# Patient Record
Sex: MALE | Race: WHITE | NOT HISPANIC OR LATINO | Employment: OTHER | URBAN - METROPOLITAN AREA
[De-identification: names, ages, dates, MRNs, and addresses within clinical notes are randomized per-mention and may not be internally consistent; named-entity substitution may affect disease eponyms.]

---

## 2017-01-01 ENCOUNTER — PATIENT OUTREACH (OUTPATIENT)
Dept: ADMINISTRATIVE | Facility: CLINIC | Age: 82
End: 2017-01-01
Payer: MEDICARE

## 2017-01-01 ENCOUNTER — TELEPHONE (OUTPATIENT)
Dept: INTERNAL MEDICINE | Facility: CLINIC | Age: 82
End: 2017-01-01

## 2017-01-01 ENCOUNTER — OFFICE VISIT (OUTPATIENT)
Dept: PULMONOLOGY | Facility: CLINIC | Age: 82
End: 2017-01-01
Payer: MEDICARE

## 2017-01-01 ENCOUNTER — LAB VISIT (OUTPATIENT)
Dept: LAB | Facility: HOSPITAL | Age: 82
End: 2017-01-01
Attending: PSYCHIATRY & NEUROLOGY
Payer: MEDICARE

## 2017-01-01 ENCOUNTER — PROCEDURE VISIT (OUTPATIENT)
Dept: PULMONOLOGY | Facility: CLINIC | Age: 82
End: 2017-01-01
Payer: MEDICARE

## 2017-01-01 ENCOUNTER — OFFICE VISIT (OUTPATIENT)
Dept: NEUROLOGY | Facility: CLINIC | Age: 82
End: 2017-01-01
Payer: MEDICARE

## 2017-01-01 ENCOUNTER — LAB VISIT (OUTPATIENT)
Dept: LAB | Facility: HOSPITAL | Age: 82
End: 2017-01-01
Attending: NUCLEAR MEDICINE
Payer: MEDICARE

## 2017-01-01 ENCOUNTER — TELEPHONE (OUTPATIENT)
Dept: CARDIOLOGY | Facility: CLINIC | Age: 82
End: 2017-01-01

## 2017-01-01 ENCOUNTER — TELEPHONE (OUTPATIENT)
Dept: PHARMACY | Facility: CLINIC | Age: 82
End: 2017-01-01

## 2017-01-01 ENCOUNTER — LAB VISIT (OUTPATIENT)
Dept: LAB | Facility: HOSPITAL | Age: 82
End: 2017-01-01
Attending: INTERNAL MEDICINE
Payer: MEDICARE

## 2017-01-01 ENCOUNTER — HOSPITAL ENCOUNTER (INPATIENT)
Facility: HOSPITAL | Age: 82
LOS: 4 days | Discharge: HOME | DRG: 189 | End: 2017-05-20
Attending: EMERGENCY MEDICINE | Admitting: INTERNAL MEDICINE
Payer: MEDICARE

## 2017-01-01 ENCOUNTER — TELEPHONE (OUTPATIENT)
Dept: PULMONOLOGY | Facility: CLINIC | Age: 82
End: 2017-01-01

## 2017-01-01 ENCOUNTER — OFFICE VISIT (OUTPATIENT)
Dept: CARDIOLOGY | Facility: CLINIC | Age: 82
End: 2017-01-01
Payer: MEDICARE

## 2017-01-01 ENCOUNTER — PATIENT OUTREACH (OUTPATIENT)
Dept: ADMINISTRATIVE | Facility: CLINIC | Age: 82
End: 2017-01-01

## 2017-01-01 ENCOUNTER — HOSPITAL ENCOUNTER (OUTPATIENT)
Dept: RADIOLOGY | Facility: HOSPITAL | Age: 82
Discharge: HOME | End: 2017-01-25
Attending: INTERNAL MEDICINE
Payer: MEDICARE

## 2017-01-01 ENCOUNTER — OFFICE VISIT (OUTPATIENT)
Dept: CARDIOLOGY | Facility: CLINIC | Age: 82
DRG: 291 | End: 2017-01-01
Payer: MEDICARE

## 2017-01-01 ENCOUNTER — IMMUNIZATION (OUTPATIENT)
Dept: INTERNAL MEDICINE | Facility: CLINIC | Age: 82
End: 2017-01-01
Payer: MEDICARE

## 2017-01-01 ENCOUNTER — TELEPHONE (OUTPATIENT)
Dept: HEMATOLOGY/ONCOLOGY | Facility: CLINIC | Age: 82
End: 2017-01-01

## 2017-01-01 ENCOUNTER — HOSPITAL ENCOUNTER (OUTPATIENT)
Dept: RADIOLOGY | Facility: HOSPITAL | Age: 82
Discharge: HOME | End: 2017-04-25
Attending: INTERNAL MEDICINE
Payer: MEDICARE

## 2017-01-01 ENCOUNTER — OFFICE VISIT (OUTPATIENT)
Dept: PODIATRY | Facility: CLINIC | Age: 82
End: 2017-01-01
Payer: MEDICARE

## 2017-01-01 ENCOUNTER — HOSPITAL ENCOUNTER (INPATIENT)
Facility: HOSPITAL | Age: 82
LOS: 5 days | Discharge: HOME | DRG: 871 | End: 2017-09-23
Attending: EMERGENCY MEDICINE | Admitting: FAMILY MEDICINE
Payer: MEDICARE

## 2017-01-01 ENCOUNTER — HOSPITAL ENCOUNTER (INPATIENT)
Facility: HOSPITAL | Age: 82
LOS: 2 days | Discharge: HOME | DRG: 871 | End: 2017-01-05
Attending: EMERGENCY MEDICINE | Admitting: INTERNAL MEDICINE
Payer: MEDICARE

## 2017-01-01 ENCOUNTER — HOSPITAL ENCOUNTER (INPATIENT)
Facility: HOSPITAL | Age: 82
LOS: 3 days | Discharge: HOME | DRG: 291 | End: 2017-07-06
Attending: EMERGENCY MEDICINE | Admitting: EMERGENCY MEDICINE
Payer: MEDICARE

## 2017-01-01 ENCOUNTER — HOSPITAL ENCOUNTER (INPATIENT)
Facility: HOSPITAL | Age: 82
LOS: 3 days | DRG: 291 | End: 2017-10-23
Attending: EMERGENCY MEDICINE | Admitting: INTERNAL MEDICINE
Payer: MEDICARE

## 2017-01-01 VITALS
RESPIRATION RATE: 18 BRPM | DIASTOLIC BLOOD PRESSURE: 64 MMHG | WEIGHT: 202 LBS | HEART RATE: 60 BPM | BODY MASS INDEX: 28.28 KG/M2 | SYSTOLIC BLOOD PRESSURE: 120 MMHG | HEIGHT: 71 IN | OXYGEN SATURATION: 94 %

## 2017-01-01 VITALS
SYSTOLIC BLOOD PRESSURE: 128 MMHG | OXYGEN SATURATION: 94 % | RESPIRATION RATE: 20 BRPM | WEIGHT: 190.5 LBS | TEMPERATURE: 98 F | HEIGHT: 71 IN | DIASTOLIC BLOOD PRESSURE: 67 MMHG | BODY MASS INDEX: 26.67 KG/M2 | HEART RATE: 90 BPM

## 2017-01-01 VITALS
DIASTOLIC BLOOD PRESSURE: 24 MMHG | OXYGEN SATURATION: 88 % | SYSTOLIC BLOOD PRESSURE: 57 MMHG | HEIGHT: 71 IN | HEART RATE: 76 BPM | BODY MASS INDEX: 28.58 KG/M2 | RESPIRATION RATE: 18 BRPM | TEMPERATURE: 101 F | WEIGHT: 204.13 LBS

## 2017-01-01 VITALS
OXYGEN SATURATION: 98 % | SYSTOLIC BLOOD PRESSURE: 115 MMHG | WEIGHT: 186.69 LBS | HEIGHT: 71 IN | RESPIRATION RATE: 20 BRPM | DIASTOLIC BLOOD PRESSURE: 62 MMHG | TEMPERATURE: 98 F | BODY MASS INDEX: 26.14 KG/M2 | HEART RATE: 75 BPM

## 2017-01-01 VITALS
RESPIRATION RATE: 18 BRPM | HEIGHT: 71 IN | SYSTOLIC BLOOD PRESSURE: 104 MMHG | HEART RATE: 73 BPM | WEIGHT: 191 LBS | TEMPERATURE: 98 F | OXYGEN SATURATION: 94 % | DIASTOLIC BLOOD PRESSURE: 53 MMHG | BODY MASS INDEX: 26.74 KG/M2

## 2017-01-01 VITALS
BODY MASS INDEX: 28.55 KG/M2 | HEART RATE: 72 BPM | SYSTOLIC BLOOD PRESSURE: 120 MMHG | HEIGHT: 71 IN | WEIGHT: 203.94 LBS | DIASTOLIC BLOOD PRESSURE: 60 MMHG

## 2017-01-01 VITALS
SYSTOLIC BLOOD PRESSURE: 80 MMHG | WEIGHT: 181.44 LBS | HEIGHT: 71 IN | DIASTOLIC BLOOD PRESSURE: 36 MMHG | BODY MASS INDEX: 25.4 KG/M2 | HEART RATE: 72 BPM

## 2017-01-01 VITALS — HEART RATE: 68 BPM | DIASTOLIC BLOOD PRESSURE: 56 MMHG | HEIGHT: 71 IN | SYSTOLIC BLOOD PRESSURE: 92 MMHG

## 2017-01-01 VITALS
HEART RATE: 67 BPM | SYSTOLIC BLOOD PRESSURE: 90 MMHG | WEIGHT: 181.44 LBS | BODY MASS INDEX: 25.4 KG/M2 | HEIGHT: 71 IN | DIASTOLIC BLOOD PRESSURE: 46 MMHG

## 2017-01-01 VITALS
RESPIRATION RATE: 20 BRPM | HEART RATE: 72 BPM | DIASTOLIC BLOOD PRESSURE: 68 MMHG | SYSTOLIC BLOOD PRESSURE: 90 MMHG | OXYGEN SATURATION: 83 % | BODY MASS INDEX: 26.91 KG/M2 | WEIGHT: 192.25 LBS | HEIGHT: 71 IN

## 2017-01-01 VITALS
HEART RATE: 77 BPM | TEMPERATURE: 98 F | DIASTOLIC BLOOD PRESSURE: 53 MMHG | WEIGHT: 172.81 LBS | RESPIRATION RATE: 20 BRPM | BODY MASS INDEX: 24.19 KG/M2 | HEIGHT: 71 IN | SYSTOLIC BLOOD PRESSURE: 96 MMHG | OXYGEN SATURATION: 94 %

## 2017-01-01 VITALS
SYSTOLIC BLOOD PRESSURE: 104 MMHG | HEART RATE: 96 BPM | WEIGHT: 188.06 LBS | BODY MASS INDEX: 26.33 KG/M2 | HEIGHT: 71 IN | DIASTOLIC BLOOD PRESSURE: 50 MMHG

## 2017-01-01 VITALS
OXYGEN SATURATION: 90 % | HEART RATE: 62 BPM | BODY MASS INDEX: 28.45 KG/M2 | HEIGHT: 71 IN | SYSTOLIC BLOOD PRESSURE: 120 MMHG | DIASTOLIC BLOOD PRESSURE: 60 MMHG | WEIGHT: 203.25 LBS

## 2017-01-01 DIAGNOSIS — G47.36 NOCTURNAL HYPOXEMIA DUE TO EMPHYSEMA: Chronic | ICD-10-CM

## 2017-01-01 DIAGNOSIS — I50.9 CONGESTIVE HEART FAILURE, UNSPECIFIED CONGESTIVE HEART FAILURE CHRONICITY, UNSPECIFIED CONGESTIVE HEART FAILURE TYPE: Primary | ICD-10-CM

## 2017-01-01 DIAGNOSIS — R06.02 SOB (SHORTNESS OF BREATH): ICD-10-CM

## 2017-01-01 DIAGNOSIS — I50.41 ACUTE COMBINED SYSTOLIC AND DIASTOLIC CONGESTIVE HEART FAILURE: Primary | ICD-10-CM

## 2017-01-01 DIAGNOSIS — J44.9 MODERATE COPD (CHRONIC OBSTRUCTIVE PULMONARY DISEASE): ICD-10-CM

## 2017-01-01 DIAGNOSIS — R60.0 LOCALIZED EDEMA: ICD-10-CM

## 2017-01-01 DIAGNOSIS — Z99.81 SUPPLEMENTAL OXYGEN DEPENDENT: ICD-10-CM

## 2017-01-01 DIAGNOSIS — K72.00 ACUTE LIVER FAILURE WITHOUT HEPATIC COMA: ICD-10-CM

## 2017-01-01 DIAGNOSIS — I50.43 ACUTE ON CHRONIC COMBINED SYSTOLIC AND DIASTOLIC CONGESTIVE HEART FAILURE: Primary | ICD-10-CM

## 2017-01-01 DIAGNOSIS — D69.6 THROMBOCYTOPENIA: Primary | Chronic | ICD-10-CM

## 2017-01-01 DIAGNOSIS — R73.9 HYPERGLYCEMIA, UNSPECIFIED: ICD-10-CM

## 2017-01-01 DIAGNOSIS — I50.41 ACUTE COMBINED SYSTOLIC AND DIASTOLIC HEART FAILURE: Primary | ICD-10-CM

## 2017-01-01 DIAGNOSIS — I50.23 ACUTE ON CHRONIC SYSTOLIC CHF (CONGESTIVE HEART FAILURE), NYHA CLASS 4: ICD-10-CM

## 2017-01-01 DIAGNOSIS — I27.20 PULMONARY HYPERTENSION: Chronic | ICD-10-CM

## 2017-01-01 DIAGNOSIS — J96.21 ACUTE ON CHRONIC RESPIRATORY FAILURE WITH HYPOXEMIA: Primary | ICD-10-CM

## 2017-01-01 DIAGNOSIS — R79.89 ELEVATED LACTIC ACID LEVEL: ICD-10-CM

## 2017-01-01 DIAGNOSIS — I25.10 CORONARY ARTERY DISEASE INVOLVING NATIVE CORONARY ARTERY OF NATIVE HEART WITHOUT ANGINA PECTORIS: Chronic | ICD-10-CM

## 2017-01-01 DIAGNOSIS — I50.41 ACUTE COMBINED SYSTOLIC AND DIASTOLIC HEART FAILURE: ICD-10-CM

## 2017-01-01 DIAGNOSIS — L90.9 FAT PAD ATROPHY OF FOOT: ICD-10-CM

## 2017-01-01 DIAGNOSIS — J96.11 CHRONIC RESPIRATORY FAILURE WITH HYPOXIA: Primary | Chronic | ICD-10-CM

## 2017-01-01 DIAGNOSIS — R57.0 CARDIOGENIC SHOCK: ICD-10-CM

## 2017-01-01 DIAGNOSIS — I50.22 CHRONIC SYSTOLIC CONGESTIVE HEART FAILURE: Chronic | ICD-10-CM

## 2017-01-01 DIAGNOSIS — N17.9 ACUTE RENAL FAILURE, UNSPECIFIED ACUTE RENAL FAILURE TYPE: ICD-10-CM

## 2017-01-01 DIAGNOSIS — I50.22 CHRONIC SYSTOLIC CONGESTIVE HEART FAILURE: Primary | Chronic | ICD-10-CM

## 2017-01-01 DIAGNOSIS — J96.21 ACUTE ON CHRONIC RESPIRATORY FAILURE WITH HYPOXIA: ICD-10-CM

## 2017-01-01 DIAGNOSIS — J43.9 NOCTURNAL HYPOXEMIA DUE TO EMPHYSEMA: Chronic | ICD-10-CM

## 2017-01-01 DIAGNOSIS — R60.9 EDEMA, UNSPECIFIED TYPE: ICD-10-CM

## 2017-01-01 DIAGNOSIS — J81.0 ACUTE PULMONARY EDEMA: ICD-10-CM

## 2017-01-01 DIAGNOSIS — J44.9 COPD (CHRONIC OBSTRUCTIVE PULMONARY DISEASE): ICD-10-CM

## 2017-01-01 DIAGNOSIS — J44.9 MODERATE COPD (CHRONIC OBSTRUCTIVE PULMONARY DISEASE): Primary | ICD-10-CM

## 2017-01-01 DIAGNOSIS — J44.1 CHRONIC OBSTRUCTIVE PULMONARY DISEASE WITH ACUTE EXACERBATION: ICD-10-CM

## 2017-01-01 DIAGNOSIS — J96.01 ACUTE RESPIRATORY FAILURE WITH HYPOXIA: ICD-10-CM

## 2017-01-01 DIAGNOSIS — R56.9 SEIZURES: Chronic | ICD-10-CM

## 2017-01-01 DIAGNOSIS — I50.41 ACUTE COMBINED SYSTOLIC AND DIASTOLIC CONGESTIVE HEART FAILURE: ICD-10-CM

## 2017-01-01 DIAGNOSIS — L85.3 XEROSIS CUTIS: ICD-10-CM

## 2017-01-01 DIAGNOSIS — I50.9 CONGESTIVE HEART FAILURE, UNSPECIFIED CONGESTIVE HEART FAILURE CHRONICITY, UNSPECIFIED CONGESTIVE HEART FAILURE TYPE: ICD-10-CM

## 2017-01-01 DIAGNOSIS — D69.6 THROMBOCYTOPENIA: Chronic | ICD-10-CM

## 2017-01-01 DIAGNOSIS — J44.0 CHRONIC OBSTRUCTIVE PULMONARY DISEASE WITH ACUTE LOWER RESPIRATORY INFECTION: ICD-10-CM

## 2017-01-01 DIAGNOSIS — R74.8 ELEVATION OF CARDIAC ENZYMES: ICD-10-CM

## 2017-01-01 DIAGNOSIS — I25.5 CARDIOMYOPATHY, ISCHEMIC: Chronic | ICD-10-CM

## 2017-01-01 DIAGNOSIS — I50.9 ACUTE CONGESTIVE HEART FAILURE, UNSPECIFIED CONGESTIVE HEART FAILURE TYPE: ICD-10-CM

## 2017-01-01 DIAGNOSIS — L84 CORN OR CALLUS: ICD-10-CM

## 2017-01-01 DIAGNOSIS — M62.469 GASTROCNEMIUS EQUINUS, UNSPECIFIED LATERALITY: ICD-10-CM

## 2017-01-01 DIAGNOSIS — I50.43 ACUTE ON CHRONIC COMBINED SYSTOLIC AND DIASTOLIC CONGESTIVE HEART FAILURE: ICD-10-CM

## 2017-01-01 DIAGNOSIS — B96.20 E. COLI URINARY TRACT INFECTION: ICD-10-CM

## 2017-01-01 DIAGNOSIS — I27.9 PULMONARY HEART DISEASE: Primary | ICD-10-CM

## 2017-01-01 DIAGNOSIS — J96.11 CHRONIC RESPIRATORY FAILURE WITH HYPOXIA: Chronic | ICD-10-CM

## 2017-01-01 DIAGNOSIS — R51.9 ACUTE NONINTRACTABLE HEADACHE, UNSPECIFIED HEADACHE TYPE: ICD-10-CM

## 2017-01-01 DIAGNOSIS — J44.9 CHRONIC OBSTRUCTIVE PULMONARY DISEASE, UNSPECIFIED COPD TYPE: ICD-10-CM

## 2017-01-01 DIAGNOSIS — I73.9 PERIPHERAL VASCULAR DISEASE: Primary | ICD-10-CM

## 2017-01-01 DIAGNOSIS — A41.9 SEVERE SEPSIS: ICD-10-CM

## 2017-01-01 DIAGNOSIS — R57.9 SHOCK: ICD-10-CM

## 2017-01-01 DIAGNOSIS — N39.0 E. COLI URINARY TRACT INFECTION: ICD-10-CM

## 2017-01-01 DIAGNOSIS — I35.0 NONRHEUMATIC AORTIC VALVE STENOSIS: ICD-10-CM

## 2017-01-01 DIAGNOSIS — I48.91 A-FIB: ICD-10-CM

## 2017-01-01 DIAGNOSIS — G40.909 SEIZURE DISORDER: Primary | ICD-10-CM

## 2017-01-01 DIAGNOSIS — R56.9 CONVULSIONS, UNSPECIFIED CONVULSION TYPE: Chronic | ICD-10-CM

## 2017-01-01 DIAGNOSIS — R06.02 SHORTNESS OF BREATH: ICD-10-CM

## 2017-01-01 DIAGNOSIS — I50.9 ACUTE CONGESTIVE HEART FAILURE, UNSPECIFIED CONGESTIVE HEART FAILURE TYPE: Primary | ICD-10-CM

## 2017-01-01 DIAGNOSIS — R65.20 SEVERE SEPSIS: ICD-10-CM

## 2017-01-01 DIAGNOSIS — J96.21 ACUTE ON CHRONIC RESPIRATORY FAILURE WITH HYPOXIA: Primary | ICD-10-CM

## 2017-01-01 DIAGNOSIS — A41.9 SEPSIS, DUE TO UNSPECIFIED ORGANISM: ICD-10-CM

## 2017-01-01 DIAGNOSIS — I27.9 PULMONARY HEART DISEASE: ICD-10-CM

## 2017-01-01 DIAGNOSIS — J43.9 PULMONARY EMPHYSEMA, UNSPECIFIED EMPHYSEMA TYPE: Primary | ICD-10-CM

## 2017-01-01 DIAGNOSIS — I27.21 PAH (PULMONARY ARTERY HYPERTENSION): ICD-10-CM

## 2017-01-01 DIAGNOSIS — R51.9 PERSISTENT HEADACHES: ICD-10-CM

## 2017-01-01 DIAGNOSIS — R09.02 HYPOXIA: Primary | ICD-10-CM

## 2017-01-01 DIAGNOSIS — M10.9 GOUT SYNOVITIS: ICD-10-CM

## 2017-01-01 DIAGNOSIS — I48.0 PAF (PAROXYSMAL ATRIAL FIBRILLATION): ICD-10-CM

## 2017-01-01 DIAGNOSIS — J44.1 ACUTE EXACERBATION OF CHRONIC OBSTRUCTIVE PULMONARY DISEASE (COPD): ICD-10-CM

## 2017-01-01 DIAGNOSIS — I50.9 CHF (CONGESTIVE HEART FAILURE): ICD-10-CM

## 2017-01-01 DIAGNOSIS — M20.12 HALLUX ABDUCTO VALGUS, LEFT: ICD-10-CM

## 2017-01-01 DIAGNOSIS — N17.9 AKI (ACUTE KIDNEY INJURY): ICD-10-CM

## 2017-01-01 DIAGNOSIS — E87.20 METABOLIC ACIDOSIS: ICD-10-CM

## 2017-01-01 DIAGNOSIS — Z95.2 H/O AORTIC VALVE REPLACEMENT: Chronic | ICD-10-CM

## 2017-01-01 DIAGNOSIS — E87.6 CHRONIC HYPOKALEMIA: ICD-10-CM

## 2017-01-01 DIAGNOSIS — R51.9 ACUTE NONINTRACTABLE HEADACHE, UNSPECIFIED HEADACHE TYPE: Primary | ICD-10-CM

## 2017-01-01 DIAGNOSIS — J18.9 PNEUMONIA OF LEFT LOWER LOBE DUE TO INFECTIOUS ORGANISM: ICD-10-CM

## 2017-01-01 DIAGNOSIS — J18.9 LLL PNEUMONIA: ICD-10-CM

## 2017-01-01 DIAGNOSIS — R06.00 DYSPNEA: ICD-10-CM

## 2017-01-01 DIAGNOSIS — N39.0 URINARY TRACT INFECTION WITHOUT HEMATURIA, SITE UNSPECIFIED: ICD-10-CM

## 2017-01-01 DIAGNOSIS — J96.01 ACUTE RESPIRATORY FAILURE WITH HYPOXIA: Primary | ICD-10-CM

## 2017-01-01 DIAGNOSIS — N39.0 ACUTE URINARY TRACT INFECTION: ICD-10-CM

## 2017-01-01 DIAGNOSIS — J43.2 CENTRILOBULAR EMPHYSEMA: Chronic | ICD-10-CM

## 2017-01-01 LAB
ACANTHOCYTES BLD QL SMEAR: PRESENT
ALBUMIN SERPL BCP-MCNC: 2.4 G/DL
ALBUMIN SERPL BCP-MCNC: 2.8 G/DL
ALBUMIN SERPL BCP-MCNC: 2.9 G/DL
ALBUMIN SERPL BCP-MCNC: 3 G/DL
ALBUMIN SERPL BCP-MCNC: 3.2 G/DL
ALBUMIN SERPL BCP-MCNC: 3.3 G/DL
ALBUMIN SERPL BCP-MCNC: 3.4 G/DL
ALBUMIN SERPL BCP-MCNC: 3.4 G/DL
ALBUMIN SERPL BCP-MCNC: 3.7 G/DL
ALBUMIN SERPL BCP-MCNC: 3.9 G/DL
ALLENS TEST: ABNORMAL
ALP SERPL-CCNC: 107 U/L
ALP SERPL-CCNC: 108 U/L
ALP SERPL-CCNC: 108 U/L
ALP SERPL-CCNC: 137 U/L
ALP SERPL-CCNC: 57 U/L
ALP SERPL-CCNC: 62 U/L
ALP SERPL-CCNC: 65 U/L
ALP SERPL-CCNC: 71 U/L
ALP SERPL-CCNC: 72 U/L
ALP SERPL-CCNC: 73 U/L
ALP SERPL-CCNC: 80 U/L
ALP SERPL-CCNC: 80 U/L
ALP SERPL-CCNC: 82 U/L
ALP SERPL-CCNC: 89 U/L
ALP SERPL-CCNC: 91 U/L
ALP SERPL-CCNC: 96 U/L
ALP SERPL-CCNC: 99 U/L
ALT SERPL W/O P-5'-P-CCNC: 13 U/L
ALT SERPL W/O P-5'-P-CCNC: 14 U/L
ALT SERPL W/O P-5'-P-CCNC: 14 U/L
ALT SERPL W/O P-5'-P-CCNC: 15 U/L
ALT SERPL W/O P-5'-P-CCNC: 17 U/L
ALT SERPL W/O P-5'-P-CCNC: 17 U/L
ALT SERPL W/O P-5'-P-CCNC: 18 U/L
ALT SERPL W/O P-5'-P-CCNC: 18 U/L
ALT SERPL W/O P-5'-P-CCNC: 19 U/L
ALT SERPL W/O P-5'-P-CCNC: 21 U/L
ALT SERPL W/O P-5'-P-CCNC: 22 U/L
ALT SERPL W/O P-5'-P-CCNC: 31 U/L
ALT SERPL W/O P-5'-P-CCNC: 3401 U/L
AMORPH CRY URNS QL MICRO: ABNORMAL
ANION GAP SERPL CALC-SCNC: 10 MMOL/L
ANION GAP SERPL CALC-SCNC: 11 MMOL/L
ANION GAP SERPL CALC-SCNC: 12 MMOL/L
ANION GAP SERPL CALC-SCNC: 13 MMOL/L
ANION GAP SERPL CALC-SCNC: 14 MMOL/L
ANION GAP SERPL CALC-SCNC: 14 MMOL/L
ANION GAP SERPL CALC-SCNC: 15 MMOL/L
ANION GAP SERPL CALC-SCNC: 17 MMOL/L
ANION GAP SERPL CALC-SCNC: 18 MMOL/L
ANION GAP SERPL CALC-SCNC: 18 MMOL/L
ANION GAP SERPL CALC-SCNC: 7 MMOL/L
ANION GAP SERPL CALC-SCNC: 8 MMOL/L
ANION GAP SERPL CALC-SCNC: 8 MMOL/L
ANION GAP SERPL CALC-SCNC: 9 MMOL/L
ANISOCYTOSIS BLD QL SMEAR: SLIGHT
AORTIC VALVE REGURGITATION: ABNORMAL
AORTIC VALVE REGURGITATION: ABNORMAL
APTT BLDCRRT: 26.8 SEC
APTT BLDCRRT: 27.3 SEC
APTT BLDCRRT: 29.2 SEC
APTT BLDCRRT: 50.6 SEC
AST SERPL-CCNC: 13 U/L
AST SERPL-CCNC: 16 U/L
AST SERPL-CCNC: 1603 U/L
AST SERPL-CCNC: 17 U/L
AST SERPL-CCNC: 18 U/L
AST SERPL-CCNC: 19 U/L
AST SERPL-CCNC: 20 U/L
AST SERPL-CCNC: 21 U/L
AST SERPL-CCNC: 22 U/L
AST SERPL-CCNC: 22 U/L
AST SERPL-CCNC: 26 U/L
AST SERPL-CCNC: 27 U/L
AST SERPL-CCNC: 27 U/L
AST SERPL-CCNC: 40 U/L
BACTERIA #/AREA URNS HPF: ABNORMAL /HPF
BACTERIA #/AREA URNS HPF: ABNORMAL /HPF
BACTERIA BLD CULT: NORMAL
BACTERIA SPEC AEROBE CULT: NORMAL
BACTERIA UR CULT: NO GROWTH
BACTERIA UR CULT: NORMAL
BACTERIA UR CULT: NORMAL
BASOPHILS # BLD AUTO: 0 K/UL
BASOPHILS # BLD AUTO: 0.01 K/UL
BASOPHILS # BLD AUTO: 0.03 K/UL
BASOPHILS # BLD AUTO: 0.03 K/UL
BASOPHILS # BLD AUTO: 0.04 K/UL
BASOPHILS # BLD AUTO: 0.04 K/UL
BASOPHILS # BLD AUTO: 0.05 K/UL
BASOPHILS # BLD AUTO: 0.05 K/UL
BASOPHILS # BLD AUTO: 0.06 K/UL
BASOPHILS # BLD AUTO: 0.06 K/UL
BASOPHILS NFR BLD: 0 %
BASOPHILS NFR BLD: 0.1 %
BASOPHILS NFR BLD: 0.1 %
BASOPHILS NFR BLD: 0.2 %
BASOPHILS NFR BLD: 0.3 %
BASOPHILS NFR BLD: 0.4 %
BASOPHILS NFR BLD: 0.6 %
BASOPHILS NFR BLD: 0.8 %
BASOPHILS NFR BLD: 0.9 %
BASOPHILS NFR BLD: 1 %
BASOPHILS NFR BLD: 1 %
BASOPHILS NFR BLD: 1.2 %
BILIRUB SERPL-MCNC: 1.5 MG/DL
BILIRUB SERPL-MCNC: 1.6 MG/DL
BILIRUB SERPL-MCNC: 1.7 MG/DL
BILIRUB SERPL-MCNC: 1.7 MG/DL
BILIRUB SERPL-MCNC: 1.8 MG/DL
BILIRUB SERPL-MCNC: 2 MG/DL
BILIRUB SERPL-MCNC: 2.1 MG/DL
BILIRUB SERPL-MCNC: 2.1 MG/DL
BILIRUB SERPL-MCNC: 2.5 MG/DL
BILIRUB SERPL-MCNC: 2.7 MG/DL
BILIRUB SERPL-MCNC: 2.8 MG/DL
BILIRUB SERPL-MCNC: 5.8 MG/DL
BILIRUB UR QL STRIP: ABNORMAL
BILIRUB UR QL STRIP: NEGATIVE
BNP SERPL-MCNC: 1524 PG/ML
BNP SERPL-MCNC: 1556 PG/ML
BNP SERPL-MCNC: 1829 PG/ML
BNP SERPL-MCNC: 1977 PG/ML
BNP SERPL-MCNC: 2317 PG/ML
BNP SERPL-MCNC: 2560 PG/ML
BNP SERPL-MCNC: 2597 PG/ML
BUN SERPL-MCNC: 29 MG/DL
BUN SERPL-MCNC: 31 MG/DL
BUN SERPL-MCNC: 33 MG/DL
BUN SERPL-MCNC: 35 MG/DL
BUN SERPL-MCNC: 39 MG/DL
BUN SERPL-MCNC: 40 MG/DL
BUN SERPL-MCNC: 46 MG/DL
BUN SERPL-MCNC: 46 MG/DL
BUN SERPL-MCNC: 49 MG/DL
BUN SERPL-MCNC: 52 MG/DL
BUN SERPL-MCNC: 56 MG/DL
BUN SERPL-MCNC: 56 MG/DL
BUN SERPL-MCNC: 57 MG/DL
BUN SERPL-MCNC: 57 MG/DL
BUN SERPL-MCNC: 60 MG/DL
BUN SERPL-MCNC: 60 MG/DL
BUN SERPL-MCNC: 62 MG/DL
BUN SERPL-MCNC: 62 MG/DL
BUN SERPL-MCNC: 63 MG/DL
BUN SERPL-MCNC: 63 MG/DL
BUN SERPL-MCNC: 64 MG/DL
BUN SERPL-MCNC: 65 MG/DL
BUN SERPL-MCNC: 66 MG/DL
BUN SERPL-MCNC: 66 MG/DL
BUN SERPL-MCNC: 67 MG/DL
BUN SERPL-MCNC: 68 MG/DL
BUN SERPL-MCNC: 69 MG/DL
BUN SERPL-MCNC: 73 MG/DL
BUN SERPL-MCNC: 84 MG/DL
BURR CELLS BLD QL SMEAR: ABNORMAL
CALCIUM SERPL-MCNC: 6.3 MG/DL
CALCIUM SERPL-MCNC: 7.1 MG/DL
CALCIUM SERPL-MCNC: 7.3 MG/DL
CALCIUM SERPL-MCNC: 7.9 MG/DL
CALCIUM SERPL-MCNC: 8 MG/DL
CALCIUM SERPL-MCNC: 8.1 MG/DL
CALCIUM SERPL-MCNC: 8.7 MG/DL
CALCIUM SERPL-MCNC: 8.7 MG/DL
CALCIUM SERPL-MCNC: 8.8 MG/DL
CALCIUM SERPL-MCNC: 8.8 MG/DL
CALCIUM SERPL-MCNC: 9 MG/DL
CALCIUM SERPL-MCNC: 9.1 MG/DL
CALCIUM SERPL-MCNC: 9.2 MG/DL
CALCIUM SERPL-MCNC: 9.3 MG/DL
CALCIUM SERPL-MCNC: 9.3 MG/DL
CALCIUM SERPL-MCNC: 9.4 MG/DL
CALCIUM SERPL-MCNC: 9.5 MG/DL
CALCIUM SERPL-MCNC: 9.5 MG/DL
CALCIUM SERPL-MCNC: 9.9 MG/DL
CHLORIDE SERPL-SCNC: 101 MMOL/L
CHLORIDE SERPL-SCNC: 104 MMOL/L
CHLORIDE SERPL-SCNC: 105 MMOL/L
CHLORIDE SERPL-SCNC: 105 MMOL/L
CHLORIDE SERPL-SCNC: 106 MMOL/L
CHLORIDE SERPL-SCNC: 107 MMOL/L
CHLORIDE SERPL-SCNC: 108 MMOL/L
CHLORIDE SERPL-SCNC: 108 MMOL/L
CHLORIDE SERPL-SCNC: 109 MMOL/L
CHLORIDE SERPL-SCNC: 110 MMOL/L
CHLORIDE SERPL-SCNC: 110 MMOL/L
CHLORIDE SERPL-SCNC: 111 MMOL/L
CHLORIDE SERPL-SCNC: 112 MMOL/L
CHLORIDE SERPL-SCNC: 114 MMOL/L
CHLORIDE SERPL-SCNC: 116 MMOL/L
CHLORIDE SERPL-SCNC: 120 MMOL/L
CHLORIDE SERPL-SCNC: 96 MMOL/L
CHLORIDE SERPL-SCNC: 99 MMOL/L
CK MB SERPL-MCNC: 2.7 NG/ML
CK MB SERPL-MCNC: 6.2 NG/ML
CK MB SERPL-RTO: 2.9 %
CK MB SERPL-RTO: 5.5 %
CK SERPL-CCNC: 112 U/L
CK SERPL-CCNC: 112 U/L
CK SERPL-CCNC: 92 U/L
CK SERPL-CCNC: 92 U/L
CLARITY UR: ABNORMAL
CLARITY UR: CLEAR
CO2 SERPL-SCNC: 11 MMOL/L
CO2 SERPL-SCNC: 13 MMOL/L
CO2 SERPL-SCNC: 14 MMOL/L
CO2 SERPL-SCNC: 15 MMOL/L
CO2 SERPL-SCNC: 16 MMOL/L
CO2 SERPL-SCNC: 16 MMOL/L
CO2 SERPL-SCNC: 17 MMOL/L
CO2 SERPL-SCNC: 18 MMOL/L
CO2 SERPL-SCNC: 19 MMOL/L
CO2 SERPL-SCNC: 20 MMOL/L
CO2 SERPL-SCNC: 21 MMOL/L
CO2 SERPL-SCNC: 22 MMOL/L
CO2 SERPL-SCNC: 22 MMOL/L
CO2 SERPL-SCNC: 23 MMOL/L
CO2 SERPL-SCNC: 23 MMOL/L
CO2 SERPL-SCNC: 24 MMOL/L
CO2 SERPL-SCNC: 26 MMOL/L
CO2 SERPL-SCNC: 27 MMOL/L
CO2 SERPL-SCNC: 8 MMOL/L
CO2 SERPL-SCNC: 8 MMOL/L
COLOR UR: YELLOW
CREAT SERPL-MCNC: 1 MG/DL
CREAT SERPL-MCNC: 1.2 MG/DL
CREAT SERPL-MCNC: 1.2 MG/DL
CREAT SERPL-MCNC: 1.3 MG/DL
CREAT SERPL-MCNC: 1.4 MG/DL
CREAT SERPL-MCNC: 1.4 MG/DL
CREAT SERPL-MCNC: 1.5 MG/DL
CREAT SERPL-MCNC: 1.6 MG/DL
CREAT SERPL-MCNC: 1.7 MG/DL
CREAT SERPL-MCNC: 1.8 MG/DL
CREAT SERPL-MCNC: 1.9 MG/DL
CREAT SERPL-MCNC: 2 MG/DL
CREAT SERPL-MCNC: 2.3 MG/DL
CREAT SERPL-MCNC: 2.6 MG/DL
D DIMER PPP IA.FEU-MCNC: 2.77 MG/L FEU
DACRYOCYTES BLD QL SMEAR: ABNORMAL
DACRYOCYTES BLD QL SMEAR: ABNORMAL
DELSYS: ABNORMAL
DIASTOLIC DYSFUNCTION: YES
DIFFERENTIAL METHOD: ABNORMAL
EOSINOPHIL # BLD AUTO: 0 K/UL
EOSINOPHIL # BLD AUTO: 0.1 K/UL
EOSINOPHIL NFR BLD: 0 %
EOSINOPHIL NFR BLD: 0.1 %
EOSINOPHIL NFR BLD: 0.3 %
EOSINOPHIL NFR BLD: 0.5 %
EOSINOPHIL NFR BLD: 0.6 %
EOSINOPHIL NFR BLD: 0.7 %
EOSINOPHIL NFR BLD: 0.9 %
EOSINOPHIL NFR BLD: 1.3 %
EOSINOPHIL NFR BLD: 1.4 %
EOSINOPHIL NFR BLD: 2 %
EP: 6
ERYTHROCYTE [DISTWIDTH] IN BLOOD BY AUTOMATED COUNT: 19.6 %
ERYTHROCYTE [DISTWIDTH] IN BLOOD BY AUTOMATED COUNT: 19.7 %
ERYTHROCYTE [DISTWIDTH] IN BLOOD BY AUTOMATED COUNT: 19.7 %
ERYTHROCYTE [DISTWIDTH] IN BLOOD BY AUTOMATED COUNT: 19.8 %
ERYTHROCYTE [DISTWIDTH] IN BLOOD BY AUTOMATED COUNT: 19.8 %
ERYTHROCYTE [DISTWIDTH] IN BLOOD BY AUTOMATED COUNT: 20 %
ERYTHROCYTE [DISTWIDTH] IN BLOOD BY AUTOMATED COUNT: 20.2 %
ERYTHROCYTE [DISTWIDTH] IN BLOOD BY AUTOMATED COUNT: 20.5 %
ERYTHROCYTE [DISTWIDTH] IN BLOOD BY AUTOMATED COUNT: 20.6 %
ERYTHROCYTE [DISTWIDTH] IN BLOOD BY AUTOMATED COUNT: 20.7 %
ERYTHROCYTE [DISTWIDTH] IN BLOOD BY AUTOMATED COUNT: 20.8 %
ERYTHROCYTE [DISTWIDTH] IN BLOOD BY AUTOMATED COUNT: 20.9 %
ERYTHROCYTE [DISTWIDTH] IN BLOOD BY AUTOMATED COUNT: 21 %
ERYTHROCYTE [DISTWIDTH] IN BLOOD BY AUTOMATED COUNT: 21 %
ERYTHROCYTE [DISTWIDTH] IN BLOOD BY AUTOMATED COUNT: 21.2 %
ERYTHROCYTE [DISTWIDTH] IN BLOOD BY AUTOMATED COUNT: 21.2 %
ERYTHROCYTE [DISTWIDTH] IN BLOOD BY AUTOMATED COUNT: 21.3 %
ERYTHROCYTE [DISTWIDTH] IN BLOOD BY AUTOMATED COUNT: 22 %
ERYTHROCYTE [SEDIMENTATION RATE] IN BLOOD BY WESTERGREN METHOD: 1 MM/HR
ERYTHROCYTE [SEDIMENTATION RATE] IN BLOOD BY WESTERGREN METHOD: 10 MM/H
ERYTHROCYTE [SEDIMENTATION RATE] IN BLOOD BY WESTERGREN METHOD: 16 MM/H
ERYTHROCYTE [SEDIMENTATION RATE] IN BLOOD BY WESTERGREN METHOD: 16 MM/H
ERYTHROCYTE [SEDIMENTATION RATE] IN BLOOD BY WESTERGREN METHOD: 20 MM/H
ERYTHROCYTE [SEDIMENTATION RATE] IN BLOOD BY WESTERGREN METHOD: 22 MM/H
ERYTHROCYTE [SEDIMENTATION RATE] IN BLOOD BY WESTERGREN METHOD: 22 MM/H
EST. GFR  (AFRICAN AMERICAN): 26 ML/MIN/1.73 M^2
EST. GFR  (AFRICAN AMERICAN): 30 ML/MIN/1.73 M^2
EST. GFR  (AFRICAN AMERICAN): 35.6 ML/MIN/1.73 M^2
EST. GFR  (AFRICAN AMERICAN): 38 ML/MIN/1.73 M^2
EST. GFR  (AFRICAN AMERICAN): 40 ML/MIN/1.73 M^2
EST. GFR  (AFRICAN AMERICAN): 43 ML/MIN/1.73 M^2
EST. GFR  (AFRICAN AMERICAN): 46.7 ML/MIN/1.73 M^2
EST. GFR  (AFRICAN AMERICAN): 46.7 ML/MIN/1.73 M^2
EST. GFR  (AFRICAN AMERICAN): 47 ML/MIN/1.73 M^2
EST. GFR  (AFRICAN AMERICAN): 47 ML/MIN/1.73 M^2
EST. GFR  (AFRICAN AMERICAN): 50 ML/MIN/1.73 M^2
EST. GFR  (AFRICAN AMERICAN): 54.9 ML/MIN/1.73 M^2
EST. GFR  (AFRICAN AMERICAN): 55 ML/MIN/1.73 M^2
EST. GFR  (AFRICAN AMERICAN): 60 ML/MIN/1.73 M^2
EST. GFR  (AFRICAN AMERICAN): >60 ML/MIN/1.73 M^2
EST. GFR  (NON AFRICAN AMERICAN): 22 ML/MIN/1.73 M^2
EST. GFR  (NON AFRICAN AMERICAN): 26 ML/MIN/1.73 M^2
EST. GFR  (NON AFRICAN AMERICAN): 30.8 ML/MIN/1.73 M^2
EST. GFR  (NON AFRICAN AMERICAN): 33 ML/MIN/1.73 M^2
EST. GFR  (NON AFRICAN AMERICAN): 35 ML/MIN/1.73 M^2
EST. GFR  (NON AFRICAN AMERICAN): 38 ML/MIN/1.73 M^2
EST. GFR  (NON AFRICAN AMERICAN): 40 ML/MIN/1.73 M^2
EST. GFR  (NON AFRICAN AMERICAN): 40 ML/MIN/1.73 M^2
EST. GFR  (NON AFRICAN AMERICAN): 40.4 ML/MIN/1.73 M^2
EST. GFR  (NON AFRICAN AMERICAN): 40.4 ML/MIN/1.73 M^2
EST. GFR  (NON AFRICAN AMERICAN): 44 ML/MIN/1.73 M^2
EST. GFR  (NON AFRICAN AMERICAN): 47 ML/MIN/1.73 M^2
EST. GFR  (NON AFRICAN AMERICAN): 47.4 ML/MIN/1.73 M^2
EST. GFR  (NON AFRICAN AMERICAN): 52 ML/MIN/1.73 M^2
EST. GFR  (NON AFRICAN AMERICAN): 57 ML/MIN/1.73 M^2
EST. GFR  (NON AFRICAN AMERICAN): 57 ML/MIN/1.73 M^2
EST. GFR  (NON AFRICAN AMERICAN): >60 ML/MIN/1.73 M^2
ESTIMATED PA SYSTOLIC PRESSURE: 33.64
ESTIMATED PA SYSTOLIC PRESSURE: 50.28
ESTIMATED PA SYSTOLIC PRESSURE: 69.15
FIBRINOGEN PPP-MCNC: 151 MG/DL
FIO2: 100
FIO2: 40
FIO2: 45
FIO2: 50
FIO2: 50
FIO2: 55
FLOW: 14
FLOW: 5
FLUAV AG SPEC QL IA: NEGATIVE
FLUBV AG SPEC QL IA: NEGATIVE
GIANT PLATELETS BLD QL SMEAR: PRESENT
GIANT PLATELETS BLD QL SMEAR: PRESENT
GLUCOSE SERPL-MCNC: 108 MG/DL
GLUCOSE SERPL-MCNC: 110 MG/DL
GLUCOSE SERPL-MCNC: 112 MG/DL
GLUCOSE SERPL-MCNC: 119 MG/DL
GLUCOSE SERPL-MCNC: 123 MG/DL
GLUCOSE SERPL-MCNC: 123 MG/DL
GLUCOSE SERPL-MCNC: 127 MG/DL
GLUCOSE SERPL-MCNC: 136 MG/DL
GLUCOSE SERPL-MCNC: 137 MG/DL
GLUCOSE SERPL-MCNC: 143 MG/DL
GLUCOSE SERPL-MCNC: 144 MG/DL
GLUCOSE SERPL-MCNC: 147 MG/DL
GLUCOSE SERPL-MCNC: 160 MG/DL
GLUCOSE SERPL-MCNC: 164 MG/DL
GLUCOSE SERPL-MCNC: 164 MG/DL
GLUCOSE SERPL-MCNC: 165 MG/DL
GLUCOSE SERPL-MCNC: 172 MG/DL
GLUCOSE SERPL-MCNC: 176 MG/DL
GLUCOSE SERPL-MCNC: 180 MG/DL
GLUCOSE SERPL-MCNC: 207 MG/DL
GLUCOSE SERPL-MCNC: 218 MG/DL
GLUCOSE SERPL-MCNC: 316 MG/DL
GLUCOSE SERPL-MCNC: 353 MG/DL
GLUCOSE SERPL-MCNC: 85 MG/DL
GLUCOSE SERPL-MCNC: 86 MG/DL
GLUCOSE SERPL-MCNC: 89 MG/DL
GLUCOSE SERPL-MCNC: 90 MG/DL
GLUCOSE SERPL-MCNC: 92 MG/DL
GLUCOSE SERPL-MCNC: 96 MG/DL
GLUCOSE SERPL-MCNC: 96 MG/DL
GLUCOSE SERPL-MCNC: 99 MG/DL
GLUCOSE UR QL STRIP: NEGATIVE
GRAM STN SPEC: NORMAL
HCO3 UR-SCNC: 10.4 MMOL/L (ref 24–28)
HCO3 UR-SCNC: 10.5 MMOL/L (ref 24–28)
HCO3 UR-SCNC: 11 MMOL/L (ref 24–28)
HCO3 UR-SCNC: 13.7 MMOL/L (ref 24–28)
HCO3 UR-SCNC: 16.2 MMOL/L (ref 24–28)
HCO3 UR-SCNC: 16.3 MMOL/L (ref 24–28)
HCO3 UR-SCNC: 16.4 MMOL/L (ref 24–28)
HCO3 UR-SCNC: 16.7 MMOL/L (ref 24–28)
HCO3 UR-SCNC: 17.6 MMOL/L (ref 24–28)
HCO3 UR-SCNC: 21.7 MMOL/L (ref 24–28)
HCT VFR BLD AUTO: 34.3 %
HCT VFR BLD AUTO: 34.9 %
HCT VFR BLD AUTO: 35.3 %
HCT VFR BLD AUTO: 35.5 %
HCT VFR BLD AUTO: 36.2 %
HCT VFR BLD AUTO: 36.4 %
HCT VFR BLD AUTO: 36.7 %
HCT VFR BLD AUTO: 37.4 %
HCT VFR BLD AUTO: 37.6 %
HCT VFR BLD AUTO: 37.8 %
HCT VFR BLD AUTO: 37.8 %
HCT VFR BLD AUTO: 37.9 %
HCT VFR BLD AUTO: 38.5 %
HCT VFR BLD AUTO: 39.9 %
HCT VFR BLD AUTO: 40 %
HCT VFR BLD AUTO: 40.3 %
HCT VFR BLD AUTO: 41.1 %
HCT VFR BLD AUTO: 41.1 %
HCT VFR BLD AUTO: 42 %
HCT VFR BLD AUTO: 43 %
HCT VFR BLD AUTO: 43.1 %
HCT VFR BLD AUTO: 44.3 %
HGB BLD-MCNC: 10.3 G/DL
HGB BLD-MCNC: 10.8 G/DL
HGB BLD-MCNC: 10.9 G/DL
HGB BLD-MCNC: 11 G/DL
HGB BLD-MCNC: 11.3 G/DL
HGB BLD-MCNC: 11.5 G/DL
HGB BLD-MCNC: 11.6 G/DL
HGB BLD-MCNC: 11.6 G/DL
HGB BLD-MCNC: 11.9 G/DL
HGB BLD-MCNC: 12.3 G/DL
HGB BLD-MCNC: 12.3 G/DL
HGB BLD-MCNC: 12.5 G/DL
HGB BLD-MCNC: 12.6 G/DL
HGB BLD-MCNC: 12.6 G/DL
HGB BLD-MCNC: 12.8 G/DL
HGB BLD-MCNC: 13 G/DL
HGB BLD-MCNC: 13 G/DL
HGB BLD-MCNC: 13.4 G/DL
HGB BLD-MCNC: 13.8 G/DL
HGB BLD-MCNC: 14.3 G/DL
HGB UR QL STRIP: ABNORMAL
HGB UR QL STRIP: NEGATIVE
HYALINE CASTS #/AREA URNS LPF: 1 /LPF
HYALINE CASTS #/AREA URNS LPF: 3 /LPF
INR PPP: 1.2
INR PPP: 1.4
INR PPP: 1.5
INR PPP: 2.5
IP: 10
IP: 10
IP: 12
IP: 20
IT: 0.68
IT: 0.68
KETONES UR QL STRIP: ABNORMAL
KETONES UR QL STRIP: NEGATIVE
LACTATE SERPL-SCNC: 1.2 MMOL/L
LACTATE SERPL-SCNC: 1.3 MMOL/L
LACTATE SERPL-SCNC: 1.4 MMOL/L
LACTATE SERPL-SCNC: 1.8 MMOL/L
LACTATE SERPL-SCNC: 2.1 MMOL/L
LACTATE SERPL-SCNC: 2.4 MMOL/L
LACTATE SERPL-SCNC: 3.4 MMOL/L
LACTATE SERPL-SCNC: 3.9 MMOL/L
LACTATE SERPL-SCNC: 4.8 MMOL/L
LACTATE SERPL-SCNC: 5.1 MMOL/L
LACTATE SERPL-SCNC: 6.1 MMOL/L
LACTATE SERPL-SCNC: 6.2 MMOL/L
LACTATE SERPL-SCNC: 6.2 MMOL/L
LEUKOCYTE ESTERASE UR QL STRIP: ABNORMAL
LEUKOCYTE ESTERASE UR QL STRIP: NEGATIVE
LIPASE SERPL-CCNC: 11 U/L
LYMPHOCYTES # BLD AUTO: 0.1 K/UL
LYMPHOCYTES # BLD AUTO: 0.3 K/UL
LYMPHOCYTES # BLD AUTO: 0.5 K/UL
LYMPHOCYTES # BLD AUTO: 0.6 K/UL
LYMPHOCYTES # BLD AUTO: 0.7 K/UL
LYMPHOCYTES # BLD AUTO: 0.8 K/UL
LYMPHOCYTES # BLD AUTO: 0.8 K/UL
LYMPHOCYTES # BLD AUTO: 0.9 K/UL
LYMPHOCYTES # BLD AUTO: 1 K/UL
LYMPHOCYTES # BLD AUTO: 1.1 K/UL
LYMPHOCYTES # BLD AUTO: 1.1 K/UL
LYMPHOCYTES # BLD AUTO: 1.2 K/UL
LYMPHOCYTES # BLD AUTO: 1.2 K/UL
LYMPHOCYTES # BLD AUTO: 1.5 K/UL
LYMPHOCYTES NFR BLD: 1.3 %
LYMPHOCYTES NFR BLD: 10.4 %
LYMPHOCYTES NFR BLD: 11 %
LYMPHOCYTES NFR BLD: 11.3 %
LYMPHOCYTES NFR BLD: 11.7 %
LYMPHOCYTES NFR BLD: 11.7 %
LYMPHOCYTES NFR BLD: 12.5 %
LYMPHOCYTES NFR BLD: 12.8 %
LYMPHOCYTES NFR BLD: 12.8 %
LYMPHOCYTES NFR BLD: 13 %
LYMPHOCYTES NFR BLD: 13.9 %
LYMPHOCYTES NFR BLD: 14 %
LYMPHOCYTES NFR BLD: 15.2 %
LYMPHOCYTES NFR BLD: 18.1 %
LYMPHOCYTES NFR BLD: 19.2 %
LYMPHOCYTES NFR BLD: 21 %
LYMPHOCYTES NFR BLD: 22 %
LYMPHOCYTES NFR BLD: 4 %
LYMPHOCYTES NFR BLD: 7.5 %
LYMPHOCYTES NFR BLD: 8.9 %
LYMPHOCYTES NFR BLD: 9.1 %
LYMPHOCYTES NFR BLD: 9.4 %
MAGNESIUM SERPL-MCNC: 1.9 MG/DL
MAGNESIUM SERPL-MCNC: 2 MG/DL
MAGNESIUM SERPL-MCNC: 2 MG/DL
MAGNESIUM SERPL-MCNC: 2.1 MG/DL
MAGNESIUM SERPL-MCNC: 2.2 MG/DL
MAGNESIUM SERPL-MCNC: 2.3 MG/DL
MAGNESIUM SERPL-MCNC: 2.3 MG/DL
MAGNESIUM SERPL-MCNC: 2.5 MG/DL
MAGNESIUM SERPL-MCNC: 2.5 MG/DL
MAGNESIUM SERPL-MCNC: 2.6 MG/DL
MAGNESIUM SERPL-MCNC: 2.6 MG/DL
MCH RBC QN AUTO: 25.7 PG
MCH RBC QN AUTO: 25.8 PG
MCH RBC QN AUTO: 25.8 PG
MCH RBC QN AUTO: 26 PG
MCH RBC QN AUTO: 26.7 PG
MCH RBC QN AUTO: 26.7 PG
MCH RBC QN AUTO: 26.9 PG
MCH RBC QN AUTO: 26.9 PG
MCH RBC QN AUTO: 27 PG
MCH RBC QN AUTO: 27.1 PG
MCH RBC QN AUTO: 27.3 PG
MCH RBC QN AUTO: 29.3 PG
MCH RBC QN AUTO: 29.4 PG
MCH RBC QN AUTO: 29.4 PG
MCH RBC QN AUTO: 29.5 PG
MCH RBC QN AUTO: 29.5 PG
MCH RBC QN AUTO: 29.6 PG
MCH RBC QN AUTO: 29.6 PG
MCH RBC QN AUTO: 29.9 PG
MCH RBC QN AUTO: 30.1 PG
MCH RBC QN AUTO: 30.3 PG
MCH RBC QN AUTO: 30.5 PG
MCHC RBC AUTO-ENTMCNC: 29.5 G/DL
MCHC RBC AUTO-ENTMCNC: 30.3 G/DL
MCHC RBC AUTO-ENTMCNC: 30.4 G/DL
MCHC RBC AUTO-ENTMCNC: 30.7 %
MCHC RBC AUTO-ENTMCNC: 30.7 G/DL
MCHC RBC AUTO-ENTMCNC: 30.8 %
MCHC RBC AUTO-ENTMCNC: 30.9 G/DL
MCHC RBC AUTO-ENTMCNC: 31 G/DL
MCHC RBC AUTO-ENTMCNC: 31.1 %
MCHC RBC AUTO-ENTMCNC: 31.2 %
MCHC RBC AUTO-ENTMCNC: 31.2 G/DL
MCHC RBC AUTO-ENTMCNC: 31.3 G/DL
MCHC RBC AUTO-ENTMCNC: 31.6 %
MCHC RBC AUTO-ENTMCNC: 31.6 G/DL
MCHC RBC AUTO-ENTMCNC: 32 %
MCHC RBC AUTO-ENTMCNC: 32.1 %
MCHC RBC AUTO-ENTMCNC: 32.3 %
MCHC RBC AUTO-ENTMCNC: 32.5 %
MCHC RBC AUTO-ENTMCNC: 32.5 %
MCHC RBC AUTO-ENTMCNC: 32.7 %
MCV RBC AUTO: 100 FL
MCV RBC AUTO: 82 FL
MCV RBC AUTO: 83 FL
MCV RBC AUTO: 84 FL
MCV RBC AUTO: 85 FL
MCV RBC AUTO: 85 FL
MCV RBC AUTO: 94 FL
MCV RBC AUTO: 95 FL
MCV RBC AUTO: 95 FL
MCV RBC AUTO: 96 FL
MCV RBC AUTO: 97 FL
MCV RBC AUTO: 97 FL
MCV RBC AUTO: 98 FL
MCV RBC AUTO: 98 FL
METAMYELOCYTES NFR BLD MANUAL: 1 %
MICROSCOPIC COMMENT: ABNORMAL
MICROSCOPIC COMMENT: ABNORMAL
MICROSCOPIC COMMENT: NORMAL
MIN VOL: 32.5
MITRAL VALVE REGURGITATION: ABNORMAL
MITRAL VALVE STENOSIS: ABNORMAL
MODE: ABNORMAL
MONOCYTES # BLD AUTO: 0.2 K/UL
MONOCYTES # BLD AUTO: 0.3 K/UL
MONOCYTES # BLD AUTO: 0.4 K/UL
MONOCYTES # BLD AUTO: 0.4 K/UL
MONOCYTES # BLD AUTO: 0.5 K/UL
MONOCYTES # BLD AUTO: 0.8 K/UL
MONOCYTES # BLD AUTO: 0.8 K/UL
MONOCYTES # BLD AUTO: 0.9 K/UL
MONOCYTES # BLD AUTO: 1 K/UL
MONOCYTES # BLD AUTO: 1 K/UL
MONOCYTES # BLD AUTO: 1.1 K/UL
MONOCYTES # BLD AUTO: 1.4 K/UL
MONOCYTES # BLD AUTO: 1.7 K/UL
MONOCYTES # BLD AUTO: 1.8 K/UL
MONOCYTES # BLD AUTO: 2.2 K/UL
MONOCYTES NFR BLD: 1.7 %
MONOCYTES NFR BLD: 12.4 %
MONOCYTES NFR BLD: 13 %
MONOCYTES NFR BLD: 13.5 %
MONOCYTES NFR BLD: 13.8 %
MONOCYTES NFR BLD: 14.9 %
MONOCYTES NFR BLD: 14.9 %
MONOCYTES NFR BLD: 15.6 %
MONOCYTES NFR BLD: 15.7 %
MONOCYTES NFR BLD: 16 %
MONOCYTES NFR BLD: 16 %
MONOCYTES NFR BLD: 16.3 %
MONOCYTES NFR BLD: 17.6 %
MONOCYTES NFR BLD: 18.3 %
MONOCYTES NFR BLD: 19.6 %
MONOCYTES NFR BLD: 21.6 %
MONOCYTES NFR BLD: 22.6 %
MONOCYTES NFR BLD: 6 %
MONOCYTES NFR BLD: 6.8 %
MONOCYTES NFR BLD: 7.4 %
MONOCYTES NFR BLD: 8 %
MONOCYTES NFR BLD: 9.3 %
MYELOCYTES NFR BLD MANUAL: 1 %
MYELOCYTES NFR BLD MANUAL: 1 %
NEUTROPHILS # BLD AUTO: 1.7 K/UL
NEUTROPHILS # BLD AUTO: 1.9 K/UL
NEUTROPHILS # BLD AUTO: 2.4 K/UL
NEUTROPHILS # BLD AUTO: 2.6 K/UL
NEUTROPHILS # BLD AUTO: 2.8 K/UL
NEUTROPHILS # BLD AUTO: 3.1 K/UL
NEUTROPHILS # BLD AUTO: 3.3 K/UL
NEUTROPHILS # BLD AUTO: 3.3 K/UL
NEUTROPHILS # BLD AUTO: 3.4 K/UL
NEUTROPHILS # BLD AUTO: 3.6 K/UL
NEUTROPHILS # BLD AUTO: 3.6 K/UL
NEUTROPHILS # BLD AUTO: 3.8 K/UL
NEUTROPHILS # BLD AUTO: 4.2 K/UL
NEUTROPHILS # BLD AUTO: 4.6 K/UL
NEUTROPHILS # BLD AUTO: 6.3 K/UL
NEUTROPHILS # BLD AUTO: 6.8 K/UL
NEUTROPHILS # BLD AUTO: 7.7 K/UL
NEUTROPHILS # BLD AUTO: 8 K/UL
NEUTROPHILS # BLD AUTO: 9.6 K/UL
NEUTROPHILS NFR BLD: 57.9 %
NEUTROPHILS NFR BLD: 58.2 %
NEUTROPHILS NFR BLD: 58.4 %
NEUTROPHILS NFR BLD: 65 %
NEUTROPHILS NFR BLD: 67 %
NEUTROPHILS NFR BLD: 67.4 %
NEUTROPHILS NFR BLD: 69 %
NEUTROPHILS NFR BLD: 70.6 %
NEUTROPHILS NFR BLD: 70.7 %
NEUTROPHILS NFR BLD: 71.3 %
NEUTROPHILS NFR BLD: 71.4 %
NEUTROPHILS NFR BLD: 73.2 %
NEUTROPHILS NFR BLD: 74.3 %
NEUTROPHILS NFR BLD: 74.9 %
NEUTROPHILS NFR BLD: 78.5 %
NEUTROPHILS NFR BLD: 79 %
NEUTROPHILS NFR BLD: 80.9 %
NEUTROPHILS NFR BLD: 81 %
NEUTROPHILS NFR BLD: 84.5 %
NEUTROPHILS NFR BLD: 84.8 %
NEUTROPHILS NFR BLD: 88.2 %
NEUTROPHILS NFR BLD: 89.1 %
NEUTS BAND NFR BLD MANUAL: 2 %
NITRITE UR QL STRIP: NEGATIVE
NRBC BLD-RTO: 4 /100 WBC
OVALOCYTES BLD QL SMEAR: ABNORMAL
PCO2 BLDA: 20.9 MMHG (ref 35–45)
PCO2 BLDA: 21.2 MMHG (ref 35–45)
PCO2 BLDA: 23.5 MMHG (ref 35–45)
PCO2 BLDA: 23.5 MMHG (ref 35–45)
PCO2 BLDA: 24.1 MMHG (ref 35–45)
PCO2 BLDA: 24.9 MMHG (ref 35–45)
PCO2 BLDA: 27.4 MMHG (ref 35–45)
PCO2 BLDA: 27.6 MMHG (ref 35–45)
PCO2 BLDA: 29.6 MMHG (ref 35–45)
PCO2 BLDA: 31.6 MMHG (ref 35–45)
PEEP: 8
PEEP: 8
PH SMN: 7.23 [PH] (ref 7.35–7.45)
PH SMN: 7.26 [PH] (ref 7.35–7.45)
PH SMN: 7.27 [PH] (ref 7.35–7.45)
PH SMN: 7.32 [PH] (ref 7.35–7.45)
PH SMN: 7.39 [PH] (ref 7.35–7.45)
PH SMN: 7.42 [PH] (ref 7.35–7.45)
PH SMN: 7.43 [PH] (ref 7.35–7.45)
PH SMN: 7.45 [PH] (ref 7.35–7.45)
PH SMN: 7.47 [PH] (ref 7.35–7.45)
PH SMN: 7.49 [PH] (ref 7.35–7.45)
PH UR STRIP: 5 [PH] (ref 5–8)
PH UR STRIP: 5 [PH] (ref 5–8)
PH UR STRIP: 6 [PH] (ref 5–8)
PH UR STRIP: 6 [PH] (ref 5–8)
PHOSPHATE SERPL-MCNC: 3.4 MG/DL
PHOSPHATE SERPL-MCNC: 4.3 MG/DL
PHOSPHATE SERPL-MCNC: 4.5 MG/DL
PLATELET # BLD AUTO: 100 K/UL
PLATELET # BLD AUTO: 109 K/UL
PLATELET # BLD AUTO: 120 K/UL
PLATELET # BLD AUTO: 123 K/UL
PLATELET # BLD AUTO: 129 K/UL
PLATELET # BLD AUTO: 138 K/UL
PLATELET # BLD AUTO: 140 K/UL
PLATELET # BLD AUTO: 17 K/UL
PLATELET # BLD AUTO: 28 K/UL
PLATELET # BLD AUTO: 33 K/UL
PLATELET # BLD AUTO: 36 K/UL
PLATELET # BLD AUTO: 37 K/UL
PLATELET # BLD AUTO: 40 K/UL
PLATELET # BLD AUTO: 40 K/UL
PLATELET # BLD AUTO: 42 K/UL
PLATELET # BLD AUTO: 45 K/UL
PLATELET # BLD AUTO: 47 K/UL
PLATELET # BLD AUTO: 56 K/UL
PLATELET # BLD AUTO: 75 K/UL
PLATELET # BLD AUTO: 76 K/UL
PLATELET # BLD AUTO: 77 K/UL
PLATELET # BLD AUTO: 77 K/UL
PLATELET BLD QL SMEAR: ABNORMAL
PMV BLD AUTO: ABNORMAL FL
PO2 BLDA: 105 MMHG (ref 80–100)
PO2 BLDA: 111 MMHG (ref 80–100)
PO2 BLDA: 112 MMHG (ref 80–100)
PO2 BLDA: 162 MMHG (ref 80–100)
PO2 BLDA: 175 MMHG (ref 80–100)
PO2 BLDA: 232 MMHG (ref 80–100)
PO2 BLDA: 49 MMHG (ref 80–100)
PO2 BLDA: 57 MMHG (ref 80–100)
PO2 BLDA: 85 MMHG (ref 80–100)
PO2 BLDA: 88 MMHG (ref 80–100)
POC BE: -10 MMOL/L
POC BE: -11 MMOL/L
POC BE: -16 MMOL/L
POC BE: -17 MMOL/L
POC BE: -17 MMOL/L
POC BE: -2 MMOL/L
POC BE: -7 MMOL/L
POC BE: -7 MMOL/L
POC BE: -8 MMOL/L
POC BE: -8 MMOL/L
POC SATURATED O2: 100 % (ref 95–100)
POC SATURATED O2: 85 % (ref 95–100)
POC SATURATED O2: 91 % (ref 95–100)
POC SATURATED O2: 95 % (ref 95–100)
POC SATURATED O2: 97 % (ref 95–100)
POC SATURATED O2: 98 % (ref 95–100)
POCT GLUCOSE: 104 MG/DL (ref 70–110)
POCT GLUCOSE: 128 MG/DL (ref 70–110)
POCT GLUCOSE: 133 MG/DL (ref 70–110)
POCT GLUCOSE: 135 MG/DL (ref 70–110)
POCT GLUCOSE: 136 MG/DL (ref 70–110)
POCT GLUCOSE: 137 MG/DL (ref 70–110)
POCT GLUCOSE: 140 MG/DL (ref 70–110)
POCT GLUCOSE: 145 MG/DL (ref 70–110)
POCT GLUCOSE: 149 MG/DL (ref 70–110)
POCT GLUCOSE: 156 MG/DL (ref 70–110)
POCT GLUCOSE: 210 MG/DL (ref 70–110)
POCT GLUCOSE: 213 MG/DL (ref 70–110)
POCT GLUCOSE: 252 MG/DL (ref 70–110)
POCT GLUCOSE: 368 MG/DL (ref 70–110)
POCT GLUCOSE: 94 MG/DL (ref 70–110)
POCT GLUCOSE: 99 MG/DL (ref 70–110)
POIKILOCYTOSIS BLD QL SMEAR: SLIGHT
POLYCHROMASIA BLD QL SMEAR: ABNORMAL
POTASSIUM SERPL-SCNC: 3.5 MMOL/L
POTASSIUM SERPL-SCNC: 3.6 MMOL/L
POTASSIUM SERPL-SCNC: 3.8 MMOL/L
POTASSIUM SERPL-SCNC: 3.9 MMOL/L
POTASSIUM SERPL-SCNC: 3.9 MMOL/L
POTASSIUM SERPL-SCNC: 4 MMOL/L
POTASSIUM SERPL-SCNC: 4.2 MMOL/L
POTASSIUM SERPL-SCNC: 4.2 MMOL/L
POTASSIUM SERPL-SCNC: 4.3 MMOL/L
POTASSIUM SERPL-SCNC: 4.4 MMOL/L
POTASSIUM SERPL-SCNC: 4.5 MMOL/L
POTASSIUM SERPL-SCNC: 4.5 MMOL/L
POTASSIUM SERPL-SCNC: 4.6 MMOL/L
POTASSIUM SERPL-SCNC: 4.7 MMOL/L
POTASSIUM SERPL-SCNC: 4.8 MMOL/L
POTASSIUM SERPL-SCNC: 4.8 MMOL/L
POTASSIUM SERPL-SCNC: 5.1 MMOL/L
POTASSIUM SERPL-SCNC: 5.1 MMOL/L
POTASSIUM SERPL-SCNC: 5.4 MMOL/L
POTASSIUM SERPL-SCNC: 5.6 MMOL/L
PRE FEF 25 75: 1.08 L/S (ref 1.29–2.92)
PRE FET 100: 8.69 S
PRE FEV1 FVC: 67 %
PRE FEV1: 2.16 L (ref 2.61–3.42)
PRE FIF 50: 0.79 L/S
PRE FVC: 3.24 L (ref 3.74–4.7)
PRE PEF: 4.01 L/S (ref 6.37–8.74)
PREDICTED FEV1 FVC: 71.74 % (ref 66.91–76.58)
PREDICTED FEV1: 3.02 L (ref 2.61–3.42)
PREDICTED FVC: 4.22 L (ref 3.74–4.7)
PROCALCITONIN SERPL IA-MCNC: 0.27 NG/ML
PROT SERPL-MCNC: 5.3 G/DL
PROT SERPL-MCNC: 6.2 G/DL
PROT SERPL-MCNC: 6.6 G/DL
PROT SERPL-MCNC: 6.6 G/DL
PROT SERPL-MCNC: 6.8 G/DL
PROT SERPL-MCNC: 6.9 G/DL
PROT SERPL-MCNC: 6.9 G/DL
PROT SERPL-MCNC: 7 G/DL
PROT SERPL-MCNC: 7 G/DL
PROT SERPL-MCNC: 7.3 G/DL
PROT SERPL-MCNC: 7.3 G/DL
PROT SERPL-MCNC: 7.4 G/DL
PROT SERPL-MCNC: 7.6 G/DL
PROT SERPL-MCNC: 7.6 G/DL
PROT SERPL-MCNC: 7.7 G/DL
PROT SERPL-MCNC: 7.8 G/DL
PROT SERPL-MCNC: 8.2 G/DL
PROT UR QL STRIP: ABNORMAL
PROT UR QL STRIP: NEGATIVE
PROTHROMBIN TIME: 12.8 SEC
PROTHROMBIN TIME: 13.9 SEC
PROTHROMBIN TIME: 14 SEC
PROTHROMBIN TIME: 14.7 SEC
PROTHROMBIN TIME: 15.4 SEC
PROTHROMBIN TIME: 24.6 SEC
PROVOCATION PROTOCOL: ABNORMAL
RBC # BLD AUTO: 3.48 M/UL
RBC # BLD AUTO: 3.6 M/UL
RBC # BLD AUTO: 3.61 M/UL
RBC # BLD AUTO: 3.77 M/UL
RBC # BLD AUTO: 3.85 M/UL
RBC # BLD AUTO: 3.92 M/UL
RBC # BLD AUTO: 3.93 M/UL
RBC # BLD AUTO: 3.94 M/UL
RBC # BLD AUTO: 3.95 M/UL
RBC # BLD AUTO: 4.12 M/UL
RBC # BLD AUTO: 4.22 M/UL
RBC # BLD AUTO: 4.3 M/UL
RBC # BLD AUTO: 4.4 M/UL
RBC # BLD AUTO: 4.58 M/UL
RBC # BLD AUTO: 4.69 M/UL
RBC # BLD AUTO: 4.78 M/UL
RBC # BLD AUTO: 4.79 M/UL
RBC # BLD AUTO: 4.88 M/UL
RBC # BLD AUTO: 4.96 M/UL
RBC # BLD AUTO: 5.12 M/UL
RBC # BLD AUTO: 5.16 M/UL
RBC # BLD AUTO: 5.24 M/UL
RBC #/AREA URNS HPF: 0 /HPF (ref 0–4)
RETIRED EF AND QEF - SEE NOTES: 30 (ref 55–65)
RETIRED EF AND QEF - SEE NOTES: 45 (ref 55–65)
RETIRED EF AND QEF - SEE NOTES: 45 (ref 55–65)
SAMPLE: ABNORMAL
SCHISTOCYTES BLD QL SMEAR: PRESENT
SITE: ABNORMAL
SODIUM SERPL-SCNC: 135 MMOL/L
SODIUM SERPL-SCNC: 135 MMOL/L
SODIUM SERPL-SCNC: 136 MMOL/L
SODIUM SERPL-SCNC: 137 MMOL/L
SODIUM SERPL-SCNC: 138 MMOL/L
SODIUM SERPL-SCNC: 139 MMOL/L
SODIUM SERPL-SCNC: 139 MMOL/L
SODIUM SERPL-SCNC: 140 MMOL/L
SODIUM SERPL-SCNC: 141 MMOL/L
SODIUM SERPL-SCNC: 142 MMOL/L
SODIUM SERPL-SCNC: 143 MMOL/L
SP GR UR STRIP: 1.01 (ref 1–1.03)
SP GR UR STRIP: 1.02 (ref 1–1.03)
SP GR UR STRIP: 1.02 (ref 1–1.03)
SP GR UR STRIP: >=1.03 (ref 1–1.03)
SP02: 100
SP02: 100
SPECIMEN SOURCE: NORMAL
SPHEROCYTES BLD QL SMEAR: ABNORMAL
SPONT RATE: 10
SPONT RATE: 28
SPONT RATE: 31
SQUAMOUS #/AREA URNS HPF: 3 /HPF
STOMATOCYTES BLD QL SMEAR: PRESENT
STOMATOCYTES BLD QL SMEAR: PRESENT
T4 FREE SERPL-MCNC: 0.79 NG/DL
T4 FREE SERPL-MCNC: 0.81 NG/DL
TARGETS BLD QL SMEAR: ABNORMAL
TRICUSPID VALVE REGURGITATION: ABNORMAL
TRICUSPID VALVE REGURGITATION: ABNORMAL
TROPONIN I SERPL DL<=0.01 NG/ML-MCNC: 0.02 NG/ML
TROPONIN I SERPL DL<=0.01 NG/ML-MCNC: 0.03 NG/ML
TROPONIN I SERPL DL<=0.01 NG/ML-MCNC: 0.04 NG/ML
TROPONIN I SERPL DL<=0.01 NG/ML-MCNC: 0.05 NG/ML
TROPONIN I SERPL DL<=0.01 NG/ML-MCNC: 0.05 NG/ML
TROPONIN I SERPL DL<=0.01 NG/ML-MCNC: 0.12 NG/ML
TROPONIN I SERPL DL<=0.01 NG/ML-MCNC: 0.14 NG/ML
TROPONIN I SERPL DL<=0.01 NG/ML-MCNC: 2.79 NG/ML
TSH SERPL DL<=0.005 MIU/L-ACNC: 8.54 UIU/ML
TSH SERPL DL<=0.005 MIU/L-ACNC: 9.92 UIU/ML
URATE SERPL-MCNC: 8 MG/DL
URN SPEC COLLECT METH UR: ABNORMAL
UROBILINOGEN UR STRIP-ACNC: ABNORMAL EU/DL
UROBILINOGEN UR STRIP-ACNC: NEGATIVE EU/DL
VANCOMYCIN SERPL-MCNC: 12.1 UG/ML
VANCOMYCIN SERPL-MCNC: 17.1 UG/ML
VANCOMYCIN SERPL-MCNC: 6.5 UG/ML
VANCOMYCIN SERPL-MCNC: 7.7 UG/ML
VANCOMYCIN SERPL-MCNC: 8 UG/ML
VANCOMYCIN SERPL-MCNC: 8.5 UG/ML
WBC # BLD AUTO: 10.97 K/UL
WBC # BLD AUTO: 12.99 K/UL
WBC # BLD AUTO: 13.18 K/UL
WBC # BLD AUTO: 2.31 K/UL
WBC # BLD AUTO: 2.61 K/UL
WBC # BLD AUTO: 3.02 K/UL
WBC # BLD AUTO: 3.07 K/UL
WBC # BLD AUTO: 3.36 K/UL
WBC # BLD AUTO: 3.86 K/UL
WBC # BLD AUTO: 4 K/UL
WBC # BLD AUTO: 4.87 K/UL
WBC # BLD AUTO: 4.91 K/UL
WBC # BLD AUTO: 5.23 K/UL
WBC # BLD AUTO: 5.51 K/UL
WBC # BLD AUTO: 5.63 K/UL
WBC # BLD AUTO: 5.77 K/UL
WBC # BLD AUTO: 6.25 K/UL
WBC # BLD AUTO: 6.54 K/UL
WBC # BLD AUTO: 7.9 K/UL
WBC # BLD AUTO: 8.63 K/UL
WBC # BLD AUTO: 8.85 K/UL
WBC # BLD AUTO: 9.5 K/UL
WBC #/AREA URNS HPF: 0 /HPF (ref 0–5)
WBC #/AREA URNS HPF: 25 /HPF (ref 0–5)

## 2017-01-01 PROCEDURE — 25000003 PHARM REV CODE 250: Performed by: NURSE PRACTITIONER

## 2017-01-01 PROCEDURE — 99233 SBSQ HOSP IP/OBS HIGH 50: CPT | Mod: ,,, | Performed by: INTERNAL MEDICINE

## 2017-01-01 PROCEDURE — 99291 CRITICAL CARE FIRST HOUR: CPT | Mod: ,,, | Performed by: INTERNAL MEDICINE

## 2017-01-01 PROCEDURE — 36600 WITHDRAWAL OF ARTERIAL BLOOD: CPT

## 2017-01-01 PROCEDURE — 83735 ASSAY OF MAGNESIUM: CPT

## 2017-01-01 PROCEDURE — 87040 BLOOD CULTURE FOR BACTERIA: CPT | Mod: 59

## 2017-01-01 PROCEDURE — 94761 N-INVAS EAR/PLS OXIMETRY MLT: CPT

## 2017-01-01 PROCEDURE — 63600175 PHARM REV CODE 636 W HCPCS: Performed by: EMERGENCY MEDICINE

## 2017-01-01 PROCEDURE — 21400001 HC TELEMETRY ROOM

## 2017-01-01 PROCEDURE — 99214 OFFICE O/P EST MOD 30 MIN: CPT | Mod: 25,S$GLB,, | Performed by: INTERNAL MEDICINE

## 2017-01-01 PROCEDURE — 25000003 PHARM REV CODE 250: Performed by: INTERNAL MEDICINE

## 2017-01-01 PROCEDURE — 25000003 PHARM REV CODE 250: Performed by: HOSPITALIST

## 2017-01-01 PROCEDURE — 87147 CULTURE TYPE IMMUNOLOGIC: CPT

## 2017-01-01 PROCEDURE — 93010 ELECTROCARDIOGRAM REPORT: CPT | Mod: ,,, | Performed by: INTERNAL MEDICINE

## 2017-01-01 PROCEDURE — 85610 PROTHROMBIN TIME: CPT

## 2017-01-01 PROCEDURE — 82962 GLUCOSE BLOOD TEST: CPT

## 2017-01-01 PROCEDURE — 36415 COLL VENOUS BLD VENIPUNCTURE: CPT

## 2017-01-01 PROCEDURE — 27000221 HC OXYGEN, UP TO 24 HOURS

## 2017-01-01 PROCEDURE — 82803 BLOOD GASES ANY COMBINATION: CPT

## 2017-01-01 PROCEDURE — 36415 COLL VENOUS BLD VENIPUNCTURE: CPT | Mod: PO

## 2017-01-01 PROCEDURE — 25000242 PHARM REV CODE 250 ALT 637 W/ HCPCS: Performed by: NURSE PRACTITIONER

## 2017-01-01 PROCEDURE — 63600175 PHARM REV CODE 636 W HCPCS: Performed by: INTERNAL MEDICINE

## 2017-01-01 PROCEDURE — 94640 AIRWAY INHALATION TREATMENT: CPT

## 2017-01-01 PROCEDURE — 85025 COMPLETE CBC W/AUTO DIFF WBC: CPT

## 2017-01-01 PROCEDURE — 84484 ASSAY OF TROPONIN QUANT: CPT | Mod: 91

## 2017-01-01 PROCEDURE — 1160F RVW MEDS BY RX/DR IN RCRD: CPT | Mod: S$GLB,,, | Performed by: NUCLEAR MEDICINE

## 2017-01-01 PROCEDURE — 85730 THROMBOPLASTIN TIME PARTIAL: CPT

## 2017-01-01 PROCEDURE — 63600175 PHARM REV CODE 636 W HCPCS: Performed by: HOSPITALIST

## 2017-01-01 PROCEDURE — 25000003 PHARM REV CODE 250

## 2017-01-01 PROCEDURE — 97165 OT EVAL LOW COMPLEX 30 MIN: CPT

## 2017-01-01 PROCEDURE — 99999 PR PBB SHADOW E&M-EST. PATIENT-LVL III: CPT | Mod: PBBFAC,,, | Performed by: NUCLEAR MEDICINE

## 2017-01-01 PROCEDURE — 63600175 PHARM REV CODE 636 W HCPCS: Performed by: NURSE PRACTITIONER

## 2017-01-01 PROCEDURE — 83605 ASSAY OF LACTIC ACID: CPT

## 2017-01-01 PROCEDURE — 99231 SBSQ HOSP IP/OBS SF/LOW 25: CPT | Mod: ,,, | Performed by: INTERNAL MEDICINE

## 2017-01-01 PROCEDURE — 99223 1ST HOSP IP/OBS HIGH 75: CPT | Mod: ,,, | Performed by: INTERNAL MEDICINE

## 2017-01-01 PROCEDURE — 84484 ASSAY OF TROPONIN QUANT: CPT

## 2017-01-01 PROCEDURE — 97530 THERAPEUTIC ACTIVITIES: CPT

## 2017-01-01 PROCEDURE — 94003 VENT MGMT INPAT SUBQ DAY: CPT

## 2017-01-01 PROCEDURE — 80048 BASIC METABOLIC PNL TOTAL CA: CPT

## 2017-01-01 PROCEDURE — 63600175 PHARM REV CODE 636 W HCPCS: Performed by: FAMILY MEDICINE

## 2017-01-01 PROCEDURE — 1126F AMNT PAIN NOTED NONE PRSNT: CPT | Mod: S$GLB,,, | Performed by: INTERNAL MEDICINE

## 2017-01-01 PROCEDURE — 83690 ASSAY OF LIPASE: CPT

## 2017-01-01 PROCEDURE — 96365 THER/PROPH/DIAG IV INF INIT: CPT

## 2017-01-01 PROCEDURE — 80053 COMPREHEN METABOLIC PANEL: CPT

## 2017-01-01 PROCEDURE — 84439 ASSAY OF FREE THYROXINE: CPT

## 2017-01-01 PROCEDURE — 99292 CRITICAL CARE ADDL 30 MIN: CPT | Mod: 25,,, | Performed by: INTERNAL MEDICINE

## 2017-01-01 PROCEDURE — 85027 COMPLETE CBC AUTOMATED: CPT

## 2017-01-01 PROCEDURE — 1159F MED LIST DOCD IN RCRD: CPT | Mod: S$GLB,,, | Performed by: INTERNAL MEDICINE

## 2017-01-01 PROCEDURE — 25000003 PHARM REV CODE 250: Performed by: EMERGENCY MEDICINE

## 2017-01-01 PROCEDURE — 20000000 HC ICU ROOM

## 2017-01-01 PROCEDURE — 93306 TTE W/DOPPLER COMPLETE: CPT | Mod: 26,,, | Performed by: INTERNAL MEDICINE

## 2017-01-01 PROCEDURE — 63600175 PHARM REV CODE 636 W HCPCS

## 2017-01-01 PROCEDURE — 87070 CULTURE OTHR SPECIMN AEROBIC: CPT

## 2017-01-01 PROCEDURE — G8979 MOBILITY GOAL STATUS: HCPCS | Mod: CH

## 2017-01-01 PROCEDURE — 99291 CRITICAL CARE FIRST HOUR: CPT | Mod: ,,, | Performed by: NURSE PRACTITIONER

## 2017-01-01 PROCEDURE — 94660 CPAP INITIATION&MGMT: CPT

## 2017-01-01 PROCEDURE — 87088 URINE BACTERIA CULTURE: CPT

## 2017-01-01 PROCEDURE — 83605 ASSAY OF LACTIC ACID: CPT | Mod: 91

## 2017-01-01 PROCEDURE — 99215 OFFICE O/P EST HI 40 MIN: CPT | Mod: S$GLB,,, | Performed by: NUCLEAR MEDICINE

## 2017-01-01 PROCEDURE — 25000242 PHARM REV CODE 250 ALT 637 W/ HCPCS: Performed by: INTERNAL MEDICINE

## 2017-01-01 PROCEDURE — 97161 PT EVAL LOW COMPLEX 20 MIN: CPT

## 2017-01-01 PROCEDURE — 87077 CULTURE AEROBIC IDENTIFY: CPT

## 2017-01-01 PROCEDURE — 99900035 HC TECH TIME PER 15 MIN (STAT)

## 2017-01-01 PROCEDURE — 84100 ASSAY OF PHOSPHORUS: CPT

## 2017-01-01 PROCEDURE — 25000003 PHARM REV CODE 250: Performed by: STUDENT IN AN ORGANIZED HEALTH CARE EDUCATION/TRAINING PROGRAM

## 2017-01-01 PROCEDURE — 87186 SC STD MICRODIL/AGAR DIL: CPT

## 2017-01-01 PROCEDURE — 80202 ASSAY OF VANCOMYCIN: CPT

## 2017-01-01 PROCEDURE — 02HV33Z INSERTION OF INFUSION DEVICE INTO SUPERIOR VENA CAVA, PERCUTANEOUS APPROACH: ICD-10-PCS | Performed by: INTERNAL MEDICINE

## 2017-01-01 PROCEDURE — G8987 SELF CARE CURRENT STATUS: HCPCS | Mod: CI

## 2017-01-01 PROCEDURE — 90662 IIV NO PRSV INCREASED AG IM: CPT | Mod: S$GLB,,, | Performed by: FAMILY MEDICINE

## 2017-01-01 PROCEDURE — 87086 URINE CULTURE/COLONY COUNT: CPT

## 2017-01-01 PROCEDURE — 83880 ASSAY OF NATRIURETIC PEPTIDE: CPT

## 2017-01-01 PROCEDURE — 97162 PT EVAL MOD COMPLEX 30 MIN: CPT

## 2017-01-01 PROCEDURE — 37799 UNLISTED PX VASCULAR SURGERY: CPT

## 2017-01-01 PROCEDURE — 25000003 PHARM REV CODE 250: Performed by: PHYSICIAN ASSISTANT

## 2017-01-01 PROCEDURE — 85007 BL SMEAR W/DIFF WBC COUNT: CPT

## 2017-01-01 PROCEDURE — 87040 BLOOD CULTURE FOR BACTERIA: CPT

## 2017-01-01 PROCEDURE — 99214 OFFICE O/P EST MOD 30 MIN: CPT | Mod: S$GLB,,, | Performed by: NUCLEAR MEDICINE

## 2017-01-01 PROCEDURE — 25000242 PHARM REV CODE 250 ALT 637 W/ HCPCS: Performed by: EMERGENCY MEDICINE

## 2017-01-01 PROCEDURE — 81000 URINALYSIS NONAUTO W/SCOPE: CPT

## 2017-01-01 PROCEDURE — 99222 1ST HOSP IP/OBS MODERATE 55: CPT | Mod: ,,, | Performed by: INTERNAL MEDICINE

## 2017-01-01 PROCEDURE — 96375 TX/PRO/DX INJ NEW DRUG ADDON: CPT

## 2017-01-01 PROCEDURE — G8979 MOBILITY GOAL STATUS: HCPCS | Mod: CI

## 2017-01-01 PROCEDURE — G8988 SELF CARE GOAL STATUS: HCPCS | Mod: CI

## 2017-01-01 PROCEDURE — 93005 ELECTROCARDIOGRAM TRACING: CPT

## 2017-01-01 PROCEDURE — 51702 INSERT TEMP BLADDER CATH: CPT

## 2017-01-01 PROCEDURE — 36620 INSERTION CATHETER ARTERY: CPT

## 2017-01-01 PROCEDURE — G8980 MOBILITY D/C STATUS: HCPCS | Mod: CI

## 2017-01-01 PROCEDURE — 36620 INSERTION CATHETER ARTERY: CPT | Mod: 59,,, | Performed by: INTERNAL MEDICINE

## 2017-01-01 PROCEDURE — 94010 BREATHING CAPACITY TEST: CPT | Mod: S$GLB,,, | Performed by: INTERNAL MEDICINE

## 2017-01-01 PROCEDURE — 84145 PROCALCITONIN (PCT): CPT

## 2017-01-01 PROCEDURE — 85384 FIBRINOGEN ACTIVITY: CPT

## 2017-01-01 PROCEDURE — 1160F RVW MEDS BY RX/DR IN RCRD: CPT | Mod: S$GLB,,, | Performed by: INTERNAL MEDICINE

## 2017-01-01 PROCEDURE — 27000190 HC CPAP FULL FACE MASK W/VALVE

## 2017-01-01 PROCEDURE — 99499 UNLISTED E&M SERVICE: CPT | Mod: S$GLB,,, | Performed by: INTERNAL MEDICINE

## 2017-01-01 PROCEDURE — 82553 CREATINE MB FRACTION: CPT

## 2017-01-01 PROCEDURE — 99285 EMERGENCY DEPT VISIT HI MDM: CPT | Mod: 25

## 2017-01-01 PROCEDURE — 99212 OFFICE O/P EST SF 10 MIN: CPT | Mod: S$GLB,,, | Performed by: NUCLEAR MEDICINE

## 2017-01-01 PROCEDURE — 83735 ASSAY OF MAGNESIUM: CPT | Mod: 91

## 2017-01-01 PROCEDURE — 96372 THER/PROPH/DIAG INJ SC/IM: CPT

## 2017-01-01 PROCEDURE — 99232 SBSQ HOSP IP/OBS MODERATE 35: CPT | Mod: ,,, | Performed by: INTERNAL MEDICINE

## 2017-01-01 PROCEDURE — C9113 INJ PANTOPRAZOLE SODIUM, VIA: HCPCS | Performed by: INTERNAL MEDICINE

## 2017-01-01 PROCEDURE — 3008F BODY MASS INDEX DOCD: CPT | Mod: S$GLB,,, | Performed by: NUCLEAR MEDICINE

## 2017-01-01 PROCEDURE — 97116 GAIT TRAINING THERAPY: CPT

## 2017-01-01 PROCEDURE — 81003 URINALYSIS AUTO W/O SCOPE: CPT

## 2017-01-01 PROCEDURE — 1157F ADVNC CARE PLAN IN RCRD: CPT | Mod: S$GLB,,, | Performed by: INTERNAL MEDICINE

## 2017-01-01 PROCEDURE — 99499 UNLISTED E&M SERVICE: CPT | Mod: S$GLB,,, | Performed by: NUCLEAR MEDICINE

## 2017-01-01 PROCEDURE — 99499 UNLISTED E&M SERVICE: CPT | Mod: S$GLB,,, | Performed by: PSYCHIATRY & NEUROLOGY

## 2017-01-01 PROCEDURE — 1125F AMNT PAIN NOTED PAIN PRSNT: CPT | Mod: S$GLB,,, | Performed by: PODIATRIST

## 2017-01-01 PROCEDURE — 11000001 HC ACUTE MED/SURG PRIVATE ROOM

## 2017-01-01 PROCEDURE — 1126F AMNT PAIN NOTED NONE PRSNT: CPT | Mod: S$GLB,,, | Performed by: NUCLEAR MEDICINE

## 2017-01-01 PROCEDURE — 80048 BASIC METABOLIC PNL TOTAL CA: CPT | Mod: 91

## 2017-01-01 PROCEDURE — 1159F MED LIST DOCD IN RCRD: CPT | Mod: S$GLB,,, | Performed by: PODIATRIST

## 2017-01-01 PROCEDURE — 99999 PR PBB SHADOW E&M-EST. PATIENT-LVL III: CPT | Mod: PBBFAC,,, | Performed by: INTERNAL MEDICINE

## 2017-01-01 PROCEDURE — 1159F MED LIST DOCD IN RCRD: CPT | Mod: S$GLB,,, | Performed by: NUCLEAR MEDICINE

## 2017-01-01 PROCEDURE — 99233 SBSQ HOSP IP/OBS HIGH 50: CPT | Mod: ,,, | Performed by: NURSE PRACTITIONER

## 2017-01-01 PROCEDURE — 85651 RBC SED RATE NONAUTOMATED: CPT

## 2017-01-01 PROCEDURE — 99291 CRITICAL CARE FIRST HOUR: CPT | Mod: 25

## 2017-01-01 PROCEDURE — G0008 ADMIN INFLUENZA VIRUS VAC: HCPCS | Mod: S$GLB,,, | Performed by: FAMILY MEDICINE

## 2017-01-01 PROCEDURE — 96361 HYDRATE IV INFUSION ADD-ON: CPT

## 2017-01-01 PROCEDURE — 27200966 HC CLOSED SUCTION SYSTEM

## 2017-01-01 PROCEDURE — 25500020 PHARM REV CODE 255: Performed by: INTERNAL MEDICINE

## 2017-01-01 PROCEDURE — 85379 FIBRIN DEGRADATION QUANT: CPT

## 2017-01-01 PROCEDURE — 93010 ELECTROCARDIOGRAM REPORT: CPT | Mod: S$GLB,,, | Performed by: INTERNAL MEDICINE

## 2017-01-01 PROCEDURE — G8978 MOBILITY CURRENT STATUS: HCPCS | Mod: CI

## 2017-01-01 PROCEDURE — 87400 INFLUENZA A/B EACH AG IA: CPT | Mod: 59

## 2017-01-01 PROCEDURE — 96374 THER/PROPH/DIAG INJ IV PUSH: CPT

## 2017-01-01 PROCEDURE — 71020 XR CHEST PA AND LATERAL: CPT | Mod: 26,,, | Performed by: RADIOLOGY

## 2017-01-01 PROCEDURE — 25000003 PHARM REV CODE 250: Performed by: FAMILY MEDICINE

## 2017-01-01 PROCEDURE — 99999 PR PBB SHADOW E&M-EST. PATIENT-LVL III: CPT | Mod: PBBFAC,,, | Performed by: PODIATRIST

## 2017-01-01 PROCEDURE — 63600175 PHARM REV CODE 636 W HCPCS: Performed by: STUDENT IN AN ORGANIZED HEALTH CARE EDUCATION/TRAINING PROGRAM

## 2017-01-01 PROCEDURE — 84443 ASSAY THYROID STIM HORMONE: CPT

## 2017-01-01 PROCEDURE — 1125F AMNT PAIN NOTED PAIN PRSNT: CPT | Mod: S$GLB,,, | Performed by: PSYCHIATRY & NEUROLOGY

## 2017-01-01 PROCEDURE — G8978 MOBILITY CURRENT STATUS: HCPCS | Mod: CJ

## 2017-01-01 PROCEDURE — 94002 VENT MGMT INPAT INIT DAY: CPT

## 2017-01-01 PROCEDURE — G8989 SELF CARE D/C STATUS: HCPCS | Mod: CI

## 2017-01-01 PROCEDURE — 1159F MED LIST DOCD IN RCRD: CPT | Mod: S$GLB,,, | Performed by: PSYCHIATRY & NEUROLOGY

## 2017-01-01 PROCEDURE — 99214 OFFICE O/P EST MOD 30 MIN: CPT | Mod: S$GLB,,, | Performed by: PSYCHIATRY & NEUROLOGY

## 2017-01-01 PROCEDURE — 0BH17EZ INSERTION OF ENDOTRACHEAL AIRWAY INTO TRACHEA, VIA NATURAL OR ARTIFICIAL OPENING: ICD-10-PCS | Performed by: EMERGENCY MEDICINE

## 2017-01-01 PROCEDURE — 36556 INSERT NON-TUNNEL CV CATH: CPT

## 2017-01-01 PROCEDURE — 1160F RVW MEDS BY RX/DR IN RCRD: CPT | Mod: S$GLB,,, | Performed by: PSYCHIATRY & NEUROLOGY

## 2017-01-01 PROCEDURE — 1157F ADVNC CARE PLAN IN RCRD: CPT | Mod: S$GLB,,, | Performed by: NUCLEAR MEDICINE

## 2017-01-01 PROCEDURE — 87205 SMEAR GRAM STAIN: CPT

## 2017-01-01 PROCEDURE — 93306 TTE W/DOPPLER COMPLETE: CPT

## 2017-01-01 PROCEDURE — 36556 INSERT NON-TUNNEL CV CATH: CPT | Mod: ,,, | Performed by: INTERNAL MEDICINE

## 2017-01-01 PROCEDURE — 99291 CRITICAL CARE FIRST HOUR: CPT | Mod: 25,,, | Performed by: INTERNAL MEDICINE

## 2017-01-01 PROCEDURE — 96367 TX/PROPH/DG ADDL SEQ IV INF: CPT

## 2017-01-01 PROCEDURE — 96376 TX/PRO/DX INJ SAME DRUG ADON: CPT

## 2017-01-01 PROCEDURE — 99999 PR PBB SHADOW E&M-EST. PATIENT-LVL III: CPT | Mod: PBBFAC,,, | Performed by: PSYCHIATRY & NEUROLOGY

## 2017-01-01 PROCEDURE — 5A1945Z RESPIRATORY VENTILATION, 24-96 CONSECUTIVE HOURS: ICD-10-PCS | Performed by: EMERGENCY MEDICINE

## 2017-01-01 PROCEDURE — 99499 UNLISTED E&M SERVICE: CPT | Mod: S$GLB,,, | Performed by: PODIATRIST

## 2017-01-01 PROCEDURE — 99203 OFFICE O/P NEW LOW 30 MIN: CPT | Mod: 25,S$GLB,, | Performed by: PODIATRIST

## 2017-01-01 PROCEDURE — 84550 ASSAY OF BLOOD/URIC ACID: CPT

## 2017-01-01 RX ORDER — IBUPROFEN 200 MG
24 TABLET ORAL
Status: DISCONTINUED | OUTPATIENT
Start: 2017-01-01 | End: 2017-01-01

## 2017-01-01 RX ORDER — FORMOTEROL FUMARATE DIHYDRATE 20 UG/2ML
20 SOLUTION RESPIRATORY (INHALATION) EVERY 12 HOURS
Status: DISCONTINUED | OUTPATIENT
Start: 2017-01-01 | End: 2017-01-01

## 2017-01-01 RX ORDER — PANTOPRAZOLE SODIUM 40 MG/10ML
40 INJECTION, POWDER, LYOPHILIZED, FOR SOLUTION INTRAVENOUS DAILY
Status: DISCONTINUED | OUTPATIENT
Start: 2017-01-01 | End: 2017-01-01

## 2017-01-01 RX ORDER — PANTOPRAZOLE SODIUM 40 MG/1
40 TABLET, DELAYED RELEASE ORAL DAILY
Status: DISCONTINUED | OUTPATIENT
Start: 2017-01-01 | End: 2017-01-01 | Stop reason: HOSPADM

## 2017-01-01 RX ORDER — HEPARIN SODIUM 5000 [USP'U]/ML
5000 INJECTION, SOLUTION INTRAVENOUS; SUBCUTANEOUS EVERY 8 HOURS
Status: DISCONTINUED | OUTPATIENT
Start: 2017-01-01 | End: 2017-01-01

## 2017-01-01 RX ORDER — METHYLPREDNISOLONE SOD SUCC 125 MG
125 VIAL (EA) INJECTION
Status: COMPLETED | OUTPATIENT
Start: 2017-01-01 | End: 2017-01-01

## 2017-01-01 RX ORDER — ARFORMOTEROL TARTRATE 15 UG/2ML
15 SOLUTION RESPIRATORY (INHALATION) 2 TIMES DAILY
Status: DISCONTINUED | OUTPATIENT
Start: 2017-01-01 | End: 2017-01-01 | Stop reason: HOSPADM

## 2017-01-01 RX ORDER — TORSEMIDE 10 MG/1
20 TABLET ORAL DAILY
Status: DISCONTINUED | OUTPATIENT
Start: 2017-01-01 | End: 2017-01-01 | Stop reason: HOSPADM

## 2017-01-01 RX ORDER — FUROSEMIDE 10 MG/ML
40 INJECTION INTRAMUSCULAR; INTRAVENOUS DAILY
Status: DISCONTINUED | OUTPATIENT
Start: 2017-01-01 | End: 2017-01-01

## 2017-01-01 RX ORDER — IPRATROPIUM BROMIDE AND ALBUTEROL SULFATE 2.5; .5 MG/3ML; MG/3ML
3 SOLUTION RESPIRATORY (INHALATION)
Status: DISCONTINUED | OUTPATIENT
Start: 2017-01-01 | End: 2017-01-01

## 2017-01-01 RX ORDER — SIMVASTATIN 20 MG/1
40 TABLET, FILM COATED ORAL NIGHTLY
Status: DISCONTINUED | OUTPATIENT
Start: 2017-01-01 | End: 2017-01-01

## 2017-01-01 RX ORDER — PREDNISONE 20 MG/1
20 TABLET ORAL DAILY
Qty: 10 TABLET | Refills: 0 | Status: SHIPPED | OUTPATIENT
Start: 2017-01-01 | End: 2017-01-01

## 2017-01-01 RX ORDER — CIPROFLOXACIN 500 MG/1
500 TABLET ORAL EVERY 12 HOURS
Status: DISCONTINUED | OUTPATIENT
Start: 2017-01-01 | End: 2017-01-01

## 2017-01-01 RX ORDER — SIMVASTATIN 20 MG/1
40 TABLET, FILM COATED ORAL NIGHTLY
Status: DISCONTINUED | OUTPATIENT
Start: 2017-01-01 | End: 2017-01-01 | Stop reason: HOSPADM

## 2017-01-01 RX ORDER — IBUPROFEN 200 MG
16 TABLET ORAL
Status: DISCONTINUED | OUTPATIENT
Start: 2017-01-01 | End: 2017-01-01 | Stop reason: HOSPADM

## 2017-01-01 RX ORDER — FAMOTIDINE 20 MG/1
20 TABLET, FILM COATED ORAL 2 TIMES DAILY
Status: DISCONTINUED | OUTPATIENT
Start: 2017-01-01 | End: 2017-01-01

## 2017-01-01 RX ORDER — BUDESONIDE 0.5 MG/2ML
0.5 INHALANT ORAL 2 TIMES DAILY
Status: DISCONTINUED | OUTPATIENT
Start: 2017-01-01 | End: 2017-01-01 | Stop reason: HOSPADM

## 2017-01-01 RX ORDER — IPRATROPIUM BROMIDE AND ALBUTEROL SULFATE 2.5; .5 MG/3ML; MG/3ML
3 SOLUTION RESPIRATORY (INHALATION)
Status: COMPLETED | OUTPATIENT
Start: 2017-01-01 | End: 2017-01-01

## 2017-01-01 RX ORDER — FUROSEMIDE 10 MG/ML
60 INJECTION INTRAMUSCULAR; INTRAVENOUS
Status: COMPLETED | OUTPATIENT
Start: 2017-01-01 | End: 2017-01-01

## 2017-01-01 RX ORDER — CHLORHEXIDINE GLUCONATE ORAL RINSE 1.2 MG/ML
15 SOLUTION DENTAL 2 TIMES DAILY
Status: DISCONTINUED | OUTPATIENT
Start: 2017-01-01 | End: 2017-01-01

## 2017-01-01 RX ORDER — METOPROLOL TARTRATE 25 MG/1
12.5 TABLET ORAL 2 TIMES DAILY
Status: DISCONTINUED | OUTPATIENT
Start: 2017-01-01 | End: 2017-01-01

## 2017-01-01 RX ORDER — ACETAMINOPHEN 325 MG/1
650 TABLET ORAL
Status: COMPLETED | OUTPATIENT
Start: 2017-01-01 | End: 2017-01-01

## 2017-01-01 RX ORDER — LEVALBUTEROL INHALATION SOLUTION 0.63 MG/3ML
0.63 SOLUTION RESPIRATORY (INHALATION) EVERY 8 HOURS
Status: DISCONTINUED | OUTPATIENT
Start: 2017-01-01 | End: 2017-01-01

## 2017-01-01 RX ORDER — ONDANSETRON 2 MG/ML
4 INJECTION INTRAMUSCULAR; INTRAVENOUS EVERY 12 HOURS PRN
Status: DISCONTINUED | OUTPATIENT
Start: 2017-01-01 | End: 2017-01-01 | Stop reason: HOSPADM

## 2017-01-01 RX ORDER — MEROPENEM AND SODIUM CHLORIDE 500 MG/50ML
500 INJECTION, SOLUTION INTRAVENOUS
Status: DISCONTINUED | OUTPATIENT
Start: 2017-01-01 | End: 2017-01-01

## 2017-01-01 RX ORDER — IBUPROFEN 200 MG
16 TABLET ORAL
Status: DISCONTINUED | OUTPATIENT
Start: 2017-01-01 | End: 2017-01-01

## 2017-01-01 RX ORDER — DIPHENHYDRAMINE HYDROCHLORIDE 50 MG/ML
6.25 INJECTION INTRAMUSCULAR; INTRAVENOUS EVERY 4 HOURS PRN
Status: DISCONTINUED | OUTPATIENT
Start: 2017-01-01 | End: 2017-01-01 | Stop reason: HOSPADM

## 2017-01-01 RX ORDER — FUROSEMIDE 10 MG/ML
40 INJECTION INTRAMUSCULAR; INTRAVENOUS 2 TIMES DAILY
Status: DISCONTINUED | OUTPATIENT
Start: 2017-01-01 | End: 2017-01-01

## 2017-01-01 RX ORDER — MOXIFLOXACIN HYDROCHLORIDE 400 MG/1
400 TABLET ORAL DAILY
Status: DISCONTINUED | OUTPATIENT
Start: 2017-01-01 | End: 2017-01-01 | Stop reason: HOSPADM

## 2017-01-01 RX ORDER — FUROSEMIDE 10 MG/ML
40 INJECTION INTRAMUSCULAR; INTRAVENOUS ONCE
Status: DISCONTINUED | OUTPATIENT
Start: 2017-01-01 | End: 2017-01-01

## 2017-01-01 RX ORDER — ACETAMINOPHEN 325 MG/1
650 TABLET ORAL EVERY 6 HOURS PRN
Status: DISCONTINUED | OUTPATIENT
Start: 2017-01-01 | End: 2017-01-01 | Stop reason: HOSPADM

## 2017-01-01 RX ORDER — FUROSEMIDE 10 MG/ML
20 INJECTION INTRAMUSCULAR; INTRAVENOUS ONCE
Status: COMPLETED | OUTPATIENT
Start: 2017-01-01 | End: 2017-01-01

## 2017-01-01 RX ORDER — IPRATROPIUM BROMIDE 0.5 MG/2.5ML
0.5 SOLUTION RESPIRATORY (INHALATION) EVERY 6 HOURS
Status: DISCONTINUED | OUTPATIENT
Start: 2017-01-01 | End: 2017-01-01

## 2017-01-01 RX ORDER — TORSEMIDE 20 MG/1
20 TABLET ORAL DAILY
Qty: 30 TABLET | Refills: 11 | Status: SHIPPED | OUTPATIENT
Start: 2017-01-01 | End: 2017-01-01 | Stop reason: HOSPADM

## 2017-01-01 RX ORDER — IPRATROPIUM BROMIDE AND ALBUTEROL SULFATE 2.5; .5 MG/3ML; MG/3ML
3 SOLUTION RESPIRATORY (INHALATION) EVERY 4 HOURS
Status: DISCONTINUED | OUTPATIENT
Start: 2017-01-01 | End: 2017-01-01 | Stop reason: HOSPADM

## 2017-01-01 RX ORDER — ONDANSETRON 2 MG/ML
4 INJECTION INTRAMUSCULAR; INTRAVENOUS EVERY 8 HOURS PRN
Status: DISCONTINUED | OUTPATIENT
Start: 2017-01-01 | End: 2017-01-01 | Stop reason: HOSPADM

## 2017-01-01 RX ORDER — METOPROLOL TARTRATE 25 MG/1
12.5 TABLET, FILM COATED ORAL 2 TIMES DAILY
Qty: 30 TABLET | Refills: 0 | Status: SHIPPED | OUTPATIENT
Start: 2017-01-01 | End: 2017-10-24 | Stop reason: HOSPADM

## 2017-01-01 RX ORDER — FUROSEMIDE 40 MG/1
40 TABLET ORAL DAILY
Status: DISCONTINUED | OUTPATIENT
Start: 2017-01-01 | End: 2017-01-01 | Stop reason: HOSPADM

## 2017-01-01 RX ORDER — NAPROXEN SODIUM 220 MG/1
81 TABLET, FILM COATED ORAL DAILY
Status: DISCONTINUED | OUTPATIENT
Start: 2017-01-01 | End: 2017-01-01 | Stop reason: HOSPADM

## 2017-01-01 RX ORDER — BISACODYL 10 MG
10 SUPPOSITORY, RECTAL RECTAL DAILY PRN
Status: DISCONTINUED | OUTPATIENT
Start: 2017-01-01 | End: 2017-01-01 | Stop reason: HOSPADM

## 2017-01-01 RX ORDER — LEVALBUTEROL INHALATION SOLUTION 0.63 MG/3ML
0.63 SOLUTION RESPIRATORY (INHALATION) EVERY 8 HOURS
Status: DISCONTINUED | OUTPATIENT
Start: 2017-01-01 | End: 2017-01-01 | Stop reason: HOSPADM

## 2017-01-01 RX ORDER — IPRATROPIUM BROMIDE AND ALBUTEROL SULFATE 2.5; .5 MG/3ML; MG/3ML
3 SOLUTION RESPIRATORY (INHALATION)
Status: DISCONTINUED | OUTPATIENT
Start: 2017-01-01 | End: 2017-01-01 | Stop reason: HOSPADM

## 2017-01-01 RX ORDER — FUROSEMIDE 20 MG/1
20 TABLET ORAL DAILY
Status: DISCONTINUED | OUTPATIENT
Start: 2017-01-01 | End: 2017-01-01

## 2017-01-01 RX ORDER — IPRATROPIUM BROMIDE AND ALBUTEROL SULFATE 2.5; .5 MG/3ML; MG/3ML
3 SOLUTION RESPIRATORY (INHALATION) EVERY 6 HOURS
Status: DISCONTINUED | OUTPATIENT
Start: 2017-01-01 | End: 2017-01-01

## 2017-01-01 RX ORDER — METHYLPREDNISOLONE SOD SUCC 125 MG
125 VIAL (EA) INJECTION ONCE
Status: COMPLETED | OUTPATIENT
Start: 2017-01-01 | End: 2017-01-01

## 2017-01-01 RX ORDER — PANTOPRAZOLE SODIUM 40 MG/1
40 TABLET, DELAYED RELEASE ORAL DAILY
Qty: 30 TABLET | Refills: 1 | Status: SHIPPED | OUTPATIENT
Start: 2017-01-01 | End: 2017-01-01

## 2017-01-01 RX ORDER — MOXIFLOXACIN HYDROCHLORIDE 400 MG/250ML
400 INJECTION, SOLUTION INTRAVENOUS
Status: DISCONTINUED | OUTPATIENT
Start: 2017-01-01 | End: 2017-01-01 | Stop reason: HOSPADM

## 2017-01-01 RX ORDER — PREDNISONE 10 MG/1
TABLET ORAL
Qty: 32 TABLET | Refills: 0 | Status: ON HOLD | OUTPATIENT
Start: 2017-01-01 | End: 2017-01-01 | Stop reason: HOSPADM

## 2017-01-01 RX ORDER — PREDNISONE 20 MG/1
60 TABLET ORAL DAILY
Status: DISCONTINUED | OUTPATIENT
Start: 2017-01-01 | End: 2017-01-01 | Stop reason: HOSPADM

## 2017-01-01 RX ORDER — ROFLUMILAST 500 UG/1
500 TABLET ORAL DAILY
Qty: 30 TABLET | Refills: 0 | Status: SHIPPED | OUTPATIENT
Start: 2017-01-01 | End: 2017-01-01 | Stop reason: SDUPTHER

## 2017-01-01 RX ORDER — LEVOFLOXACIN 250 MG/1
250 TABLET ORAL DAILY
Qty: 7 TABLET | Refills: 0 | Status: SHIPPED | OUTPATIENT
Start: 2017-01-01 | End: 2017-10-24 | Stop reason: HOSPADM

## 2017-01-01 RX ORDER — FENTANYL CITRATE 50 UG/ML
INJECTION, SOLUTION INTRAMUSCULAR; INTRAVENOUS
Status: COMPLETED
Start: 2017-01-01 | End: 2017-01-01

## 2017-01-01 RX ORDER — FUROSEMIDE 10 MG/ML
80 INJECTION INTRAMUSCULAR; INTRAVENOUS
Status: COMPLETED | OUTPATIENT
Start: 2017-01-01 | End: 2017-01-01

## 2017-01-01 RX ORDER — PANTOPRAZOLE SODIUM 40 MG/1
40 TABLET, DELAYED RELEASE ORAL DAILY
Qty: 30 TABLET | Refills: 1 | Status: SHIPPED | OUTPATIENT
Start: 2017-01-01 | End: 2017-10-24 | Stop reason: HOSPADM

## 2017-01-01 RX ORDER — CIPROFLOXACIN 2 MG/ML
400 INJECTION, SOLUTION INTRAVENOUS
Status: DISCONTINUED | OUTPATIENT
Start: 2017-01-01 | End: 2017-01-01

## 2017-01-01 RX ORDER — METOPROLOL TARTRATE 1 MG/ML
5 INJECTION, SOLUTION INTRAVENOUS ONCE
Status: COMPLETED | OUTPATIENT
Start: 2017-01-01 | End: 2017-01-01

## 2017-01-01 RX ORDER — SPIRONOLACTONE 25 MG/1
25 TABLET ORAL 2 TIMES DAILY
Qty: 60 TABLET | Refills: 0 | Status: CANCELLED | OUTPATIENT
Start: 2017-01-01 | End: 2018-07-07

## 2017-01-01 RX ORDER — DEXMEDETOMIDINE HYDROCHLORIDE 4 UG/ML
1 INJECTION, SOLUTION INTRAVENOUS CONTINUOUS
Status: DISCONTINUED | OUTPATIENT
Start: 2017-01-01 | End: 2017-01-01

## 2017-01-01 RX ORDER — SULFAMETHOXAZOLE AND TRIMETHOPRIM 800; 160 MG/1; MG/1
1 TABLET ORAL 2 TIMES DAILY
Qty: 6 TABLET | Refills: 0 | Status: SHIPPED | OUTPATIENT
Start: 2017-01-01 | End: 2017-01-01

## 2017-01-01 RX ORDER — MORPHINE SULFATE 2 MG/ML
2 INJECTION, SOLUTION INTRAMUSCULAR; INTRAVENOUS ONCE
Status: COMPLETED | OUTPATIENT
Start: 2017-01-01 | End: 2017-01-01

## 2017-01-01 RX ORDER — FENTANYL CITRAT/DEXTROSE 5%/PF 100 MCG/10
PATIENT CONTROLLED ANALGESIA SYRINGE INTRAVENOUS CONTINUOUS
Status: DISCONTINUED | OUTPATIENT
Start: 2017-01-01 | End: 2017-01-01

## 2017-01-01 RX ORDER — ACETAMINOPHEN 325 MG/1
650 TABLET ORAL EVERY 8 HOURS PRN
Status: DISCONTINUED | OUTPATIENT
Start: 2017-01-01 | End: 2017-01-01

## 2017-01-01 RX ORDER — PREDNISONE 20 MG/1
20 TABLET ORAL DAILY
Qty: 20 TABLET | Refills: 0 | Status: SHIPPED | OUTPATIENT
Start: 2017-01-01 | End: 2017-01-01

## 2017-01-01 RX ORDER — SODIUM CHLORIDE 9 MG/ML
INJECTION, SOLUTION INTRAVENOUS ONCE
Status: COMPLETED | OUTPATIENT
Start: 2017-01-01 | End: 2017-01-01

## 2017-01-01 RX ORDER — GLUCAGON 1 MG
1 KIT INJECTION
Status: DISCONTINUED | OUTPATIENT
Start: 2017-01-01 | End: 2017-01-01

## 2017-01-01 RX ORDER — ROFLUMILAST 500 UG/1
500 TABLET ORAL DAILY
Status: DISCONTINUED | OUTPATIENT
Start: 2017-01-01 | End: 2017-01-01 | Stop reason: HOSPADM

## 2017-01-01 RX ORDER — PANTOPRAZOLE SODIUM 40 MG/1
40 TABLET, DELAYED RELEASE ORAL DAILY
Status: DISCONTINUED | OUTPATIENT
Start: 2017-01-01 | End: 2017-01-01

## 2017-01-01 RX ORDER — FENTANYL CITRATE 50 UG/ML
50 INJECTION, SOLUTION INTRAMUSCULAR; INTRAVENOUS ONCE
Status: COMPLETED | OUTPATIENT
Start: 2017-01-01 | End: 2017-01-01

## 2017-01-01 RX ORDER — FUROSEMIDE 10 MG/ML
40 INJECTION INTRAMUSCULAR; INTRAVENOUS
Status: COMPLETED | OUTPATIENT
Start: 2017-01-01 | End: 2017-01-01

## 2017-01-01 RX ORDER — LACTULOSE 10 G/15ML
10 SOLUTION ORAL EVERY 8 HOURS PRN
Status: DISCONTINUED | OUTPATIENT
Start: 2017-01-01 | End: 2017-01-01 | Stop reason: HOSPADM

## 2017-01-01 RX ORDER — FUROSEMIDE 20 MG/1
20 TABLET ORAL 2 TIMES DAILY
Qty: 180 TABLET | Refills: 3 | Status: SHIPPED | OUTPATIENT
Start: 2017-01-01 | End: 2017-01-01 | Stop reason: CLARIF

## 2017-01-01 RX ORDER — GUAIFENESIN 600 MG/1
600 TABLET, EXTENDED RELEASE ORAL 2 TIMES DAILY
Status: DISCONTINUED | OUTPATIENT
Start: 2017-01-01 | End: 2017-01-01

## 2017-01-01 RX ORDER — MOXIFLOXACIN HYDROCHLORIDE 400 MG/250ML
400 INJECTION, SOLUTION INTRAVENOUS
Status: DISCONTINUED | OUTPATIENT
Start: 2017-01-01 | End: 2017-01-01

## 2017-01-01 RX ORDER — HEPARIN SODIUM 5000 [USP'U]/ML
5000 INJECTION, SOLUTION INTRAVENOUS; SUBCUTANEOUS EVERY 8 HOURS
Status: DISCONTINUED | OUTPATIENT
Start: 2017-01-01 | End: 2017-01-01 | Stop reason: HOSPADM

## 2017-01-01 RX ORDER — ASPIRIN 325 MG
325 TABLET ORAL
Status: COMPLETED | OUTPATIENT
Start: 2017-01-01 | End: 2017-01-01

## 2017-01-01 RX ORDER — FAMOTIDINE 20 MG/1
20 TABLET, FILM COATED ORAL DAILY
Status: DISCONTINUED | OUTPATIENT
Start: 2017-01-01 | End: 2017-01-01 | Stop reason: HOSPADM

## 2017-01-01 RX ORDER — LEVETIRACETAM 750 MG/1
750 TABLET ORAL 2 TIMES DAILY
COMMUNITY
End: 2017-01-01 | Stop reason: SDUPTHER

## 2017-01-01 RX ORDER — LEVOFLOXACIN 250 MG/1
250 TABLET ORAL DAILY
Qty: 7 TABLET | Refills: 0 | Status: SHIPPED | OUTPATIENT
Start: 2017-01-01 | End: 2017-01-01

## 2017-01-01 RX ORDER — GLUCAGON 1 MG
1 KIT INJECTION
Status: DISCONTINUED | OUTPATIENT
Start: 2017-01-01 | End: 2017-01-01 | Stop reason: HOSPADM

## 2017-01-01 RX ORDER — PREDNISONE 10 MG/1
10 TABLET ORAL 2 TIMES DAILY
Qty: 8 TABLET | Refills: 0 | Status: SHIPPED | OUTPATIENT
Start: 2017-01-01 | End: 2017-01-01

## 2017-01-01 RX ORDER — ROFLUMILAST 500 UG/1
500 TABLET ORAL DAILY
Qty: 30 TABLET | Refills: 0 | Status: SHIPPED | OUTPATIENT
Start: 2017-01-01 | End: 2017-10-24 | Stop reason: HOSPADM

## 2017-01-01 RX ORDER — FUROSEMIDE 10 MG/ML
20 INJECTION INTRAMUSCULAR; INTRAVENOUS 2 TIMES DAILY
Status: DISCONTINUED | OUTPATIENT
Start: 2017-01-01 | End: 2017-01-01

## 2017-01-01 RX ORDER — METOPROLOL TARTRATE 25 MG/1
12.5 TABLET ORAL 2 TIMES DAILY
Status: DISCONTINUED | OUTPATIENT
Start: 2017-01-01 | End: 2017-01-01 | Stop reason: HOSPADM

## 2017-01-01 RX ORDER — LOSARTAN POTASSIUM 25 MG/1
12.5 TABLET ORAL NIGHTLY
Qty: 45 TABLET | Refills: 3 | Status: SHIPPED | OUTPATIENT
Start: 2017-01-01 | End: 2017-10-24 | Stop reason: HOSPADM

## 2017-01-01 RX ORDER — IPRATROPIUM BROMIDE AND ALBUTEROL SULFATE 2.5; .5 MG/3ML; MG/3ML
3 SOLUTION RESPIRATORY (INHALATION) ONCE
Status: DISCONTINUED | OUTPATIENT
Start: 2017-01-01 | End: 2017-01-01

## 2017-01-01 RX ORDER — INSULIN ASPART 100 [IU]/ML
1-10 INJECTION, SOLUTION INTRAVENOUS; SUBCUTANEOUS EVERY 6 HOURS PRN
Status: DISCONTINUED | OUTPATIENT
Start: 2017-01-01 | End: 2017-01-01

## 2017-01-01 RX ORDER — BUDESONIDE 0.5 MG/2ML
0.5 INHALANT ORAL EVERY 12 HOURS
Status: DISCONTINUED | OUTPATIENT
Start: 2017-01-01 | End: 2017-01-01

## 2017-01-01 RX ORDER — PREDNISONE 20 MG/1
20 TABLET ORAL DAILY
Status: DISCONTINUED | OUTPATIENT
Start: 2017-01-01 | End: 2017-01-01 | Stop reason: HOSPADM

## 2017-01-01 RX ORDER — INSULIN ASPART 100 [IU]/ML
1-10 INJECTION, SOLUTION INTRAVENOUS; SUBCUTANEOUS
Status: DISCONTINUED | OUTPATIENT
Start: 2017-01-01 | End: 2017-01-01 | Stop reason: HOSPADM

## 2017-01-01 RX ORDER — METOPROLOL TARTRATE 25 MG/1
25 TABLET, FILM COATED ORAL 2 TIMES DAILY
Status: DISCONTINUED | OUTPATIENT
Start: 2017-01-01 | End: 2017-01-01 | Stop reason: HOSPADM

## 2017-01-01 RX ORDER — FUROSEMIDE 10 MG/ML
20 INJECTION INTRAMUSCULAR; INTRAVENOUS ONCE
Status: DISCONTINUED | OUTPATIENT
Start: 2017-01-01 | End: 2017-01-01

## 2017-01-01 RX ORDER — FUROSEMIDE 40 MG/1
40 TABLET ORAL DAILY
Status: DISCONTINUED | OUTPATIENT
Start: 2017-01-01 | End: 2017-01-01

## 2017-01-01 RX ORDER — ROFLUMILAST 500 UG/1
500 TABLET ORAL DAILY
Qty: 90 TABLET | Refills: 2 | Status: ON HOLD | OUTPATIENT
Start: 2017-01-01 | End: 2017-01-01 | Stop reason: HOSPADM

## 2017-01-01 RX ORDER — IBUPROFEN 200 MG
24 TABLET ORAL
Status: DISCONTINUED | OUTPATIENT
Start: 2017-01-01 | End: 2017-01-01 | Stop reason: HOSPADM

## 2017-01-01 RX ORDER — IPRATROPIUM BROMIDE AND ALBUTEROL SULFATE 2.5; .5 MG/3ML; MG/3ML
3 SOLUTION RESPIRATORY (INHALATION) EVERY 6 HOURS PRN
Status: DISCONTINUED | OUTPATIENT
Start: 2017-01-01 | End: 2017-01-01 | Stop reason: HOSPADM

## 2017-01-01 RX ORDER — ARFORMOTEROL TARTRATE 15 UG/2ML
15 SOLUTION RESPIRATORY (INHALATION) EVERY 12 HOURS
Status: DISCONTINUED | OUTPATIENT
Start: 2017-01-01 | End: 2017-01-01 | Stop reason: HOSPADM

## 2017-01-01 RX ORDER — LEVOFLOXACIN 500 MG/1
500 TABLET, FILM COATED ORAL DAILY
Qty: 5 TABLET | Refills: 0 | Status: SHIPPED | OUTPATIENT
Start: 2017-01-01 | End: 2017-01-01

## 2017-01-01 RX ORDER — METOLAZONE 5 MG/1
10 TABLET ORAL ONCE
Status: COMPLETED | OUTPATIENT
Start: 2017-01-01 | End: 2017-01-01

## 2017-01-01 RX ORDER — FUROSEMIDE 40 MG/1
40 TABLET ORAL 2 TIMES DAILY
Status: DISCONTINUED | OUTPATIENT
Start: 2017-01-01 | End: 2017-01-01

## 2017-01-01 RX ORDER — MORPHINE SULFATE 2 MG/ML
1 INJECTION, SOLUTION INTRAMUSCULAR; INTRAVENOUS
Status: COMPLETED | OUTPATIENT
Start: 2017-01-01 | End: 2017-01-01

## 2017-01-01 RX ORDER — TORSEMIDE 10 MG/1
20 TABLET ORAL DAILY
Status: DISCONTINUED | OUTPATIENT
Start: 2017-01-01 | End: 2017-01-01

## 2017-01-01 RX ORDER — ACETAMINOPHEN 10 MG/ML
1000 INJECTION, SOLUTION INTRAVENOUS ONCE
Status: COMPLETED | OUTPATIENT
Start: 2017-01-01 | End: 2017-01-01

## 2017-01-01 RX ORDER — ACETAMINOPHEN 10 MG/ML
1000 INJECTION, SOLUTION INTRAVENOUS ONCE
Status: DISCONTINUED | OUTPATIENT
Start: 2017-01-01 | End: 2017-01-01

## 2017-01-01 RX ORDER — SPIRONOLACTONE 25 MG/1
25 TABLET ORAL 2 TIMES DAILY
Qty: 60 TABLET | Refills: 0 | Status: SHIPPED | OUTPATIENT
Start: 2017-01-01 | End: 2017-01-01

## 2017-01-01 RX ORDER — SODIUM CHLORIDE 9 MG/ML
INJECTION, SOLUTION INTRAVENOUS CONTINUOUS
Status: DISCONTINUED | OUTPATIENT
Start: 2017-01-01 | End: 2017-01-01

## 2017-01-01 RX ORDER — FUROSEMIDE 10 MG/ML
20 INJECTION INTRAMUSCULAR; INTRAVENOUS DAILY
Status: DISCONTINUED | OUTPATIENT
Start: 2017-01-01 | End: 2017-01-01

## 2017-01-01 RX ORDER — METOPROLOL TARTRATE 1 MG/ML
2.5 INJECTION, SOLUTION INTRAVENOUS ONCE
Status: COMPLETED | OUTPATIENT
Start: 2017-01-01 | End: 2017-01-01

## 2017-01-01 RX ORDER — NAPROXEN SODIUM 220 MG/1
81 TABLET, FILM COATED ORAL DAILY
Status: DISCONTINUED | OUTPATIENT
Start: 2017-01-01 | End: 2017-01-01

## 2017-01-01 RX ORDER — TORSEMIDE 10 MG/1
10 TABLET ORAL DAILY
Status: DISCONTINUED | OUTPATIENT
Start: 2017-01-01 | End: 2017-01-01

## 2017-01-01 RX ORDER — ONDANSETRON 2 MG/ML
4 INJECTION INTRAMUSCULAR; INTRAVENOUS EVERY 8 HOURS PRN
Status: DISCONTINUED | OUTPATIENT
Start: 2017-01-01 | End: 2017-01-01 | Stop reason: SDUPTHER

## 2017-01-01 RX ORDER — TORSEMIDE 20 MG/1
20 TABLET ORAL DAILY
Qty: 30 TABLET | Refills: 11 | Status: SHIPPED | OUTPATIENT
Start: 2017-01-01 | End: 2017-01-01

## 2017-01-01 RX ORDER — ENOXAPARIN SODIUM 100 MG/ML
40 INJECTION SUBCUTANEOUS EVERY 24 HOURS
Status: DISCONTINUED | OUTPATIENT
Start: 2017-01-01 | End: 2017-01-01 | Stop reason: HOSPADM

## 2017-01-01 RX ORDER — SPIRONOLACTONE 25 MG/1
25 TABLET ORAL NIGHTLY
Qty: 30 TABLET | Refills: 0 | Status: SHIPPED | OUTPATIENT
Start: 2017-01-01 | End: 2017-01-01 | Stop reason: SDUPTHER

## 2017-01-01 RX ORDER — FORMOTEROL FUMARATE DIHYDRATE 20 UG/2ML
20 SOLUTION RESPIRATORY (INHALATION) EVERY 12 HOURS
Status: DISCONTINUED | OUTPATIENT
Start: 2017-01-01 | End: 2017-01-01 | Stop reason: CLARIF

## 2017-01-01 RX ORDER — BISACODYL 10 MG
10 SUPPOSITORY, RECTAL RECTAL DAILY PRN
Status: DISCONTINUED | OUTPATIENT
Start: 2017-01-01 | End: 2017-01-01

## 2017-01-01 RX ORDER — SPIRONOLACTONE 25 MG/1
25 TABLET ORAL 2 TIMES DAILY
Status: DISCONTINUED | OUTPATIENT
Start: 2017-01-01 | End: 2017-01-01

## 2017-01-01 RX ORDER — ARFORMOTEROL TARTRATE 15 UG/2ML
15 SOLUTION RESPIRATORY (INHALATION) 2 TIMES DAILY
Status: DISCONTINUED | OUTPATIENT
Start: 2017-01-01 | End: 2017-01-01

## 2017-01-01 RX ORDER — BUDESONIDE 0.5 MG/2ML
0.5 INHALANT ORAL EVERY 12 HOURS
Status: DISCONTINUED | OUTPATIENT
Start: 2017-01-01 | End: 2017-01-01 | Stop reason: HOSPADM

## 2017-01-01 RX ORDER — MOXIFLOXACIN HYDROCHLORIDE 400 MG/1
400 TABLET ORAL DAILY
Qty: 5 TABLET | Refills: 0 | Status: SHIPPED | OUTPATIENT
Start: 2017-01-01 | End: 2017-01-01 | Stop reason: HOSPADM

## 2017-01-01 RX ORDER — LEVETIRACETAM 5 MG/ML
500 INJECTION INTRAVASCULAR EVERY 12 HOURS
Status: DISCONTINUED | OUTPATIENT
Start: 2017-01-01 | End: 2017-01-01

## 2017-01-01 RX ORDER — SPIRONOLACTONE 25 MG/1
25 TABLET ORAL 2 TIMES DAILY
Status: DISCONTINUED | OUTPATIENT
Start: 2017-01-01 | End: 2017-01-01 | Stop reason: HOSPADM

## 2017-01-01 RX ORDER — MAG HYDROX/ALUMINUM HYD/SIMETH 200-200-20
30 SUSPENSION, ORAL (FINAL DOSE FORM) ORAL EVERY 6 HOURS PRN
Status: DISCONTINUED | OUTPATIENT
Start: 2017-01-01 | End: 2017-01-01 | Stop reason: HOSPADM

## 2017-01-01 RX ORDER — METOPROLOL TARTRATE 1 MG/ML
5 INJECTION, SOLUTION INTRAVENOUS ONCE
Status: DISCONTINUED | OUTPATIENT
Start: 2017-01-01 | End: 2017-01-01

## 2017-01-01 RX ORDER — SPIRONOLACTONE 25 MG/1
25 TABLET ORAL NIGHTLY
Qty: 90 TABLET | Refills: 3 | Status: SHIPPED | OUTPATIENT
Start: 2017-01-01 | End: 2017-10-24 | Stop reason: HOSPADM

## 2017-01-01 RX ORDER — ACETAMINOPHEN 325 MG/1
650 TABLET ORAL EVERY 8 HOURS PRN
Status: DISCONTINUED | OUTPATIENT
Start: 2017-01-01 | End: 2017-01-01 | Stop reason: HOSPADM

## 2017-01-01 RX ORDER — FAMOTIDINE 10 MG/ML
20 INJECTION INTRAVENOUS DAILY
Status: DISCONTINUED | OUTPATIENT
Start: 2017-01-01 | End: 2017-01-01

## 2017-01-01 RX ORDER — POTASSIUM CHLORIDE 20 MEQ/1
20 TABLET, EXTENDED RELEASE ORAL 2 TIMES DAILY
Qty: 180 TABLET | Refills: 3 | Status: SHIPPED | OUTPATIENT
Start: 2017-01-01 | End: 2017-01-01

## 2017-01-01 RX ORDER — LEVETIRACETAM 750 MG/1
750 TABLET ORAL 2 TIMES DAILY
Qty: 180 TABLET | Refills: 3 | Status: SHIPPED | OUTPATIENT
Start: 2017-01-01 | End: 2017-10-24 | Stop reason: HOSPADM

## 2017-01-01 RX ORDER — MORPHINE SULFATE 10 MG/ML
5 INJECTION INTRAMUSCULAR; INTRAVENOUS; SUBCUTANEOUS EVERY 30 MIN PRN
Status: DISCONTINUED | OUTPATIENT
Start: 2017-01-01 | End: 2017-10-24 | Stop reason: HOSPADM

## 2017-01-01 RX ORDER — METOPROLOL TARTRATE 25 MG/1
25 TABLET, FILM COATED ORAL 2 TIMES DAILY
Status: DISCONTINUED | OUTPATIENT
Start: 2017-01-01 | End: 2017-01-01

## 2017-01-01 RX ORDER — METOLAZONE 5 MG/1
5 TABLET ORAL
Status: COMPLETED | OUTPATIENT
Start: 2017-01-01 | End: 2017-01-01

## 2017-01-01 RX ORDER — ACETAMINOPHEN 325 MG/1
650 TABLET ORAL EVERY 4 HOURS PRN
Status: DISCONTINUED | OUTPATIENT
Start: 2017-01-01 | End: 2017-01-01 | Stop reason: HOSPADM

## 2017-01-01 RX ORDER — LORAZEPAM 2 MG/ML
INJECTION INTRAMUSCULAR
Status: DISCONTINUED
Start: 2017-01-01 | End: 2017-01-01 | Stop reason: WASHOUT

## 2017-01-01 RX ORDER — TIOTROPIUM BROMIDE 18 UG/1
18 CAPSULE ORAL; RESPIRATORY (INHALATION) DAILY
Status: DISCONTINUED | OUTPATIENT
Start: 2017-01-01 | End: 2017-01-01 | Stop reason: SDUPTHER

## 2017-01-01 RX ORDER — ONDANSETRON 2 MG/ML
4 INJECTION INTRAMUSCULAR; INTRAVENOUS EVERY 12 HOURS PRN
Status: DISCONTINUED | OUTPATIENT
Start: 2017-01-01 | End: 2017-10-24 | Stop reason: HOSPADM

## 2017-01-01 RX ORDER — NOREPINEPHRINE BITARTRATE/D5W 4MG/250ML
1 PLASTIC BAG, INJECTION (ML) INTRAVENOUS CONTINUOUS
Status: DISCONTINUED | OUTPATIENT
Start: 2017-01-01 | End: 2017-01-01

## 2017-01-01 RX ORDER — PREDNISONE 10 MG/1
10 TABLET ORAL 2 TIMES DAILY
Status: DISCONTINUED | OUTPATIENT
Start: 2017-01-01 | End: 2017-01-01 | Stop reason: HOSPADM

## 2017-01-01 RX ORDER — LORAZEPAM 2 MG/ML
INJECTION INTRAMUSCULAR
Status: COMPLETED
Start: 2017-01-01 | End: 2017-01-01

## 2017-01-01 RX ORDER — HYDRALAZINE HYDROCHLORIDE 20 MG/ML
10 INJECTION INTRAMUSCULAR; INTRAVENOUS EVERY 6 HOURS PRN
Status: DISCONTINUED | OUTPATIENT
Start: 2017-01-01 | End: 2017-01-01 | Stop reason: HOSPADM

## 2017-01-01 RX ORDER — FAMOTIDINE 10 MG/ML
20 INJECTION INTRAVENOUS 2 TIMES DAILY
Status: DISCONTINUED | OUTPATIENT
Start: 2017-01-01 | End: 2017-01-01

## 2017-01-01 RX ORDER — TORSEMIDE 20 MG/1
20 TABLET ORAL DAILY
COMMUNITY
Start: 2017-01-01 | End: 2017-10-24 | Stop reason: HOSPADM

## 2017-01-01 RX ORDER — UREA 40 %
CREAM (GRAM) TOPICAL 3 TIMES DAILY
Qty: 198.5 G | Refills: 4 | Status: SHIPPED | OUTPATIENT
Start: 2017-01-01 | End: 2017-01-01

## 2017-01-01 RX ORDER — TIOTROPIUM BROMIDE 18 UG/1
18 CAPSULE ORAL; RESPIRATORY (INHALATION) DAILY
Status: DISCONTINUED | OUTPATIENT
Start: 2017-01-01 | End: 2017-01-01

## 2017-01-01 RX ORDER — NOREPINEPHRINE BITARTRATE/D5W 4MG/250ML
PLASTIC BAG, INJECTION (ML) INTRAVENOUS
Status: COMPLETED
Start: 2017-01-01 | End: 2017-01-01

## 2017-01-01 RX ADMIN — LORAZEPAM 2 MG: 2 INJECTION INTRAMUSCULAR; INTRAVENOUS at 12:10

## 2017-01-01 RX ADMIN — SIMVASTATIN 40 MG: 20 TABLET, FILM COATED ORAL at 09:09

## 2017-01-01 RX ADMIN — LEVETIRACETAM 750 MG: 500 TABLET, FILM COATED ORAL at 08:05

## 2017-01-01 RX ADMIN — AMIODARONE HYDROCHLORIDE 0.5 MG/MIN: 1.8 INJECTION, SOLUTION INTRAVENOUS at 01:10

## 2017-01-01 RX ADMIN — METOPROLOL TARTRATE 25 MG: 25 TABLET ORAL at 09:09

## 2017-01-01 RX ADMIN — ARFORMOTEROL TARTRATE 15 MCG: 15 SOLUTION RESPIRATORY (INHALATION) at 07:07

## 2017-01-01 RX ADMIN — AMIODARONE HYDROCHLORIDE 1 MG/MIN: 1.8 INJECTION, SOLUTION INTRAVENOUS at 08:10

## 2017-01-01 RX ADMIN — PIPERACILLIN SODIUM,TAZOBACTAM SODIUM 4.5 G: 4; .5 INJECTION, POWDER, FOR SOLUTION INTRAVENOUS at 09:09

## 2017-01-01 RX ADMIN — METHYLPREDNISOLONE SODIUM SUCCINATE 80 MG: 40 INJECTION, POWDER, FOR SOLUTION INTRAMUSCULAR; INTRAVENOUS at 09:10

## 2017-01-01 RX ADMIN — Medication 12.5 MG: at 09:05

## 2017-01-01 RX ADMIN — IPRATROPIUM BROMIDE AND ALBUTEROL SULFATE 3 ML: .5; 3 SOLUTION RESPIRATORY (INHALATION) at 03:05

## 2017-01-01 RX ADMIN — ARFORMOTEROL TARTRATE 15 MCG: 15 SOLUTION RESPIRATORY (INHALATION) at 08:05

## 2017-01-01 RX ADMIN — CIPROFLOXACIN 400 MG: 2 INJECTION, SOLUTION INTRAVENOUS at 11:09

## 2017-01-01 RX ADMIN — HEPARIN SODIUM 5000 UNITS: 5000 INJECTION, SOLUTION INTRAVENOUS; SUBCUTANEOUS at 02:01

## 2017-01-01 RX ADMIN — METOPROLOL TARTRATE 2.5 MG: 5 INJECTION INTRAVENOUS at 11:09

## 2017-01-01 RX ADMIN — ACETAMINOPHEN 650 MG: 325 TABLET ORAL at 03:01

## 2017-01-01 RX ADMIN — DEXMEDETOMIDINE HYDROCHLORIDE 1.4 MCG/KG/HR: 4 INJECTION, SOLUTION INTRAVENOUS at 03:10

## 2017-01-01 RX ADMIN — PREDNISONE 10 MG: 10 TABLET ORAL at 08:01

## 2017-01-01 RX ADMIN — IPRATROPIUM BROMIDE AND ALBUTEROL SULFATE 3 ML: .5; 3 SOLUTION RESPIRATORY (INHALATION) at 07:10

## 2017-01-01 RX ADMIN — LEVETIRACETAM 750 MG: 500 TABLET, FILM COATED ORAL at 09:05

## 2017-01-01 RX ADMIN — DEXMEDETOMIDINE HYDROCHLORIDE 1.4 MCG/KG/HR: 4 INJECTION, SOLUTION INTRAVENOUS at 09:10

## 2017-01-01 RX ADMIN — ROFLUMILAST 500 MCG: 500 TABLET ORAL at 09:09

## 2017-01-01 RX ADMIN — CHLORHEXIDINE GLUCONATE 15 ML: 1.2 RINSE ORAL at 08:10

## 2017-01-01 RX ADMIN — LEVETIRACETAM 750 MG: 500 TABLET, FILM COATED ORAL at 08:01

## 2017-01-01 RX ADMIN — IPRATROPIUM BROMIDE AND ALBUTEROL SULFATE 3 ML: 2.5; .5 SOLUTION RESPIRATORY (INHALATION) at 07:09

## 2017-01-01 RX ADMIN — PREDNISONE 10 MG: 10 TABLET ORAL at 09:01

## 2017-01-01 RX ADMIN — METOLAZONE 5 MG: 5 TABLET ORAL at 05:07

## 2017-01-01 RX ADMIN — LOSARTAN POTASSIUM 12.5 MG: 25 TABLET, FILM COATED ORAL at 08:09

## 2017-01-01 RX ADMIN — TORSEMIDE 20 MG: 10 TABLET ORAL at 08:09

## 2017-01-01 RX ADMIN — BUDESONIDE 0.5 MG: 0.5 SUSPENSION RESPIRATORY (INHALATION) at 07:10

## 2017-01-01 RX ADMIN — Medication 1000 MG: at 04:01

## 2017-01-01 RX ADMIN — Medication 12.5 MG: at 09:07

## 2017-01-01 RX ADMIN — IPRATROPIUM BROMIDE AND ALBUTEROL SULFATE 3 ML: .5; 3 SOLUTION RESPIRATORY (INHALATION) at 07:05

## 2017-01-01 RX ADMIN — FUROSEMIDE 20 MG: 10 INJECTION, SOLUTION INTRAMUSCULAR; INTRAVENOUS at 12:09

## 2017-01-01 RX ADMIN — LEVETIRACETAM 750 MG: 500 TABLET, FILM COATED ORAL at 09:07

## 2017-01-01 RX ADMIN — ASPIRIN 81 MG 81 MG: 81 TABLET ORAL at 08:01

## 2017-01-01 RX ADMIN — SIMVASTATIN 40 MG: 20 TABLET, FILM COATED ORAL at 08:07

## 2017-01-01 RX ADMIN — IPRATROPIUM BROMIDE AND ALBUTEROL SULFATE 3 ML: .5; 3 SOLUTION RESPIRATORY (INHALATION) at 02:07

## 2017-01-01 RX ADMIN — BUDESONIDE 0.5 MG: 0.5 SUSPENSION RESPIRATORY (INHALATION) at 07:07

## 2017-01-01 RX ADMIN — IPRATROPIUM BROMIDE AND ALBUTEROL SULFATE 3 ML: 2.5; .5 SOLUTION RESPIRATORY (INHALATION) at 08:09

## 2017-01-01 RX ADMIN — PIPERACILLIN SODIUM,TAZOBACTAM SODIUM 4.5 G: 4; .5 INJECTION, POWDER, FOR SOLUTION INTRAVENOUS at 04:09

## 2017-01-01 RX ADMIN — ARFORMOTEROL TARTRATE 15 MCG: 15 SOLUTION RESPIRATORY (INHALATION) at 08:09

## 2017-01-01 RX ADMIN — PIPERACILLIN SODIUM AND TAZOBACTAM SODIUM 4.5 G: 4; .5 INJECTION, POWDER, LYOPHILIZED, FOR SOLUTION INTRAVENOUS at 04:10

## 2017-01-01 RX ADMIN — FUROSEMIDE 40 MG: 10 INJECTION, SOLUTION INTRAMUSCULAR; INTRAVENOUS at 08:01

## 2017-01-01 RX ADMIN — PIPERACILLIN SODIUM AND TAZOBACTAM SODIUM 4.5 G: 4; .5 INJECTION, POWDER, LYOPHILIZED, FOR SOLUTION INTRAVENOUS at 12:10

## 2017-01-01 RX ADMIN — VANCOMYCIN HYDROCHLORIDE 1000 MG: 1 INJECTION, POWDER, LYOPHILIZED, FOR SOLUTION INTRAVENOUS at 01:09

## 2017-01-01 RX ADMIN — AMIODARONE HYDROCHLORIDE 150 MG: 1.5 INJECTION, SOLUTION INTRAVENOUS at 08:10

## 2017-01-01 RX ADMIN — LOSARTAN POTASSIUM 12.5 MG: 25 TABLET, FILM COATED ORAL at 08:05

## 2017-01-01 RX ADMIN — METHYLPREDNISOLONE SODIUM SUCCINATE 80 MG: 40 INJECTION, POWDER, FOR SOLUTION INTRAMUSCULAR; INTRAVENOUS at 05:10

## 2017-01-01 RX ADMIN — LEVETIRACETAM 750 MG: 500 TABLET, FILM COATED ORAL at 08:09

## 2017-01-01 RX ADMIN — BUDESONIDE 0.5 MG: 0.5 SUSPENSION RESPIRATORY (INHALATION) at 08:10

## 2017-01-01 RX ADMIN — IPRATROPIUM BROMIDE AND ALBUTEROL SULFATE 3 ML: .5; 3 SOLUTION RESPIRATORY (INHALATION) at 01:07

## 2017-01-01 RX ADMIN — FAMOTIDINE 20 MG: 20 TABLET ORAL at 08:07

## 2017-01-01 RX ADMIN — FAMOTIDINE 20 MG: 20 TABLET ORAL at 09:07

## 2017-01-01 RX ADMIN — IPRATROPIUM BROMIDE AND ALBUTEROL SULFATE 3 ML: .5; 3 SOLUTION RESPIRATORY (INHALATION) at 11:05

## 2017-01-01 RX ADMIN — SIMVASTATIN 40 MG: 20 TABLET, FILM COATED ORAL at 08:05

## 2017-01-01 RX ADMIN — MORPHINE SULFATE 2 MG: 2 INJECTION, SOLUTION INTRAMUSCULAR; INTRAVENOUS at 09:10

## 2017-01-01 RX ADMIN — DEXMEDETOMIDINE HYDROCHLORIDE 1.4 MCG/KG/HR: 4 INJECTION, SOLUTION INTRAVENOUS at 06:10

## 2017-01-01 RX ADMIN — PANTOPRAZOLE SODIUM 600 MG: 40 TABLET, DELAYED RELEASE ORAL at 08:05

## 2017-01-01 RX ADMIN — LEVALBUTEROL 0.63 MG: 0.63 SOLUTION RESPIRATORY (INHALATION) at 03:01

## 2017-01-01 RX ADMIN — METHYLPREDNISOLONE SODIUM SUCCINATE 80 MG: 40 INJECTION, POWDER, FOR SOLUTION INTRAMUSCULAR; INTRAVENOUS at 11:09

## 2017-01-01 RX ADMIN — ROFLUMILAST 500 MCG: 500 TABLET ORAL at 08:05

## 2017-01-01 RX ADMIN — LEVALBUTEROL 0.63 MG: 0.63 SOLUTION RESPIRATORY (INHALATION) at 10:01

## 2017-01-01 RX ADMIN — HEPARIN SODIUM 5000 UNITS: 5000 INJECTION, SOLUTION INTRAVENOUS; SUBCUTANEOUS at 06:01

## 2017-01-01 RX ADMIN — IPRATROPIUM BROMIDE AND ALBUTEROL SULFATE 3 ML: .5; 3 SOLUTION RESPIRATORY (INHALATION) at 02:05

## 2017-01-01 RX ADMIN — SPIRONOLACTONE 25 MG: 25 TABLET ORAL at 08:05

## 2017-01-01 RX ADMIN — Medication 0.28 MCG/KG/MIN: at 04:10

## 2017-01-01 RX ADMIN — METHYLPREDNISOLONE SODIUM SUCCINATE 80 MG: 40 INJECTION, POWDER, FOR SOLUTION INTRAMUSCULAR; INTRAVENOUS at 05:09

## 2017-01-01 RX ADMIN — PANTOPRAZOLE SODIUM 600 MG: 40 TABLET, DELAYED RELEASE ORAL at 09:05

## 2017-01-01 RX ADMIN — IPRATROPIUM BROMIDE AND ALBUTEROL SULFATE 3 ML: .5; 3 SOLUTION RESPIRATORY (INHALATION) at 10:05

## 2017-01-01 RX ADMIN — Medication 12.5 MG: at 09:10

## 2017-01-01 RX ADMIN — ASPIRIN 81 MG CHEWABLE TABLET 81 MG: 81 TABLET CHEWABLE at 08:05

## 2017-01-01 RX ADMIN — MORPHINE SULFATE 5 MG: 10 INJECTION, SOLUTION INTRAMUSCULAR; INTRAVENOUS at 05:10

## 2017-01-01 RX ADMIN — TORSEMIDE 20 MG: 10 TABLET ORAL at 03:09

## 2017-01-01 RX ADMIN — Medication 12.5 MG: at 08:09

## 2017-01-01 RX ADMIN — METOLAZONE 10 MG: 5 TABLET ORAL at 09:10

## 2017-01-01 RX ADMIN — SODIUM CHLORIDE 1000 ML: 0.9 INJECTION, SOLUTION INTRAVENOUS at 08:09

## 2017-01-01 RX ADMIN — METOPROLOL TARTRATE 25 MG: 25 TABLET ORAL at 08:09

## 2017-01-01 RX ADMIN — LEVETIRACETAM 750 MG: 500 TABLET, FILM COATED ORAL at 08:07

## 2017-01-01 RX ADMIN — PANTOPRAZOLE SODIUM 40 MG: 40 INJECTION, POWDER, FOR SOLUTION INTRAVENOUS at 09:10

## 2017-01-01 RX ADMIN — IPRATROPIUM BROMIDE AND ALBUTEROL SULFATE 3 ML: .5; 3 SOLUTION RESPIRATORY (INHALATION) at 08:07

## 2017-01-01 RX ADMIN — FUROSEMIDE 40 MG: 40 TABLET ORAL at 09:01

## 2017-01-01 RX ADMIN — TORSEMIDE 10 MG: 10 TABLET ORAL at 04:07

## 2017-01-01 RX ADMIN — GUAIFENESIN AND DEXTROMETHORPHAN HYDROBROMIDE 1 TABLET: 600; 30 TABLET, EXTENDED RELEASE ORAL at 08:05

## 2017-01-01 RX ADMIN — Medication 0.5 MCG/KG/MIN: at 12:10

## 2017-01-01 RX ADMIN — AMIODARONE HYDROCHLORIDE 0.5 MG/MIN: 1.8 INJECTION, SOLUTION INTRAVENOUS at 02:10

## 2017-01-01 RX ADMIN — LEVETIRACETAM 750 MG: 500 TABLET, FILM COATED ORAL at 10:05

## 2017-01-01 RX ADMIN — ENOXAPARIN SODIUM 40 MG: 100 INJECTION SUBCUTANEOUS at 05:07

## 2017-01-01 RX ADMIN — Medication 12.5 MG: at 08:05

## 2017-01-01 RX ADMIN — INSULIN ASPART 4 UNITS: 100 INJECTION, SOLUTION INTRAVENOUS; SUBCUTANEOUS at 05:10

## 2017-01-01 RX ADMIN — ASPIRIN 81 MG CHEWABLE TABLET 81 MG: 81 TABLET CHEWABLE at 09:09

## 2017-01-01 RX ADMIN — DEXTROSE 750 MG: 50 INJECTION, SOLUTION INTRAVENOUS at 12:10

## 2017-01-01 RX ADMIN — BUDESONIDE 0.5 MG: 0.5 INHALANT RESPIRATORY (INHALATION) at 08:01

## 2017-01-01 RX ADMIN — FUROSEMIDE 20 MG: 10 INJECTION, SOLUTION INTRAMUSCULAR; INTRAVENOUS at 10:05

## 2017-01-01 RX ADMIN — FAMOTIDINE 20 MG: 20 TABLET ORAL at 08:01

## 2017-01-01 RX ADMIN — HEPARIN SODIUM 5000 UNITS: 5000 INJECTION, SOLUTION INTRAVENOUS; SUBCUTANEOUS at 10:01

## 2017-01-01 RX ADMIN — CIPROFLOXACIN 400 MG: 2 INJECTION, SOLUTION INTRAVENOUS at 09:09

## 2017-01-01 RX ADMIN — SODIUM BICARBONATE: 84 INJECTION, SOLUTION INTRAVENOUS at 11:10

## 2017-01-01 RX ADMIN — ARFORMOTEROL TARTRATE 15 MCG: 15 SOLUTION RESPIRATORY (INHALATION) at 07:09

## 2017-01-01 RX ADMIN — MORPHINE SULFATE 5 MG: 10 INJECTION, SOLUTION INTRAMUSCULAR; INTRAVENOUS at 02:10

## 2017-01-01 RX ADMIN — TORSEMIDE 20 MG: 10 TABLET ORAL at 09:09

## 2017-01-01 RX ADMIN — MORPHINE SULFATE 1 MG: 2 INJECTION, SOLUTION INTRAMUSCULAR; INTRAVENOUS at 04:07

## 2017-01-01 RX ADMIN — METHYLPREDNISOLONE SODIUM SUCCINATE 125 MG: 125 INJECTION, POWDER, FOR SOLUTION INTRAMUSCULAR; INTRAVENOUS at 11:05

## 2017-01-01 RX ADMIN — ARFORMOTEROL TARTRATE 15 MCG: 15 SOLUTION RESPIRATORY (INHALATION) at 07:10

## 2017-01-01 RX ADMIN — MORPHINE SULFATE 5 MG: 10 INJECTION, SOLUTION INTRAMUSCULAR; INTRAVENOUS at 06:10

## 2017-01-01 RX ADMIN — PANTOPRAZOLE SODIUM 40 MG: 40 TABLET, DELAYED RELEASE ORAL at 08:09

## 2017-01-01 RX ADMIN — PIPERACILLIN SODIUM,TAZOBACTAM SODIUM 4.5 G: 4; .5 INJECTION, POWDER, FOR SOLUTION INTRAVENOUS at 11:09

## 2017-01-01 RX ADMIN — Medication 0.02 MCG/KG/MIN: at 09:10

## 2017-01-01 RX ADMIN — IPRATROPIUM BROMIDE AND ALBUTEROL SULFATE 3 ML: .5; 3 SOLUTION RESPIRATORY (INHALATION) at 05:10

## 2017-01-01 RX ADMIN — HEPARIN SODIUM 5000 UNITS: 5000 INJECTION, SOLUTION INTRAVENOUS; SUBCUTANEOUS at 05:01

## 2017-01-01 RX ADMIN — IPRATROPIUM BROMIDE AND ALBUTEROL SULFATE 3 ML: .5; 3 SOLUTION RESPIRATORY (INHALATION) at 12:10

## 2017-01-01 RX ADMIN — METHYLPREDNISOLONE SODIUM SUCCINATE 40 MG: 125 INJECTION, POWDER, FOR SOLUTION INTRAMUSCULAR; INTRAVENOUS at 03:05

## 2017-01-01 RX ADMIN — CIPROFLOXACIN 400 MG: 2 INJECTION, SOLUTION INTRAVENOUS at 10:09

## 2017-01-01 RX ADMIN — CIPROFLOXACIN 400 MG: 2 INJECTION, SOLUTION INTRAVENOUS at 03:10

## 2017-01-01 RX ADMIN — MOXIFLOXACIN HYDROCHLORIDE 400 MG: 400 TABLET, FILM COATED ORAL at 08:05

## 2017-01-01 RX ADMIN — ACETAMINOPHEN 650 MG: 325 TABLET ORAL at 11:10

## 2017-01-01 RX ADMIN — IPRATROPIUM BROMIDE AND ALBUTEROL SULFATE 3 ML: 2.5; .5 SOLUTION RESPIRATORY (INHALATION) at 02:09

## 2017-01-01 RX ADMIN — SIMVASTATIN 40 MG: 20 TABLET, FILM COATED ORAL at 10:09

## 2017-01-01 RX ADMIN — MORPHINE SULFATE 5 MG: 10 INJECTION, SOLUTION INTRAMUSCULAR; INTRAVENOUS at 11:10

## 2017-01-01 RX ADMIN — SODIUM BICARBONATE: 84 INJECTION, SOLUTION INTRAVENOUS at 04:10

## 2017-01-01 RX ADMIN — PANTOPRAZOLE SODIUM 40 MG: 40 INJECTION, POWDER, FOR SOLUTION INTRAVENOUS at 11:10

## 2017-01-01 RX ADMIN — ROFLUMILAST 500 MCG: 500 TABLET ORAL at 08:07

## 2017-01-01 RX ADMIN — LEVALBUTEROL 0.63 MG: 0.63 SOLUTION RESPIRATORY (INHALATION) at 08:01

## 2017-01-01 RX ADMIN — PREDNISONE 60 MG: 20 TABLET ORAL at 08:05

## 2017-01-01 RX ADMIN — LORAZEPAM 2 MG: 2 INJECTION INTRAMUSCULAR; INTRAVENOUS at 09:10

## 2017-01-01 RX ADMIN — SODIUM CHLORIDE: 0.9 INJECTION, SOLUTION INTRAVENOUS at 07:10

## 2017-01-01 RX ADMIN — METHYLPREDNISOLONE SODIUM SUCCINATE 80 MG: 40 INJECTION, POWDER, FOR SOLUTION INTRAMUSCULAR; INTRAVENOUS at 06:09

## 2017-01-01 RX ADMIN — SODIUM CHLORIDE 1000 ML: 0.9 INJECTION, SOLUTION INTRAVENOUS at 03:01

## 2017-01-01 RX ADMIN — HEPARIN SODIUM 5000 UNITS: 5000 INJECTION, SOLUTION INTRAVENOUS; SUBCUTANEOUS at 09:01

## 2017-01-01 RX ADMIN — METHYLPREDNISOLONE SODIUM SUCCINATE 80 MG: 40 INJECTION, POWDER, FOR SOLUTION INTRAMUSCULAR; INTRAVENOUS at 01:09

## 2017-01-01 RX ADMIN — BUDESONIDE 0.5 MG: 0.5 SUSPENSION RESPIRATORY (INHALATION) at 07:05

## 2017-01-01 RX ADMIN — MOXIFLOXACIN HYDROCHLORIDE 400 MG: 400 TABLET, FILM COATED ORAL at 09:09

## 2017-01-01 RX ADMIN — DEXMEDETOMIDINE HYDROCHLORIDE 1.4 MCG/KG/HR: 4 INJECTION, SOLUTION INTRAVENOUS at 12:10

## 2017-01-01 RX ADMIN — FUROSEMIDE 20 MG: 10 INJECTION, SOLUTION INTRAMUSCULAR; INTRAVENOUS at 11:05

## 2017-01-01 RX ADMIN — Medication 0.42 MCG/KG/MIN: at 01:10

## 2017-01-01 RX ADMIN — DEXMEDETOMIDINE HYDROCHLORIDE 1.4 MCG/KG/HR: 4 INJECTION, SOLUTION INTRAVENOUS at 11:10

## 2017-01-01 RX ADMIN — ASPIRIN 81 MG CHEWABLE TABLET 81 MG: 81 TABLET CHEWABLE at 09:07

## 2017-01-01 RX ADMIN — IPRATROPIUM BROMIDE AND ALBUTEROL SULFATE 3 ML: .5; 3 SOLUTION RESPIRATORY (INHALATION) at 08:05

## 2017-01-01 RX ADMIN — FUROSEMIDE 5 MG/HR: 10 INJECTION, SOLUTION INTRAVENOUS at 11:07

## 2017-01-01 RX ADMIN — FUROSEMIDE 20 MG: 20 TABLET ORAL at 03:05

## 2017-01-01 RX ADMIN — METHYLPREDNISOLONE SODIUM SUCCINATE 40 MG: 40 INJECTION, POWDER, FOR SOLUTION INTRAMUSCULAR; INTRAVENOUS at 10:10

## 2017-01-01 RX ADMIN — SIMVASTATIN 40 MG: 20 TABLET, FILM COATED ORAL at 09:10

## 2017-01-01 RX ADMIN — VANCOMYCIN HYDROCHLORIDE 1250 MG: 100 INJECTION, POWDER, LYOPHILIZED, FOR SOLUTION INTRAVENOUS at 09:09

## 2017-01-01 RX ADMIN — TORSEMIDE 20 MG: 10 TABLET ORAL at 11:09

## 2017-01-01 RX ADMIN — METHYLPREDNISOLONE SODIUM SUCCINATE 40 MG: 40 INJECTION, POWDER, FOR SOLUTION INTRAMUSCULAR; INTRAVENOUS at 05:01

## 2017-01-01 RX ADMIN — MOXIFLOXACIN HYDROCHLORIDE 400 MG: 400 INJECTION, SOLUTION INTRAVENOUS at 08:05

## 2017-01-01 RX ADMIN — ASPIRIN 325 MG ORAL TABLET 325 MG: 325 PILL ORAL at 07:09

## 2017-01-01 RX ADMIN — ROFLUMILAST 500 MCG: 500 TABLET ORAL at 09:07

## 2017-01-01 RX ADMIN — METHYLPREDNISOLONE SODIUM SUCCINATE 40 MG: 125 INJECTION, POWDER, FOR SOLUTION INTRAMUSCULAR; INTRAVENOUS at 09:05

## 2017-01-01 RX ADMIN — METHYLPREDNISOLONE SODIUM SUCCINATE 40 MG: 40 INJECTION, POWDER, FOR SOLUTION INTRAMUSCULAR; INTRAVENOUS at 04:10

## 2017-01-01 RX ADMIN — LEVETIRACETAM 750 MG: 500 TABLET, FILM COATED ORAL at 10:09

## 2017-01-01 RX ADMIN — ARFORMOTEROL TARTRATE 15 MCG: 15 SOLUTION RESPIRATORY (INHALATION) at 08:01

## 2017-01-01 RX ADMIN — METHYLPREDNISOLONE SODIUM SUCCINATE 40 MG: 40 INJECTION, POWDER, FOR SOLUTION INTRAMUSCULAR; INTRAVENOUS at 11:01

## 2017-01-01 RX ADMIN — SODIUM CHLORIDE: 0.9 INJECTION, SOLUTION INTRAVENOUS at 09:10

## 2017-01-01 RX ADMIN — CIPROFLOXACIN 400 MG: 2 INJECTION, SOLUTION INTRAVENOUS at 04:10

## 2017-01-01 RX ADMIN — LEVETIRACETAM 750 MG: 500 TABLET, FILM COATED ORAL at 09:10

## 2017-01-01 RX ADMIN — ENOXAPARIN SODIUM 40 MG: 100 INJECTION SUBCUTANEOUS at 04:05

## 2017-01-01 RX ADMIN — METHYLPREDNISOLONE SODIUM SUCCINATE 80 MG: 40 INJECTION, POWDER, FOR SOLUTION INTRAMUSCULAR; INTRAVENOUS at 12:09

## 2017-01-01 RX ADMIN — PIPERACILLIN SODIUM AND TAZOBACTAM SODIUM 4.5 G: 4; .5 INJECTION, POWDER, LYOPHILIZED, FOR SOLUTION INTRAVENOUS at 08:10

## 2017-01-01 RX ADMIN — INSULIN ASPART 10 UNITS: 100 INJECTION, SOLUTION INTRAVENOUS; SUBCUTANEOUS at 05:10

## 2017-01-01 RX ADMIN — NITROGLYCERIN 1 INCH: 20 OINTMENT TOPICAL at 07:09

## 2017-01-01 RX ADMIN — ASPIRIN 81 MG 81 MG: 81 TABLET ORAL at 10:01

## 2017-01-01 RX ADMIN — SIMVASTATIN 40 MG: 20 TABLET, FILM COATED ORAL at 09:01

## 2017-01-01 RX ADMIN — MOXIFLOXACIN HYDROCHLORIDE 400 MG: 400 TABLET, FILM COATED ORAL at 03:09

## 2017-01-01 RX ADMIN — ASPIRIN 81 MG CHEWABLE TABLET 81 MG: 81 TABLET CHEWABLE at 08:09

## 2017-01-01 RX ADMIN — SIMVASTATIN 40 MG: 20 TABLET, FILM COATED ORAL at 08:10

## 2017-01-01 RX ADMIN — ASPIRIN 81 MG CHEWABLE TABLET 81 MG: 81 TABLET CHEWABLE at 10:05

## 2017-01-01 RX ADMIN — PIPERACILLIN SODIUM,TAZOBACTAM SODIUM 4.5 G: 4; .5 INJECTION, POWDER, FOR SOLUTION INTRAVENOUS at 06:09

## 2017-01-01 RX ADMIN — BUDESONIDE 0.5 MG: 0.5 SUSPENSION RESPIRATORY (INHALATION) at 01:10

## 2017-01-01 RX ADMIN — SIMVASTATIN 40 MG: 20 TABLET, FILM COATED ORAL at 01:09

## 2017-01-01 RX ADMIN — LEVETIRACETAM 750 MG: 500 TABLET, FILM COATED ORAL at 09:09

## 2017-01-01 RX ADMIN — PANTOPRAZOLE SODIUM 40 MG: 40 TABLET, DELAYED RELEASE ORAL at 09:09

## 2017-01-01 RX ADMIN — LEVETIRACETAM 750 MG: 500 TABLET, FILM COATED ORAL at 01:09

## 2017-01-01 RX ADMIN — IPRATROPIUM BROMIDE AND ALBUTEROL SULFATE 3 ML: .5; 3 SOLUTION RESPIRATORY (INHALATION) at 01:10

## 2017-01-01 RX ADMIN — PIPERACILLIN SODIUM,TAZOBACTAM SODIUM 4.5 G: 4; .5 INJECTION, POWDER, FOR SOLUTION INTRAVENOUS at 02:09

## 2017-01-01 RX ADMIN — VANCOMYCIN HYDROCHLORIDE 1500 MG: 10 INJECTION, POWDER, LYOPHILIZED, FOR SOLUTION INTRAVENOUS at 09:10

## 2017-01-01 RX ADMIN — METHYLPREDNISOLONE SODIUM SUCCINATE 80 MG: 125 INJECTION, POWDER, FOR SOLUTION INTRAMUSCULAR; INTRAVENOUS at 05:05

## 2017-01-01 RX ADMIN — IPRATROPIUM BROMIDE AND ALBUTEROL SULFATE 3 ML: .5; 3 SOLUTION RESPIRATORY (INHALATION) at 07:07

## 2017-01-01 RX ADMIN — VANCOMYCIN HYDROCHLORIDE 1000 MG: 1 INJECTION, POWDER, LYOPHILIZED, FOR SOLUTION INTRAVENOUS at 05:01

## 2017-01-01 RX ADMIN — INSULIN ASPART 4 UNITS: 100 INJECTION, SOLUTION INTRAVENOUS; SUBCUTANEOUS at 06:10

## 2017-01-01 RX ADMIN — CHLORHEXIDINE GLUCONATE 15 ML: 1.2 RINSE ORAL at 09:10

## 2017-01-01 RX ADMIN — DEXMEDETOMIDINE HYDROCHLORIDE 1.4 MCG/KG/HR: 4 INJECTION, SOLUTION INTRAVENOUS at 07:10

## 2017-01-01 RX ADMIN — ARFORMOTEROL TARTRATE 15 MCG: 15 SOLUTION RESPIRATORY (INHALATION) at 07:05

## 2017-01-01 RX ADMIN — Medication 4.5 G: at 07:01

## 2017-01-01 RX ADMIN — LOSARTAN POTASSIUM 12.5 MG: 25 TABLET, FILM COATED ORAL at 09:07

## 2017-01-01 RX ADMIN — Medication 4.5 G: at 02:01

## 2017-01-01 RX ADMIN — ACETAMINOPHEN 1000 MG: 10 INJECTION, SOLUTION INTRAVENOUS at 03:10

## 2017-01-01 RX ADMIN — LEVALBUTEROL 0.63 MG: 0.63 SOLUTION RESPIRATORY (INHALATION) at 09:09

## 2017-01-01 RX ADMIN — PIPERACILLIN SODIUM,TAZOBACTAM SODIUM 4.5 G: 4; .5 INJECTION, POWDER, FOR SOLUTION INTRAVENOUS at 03:09

## 2017-01-01 RX ADMIN — MORPHINE SULFATE 5 MG: 10 INJECTION, SOLUTION INTRAMUSCULAR; INTRAVENOUS at 04:10

## 2017-01-01 RX ADMIN — FUROSEMIDE 40 MG: 10 INJECTION, SOLUTION INTRAMUSCULAR; INTRAVENOUS at 01:07

## 2017-01-01 RX ADMIN — METHYLPREDNISOLONE SODIUM SUCCINATE 40 MG: 125 INJECTION, POWDER, FOR SOLUTION INTRAMUSCULAR; INTRAVENOUS at 05:05

## 2017-01-01 RX ADMIN — METHYLPREDNISOLONE SODIUM SUCCINATE 125 MG: 125 INJECTION, POWDER, FOR SOLUTION INTRAMUSCULAR; INTRAVENOUS at 02:10

## 2017-01-01 RX ADMIN — Medication 12.5 MG: at 08:07

## 2017-01-01 RX ADMIN — LEVETIRACETAM 750 MG: 500 TABLET, FILM COATED ORAL at 09:01

## 2017-01-01 RX ADMIN — Medication 12.5 MG: at 09:09

## 2017-01-01 RX ADMIN — METHYLPREDNISOLONE SODIUM SUCCINATE 125 MG: 125 INJECTION, POWDER, FOR SOLUTION INTRAMUSCULAR; INTRAVENOUS at 01:07

## 2017-01-01 RX ADMIN — MOXIFLOXACIN HYDROCHLORIDE 400 MG: 400 INJECTION, SOLUTION INTRAVENOUS at 08:01

## 2017-01-01 RX ADMIN — SIMVASTATIN 40 MG: 20 TABLET, FILM COATED ORAL at 10:01

## 2017-01-01 RX ADMIN — MORPHINE SULFATE 5 MG: 10 INJECTION, SOLUTION INTRAMUSCULAR; INTRAVENOUS at 12:10

## 2017-01-01 RX ADMIN — LEVETIRACETAM 500 MG: 5 INJECTION INTRAVENOUS at 10:10

## 2017-01-01 RX ADMIN — BUDESONIDE 0.5 MG: 0.5 SUSPENSION RESPIRATORY (INHALATION) at 08:05

## 2017-01-01 RX ADMIN — SODIUM BICARBONATE: 84 INJECTION, SOLUTION INTRAVENOUS at 02:10

## 2017-01-01 RX ADMIN — PIPERACILLIN SODIUM AND TAZOBACTAM SODIUM 4.5 G: 4; .5 INJECTION, POWDER, FOR SOLUTION INTRAVENOUS at 09:01

## 2017-01-01 RX ADMIN — IPRATROPIUM BROMIDE AND ALBUTEROL SULFATE 3 ML: 2.5; .5 SOLUTION RESPIRATORY (INHALATION) at 01:09

## 2017-01-01 RX ADMIN — LEVETIRACETAM 500 MG: 5 INJECTION INTRAVENOUS at 11:10

## 2017-01-01 RX ADMIN — SIMVASTATIN 40 MG: 20 TABLET, FILM COATED ORAL at 09:07

## 2017-01-01 RX ADMIN — METOPROLOL TARTRATE 5 MG: 5 INJECTION INTRAVENOUS at 06:10

## 2017-01-01 RX ADMIN — MEROPENEM AND SODIUM CHLORIDE 500 MG: 500 INJECTION, SOLUTION INTRAVENOUS at 08:10

## 2017-01-01 RX ADMIN — LEVALBUTEROL 0.63 MG: 0.63 SOLUTION RESPIRATORY (INHALATION) at 12:09

## 2017-01-01 RX ADMIN — METHYLPREDNISOLONE SODIUM SUCCINATE 80 MG: 125 INJECTION, POWDER, FOR SOLUTION INTRAMUSCULAR; INTRAVENOUS at 06:05

## 2017-01-01 RX ADMIN — INSULIN ASPART 3 UNITS: 100 INJECTION, SOLUTION INTRAVENOUS; SUBCUTANEOUS at 12:10

## 2017-01-01 RX ADMIN — SODIUM BICARBONATE: 84 INJECTION, SOLUTION INTRAVENOUS at 07:10

## 2017-01-01 RX ADMIN — PIPERACILLIN SODIUM AND TAZOBACTAM SODIUM 4.5 G: 4; .5 INJECTION, POWDER, FOR SOLUTION INTRAVENOUS at 05:01

## 2017-01-01 RX ADMIN — Medication 150 MCG: at 04:10

## 2017-01-01 RX ADMIN — METHYLPREDNISOLONE SODIUM SUCCINATE 80 MG: 40 INJECTION, POWDER, FOR SOLUTION INTRAMUSCULAR; INTRAVENOUS at 02:10

## 2017-01-01 RX ADMIN — METHYLPREDNISOLONE SODIUM SUCCINATE 80 MG: 125 INJECTION, POWDER, FOR SOLUTION INTRAMUSCULAR; INTRAVENOUS at 01:05

## 2017-01-01 RX ADMIN — CEFTRIAXONE 1 G: 1 INJECTION, SOLUTION INTRAVENOUS at 03:07

## 2017-01-01 RX ADMIN — IPRATROPIUM BROMIDE AND ALBUTEROL SULFATE 3 ML: .5; 3 SOLUTION RESPIRATORY (INHALATION) at 03:07

## 2017-01-01 RX ADMIN — ENOXAPARIN SODIUM 40 MG: 100 INJECTION SUBCUTANEOUS at 05:05

## 2017-01-01 RX ADMIN — Medication 12.5 MG: at 10:05

## 2017-01-01 RX ADMIN — SODIUM CHLORIDE: 0.9 INJECTION, SOLUTION INTRAVENOUS at 11:01

## 2017-01-01 RX ADMIN — FUROSEMIDE 60 MG: 10 INJECTION, SOLUTION INTRAMUSCULAR; INTRAVENOUS at 05:07

## 2017-01-01 RX ADMIN — LOSARTAN POTASSIUM 12.5 MG: 25 TABLET, FILM COATED ORAL at 10:09

## 2017-01-01 RX ADMIN — PANTOPRAZOLE SODIUM 40 MG: 40 TABLET, DELAYED RELEASE ORAL at 02:10

## 2017-01-01 RX ADMIN — IPRATROPIUM BROMIDE AND ALBUTEROL SULFATE 3 ML: .5; 3 SOLUTION RESPIRATORY (INHALATION) at 04:09

## 2017-01-01 RX ADMIN — MOXIFLOXACIN HYDROCHLORIDE 400 MG: 400 TABLET, FILM COATED ORAL at 08:09

## 2017-01-01 RX ADMIN — ENOXAPARIN SODIUM 40 MG: 100 INJECTION SUBCUTANEOUS at 09:07

## 2017-01-01 RX ADMIN — IOHEXOL 100 ML: 350 INJECTION, SOLUTION INTRAVENOUS at 08:05

## 2017-01-01 RX ADMIN — METHYLPREDNISOLONE SODIUM SUCCINATE 40 MG: 125 INJECTION, POWDER, FOR SOLUTION INTRAMUSCULAR; INTRAVENOUS at 01:05

## 2017-01-01 RX ADMIN — FUROSEMIDE 80 MG: 10 INJECTION, SOLUTION INTRAVENOUS at 07:09

## 2017-01-01 RX ADMIN — FUROSEMIDE 40 MG: 10 INJECTION, SOLUTION INTRAMUSCULAR; INTRAVENOUS at 12:05

## 2017-01-01 RX ADMIN — NITROGLYCERIN 0.5 INCH: 20 OINTMENT TOPICAL at 01:07

## 2017-01-01 RX ADMIN — ROFLUMILAST 500 MCG: 500 TABLET ORAL at 08:09

## 2017-01-01 RX ADMIN — ARFORMOTEROL TARTRATE 15 MCG: 15 SOLUTION RESPIRATORY (INHALATION) at 08:10

## 2017-01-01 RX ADMIN — Medication 12.5 MG: at 10:01

## 2017-01-01 RX ADMIN — PREDNISONE 20 MG: 20 TABLET ORAL at 03:09

## 2017-01-01 RX ADMIN — Medication 0.5 MCG/KG/MIN: at 09:10

## 2017-01-01 RX ADMIN — METHYLPREDNISOLONE SODIUM SUCCINATE 80 MG: 125 INJECTION, POWDER, FOR SOLUTION INTRAMUSCULAR; INTRAVENOUS at 09:05

## 2017-01-01 RX ADMIN — LEVALBUTEROL 0.63 MG: 0.63 SOLUTION RESPIRATORY (INHALATION) at 03:09

## 2017-01-01 RX ADMIN — DEXMEDETOMIDINE HYDROCHLORIDE 0.2 MCG/KG/HR: 4 INJECTION, SOLUTION INTRAVENOUS at 07:10

## 2017-01-01 RX ADMIN — LEVETIRACETAM 750 MG: 500 TABLET, FILM COATED ORAL at 11:01

## 2017-01-01 RX ADMIN — LORAZEPAM 2 MG: 2 INJECTION INTRAMUSCULAR; INTRAVENOUS at 10:10

## 2017-01-01 RX ADMIN — Medication 12.5 MG: at 08:01

## 2017-01-01 RX ADMIN — LORAZEPAM 2 MG: 2 INJECTION INTRAMUSCULAR; INTRAVENOUS at 05:10

## 2017-01-01 RX ADMIN — ASPIRIN 81 MG CHEWABLE TABLET 81 MG: 81 TABLET CHEWABLE at 08:07

## 2017-01-01 RX ADMIN — FUROSEMIDE 10 MG/HR: 10 INJECTION, SOLUTION INTRAVENOUS at 07:07

## 2017-01-01 RX ADMIN — MORPHINE SULFATE 5 MG: 10 INJECTION, SOLUTION INTRAMUSCULAR; INTRAVENOUS at 01:10

## 2017-01-01 RX ADMIN — METHYLPREDNISOLONE SODIUM SUCCINATE 40 MG: 40 INJECTION, POWDER, FOR SOLUTION INTRAMUSCULAR; INTRAVENOUS at 06:01

## 2017-01-01 RX ADMIN — SODIUM BICARBONATE: 84 INJECTION, SOLUTION INTRAVENOUS at 09:10

## 2017-01-01 RX ADMIN — SODIUM BICARBONATE: 84 INJECTION, SOLUTION INTRAVENOUS at 06:10

## 2017-01-01 RX ADMIN — Medication 12.5 MG: at 10:09

## 2017-01-01 RX ADMIN — DEXMEDETOMIDINE HYDROCHLORIDE 1.4 MCG/KG/HR: 4 INJECTION, SOLUTION INTRAVENOUS at 04:10

## 2017-01-01 RX ADMIN — IPRATROPIUM BROMIDE AND ALBUTEROL SULFATE 3 ML: .5; 3 SOLUTION RESPIRATORY (INHALATION) at 08:10

## 2017-01-01 RX ADMIN — SIMVASTATIN 40 MG: 20 TABLET, FILM COATED ORAL at 09:05

## 2017-01-01 RX ADMIN — LEVALBUTEROL 0.63 MG: 0.63 SOLUTION RESPIRATORY (INHALATION) at 08:09

## 2017-01-01 RX ADMIN — METHYLPREDNISOLONE SODIUM SUCCINATE 40 MG: 40 INJECTION, POWDER, FOR SOLUTION INTRAMUSCULAR; INTRAVENOUS at 05:10

## 2017-01-01 RX ADMIN — PIPERACILLIN SODIUM AND TAZOBACTAM SODIUM 4.5 G: 4; .5 INJECTION, POWDER, LYOPHILIZED, FOR SOLUTION INTRAVENOUS at 05:10

## 2017-01-01 RX ADMIN — PREDNISONE 20 MG: 20 TABLET ORAL at 08:09

## 2017-01-01 RX ADMIN — PIPERACILLIN SODIUM AND TAZOBACTAM SODIUM 4.5 G: 4; .5 INJECTION, POWDER, FOR SOLUTION INTRAVENOUS at 03:01

## 2017-01-01 RX ADMIN — PANTOPRAZOLE SODIUM 600 MG: 40 TABLET, DELAYED RELEASE ORAL at 10:05

## 2017-01-01 RX ADMIN — SODIUM CHLORIDE: 0.9 INJECTION, SOLUTION INTRAVENOUS at 08:10

## 2017-01-01 RX ADMIN — FENTANYL CITRATE 50 MCG: 50 INJECTION, SOLUTION INTRAMUSCULAR; INTRAVENOUS at 07:10

## 2017-01-01 RX ADMIN — FUROSEMIDE 40 MG: 40 TABLET ORAL at 06:01

## 2017-01-01 RX ADMIN — FENTANYL CITRATE 50 MCG: 50 INJECTION INTRAMUSCULAR; INTRAVENOUS at 06:10

## 2017-01-01 RX ADMIN — PIPERACILLIN SODIUM AND TAZOBACTAM SODIUM 4.5 G: 4; .5 INJECTION, POWDER, LYOPHILIZED, FOR SOLUTION INTRAVENOUS at 07:10

## 2017-01-01 RX ADMIN — MOXIFLOXACIN HYDROCHLORIDE 400 MG: 400 INJECTION, SOLUTION INTRAVENOUS at 07:05

## 2017-01-01 RX ADMIN — SODIUM CHLORIDE 500 ML: 0.9 INJECTION, SOLUTION INTRAVENOUS at 06:10

## 2017-01-01 RX ADMIN — PIPERACILLIN SODIUM,TAZOBACTAM SODIUM 4.5 G: 4; .5 INJECTION, POWDER, FOR SOLUTION INTRAVENOUS at 12:09

## 2017-01-01 RX ADMIN — ACETAMINOPHEN 650 MG: 325 TABLET ORAL at 05:10

## 2017-01-01 RX ADMIN — NOREPINEPHRINE BITARTRATE 0.2 MCG/KG/MIN: 1 INJECTION, SOLUTION, CONCENTRATE INTRAVENOUS at 08:10

## 2017-01-01 RX ADMIN — PANTOPRAZOLE SODIUM 40 MG: 40 INJECTION, POWDER, FOR SOLUTION INTRAVENOUS at 08:10

## 2017-01-01 RX ADMIN — PIPERACILLIN SODIUM,TAZOBACTAM SODIUM 4.5 G: 4; .5 INJECTION, POWDER, FOR SOLUTION INTRAVENOUS at 10:09

## 2017-01-01 RX ADMIN — BUDESONIDE 0.5 MG: 0.5 SUSPENSION RESPIRATORY (INHALATION) at 08:07

## 2017-01-01 RX ADMIN — SODIUM CHLORIDE 2574 ML: 0.9 INJECTION, SOLUTION INTRAVENOUS at 10:10

## 2017-01-01 RX ADMIN — FENTANYL CITRATE 50 MCG: 50 INJECTION, SOLUTION INTRAMUSCULAR; INTRAVENOUS at 06:10

## 2017-01-01 RX ADMIN — LACTULOSE 10 G: 20 SOLUTION ORAL at 05:05

## 2017-01-01 RX ADMIN — SODIUM CHLORIDE: 0.9 INJECTION, SOLUTION INTRAVENOUS at 06:01

## 2017-01-01 RX ADMIN — CALCIUM GLUCONATE 2 G: 94 INJECTION, SOLUTION INTRAVENOUS at 05:10

## 2017-01-01 RX ADMIN — ARFORMOTEROL TARTRATE 15 MCG: 15 SOLUTION RESPIRATORY (INHALATION) at 08:07

## 2017-01-01 RX ADMIN — FUROSEMIDE 40 MG: 10 INJECTION, SOLUTION INTRAMUSCULAR; INTRAVENOUS at 02:10

## 2017-01-01 RX ADMIN — DEXMEDETOMIDINE HYDROCHLORIDE 1.4 MCG/KG/HR: 4 INJECTION, SOLUTION INTRAVENOUS at 10:10

## 2017-01-01 RX ADMIN — FENTANYL CITRATE 50 MCG: 50 INJECTION INTRAMUSCULAR; INTRAVENOUS at 07:10

## 2017-01-01 RX ADMIN — Medication 12.5 MG: at 09:01

## 2017-01-01 RX ADMIN — Medication 25 MCG/HR: at 12:10

## 2017-01-01 RX ADMIN — PREDNISONE 20 MG: 20 TABLET ORAL at 09:09

## 2017-01-01 RX ADMIN — LOSARTAN POTASSIUM 12.5 MG: 25 TABLET, FILM COATED ORAL at 10:05

## 2017-01-01 RX ADMIN — SIMVASTATIN 40 MG: 20 TABLET, FILM COATED ORAL at 08:09

## 2017-01-03 PROBLEM — J96.01 ACUTE HYPOXEMIC RESPIRATORY FAILURE: Status: ACTIVE | Noted: 2017-01-01

## 2017-01-03 PROBLEM — I27.20 PULMONARY HYPERTENSION: Chronic | Status: ACTIVE | Noted: 2017-01-01

## 2017-01-03 PROBLEM — R74.8 ELEVATION OF CARDIAC ENZYMES: Status: ACTIVE | Noted: 2017-01-01

## 2017-01-03 PROBLEM — A41.9 SEPSIS: Status: ACTIVE | Noted: 2017-01-01

## 2017-01-03 PROBLEM — R73.9 HYPERGLYCEMIA, UNSPECIFIED: Status: ACTIVE | Noted: 2017-01-01

## 2017-01-03 PROBLEM — N17.9 ACUTE RENAL FAILURE: Status: ACTIVE | Noted: 2017-01-01

## 2017-01-03 PROBLEM — J96.21 ACUTE ON CHRONIC RESPIRATORY FAILURE WITH HYPOXEMIA: Status: ACTIVE | Noted: 2017-01-01

## 2017-01-03 PROBLEM — R65.20 SEVERE SEPSIS: Status: ACTIVE | Noted: 2017-01-01

## 2017-01-03 NOTE — ASSESSMENT & PLAN NOTE
Baseline creatinine over the last 12 months is about 1.3 and up to 1.6 with elevated BUN probably pre-renal.  Careful fluid resuscitation and follow blood chemistries.

## 2017-01-03 NOTE — ED NOTES
Pt's bp remains low at 86/56 but he is asymptomatic. He denies any complaints of of dizziness or chest pain.

## 2017-01-03 NOTE — ED NOTES
Bed rails are up and call light is within patient reach. Spouse remains at the bedside, will continue to monitor and assist.

## 2017-01-03 NOTE — PROGRESS NOTES
Pt arrived @ ED complaining of SOB, Pt noted to have Cyanosis to lips and finger tips.Pulse OX 63% on Home O2 @ 5l/m. Pt placed on NRB mask then BIPAP. ABG done. Pt tolerating well. Will continue to monitor.

## 2017-01-03 NOTE — ED NOTES
Spouse states pt has been on seizure medication for years but she cant remember the name of it. Spouse to call pt's dr simeon med can be restarted here.

## 2017-01-03 NOTE — ASSESSMENT & PLAN NOTE
Nebulizer treatments and supplemental oxygen.  Non-invasive ventilatory support.  Blood gas shows respiratory compensation for acidosis.  Adjust ventilatory support for comfort and use home equipment when available.

## 2017-01-03 NOTE — ED NOTES
No change in status. Pt resting comfortably in bed, NAD noted, RR even & unlabored. No complaints at this time. BiPap remains in place, pt tolerating well. No questions or concerns in regards to POC. Spouse at bedside. VSS. Call light within reach. Will continue to monitor.

## 2017-01-03 NOTE — ED NOTES
Pt presents to ED for c/o SOB that onset approximiately 1.5 hours ago while pt was lying down to sleep & has gradually worsened. Symptoms are constant. Pt also reports cough. Denies fever, n/v/d, CP, palpitations, diaphoresis, leg swelling. No further complaints or concerns.

## 2017-01-03 NOTE — ED NOTES
Pt tolerating BiPap. Marked improvement. Pt states he is not feeling as SOB. VSS. Fall precautions in place per protocol. Call light within reach. Family at bedside. Denies needs at this time. Will continue to monitor.

## 2017-01-03 NOTE — ED NOTES
Hospital Medicine notified about pts bp of 90/54, Nicole stated it was fine as long as pt is not symptomatic. Pt denies c/o chest pain, shortness of breath or dizziness.   She was also notified that pt's home meds, including unknown seizure med, needs to be restarted.

## 2017-01-03 NOTE — ED PROVIDER NOTES
SCRIBE #1 NOTE: I, Olga Nino, am scribing for, and in the presence of, Cecil Rivas MD. I have scribed the entire note.      History      Chief Complaint   Patient presents with    Shortness of Breath       Review of patient's allergies indicates:   Allergen Reactions    Doxycycline Nausea Only and Other (See Comments)     Dizziness     Pneumococcal vaccine         HPI   HPI    1/3/2017, 2:04 AM   History obtained from the wife and patient      History of Present Illness: Orion Rinaldi is a 81 y.o. male patient with extensive PMHx who presents to the Emergency Department for SOB which onset gradually 1.5 hours PTA while pt was trying to sleep. Sxs are constant and moderate in severity. There are no mitigating or exacerbating factors noted. Associated sxs include cough.  Pt denies any fever, N/V/D, chest tightness, CP, palpitations, calf pain, and all other sxs at this time. No further complaints or concerns at this time.     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    Seizures        Past Surgical History:  Past Surgical History   Procedure Laterality Date    Cardiac surgery      Tonsillectomy      Skin biopsy      Coronary artery bypass graft  1997     CABG x 3    Cardiac catheterization      Cardiac valve surgery      Aortic valve replacement      Cataract extraction w/  intraocular lens implant  OD 3/18/09    Cataract extraction w/  intraocular lens implant  OS 4/1/09         Family History:  Family  History   Problem Relation Age of Onset    Heart disease Brother     Cataracts Mother     No Known Problems Father     Cataracts Maternal Grandmother     No Known Problems Maternal Grandfather     Cataracts Paternal Grandmother     Cancer Paternal Grandfather     No Known Problems Sister     No Known Problems Maternal Aunt     No Known Problems Maternal Uncle     No Known Problems Paternal Aunt     No Known Problems Paternal Uncle     Anemia Neg Hx     Arrhythmia Neg Hx     Asthma Neg Hx     Clotting disorder Neg Hx     Fainting Neg Hx     Heart attack Neg Hx     Heart failure Neg Hx     Hyperlipidemia Neg Hx     Hypertension Neg Hx     Strabismus Neg Hx     Retinal detachment Neg Hx     Macular degeneration Neg Hx     Glaucoma Neg Hx     Amblyopia Neg Hx     Blindness Neg Hx     Diabetes Neg Hx     Stroke Neg Hx     Thyroid disease Neg Hx        Social History:  Social History     Social History Main Topics    Smoking status: Former Smoker     Packs/day: 1.00     Years: 20.00     Types: Cigarettes     Quit date: 9/12/1997    Smokeless tobacco: Never Used    Alcohol use No      Comment: Occasionally     Drug use: No    Sexual activity: Yes     Partners: Female       ROS   Review of Systems   Constitutional: Negative for fever.   HENT: Negative for sore throat.    Respiratory: Positive for cough and shortness of breath.    Cardiovascular: Negative for chest pain and palpitations.   Gastrointestinal: Negative for diarrhea, nausea and vomiting.   Genitourinary: Negative for dysuria.   Musculoskeletal: Negative for back pain.        (-) calf pain   Skin: Negative for rash.   Neurological: Negative for weakness.   Hematological: Does not bruise/bleed easily.       Physical Exam    Initial Vitals   BP Pulse Resp Temp SpO2   01/03/17 0158 01/03/17 0158 01/03/17 0158 01/03/17 0158 01/03/17 0158   105/64 123 40 101.5 °F (38.6 °C) 63 %      Physical Exam  Nursing Notes and Vital Signs  Reviewed.  Constitutional: Patient is in no acute distress. Awake and alert. Well-developed and well-nourished.  Head: Atraumatic. Normocephalic.  Eyes: PERRL. EOM intact. Conjunctivae are not pale. No scleral icterus.  ENT: Mucous membranes are moist. Oropharynx is clear and symmetric.    Neck: Supple. Full ROM. No lymphadenopathy.  Cardiovascular: Tachycardic rate. Regular rhythm. No murmurs, rubs, or gallops. Distal pulses are 2+ and symmetric.  Pulmonary/Chest: moderate respiratory distress. Tachypneic. No wheezing, rales, or rhonchi.  Abdominal: Soft and non-distended.  There is no tenderness.  No rebound, guarding, or rigidity.    Musculoskeletal: Moves all extremities. No obvious deformities. No edema. No calf tenderness.  Skin: Warm and dry.  Neurological:  Alert, awake, and appropriate. Pt oriented to person, place, and time.  Normal speech.  No acute focal neurological deficits are appreciated.  Psychiatric: Normal affect. Good eye contact. Appropriate in content.    ED Course    Critical Care  Date/Time: 1/3/2017 3:44 AM  Performed by: INDIA MALDONADO.  Authorized by: INDIA MALDONADO.   Direct patient critical care time: 15 minutes  Additional history critical care time: 5 minutes  Ordering / reviewing critical care time: 10 minutes  Documentation critical care time: 5 minutes  Consulting other physicians critical care time: 10 minutes  Total critical care time (exclusive of procedural time) : 45 minutes  Critical care time was exclusive of separately billable procedures and treating other patients.  Critical care was necessary to treat or prevent imminent or life-threatening deterioration of the following conditions: respiratory failure.  Critical care was time spent personally by me on the following activities: blood draw for specimens, discussions with consultants, evaluation of patient's response to treatment, obtaining history from patient or surrogate, ordering and review of laboratory studies, pulse  "oximetry, review of old charts, re-evaluation of patient's condition, ordering and performing treatments and interventions, ordering and review of radiographic studies, examination of patient, interpretation of cardiac output measurements and development of treatment plan with patient or surrogate.        ED Vital Signs:  Vitals:    01/03/17 0158 01/03/17 0214 01/03/17 0234 01/03/17 0331   BP: 105/64 (!) 108/51  122/64   Pulse: (!) 123 97  87   Resp: (!) 40 (!) 24  (!) 27   Temp: (!) 101.5 °F (38.6 °C)      TempSrc:       SpO2: (!) 63% 97% 100% 100%   Weight: 88.5 kg (195 lb)      Height: 5' 11" (1.803 m)       01/03/17 0357 01/03/17 0431   BP:  (!) 110/54   Pulse:  80   Resp:  (!) 27   Temp: (!) 100.8 °F (38.2 °C)    TempSrc: Tympanic    SpO2:  98%   Weight:     Height:         Abnormal Lab Results:  Labs Reviewed   CBC W/ AUTO DIFFERENTIAL - Abnormal; Notable for the following:        Result Value    WBC 12.99 (*)     RDW 21.2 (*)     Platelets 100 (*)     Gran # 9.6 (*)     Mono # 1.8 (*)     Gran% 74.3 (*)     Lymph% 11.7 (*)     All other components within normal limits   COMPREHENSIVE METABOLIC PANEL - Abnormal; Notable for the following:     CO2 16 (*)     Glucose 207 (*)     BUN, Bld 35 (*)     Creatinine 1.6 (*)     Total Bilirubin 2.0 (*)     Alkaline Phosphatase 137 (*)     eGFR if  47 (*)     eGFR if non  40 (*)     All other components within normal limits   PROTIME-INR - Abnormal; Notable for the following:     Prothrombin Time 12.8 (*)     All other components within normal limits   B-TYPE NATRIURETIC PEPTIDE - Abnormal; Notable for the following:     BNP 1524 (*)     All other components within normal limits   LACTIC ACID, PLASMA - Abnormal; Notable for the following:     Lactate (Lactic Acid) 5.1 (*)     All other components within normal limits    Narrative:      LA  critical result(s) called and verbal readback obtained from   TIFFANI SIERRA 1/3/17 0326, 01/03/2017 " 03:26   URINALYSIS - Abnormal; Notable for the following:     Specific Gravity, UA >=1.030 (*)     Protein, UA 2+ (*)     Urobilinogen, UA 4.0-6.0 (*)     All other components within normal limits   TROPONIN I - Abnormal; Notable for the following:     Troponin I 0.033 (*)     All other components within normal limits   URINALYSIS MICROSCOPIC - Abnormal; Notable for the following:     Hyaline Casts, UA 3 (*)     All other components within normal limits   ISTAT PROCEDURE - Abnormal; Notable for the following:     POC PCO2 27.4 (*)     POC PO2 162 (*)     POC HCO3 17.6 (*)     All other components within normal limits   CULTURE, BLOOD   CULTURE, BLOOD   APTT   CK-MB   CK   INFLUENZA A AND B ANTIGEN   LACTIC ACID, PLASMA        All Lab Results:  Results for orders placed or performed during the hospital encounter of 01/03/17   CBC auto differential   Result Value Ref Range    WBC 12.99 (H) 3.90 - 12.70 K/uL    RBC 5.24 4.60 - 6.20 M/uL    Hemoglobin 14.3 14.0 - 18.0 g/dL    Hematocrit 44.3 40.0 - 54.0 %    MCV 85 82 - 98 fL    MCH 27.3 27.0 - 31.0 pg    MCHC 32.3 32.0 - 36.0 %    RDW 21.2 (H) 11.5 - 14.5 %    Platelets 100 (L) 150 - 350 K/uL    MPV SEE COMMENT 9.2 - 12.9 fL    Gran # 9.6 (H) 1.8 - 7.7 K/uL    Lymph # 1.5 1.0 - 4.8 K/uL    Mono # 1.8 (H) 0.3 - 1.0 K/uL    Eos # 0.1 0.0 - 0.5 K/uL    Baso # 0.05 0.00 - 0.20 K/uL    Gran% 74.3 (H) 38.0 - 73.0 %    Lymph% 11.7 (L) 18.0 - 48.0 %    Mono% 13.5 4.0 - 15.0 %    Eosinophil% 0.5 0.0 - 8.0 %    Basophil% 0.4 0.0 - 1.9 %    Differential Method Automated    Comprehensive metabolic panel   Result Value Ref Range    Sodium 140 136 - 145 mmol/L    Potassium 5.1 3.5 - 5.1 mmol/L    Chloride 109 95 - 110 mmol/L    CO2 16 (L) 23 - 29 mmol/L    Glucose 207 (H) 70 - 110 mg/dL    BUN, Bld 35 (H) 8 - 23 mg/dL    Creatinine 1.6 (H) 0.5 - 1.4 mg/dL    Calcium 9.2 8.7 - 10.5 mg/dL    Total Protein 7.8 6.0 - 8.4 g/dL    Albumin 3.7 3.5 - 5.2 g/dL    Total Bilirubin 2.0 (H) 0.1  - 1.0 mg/dL    Alkaline Phosphatase 137 (H) 55 - 135 U/L    AST 40 10 - 40 U/L    ALT 22 10 - 44 U/L    Anion Gap 15 8 - 16 mmol/L    eGFR if African American 47 (A) >60 mL/min/1.73 m^2    eGFR if non African American 40 (A) >60 mL/min/1.73 m^2   Protime-INR   Result Value Ref Range    Prothrombin Time 12.8 (H) 9.0 - 12.5 sec    INR 1.2 0.8 - 1.2   APTT   Result Value Ref Range    aPTT 26.8 21.0 - 32.0 sec   CK-MB   Result Value Ref Range    CPK 92 20 - 200 U/L    CPK MB 2.7 0.1 - 6.5 ng/mL    MB% 2.9 0.0 - 5.0 %   CPK   Result Value Ref Range    CPK 92 20 - 200 U/L   Brain natriuretic peptide   Result Value Ref Range    BNP 1524 (H) 0 - 99 pg/mL   Lactic acid, plasma   Result Value Ref Range    Lactate (Lactic Acid) 5.1 (HH) 0.5 - 2.2 mmol/L   Urinalysis   Result Value Ref Range    Specimen UA Urine, Clean Catch     Color, UA Yellow Yellow, Straw, Kia    Appearance, UA Clear Clear    pH, UA 5.0 5.0 - 8.0    Specific Gravity, UA >=1.030 (A) 1.005 - 1.030    Protein, UA 2+ (A) Negative    Glucose, UA Negative Negative    Ketones, UA Negative Negative    Bilirubin (UA) Negative Negative    Occult Blood UA Negative Negative    Nitrite, UA Negative Negative    Urobilinogen, UA 4.0-6.0 (A) <2.0 EU/dL    Leukocytes, UA Negative Negative   Troponin I   Result Value Ref Range    Troponin I 0.033 (H) 0.000 - 0.026 ng/mL   Influenza antigen Nasopharyngeal Swab   Result Value Ref Range    Influenza A Ag, EIA Negative Negative    Influenza B Ag, EIA Negative Negative    Flu A & B Source Nasopharyngeal Swab    Urinalysis Microscopic   Result Value Ref Range    RBC, UA 0 0 - 4 /hpf    WBC, UA 0 0 - 5 /hpf    Bacteria, UA Occasional None-Occ /hpf    Hyaline Casts, UA 3 (A) 0-1/lpf /lpf    Amorphous, UA Occasional None-Moderate    Microscopic Comment SEE COMMENT    ISTAT PROCEDURE   Result Value Ref Range    POC PH 7.417 7.35 - 7.45    POC PCO2 27.4 (LL) 35 - 45 mmHg    POC PO2 162 (H) 80 - 100 mmHg    POC HCO3 17.6 (L) 24 - 28  mmol/L    POC BE -7 -2 to 2 mmol/L    POC SATURATED O2 100 95 - 100 %    Rate 10     Sample ARTERIAL     Site RR     Allens Test Pass     DelSys CPAP/BiPAP     Mode BiPAP     FiO2 100     Spont Rate 31     Sp02 100     IP 12     EP 6          Imaging Results:  Imaging Results         X-Ray Chest AP Portable (In process)          The EKG was ordered, reviewed, and independently interpreted by the ED provider.  Interpretation time: 02:14  Rate: 98 BPM  Rhythm: accelerated junction rhythm  Interpretation: Incomplete right bundle branch block. Left anterior fascicular block. Cannot rule out inferior infarct (masked by fascicular block?), age undetermined. ST&T wave abnormality, consider lateral ischemia. No STEMI.               The Emergency Provider reviewed the vital signs and test results, which are outlined above.    ED Discussion       2:30 AM: Re-evaluated pt. Family at bedside. Pt reports he is feeling better. Vitals signs improving. D/w pt any concerns expressed at this time. Answered all questions. Pt expresses understanding at this time.    3:39 AM: Discussed case with Dr. Collins ([facility]). Dr. Collins agrees with current care and management of pt and accepts admission.   Admitting Service: [facility]   Admitting Physician: Dr. Collins  Admit to: ICU    3:50 AM: Re-evaluated pt. I have discussed test results, shared treatment plan, and the need for admission with patient and family at bedside. Pt and family express understanding at this time and agree with all information. All questions answered. Pt and family have no further questions or concerns at this time. Pt is ready for admit.      ED Medication(s):  Medications   0.9%  NaCl infusion (not administered)   heparin (porcine) injection 5,000 Units (not administered)   sodium chloride 0.9% bolus 1,000 mL (0 mLs Intravenous Stopped 1/3/17 5190)   acetaminophen tablet 650 mg (650 mg Oral Given 1/3/17 1026)   piperacillin-tazobactam 4.5  g in dextrose 5 % 100 mL IVPB (ready to mix system) (0 g Intravenous Stopped 1/3/17 0471)                 Medical Decision Making    Medical Decision Making:   History:   Old Records Summarized: records from clinic visits and records from previous admission(s).  Clinical Tests:   Lab Tests: Reviewed and Ordered  Radiological Study: Ordered and Reviewed  Medical Tests: Reviewed and Ordered  Other:   I have discussed this case with another health care provider.           Scribe Attestation:   Scribe #1: I performed the above scribed service and the documentation accurately describes the services I performed. I attest to the accuracy of the note.    Attending:   Physician Attestation Statement for Scribe #1: I, Cecil Rivas MD, personally performed the services described in this documentation, as scribed by Olga Nino, in my presence, and it is both accurate and complete.          Clinical Impression       ICD-10-CM ICD-9-CM   1. Sepsis, due to unspecified organism A41.9 038.9     995.91   2. SOB (shortness of breath) R06.02 786.05       Disposition:   Disposition: Admitted  Condition: Serious         Cecil Rivas MD  01/03/17 1878

## 2017-01-03 NOTE — ED NOTES
Pt awake, alert and oriented x 4 with zero c/o pain or shortness of breath at this time. He remains on the cardiac monitor with a noted heart rate of 86 min. Pt is on bipap with a rate of 10 and an o2 of 40%. o2 sat is 97% on bipap. bilat pedial pulses are +1 and bowel sounds are audible in all 4 quads. bilat breath sounds are diminished and pt is breathing approx 32 times a min. resp therapist paged to bedside.

## 2017-01-03 NOTE — ED NOTES
Pt remains on o2 at 3 liters via nasal cannula, he denies worsening shortness of breath. No resp distress noted, family remains at bedside.

## 2017-01-03 NOTE — ASSESSMENT & PLAN NOTE
Troponin level is slightly elevated at 0.033 and no complaints of chest pain.  Shortness of breath from acute exacerbation of lung disease.

## 2017-01-03 NOTE — ED NOTES
No change in pt status, he is resting quietly with zero complaints and no distress noted. Pt remains on o2 at 3 liters via nasal cannula. Hospital Medicine is aware of bp

## 2017-01-03 NOTE — IP AVS SNAPSHOT
[address] [place] [address]           Patient Discharge Instructions     Our goal is to set you up for success. This packet includes information on your condition, medications, and your home care. It will help you to care for yourself so you don't get sicker and need to go back to the hospital.     Please ask your nurse if you have any questions.        There are many details to remember when preparing to leave the hospital. Here is what you will need to do:    1. Take your medicine. If you are prescribed medications, review your Medication List in the following pages. You may have new medications to  at the pharmacy and others that you'll need to stop taking. Review the instructions for how and when to take your medications. Talk with your doctor or nurses if you are unsure of what to do.     2. Go to your follow-up appointments. Specific follow-up information is listed in the following pages. Your may be contacted by a transition nurse or clinical provider about future appointments. Be sure we have all of the phone numbers to reach you, if needed. Please contact your provider's office if you are unable to make an appointment.     3. Watch for warning signs. Your doctor or nurse will give you detailed warning signs to watch for and when to call for assistance. These instructions may also include educational information about your condition. If you experience any of warning signs to your health, call your doctor.               ** Verify the list of medication(s) below is accurate and up to date. Carry this with you in case of emergency. If your medications have changed, please notify your healthcare provider.             Medication List      START taking these medications        Additional Info                      levoFLOXacin 250 MG tablet   Commonly known as:  LEVAQUIN   Quantity:  7 tablet   Refills:  0   Dose:  250 mg    Instructions:  Take 1 tablet  (250 mg total) by mouth once daily.     Begin Date    AM    Noon    PM    Bedtime       metoprolol tartrate 25 MG tablet   Commonly known as:  LOPRESSOR   Quantity:  30 tablet   Refills:  0   Dose:  12.5 mg    Last time this was given:  12.5 mg on 1/4/2017  9:12 PM   Instructions:  Take 0.5 tablets (12.5 mg total) by mouth 2 (two) times daily.     Begin Date    AM    Noon    PM    Bedtime       predniSONE 10 MG tablet   Commonly known as:  DELTASONE   Quantity:  8 tablet   Refills:  0   Dose:  10 mg    Last time this was given:  10 mg on 1/5/2017  8:18 AM   Instructions:  Take 1 tablet (10 mg total) by mouth 2 (two) times daily.     Begin Date    AM    Noon    PM    Bedtime         CHANGE how you take these medications        Additional Info                      potassium chloride SA 20 MEQ tablet   Commonly known as:  K-DUR,KLOR-CON   Quantity:  60 tablet   Refills:  0   Dose:  20 mEq   What changed:  when to take this    Instructions:  Take 1 tablet (20 mEq total) by mouth 2 (two) times daily.     Begin Date    AM    Noon    PM    Bedtime         CONTINUE taking these medications        Additional Info                      albuterol-ipratropium 2.5mg-0.5mg/3mL 0.5 mg-3 mg(2.5 mg base)/3 mL nebulizer solution   Commonly known as:  DUO-NEB   Quantity:  90 vial   Refills:  3   Dose:  3 mL   Comments:  Please call patient to pick prescription once it is ready.    Instructions:  Take 3 mLs by nebulization every 8 (eight) hours as needed for Wheezing.     Begin Date    AM    Noon    PM    Bedtime       aspirin 81 MG Chew   Refills:  0   Dose:  81 mg    Last time this was given:  81 mg on 1/5/2017  8:18 AM   Instructions:  Take 81 mg by mouth once daily.     Begin Date    AM    Noon    PM    Bedtime       budesonide-formoterol 80-4.5 mcg 80-4.5 mcg/actuation Hfaa   Commonly known as:  SYMBICORT   Quantity:  1 Inhaler   Refills:  11   Dose:  2 puff   Comments:  Please call patient to pick prescription once it is ready.     Instructions:  Inhale 2 puffs into the lungs 2 (two) times daily. Pharmacy to adjust to cheaper tier options     Begin Date    AM    Noon    PM    Bedtime       colchicine 0.6 mg tablet   Quantity:  7 tablet   Refills:  0   Dose:  0.6 mg    Instructions:  Take 1 tablet (0.6 mg total) by mouth once daily.     Begin Date    AM    Noon    PM    Bedtime       furosemide 20 MG tablet   Commonly known as:  LASIX   Quantity:  30 tablet   Refills:  1   Dose:  40 mg    Last time this was given:  40 mg on 1/5/2017  9:00 AM   Instructions:  Take 2 tablets (40 mg total) by mouth once daily.     Begin Date    AM    Noon    PM    Bedtime       levetiracetam 750 MG Tab   Commonly known as:  KEPPRA   Refills:  0   Dose:  750 mg    Last time this was given:  750 mg on 1/5/2017  8:17 AM   Instructions:  Take 750 mg by mouth 2 (two) times daily.     Begin Date    AM    Noon    PM    Bedtime       losartan 25 MG tablet   Commonly known as:  COZAAR   Quantity:  30 tablet   Refills:  1   Dose:  12.5 mg    Instructions:  Take 0.5 tablets (12.5 mg total) by mouth once daily.     Begin Date    AM    Noon    PM    Bedtime       OXYGEN-AIR DELIVERY SYSTEMS MISC   Refills:  0    Instructions:  by Misc.(Non-Drug; Combo Route) route.     Begin Date    AM    Noon    PM    Bedtime       simvastatin 40 MG tablet   Commonly known as:  ZOCOR   Quantity:  90 tablet   Refills:  3    Last time this was given:  40 mg on 1/4/2017  9:12 PM   Instructions:  TAKE 1 TABLET EVERY EVENING.     Begin Date    AM    Noon    PM    Bedtime       tiotropium 18 mcg inhalation capsule   Commonly known as:  SPIRIVA WITH HANDIHALER   Quantity:  90 capsule   Refills:  4   Dose:  18 mcg   Comments:  Please call patient to pick prescription once it is ready.    Instructions:  Inhale 1 capsule (18 mcg total) into the lungs once daily. Pharmacy to adjust to cheaper tier options     Begin Date    AM    Noon    PM    Bedtime         STOP taking these medications     metoprolol  succinate 25 MG 24 hr tablet   Commonly known as:  TOPROL-XL            Where to Get Your Medications      These medications were sent to [facility] [address] [place], [place] - [address] [facility]  [address] [facility] [address]     Phone:  [phone]     levoFLOXacin 250 MG tablet    metoprolol tartrate 25 MG tablet    predniSONE 10 MG tablet                  Please bring to all follow up appointments:    1. A copy of your discharge instructions.  2. All medicines you are currently taking in their original bottles.  3. Identification and insurance card.    Please arrive 15 minutes ahead of scheduled appointment time.    Please call 24 hours in advance if you must reschedule your appointment and/or time.        Your Scheduled Appointments     Jan 09, 2017  8:00 AM CST   Hospital Follow Up with Carl Min PA-C   [facility] - Internal Medicine ([facility])    [address] [facility] [address]   [phone]            Jan 25, 2017 10:45 AM CST   Diagnostic Xray with ON XR1-   [facility]    [address] [place] [id number] [phone]            Jan 25, 2017 11:00 AM CST   Spirometry with [facility]ULMONARY LAB, [facility]   [facility] ([facility]    [address] [place] [id number] [phone]            Jan 25, 2017 11:20 AM CST   Established Patient Visit with Francis Downing MD   [facility] ([facility]    [address] [place] [id number] [phone]            Mar 07, 2017 11:00 AM CST   Established Patient Visit with Jordan Solano MD   [facility] ([facility])    [address] [place] [id number] [phone]              Follow-up Information     Follow up with Daisy Oconnor MD In 3 days.    Specialty:  Internal Medicine    Why:  hospital follow up-repeat creatinine within 1 week    Contact information:    [address] [facility]  [place]  "[place] [address]  [phone]          Discharge Instructions     Future Orders    Activity as tolerated     Call MD for:  difficulty breathing or increased cough     Call MD for:  increased confusion or weakness     Call MD for:  persistent dizziness, light-headedness, or visual disturbances     Call MD for:  temperature >100.4     Diet general     Questions:    Total calories:      Fat restriction, if any:      Protein restriction, if any:      Na restriction, if any:  2gNa    Fluid restriction:  Fluid - 1500mL    Additional restrictions:          Primary Diagnosis     Your primary diagnosis was:  Chronic Bronchitis      Admission Information     Date & Time Provider Department CSN    1/3/2017  1:55 AM Lisa Chung MD [facility] -  [id number]      Care Providers     Provider Role Specialty Primary office phone    Lisa Chung MD Attending Provider General Practice [phone]    Lisa Chung MD Team Attending  General Practice [phone]    Prabhu Mario MD Consulting Physician  Pulmonary Disease [phone]      Your Vitals Were     BP Pulse Temp Resp Height Weight    104/53 (BP Location: Right arm, Patient Position: Sitting, BP Method: Automatic) 73 97.8 °F (36.6 °C) (Oral) 18 5' 11" (1.803 m) 86.6 kg (191 lb)    SpO2 BMI             94% 26.64 kg/m2         Recent Lab Values        9/13/2004 5/24/2006 10/23/2006                     7:51 AM  8:43 AM  7:13 AM         A1C 5.1 5.3 5.3                   Pending Labs     Order Current Status    Lactic acid, plasma In process    Blood Culture #1 **CANNOT BE ORDERED STAT** Preliminary result    Blood Culture #2 **CANNOT BE ORDERED STAT** Preliminary result    CBC auto differential Preliminary result      Allergies as of 1/5/2017        Reactions    Doxycycline Nausea Only, Other (See Comments)    Dizziness     Pneumococcal Vaccine       [facility]     [facility] Nurse Care Line - 24/7 Assistance  Unless otherwise directed by your provider, " please contact [facility], our nurse care line that is available for 24/7 assistance.     Registered nurses in the [facility] provide clinical advisement, health education, appointment booking, and other advisory services.  Call for this free service at [phone].        Advance Directives     An advance directive is a document which, in the event you are no longer able to make decisions for yourself, tells your healthcare team what kind of treatment you do or do not want to receive, or who you would like to make those decisions for you.  If you do not currently have an advance directive, [facility] encourages you to create one.  For more information call:  [phone] ([phone]), [phone] ([phone]),  or log on to [email or web address].        Smoking Cessation     If you would like to quit smoking:   You may be eligible for free services if you are a [place] resident and started smoking cigarettes before September 1, 1988.  Call the Smoking Cessation Trust (SCT) toll free at (704) 024-7918 or (209) 329-4295.   Call 3-617-QUIT-NOW if you do not meet the above criteria.            Language Assistance Services     ATTENTION: Language assistance services are available, free of charge. Please call [phone].      ATENCIÓN: Si habla español, tiene a novak disposición servicios gratuitos de asistencia lingüística. Llame al [phone].     CHÚ Ý: N?u b?n nói Ti?ng Vi?t, có các d?ch v? h? tr? ngôn ng? mi?n phí dành cho b?n. G?i s? [phone].        Heart Failure Education       Heart Failure: Being Active  You have a condition called heart failure. Being active doesnt mean that you have to wear yourself out. Even a little movement each day helps to strengthen your heart. If you cant get out to exercise, you can do simple stretching and strengthening exercises at home. These are good ways to keep you well-conditioned and prevent you and your heart from becoming excessively  weak.    Ideas to get you started  · Add a little movement to things you do now. Walk to mail letters. Park your car at the far end of the parking lot and walk to the store. Walk up a flight of stairs instead of taking the elevator.  · Choose activities you enjoy. You might walk, swim, or ride an exercise bike. Things like gardening and washing the car count, too. Other possibilities include: washing dishes, walking the dog, walking around the mall, and doing aerobic activities with friends.  · Join a group exercise program at a [facility], a senior center, or a community center. Or look into a hospital cardiac rehabilitation program. Ask your doctor if you qualify.  Tips to keep you going  · Get up and get dressed each day. Go to a coffee shop and read a newspaper or go somewhere that you'll be in the presence of other active people. Youll feel more like being active.  · Make a plan. Choose one or more activities that you enjoy and that you can easily do. Then plan to do at least one each day. You might write your plan on a calendar.  · Go with a friend or a group if you like company. This can help you feel supported and stay motivated, too.  · Plan social events that you enjoy. This will keep you mentally engaged as well as physically motivated to do things you find pleasure in.  For your safety  · Talk with your healthcare provider before starting an exercise program.  · Exercise indoors when its too hot or too cold outside, or when the air quality is poor. Try walking at a shopping mall.  · Wear socks and sturdy shoes to maintain your balance and prevent falls.  · Start slowly. Do a few minutes several times a day at first. Increase your time and speed little by little.  · Stop and rest whenever you feel tired or get short of breath.  · Dont push yourself on days when you dont feel well.  © 7044-2179 [employer]. [address] [place] [address], [place] [address]. All rights reserved. This information  is not intended as a substitute for professional medical care. Always follow your healthcare professional's instructions.              Heart Failure: Evaluating Your Heart  You have a condition called heart failure. To evaluate your condition, your doctor will examine you, ask questions, and do some tests. Along with looking for signs of heart failure, the doctor looks for any other health problems that may have led to heart failure. The results of your evaluation will help your doctor form a treatment plan.  Health history and physical exam  Your visit will start with a health history. Tell the doctor about any symptoms youve noticed and about all medicines you take. Then youll have a physical exam. This includes listening to your heartbeat and breathing. Youll also be checked for swelling (edema) in your legs and neck. When you have fluid buildup or fluid in the lungs, it may be called congestive heart failure.  Diagnosing heart failure     During an echocardiogram, sound waves bounce off the heart. These are converted into a picture on the screen.   The following may be done to help your doctor form a diagnosis:  · X-rays show the size and shape of your heart. These pictures can also show fluid in your lungs.  · An electrocardiogram (ECG or EKG) shows the pattern of your heartbeat. Small pads (electrodes) are placed on your chest, arms, and legs. Wires connect the pads to the ECG machine, which records your hearts electrical signals. This can give the doctor information about heart function.  · An echocardiogram uses ultrasound waves to show the structure and movement of your heart muscle. This shows how well the heart pumps. It also shows the thickness of the heart walls, and if the heart is enlarged. It is one of the most useful, non-invasive tests as it provides information about the heart's general function. This helps your doctor make treatment decisions.  · Lab tests evaluate small amounts of blood or  urine for signs of problems. A BNP lab test can help diagnose and evaluate heart failure. BNP stands for B-type natriuretic peptide. The ventricles secrete more BNP when heart failure worsens. Lab tests can also provide information about metabolic dysfunction or heart dysfunction.  Your treatment plan  Based on the results of your evaluation and tests, your doctor will develop a treatment plan. This plan is designed to relieve some of your heart failure symptoms and help make you more comfortable. Your treatment plan may include:  · Medicine to help your heart work better and improve your quality of life  · Changes in what you eat and drink to help prevent fluid from backing up in your body  · Daily monitoring of your weight and heart failure symptoms to see how well your treatment plan is working  · Exercise to help you stay healthy  · Help with quitting smoking  · Emotional and psychological support to help adjust to the changes  · Referrals to other specialists to make sure you are being treated comprehensively  © 4383-3120 [employer]. [address] [place] [address]. All rights reserved. This information is not intended as a substitute for professional medical care. Always follow your healthcare professional's instructions.              Heart Failure: Making Changes to Your Diet  You have a condition called heart failure. When you have heart failure, excess fluid is more likely to build up in your body because your heart isn't working well. This makes the heart work harder to pump blood. Fluid buildup causes symptoms such as shortness of breath and swelling (edema). This is often referred to as congestive heart failure or CHF. Controlling the amount of salt (sodium) you eat may help stop fluid from building up. Your doctor may also tell you to reduce the amount of fluid you drink.  Reading food labels    Your healthcare provider will tell you how much sodium you can eat each day. Read  food labels to keep track. Keep in mind that certain foods are high in salt. These include canned, frozen, and processed foods. Check the amount of sodium in each serving. Watch out for high-sodium ingredients. These include MSG (monosodium glutamate), baking soda, and sodium phosphate.   Eating less salt  Give yourself time to get used to eating less salt. It may take a little while. Here are some tips to help:  · Take the saltshaker off the table. Replace it with salt-free herb mixes and spices.  · Eat fresh or plain frozen vegetables. These have much less salt than canned vegetables.  · Choose low-sodium snacks like sodium-free pretzels, crackers, or air-popped popcorn.  · Dont add salt to your food when youre cooking. Instead, season your foods with pepper, lemon, garlic, or onion.  · When you eat out, ask that your food be cooked without added salt.  · Avoid eating fried foods as these often have a great deal of salt.  If youre told to limit fluids  You may need to limit how much fluid you have to help prevent swelling. This includes anything that is liquid at room temperature, such as ice cream and soup. If your doctor tells you to limit fluid, try these tips:  · Measure drinks in a measuring cup before you drink them. This will help you meet daily goals.  · Chill drinks to make them more refreshing.  · Suck on frozen lemon wedges to quench thirst.  · Only drink when youre thirsty.  · Chew sugarless gum or suck on hard candy to keep your mouth moist.  · Weigh yourself daily to know if your body's fluid content is rising.  My sodium goal  Your healthcare provider may give you a sodium goal to meet each day. This includes sodium found in food as well as salt that you add. My goal is to eat no more than ___________ mg of sodium per day.     When to call your doctor  Call your doctor right away if you have any symptoms of worsening heart failure. These can include:  · Sudden weight gain  · Increased swelling  of your legs or ankles  · Trouble breathing when youre resting or at night  · Increase in the number of pillows you have to sleep on  · Chest pain, pressure, discomfort, or pain in the jaw, neck, or back   © 9967-4992 [employer]. [address] [place] [address]. All rights reserved. This information is not intended as a substitute for professional medical care. Always follow your healthcare professional's instructions.              Heart Failure: Medicines to Help Your Heart    You have a condition called heart failure (also known as congestive heart failure, or CHF). Your doctor will likely prescribe medicines for heart failure and any underlying health problems you have. Most heart failure patients take one or more types of medicinen. Your healthcare provider will work to find the combination of medicines that works best for you.  Heart failure medicines  Here are the most common heart failure medicines:  · ACE inhibitors lower blood pressure and decrease strain on the heart. This makes it easier for the heart to pump. Angiotensin receptor blockers have similar effects. These are prescribed for some patients instead of ACE inhibitors.  · Beta-blockers relieve stress on the heart. They also improve symptoms. They may also improve the heart's pumping action over time.  · Diuretics (also called water pills) help rid your body of excess water. This can help rid your body of swelling (edema). Having less fluid to pump means your heart doesnt have to work as hard. Some diuretics make your body lose a mineral called potassium. Your doctor will tell you if you need to take supplements or eat more foods high in potassium.  · Digoxin helps your heart pump with more strength. This helps your heart pump more blood with each beat. So, more oxygen-rich blood travels to the rest of the body.  · Aldosterone antagonists help alter hormones and decrease strain on the heart.  · Hydralazine and nitrates  are two separate medicines used together to treat heart failure. They may come in one combination pill. They lower blood pressure and decrease how hard the heart has to pump.  Medicines for related conditions  Controlling other heart problems helps keep heart failure under control, too. Depending on other heart problems you have, medicines may be prescribed to:  · Lower blood pressure (antihypertensives).  · Lower cholesterol levels (statins).  · Prevent blood clots (anticoagulants or aspirin).  · Keep the heartbeat steady (antiarrhythmics).  © 9528-3580 [employer]. [address] [place] [address]. All rights reserved. This information is not intended as a substitute for professional medical care. Always follow your healthcare professional's instructions.              Heart Failure: Procedures That May Help    The heart is a muscle that pumps oxygen-rich blood to all parts of the body. When you have heart failure, the heart is not able to pump as well as it should. Blood and fluid may back up into the lungs (congestive heart failure), and some parts of the body dont get enough oxygen-rich blood to work normally. These problems lead to the symptoms of heart failure.     Certain procedures may help the heart pump better in some cases of heart failure. Some procedures are done to treat health problems that may have caused the heart failure such as coronary artery disease or heart rhythm problems. For more serious heart failure, other options are available.  Treating artery and valve problems  If you have coronary artery disease or valve disease, procedures may be done to improve blood flow. This helps the heart pump better, which can improve heart failure symptoms. First, your doctor may do a cardiac catheterization to help detect clogged blood vessels or valve damage. During this procedure, a  thin tube (catheter) in inserted into a blood vessel and guided to the heart. There a dye is  injected and a special type of X-ray (angiogram) is taken of the blood vessels. Procedures to open a blocked artery or fix damaged valves can also be done using catheterization.  · Angioplasty uses a balloon-tipped instrument at the end of the catheter. The balloon is inflated to widen the narrowed artery. In many cases, a stent is expanded to further support the narrowed artery. A stent is a metal mesh tube.  · Valve surgery repairs or replacement of faulty valves can also be done during catheterization so blood can flow properly through the chambers of the heart.  Bypass surgery is another option to help treat blocked arteries. It uses a healthy blood vessel from elsewhere in the body. The healthy blood vessel is attached above and below the blocked area so that blood can flow around the blocked artery.  Treating heart rhythm problems  A device may be placed in the chest to help a weak heart maintain a healthy, heartbeat so the heart can pump more effectively:  · Pacemaker. A pacemaker is an implanted device that regulates your heartbeat electronically. It monitors your heart's rhythm and generates a painless electric impulse that helps the heart beat in a regular rhythm. A pacemaker is programmed to meet your specific heart rhythm needs.  · Biventricular pacing/cardiac resynchronization therapy. A type of pacemaker that paces both pumping chambers of the heart at the same time to coordinate contractions and to improve the heart's function. Some people with heart failure are candidates for this therapy.  · Implantable cardioverter defibrillator. A device similar to a pacemaker that senses when the heart is beating too fast and delivers an electrical shock to convert the fast rhythm to a normal rhythm. This can be a life saving device.  In severe cases  In more serious cases of heart failure when other treatments no longer work, other options may include:  · Ventricular assist devices (VADs). These are mechanical  devices used to take over the pumping function for one or both of the heart's ventricles, or pumping chambers. A VAD may be necessary when heart failure progresses to the point that medicines and other treatments no longer help. In some cases, a VAD may be used as a bridge to transplant.  · Heart transplant. This is replacing the diseased heart with a healthy one from a donor. This is an option for a few people who are very sick. A heart transplant is very serious and not an option for all patients. Your doctor can tell you more.  © 9121-8686 [employer]. [address] [place] [address]. All rights reserved. This information is not intended as a substitute for professional medical care. Always follow your healthcare professional's instructions.              Heart Failure: Tracking Your Weight  You have a condition called heart failure. When you have heart failure, a sudden weight gain or a steady rise in weight is a warning sign that your body is retaining too much water and salt. This could mean your heart failure is getting worse. If left untreated, it can cause problems for your lungs and result in shortness of breath. Weighing yourself each day is the best way to know if youre retaining water. If your weight goes up quickly, call your doctor. You will be given instructions on how to get rid of the excess water. You will likely need medicines and to avoid salt. This will help your heart work better.  Call your doctor if you gain more than 2 pounds in 1 day, more than 5 pounds in 1 week, or whatever weight gain you were told to report by your doctor. This is often a sign of worsening heart failure and needs to be evaluated and treated. Your doctor will tell you what to do next.   Tips for weighing yourself    · Weigh yourself at the same time each morning, wearing the same clothes. Weigh yourself after urinating and before eating.  · Use the same scale each day. Make sure the numbers are  easy to read. Put the scale on a flat, hard surface -- not on a rug or carpet.  · Do not stop weighing yourself. If you forget one day, weigh again the next morning.  How to use your weight chart  · Keep your weight chart near the scale. Write your weight on the chart as soon as you get off the scale.  · Fill in the month and the start date on the chart. Then write down your weight each day. Your chart will look like this:    · If you miss a day, leave the space blank. Weigh yourself the next day and write your weight in the next space.  · Take your weight chart with you when you go to see your doctor.  © 7374-3370 [employer]. [address] [place] [address], [place] [address]. All rights reserved. This information is not intended as a substitute for professional medical care. Always follow your healthcare professional's instructions.              Heart Failure: Warning Signs of a Flare-Up  You have a condition called heart failure. Once you have heart failure, flare-ups can happen. Below are signs that can mean your heart failure is getting worse. If you notice any of these warning signs, call your healthcare provider.  Swelling    · Your feet, ankles, or lower legs get puffier.  · You notice skin changes on your lower legs.  · Your shoes feel too tight.  · Your clothes are tighter in the waist.  · You have trouble getting rings on or off your fingers.  Shortness of breath  · You have to breathe harder even when youre doing your normal activities or when youre resting.  · You are short of breath walking up stairs or even short distances.  · You wake up at night short of breath or coughing.  · You need to use more pillows or sit up to sleep.  · You wake up tired or restless.  Other warning signs  · You feel weaker, dizzy, or more tired.  · You have chest pain or changes in your heartbeat.  · You have a cough that wont go away.  · You cant remember things or dont feel like eating.  Tracking your  weight  Gaining weight is often the first warning sign that heart failure is getting worse. Gaining even a few pounds can be a sign that your body is retaining excess water and salt. Weighing yourself each day in the morning after you urinate and before you eat, is the best way to know if you're retaining water. Get a scale that is easy to read and make sure you wear the same clothes and use the same scale every time you weigh. Your healthcare provider will show you how to track your weight. Call your doctor if you gain more than 2 pounds in 1 day, 5 pounds in 1 week, or whatever weight gain you were told to report by your doctor. This is often a sign of worsening heart failure and needs to be evaluated and treated before it compromises your breathing. Your doctor will tell you what to do next.    © 3581-0883 [employer]. [address] [place] [address]. All rights reserved. This information is not intended as a substitute for professional medical care. Always follow your healthcare professional's instructions.              [facility] Sign-Up     Activating your [facility] account is as easy as 1-2-3!     1) Visit [email or web address], select Sign Up Now, enter this activation code and your date of birth, then select Next.  [email or web address]  Expires: 1/21/2017 10:59 AM      2) Create a username and password to use when you visit [facility] in the future and select a security question in case you lose your password and select Next.    3) Enter your e-mail address and click Sign Up!    Additional Information  If you have questions, please e-mail [email or web address] or call [phone] to talk to our [facility] staff. Remember, [facility] is NOT to be used for urgent needs. For medical emergencies, dial 911.          [facility] complies with applicable Federal civil rights laws and does not discriminate on the basis of race, color, national origin, age, disability, or sex.

## 2017-01-03 NOTE — ED NOTES
Pt back in bed resting quietly with zero complaints of shortness of breath, spouse remains a the bedside. Will continue to monitor and assist

## 2017-01-03 NOTE — ED NOTES
Dr. Collins, \[facility] [place] [facility]\"" medicine, at bedside for pt evaluation. Discussed admission to hospital with pt & spouse. Educated on POC. Questions answered. Both verbalize understanding. Pt to be held in ED until bed becomes available in ICU after shift change. No further complaints or concerns. VSS. Fall precautions in place per protocol. Call light within reach. Will continue to monitor.

## 2017-01-03 NOTE — ASSESSMENT & PLAN NOTE
Acute renal and respiratory failure with fever, elevated WBC count, lactic acid of 5.1, and tachypnea.  Urinalysis is normal and no GI symptoms by history or exam.  Probably viral upper respiratory infection causing COPD exacerbation and secondary bacterial infection.  Blood pressure is low normal and he has a history of systolic heart failure and pulmonary hypertension.  Careful with fluid resuscitation and follow serial lactic acid levels.  No recent hospitalizations and no clear signs of pneumonia.  Change to Moxifloxacin.

## 2017-01-03 NOTE — ED NOTES
Upon arriving to triage, pt noted to have cyanosis to lips and fingertips with labored breathing on 5L home oxygen. Pulse ox was obtained to be 63% and pt was immediately wheeled to main ED and respiratory therapist and MD called to bs.

## 2017-01-03 NOTE — ED NOTES
Spoke to Dr. Collins in regards to pt's fever. No new orders at this time. Will continue to monitor pt.

## 2017-01-03 NOTE — PLAN OF CARE
Problem: Patient Care Overview  Goal: Plan of Care Review  Outcome: Ongoing (interventions implemented as appropriate)  Plan of care with wife done. Diet advanced. Medication and antibiotic review with pt and spouse done. bipap in room.
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
## 2017-01-03 NOTE — ED NOTES
Pt stood up on side of bed to urinate and pulled out his iv and pulled his nasal cannula off. Pressure drsg applied to left antecubital area and pt assisted back to bed. Bipap placed back on pt due to increasing shortness of breath. Pt changed into gown and he was offered a richey catheter but refused. Extension tubing placed on nasal cannula for future use and 2 iv's restarted in RFA and LFA. Resp paged to bedside to assess pt.

## 2017-01-03 NOTE — ED NOTES
Pt states his bp is always low, especially after receiving lasix. Pt resting quietly with zero complaints

## 2017-01-03 NOTE — ED NOTES
Spouse states pt's o2 is at 5 or 6lpm via nasal cannula at home; she states they go up on the o2 when pt cant breath. Pt and spouse were educated on COPD and how too much O2 can knock out his respiratory drive; they will need reinforcement with that.

## 2017-01-03 NOTE — ED NOTES
Hospital medicine dr notified of the need for second troponin order and lasix order. Orders received.

## 2017-01-03 NOTE — SUBJECTIVE & OBJECTIVE
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    Seizures        Past Surgical History   Procedure Laterality Date    Cardiac surgery      Tonsillectomy      Skin biopsy      Coronary artery bypass graft  1997     CABG x 3    Cardiac catheterization      Cardiac valve surgery      Aortic valve replacement      Cataract extraction w/  intraocular lens implant  OD 3/18/09    Cataract extraction w/  intraocular lens implant  OS 4/1/09       Review of patient's allergies indicates:   Allergen Reactions    Doxycycline Nausea Only and Other (See Comments)     Dizziness     Pneumococcal vaccine        No current facility-administered medications on file prior to encounter.      Current Outpatient Prescriptions on File Prior to Encounter   Medication Sig    albuterol-ipratropium 2.5mg-0.5mg/3mL (DUO-NEB) 0.5 mg-3 mg(2.5 mg base)/3 mL nebulizer solution Take 3 mLs by nebulization every 8 (eight) hours as needed for Wheezing.    aspirin 81 MG Chew Take 81 mg by mouth once daily.    budesonide-formoterol 80-4.5 mcg (SYMBICORT) 80-4.5 mcg/actuation HFAA Inhale 2 puffs into the lungs 2 (two) times daily. Pharmacy to adjust to cheaper tier options    losartan (COZAAR) 25 MG tablet Take 0.5 tablets (12.5 mg total) by mouth once daily.    metoprolol succinate (TOPROL-XL) 25 MG 24 hr tablet TAKE 1 TABLET ONE TIME DAILY    OXYGEN-AIR DELIVERY SYSTEMS MISC by Misc.(Non-Drug; Combo Route) route.    simvastatin (ZOCOR) 40 MG tablet TAKE 1 TABLET EVERY EVENING.    tiotropium (SPIRIVA WITH HANDIHALER) 18 mcg inhalation  capsule Inhale 1 capsule (18 mcg total) into the lungs once daily. Pharmacy to adjust to cheaper tier options    colchicine 0.6 mg tablet Take 1 tablet (0.6 mg total) by mouth once daily.    furosemide (LASIX) 20 MG tablet Take 2 tablets (40 mg total) by mouth once daily.    potassium chloride SA (K-DUR,KLOR-CON) 20 MEQ tablet Take 1 tablet (20 mEq total) by mouth 2 (two) times daily. (Patient taking differently: Take 20 mEq by mouth once daily. )     Family History     Problem Relation (Age of Onset)    Cancer Paternal Grandfather    Cataracts Mother, Maternal Grandmother, Paternal Grandmother    Heart disease Brother    No Known Problems Father, Maternal Grandfather, Sister, Maternal Aunt, Maternal Uncle, Paternal Aunt, Paternal Uncle        Social History Main Topics    Smoking status: Former Smoker     Packs/day: 1.00     Years: 20.00     Types: Cigarettes     Quit date: 9/12/1997    Smokeless tobacco: Never Used    Alcohol use No      Comment: Occasionally     Drug use: No    Sexual activity: Yes     Partners: Female     Review of Systems   Constitutional: Positive for chills and fever.   HENT: Negative.  Negative for congestion and sore throat.    Eyes: Negative.  Negative for visual disturbance.   Respiratory: Positive for cough and shortness of breath. Negative for wheezing.         Bilateral sharp chest pain with some episodes of cough.   Cardiovascular: Negative for chest pain.   Gastrointestinal: Negative for abdominal pain, diarrhea, nausea and vomiting.   Endocrine: Negative.    Genitourinary: Negative.    Musculoskeletal: Negative.  Negative for myalgias and neck stiffness.   Skin: Negative.  Negative for color change and pallor.   Allergic/Immunologic: Negative.    Neurological: Negative.    Hematological: Negative.    Psychiatric/Behavioral: Negative.    All other systems reviewed and are negative.    Objective:     Vital Signs (Most Recent):  Temp: (!) 100.8 °F (38.2 °C) (01/03/17  0357)  Pulse: 80 (01/03/17 0431)  Resp: (!) 27 (01/03/17 0431)  BP: (!) 110/54 (01/03/17 0431)  SpO2: 98 % (01/03/17 0431) Vital Signs (24h Range):  Temp:  [100.8 °F (38.2 °C)-101.5 °F (38.6 °C)] 100.8 °F (38.2 °C)  Pulse:  [] 80  Resp:  [24-40] 27  BP: (105-122)/(51-64) 110/54     Weight: 88.5 kg (195 lb)  Body mass index is 27.2 kg/(m^2).    Physical Exam   Constitutional: He is oriented to person, place, and time. He appears well-developed and well-nourished. No distress.   HENT:   Head: Normocephalic and atraumatic.   Mouth/Throat: Oropharynx is clear and moist.   Eyes: Conjunctivae and EOM are normal. Pupils are equal, round, and reactive to light.   Neck: No JVD present. No thyromegaly present.   Cardiovascular: Normal rate and regular rhythm.  Exam reveals no gallop and no friction rub.    Murmur heard.  Pulmonary/Chest: Effort normal. No respiratory distress. He has wheezes. He has no rales.   Breathing steady and deep ion BiPAP.  Complains of poor control of pressure by the ventilator.   Abdominal: Soft. Bowel sounds are normal. He exhibits no distension. There is no tenderness. There is no rebound and no guarding.   Musculoskeletal: Normal range of motion. He exhibits edema. He exhibits no tenderness.   Trace pre-tibial edema bilaterally.   Lymphadenopathy:     He has no cervical adenopathy.   Neurological: He is alert and oriented to person, place, and time. He has normal reflexes. He displays normal reflexes. No cranial nerve deficit.   Skin: Skin is warm and dry. No rash noted. He is not diaphoretic. No erythema.   Psychiatric: He has a normal mood and affect. His behavior is normal. Judgment and thought content normal.       Significant Labs:   ABGs:   Recent Labs  Lab 01/03/17 0226   PH 7.417   PCO2 27.4*   HCO3 17.6*   POCSATURATED 100   BE -7     CBC:   Recent Labs  Lab 01/03/17 0210   WBC 12.99*   HGB 14.3   HCT 44.3   *     CMP:   Recent Labs  Lab 01/03/17 0210      K 5.1       CO2 16*   *   BUN 35*   CREATININE 1.6*   CALCIUM 9.2   PROT 7.8   ALBUMIN 3.7   BILITOT 2.0*   ALKPHOS 137*   AST 40   ALT 22   ANIONGAP 15   EGFRNONAA 40*     Cardiac Markers: No results for input(s): CKMB, TROPONINT, MYOGLOBIN in the last 48 hours.    Significant Imaging: I have reviewed all pertinent imaging results/findings within the past 24 hours.

## 2017-01-03 NOTE — PROGRESS NOTES
Pt taken off of Bipap and placed on 3lpm NC. HR 83, RR 31, Sao2 91%, purse lip breathing noted. Pt says he is comfortable at this time.

## 2017-01-03 NOTE — ED NOTES
Dr. Rivas at bedside. Reviewed results with pt & family members. Discussed need for admission to hospital. Educated all on POC. Pt to be admitted to ICU due to pt needing BiPap continuously. Questions answered. All verbalize understanding. No further complaints or concerns. Will continue to monitor.

## 2017-01-03 NOTE — ED NOTES
RT at bedside trying pt on o2 at 3 lpm via nasal cannula. o2 sat from 97 to 95% initially. Pt still breathing rapidly at 28 min. Pt uses a bipap machine at home every night.

## 2017-01-03 NOTE — IP AVS SNAPSHOT
[facility]  [id number] [place] [address] Dr Jody OBANDO [id number]           I have received a copy of my After Visit Summary and discharge instructions from [facility].    INSTRUCTIONS RECEIVED AND UNDERSTOOD BY:                     Patient/Patient Representative: ________________________________________________________________     Date/Time: ________________________________________________________________                     Instructions Given By: ________________________________________________________________     Date/Time: ________________________________________________________________

## 2017-01-03 NOTE — ED NOTES
Pt staying 87 to 88% on o2 at 3 liters, resp therapist paged. Pt refused bipap or a breathing tx; he continues to mouth breath.

## 2017-01-03 NOTE — ED NOTES
Informed pt & family members of need for urine specimen. Urinal placed at bedside. Instructed to call if assistance is needed. All verbalize understanding.

## 2017-01-03 NOTE — ASSESSMENT & PLAN NOTE
Reduced EF about 40% with a low BNP over the last 12 months of about 500.  Careful fluid resuscitation.

## 2017-01-03 NOTE — H&P
[facility] Hospital Medicine  History & Physical    Patient Name: Orion Rinaldi  MRN: [id number]  Admission Date: 1/3/2017  Attending Physician: Cecil Rivas MD   Primary Care Provider: Daisy Oconnor MD         Patient information was obtained from patient and ER records.     Subjective:     Principal Problem:Severe sepsis    Chief Complaint:  Shortness of breath     HPI: Increased breathing rate and shortness of breath over the last 24 hours with a non-productive cough.  Uses continuous supplemental oxygen at home and CPAP at night.  Fevers and chills this morning and several family members have been sick over the last weeks with cough, runny nose, and sore throat.  Denies nausea, vomiting, or diarrhea.  No other recent illness or medication changes.  He follows with Dr. Downing of Pulmonary medicine and Dr. Senior of Cardiology.    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    Seizures        Past Surgical History   Procedure Laterality Date    Cardiac surgery      Tonsillectomy      Skin biopsy      Coronary artery bypass graft  1997     CABG x 3    Cardiac catheterization      Cardiac valve surgery      Aortic valve replacement      Cataract extraction w/  intraocular lens implant  OD 3/18/09    Cataract extraction w/  intraocular lens implant  OS 4/1/09       Review of patient's allergies indicates:   Allergen Reactions    Doxycycline Nausea Only and Other (See Comments)     Dizziness     Pneumococcal vaccine        No current facility-administered medications on file prior  to encounter.      Current Outpatient Prescriptions on File Prior to Encounter   Medication Sig    albuterol-ipratropium 2.5mg-0.5mg/3mL (DUO-NEB) 0.5 mg-3 mg(2.5 mg base)/3 mL nebulizer solution Take 3 mLs by nebulization every 8 (eight) hours as needed for Wheezing.    aspirin 81 MG Chew Take 81 mg by mouth once daily.    budesonide-formoterol 80-4.5 mcg (SYMBICORT) 80-4.5 mcg/actuation HFAA Inhale 2 puffs into the lungs 2 (two) times daily. Pharmacy to adjust to cheaper tier options    losartan (COZAAR) 25 MG tablet Take 0.5 tablets (12.5 mg total) by mouth once daily.    metoprolol succinate (TOPROL-XL) 25 MG 24 hr tablet TAKE 1 TABLET ONE TIME DAILY    OXYGEN-AIR DELIVERY SYSTEMS MISC by [facility].(Non-Drug; Combo Route) route.    simvastatin (ZOCOR) 40 MG tablet TAKE 1 TABLET EVERY EVENING.    tiotropium (SPIRIVA WITH HANDIHALER) 18 mcg inhalation capsule Inhale 1 capsule (18 mcg total) into the lungs once daily. Pharmacy to adjust to cheaper tier options    colchicine 0.6 mg tablet Take 1 tablet (0.6 mg total) by mouth once daily.    furosemide (LASIX) 20 MG tablet Take 2 tablets (40 mg total) by mouth once daily.    potassium chloride SA (K-DUR,KLOR-CON) 20 MEQ tablet Take 1 tablet (20 mEq total) by mouth 2 (two) times daily. (Patient taking differently: Take 20 mEq by mouth once daily. )     Family History     Problem Relation (Age of Onset)    Cancer Paternal Grandfather    Cataracts Mother, Maternal Grandmother, Paternal Grandmother    Heart disease Brother    No Known Problems Father, Maternal Grandfather, Sister, Maternal Aunt, Maternal Uncle, Paternal Aunt, Paternal Uncle        Social History Main Topics    Smoking status: Former Smoker     Packs/day: 1.00     Years: 20.00     Types: Cigarettes     Quit date: 9/12/1997    Smokeless tobacco: Never Used    Alcohol use No      Comment: Occasionally     Drug use: No    Sexual activity: Yes     Partners: Female     Review of Systems    Constitutional: Positive for chills and fever.   HENT: Negative.  Negative for congestion and sore throat.    Eyes: Negative.  Negative for visual disturbance.   Respiratory: Positive for cough and shortness of breath. Negative for wheezing.         Bilateral sharp chest pain with some episodes of cough.   Cardiovascular: Negative for chest pain.   Gastrointestinal: Negative for abdominal pain, diarrhea, nausea and vomiting.   Endocrine: Negative.    Genitourinary: Negative.    Musculoskeletal: Negative.  Negative for myalgias and neck stiffness.   Skin: Negative.  Negative for color change and pallor.   Allergic/Immunologic: Negative.    Neurological: Negative.    Hematological: Negative.    Psychiatric/Behavioral: Negative.    All other systems reviewed and are negative.    Objective:     Vital Signs (Most Recent):  Temp: (!) 100.8 °F (38.2 °C) (01/03/17 0357)  Pulse: 80 (01/03/17 0431)  Resp: (!) 27 (01/03/17 0431)  BP: (!) 110/54 (01/03/17 0431)  SpO2: 98 % (01/03/17 0431) Vital Signs (24h Range):  Temp:  [100.8 °F (38.2 °C)-101.5 °F (38.6 °C)] 100.8 °F (38.2 °C)  Pulse:  [] 80  Resp:  [24-40] 27  BP: (105-122)/(51-64) 110/54     Weight: 88.5 kg (195 lb)  Body mass index is 27.2 kg/(m^2).    Physical Exam   Constitutional: He is oriented to person, place, and time. He appears well-developed and well-nourished. No distress.   HENT:   Head: Normocephalic and atraumatic.   Mouth/Throat: Oropharynx is clear and moist.   Eyes: Conjunctivae and EOM are normal. Pupils are equal, round, and reactive to light.   Neck: No JVD present. No thyromegaly present.   Cardiovascular: Normal rate and regular rhythm.  Exam reveals no gallop and no friction rub.    Murmur heard.  Pulmonary/Chest: Effort normal. No respiratory distress. He has wheezes. He has no rales.   Breathing steady and deep ion BiPAP.  Complains of poor control of pressure by the ventilator.   Abdominal: Soft. Bowel sounds are normal. He exhibits no  distension. There is no tenderness. There is no rebound and no guarding.   Musculoskeletal: Normal range of motion. He exhibits edema. He exhibits no tenderness.   Trace pre-tibial edema bilaterally.   Lymphadenopathy:     He has no cervical adenopathy.   Neurological: He is alert and oriented to person, place, and time. He has normal reflexes. He displays normal reflexes. No cranial nerve deficit.   Skin: Skin is warm and dry. No rash noted. He is not diaphoretic. No erythema.   Psychiatric: He has a normal mood and affect. His behavior is normal. Judgment and thought content normal.       Significant Labs:   ABGs:   Recent Labs  Lab 01/03/17 0226   PH 7.417   PCO2 27.4*   HCO3 17.6*   POCSATURATED 100   BE -7     CBC:   Recent Labs  Lab 01/03/17 0210   WBC 12.99*   HGB 14.3   HCT 44.3   *     CMP:   Recent Labs  Lab 01/03/17 0210      K 5.1      CO2 16*   *   BUN 35*   CREATININE 1.6*   CALCIUM 9.2   PROT 7.8   ALBUMIN 3.7   BILITOT 2.0*   ALKPHOS 137*   AST 40   ALT 22   ANIONGAP 15   EGFRNONAA 40*     Cardiac Markers: No results for input(s): CKMB, TROPONINT, MYOGLOBIN in the last 48 hours.    Significant Imaging: I have reviewed all pertinent imaging results/findings within the past 24 hours.    Assessment/Plan:     * Severe sepsis  Acute renal and respiratory failure with fever, elevated WBC count, lactic acid of 5.1, and tachypnea.  Urinalysis is normal and no GI symptoms by history or exam.  Probably viral upper respiratory infection causing COPD exacerbation and secondary bacterial infection.  Blood pressure is low normal and he has a history of systolic heart failure and pulmonary hypertension.  Careful with fluid resuscitation and follow serial lactic acid levels.  No recent hospitalizations and no clear signs of pneumonia.  Change to Moxifloxacin.    COPD (chronic obstructive pulmonary disease)  Nebulizer treatments and supplemental oxygen.  Non-invasive ventilatory support.   Blood gas shows respiratory compensation for acidosis.  Adjust ventilatory support for comfort and use home equipment when available.    Pulmonary heart disease  PA pressures by the last few echos were >70.  Currently on continuous supplemental oxygen.    CAD; S/P CABG (coronary artery bypass graft)  Troponin level is slightly elevated at 0.033 and no complaints of chest pain.  Shortness of breath from acute exacerbation of lung disease.    Chronic systolic congestive heart failure  Reduced EF about 40% with a low BNP over the last 12 months of about 500.  Careful fluid resuscitation.    Acute renal failure  Baseline creatinine over the last 12 months is about 1.3 and up to 1.6 with elevated BUN probably pre-renal.  Careful fluid resuscitation and follow blood chemistries.    Acute hypoxemic respiratory failure  Non-invasive ventilatory support.  Baseline DLCO 39% of predicted and does not appear to have chronic CO2 retention.    VTE Risk Mitigation         Ordered     heparin (porcine) injection 5,000 Units  Every 8 hours     Route:  Subcutaneous        01/03/17 0426        Eliot Collins MD  Department of Hospital Medicine   [facility]    Critical care time:  45 minutes

## 2017-01-03 NOTE — ASSESSMENT & PLAN NOTE
Non-invasive ventilatory support.  Baseline DLCO 39% of predicted and does not appear to have chronic CO2 retention.

## 2017-01-04 PROBLEM — A41.9 SEVERE SEPSIS: Status: RESOLVED | Noted: 2017-01-01 | Resolved: 2017-01-01

## 2017-01-04 PROBLEM — J96.20 RESPIRATORY FAILURE, ACUTE AND CHRONIC: Status: ACTIVE | Noted: 2017-01-01

## 2017-01-04 PROBLEM — R65.20 SEVERE SEPSIS: Status: RESOLVED | Noted: 2017-01-01 | Resolved: 2017-01-01

## 2017-01-04 NOTE — PROGRESS NOTES
Patient refused all neb tx, said it makes him feel bad/cough. Informed Odette Abbott RN. Connected patient home bipap and is currently on standby.

## 2017-01-04 NOTE — ASSESSMENT & PLAN NOTE
Will start steroids .  On broad spectrum antimicrobial therapy -will continue vanco/zosyn for now and deescalate soon.  Continue Noctural NIV.

## 2017-01-04 NOTE — ASSESSMENT & PLAN NOTE
Lactic acid was 5.1 on admit .  UA and initial chest x-ray was negative.  Will follow blood cultures .  Continue Vanco, zosyn for now.

## 2017-01-04 NOTE — PROGRESS NOTES
[facility] Hospital Medicine  Progress Note    Patient Name: Orion Rinaldi  MRN: [id number]  Patient Class: IP- Inpatient   Admission Date: 1/3/2017  Length of Stay: 1 days  Expected Discharge Date:   Attending Physician: Lisa Chung MD  Primary Care Provider: Daisy Oconnor MD        Subjective:     Principal Problem:Acute on chronic respiratory failure with hypoxemia    HPI:  Increased breathing rate and shortness of breath over the last 24 hours with a non-productive cough.  Uses continuous supplemental oxygen at home and CPAP at night.  Fevers and chills this morning and several family members have been sick over the last weeks with cough, runny nose, and sore throat.  Denies nausea, vomiting, or diarrhea.  No other recent illness or medication changes.  He follows with Dr. Downing of Pulmonary medicine and Dr. Senior of Cardiology.    Hospital Course:  81 y.o. male with a PMH of Sz, AVR, Pulm HTN (PAP 78), HLD, CAD (Hx CABG), O2 dependent COPD(noct NPPV) and Stg 4 Systolic Heart Failure (EF 45%) admitted with Acute on Ch Hypoxemic Resp Failure likely sec to Acute Viral URI vs Pneumonia. He has been off BIPAP since yesterday and doing well this am. He is cheerful and comfortable. He was able to sit up and eat well. Fever SOB resolved and is transferred out of ICU- all Cx NGTD. Lactic Acid resolved yesterday.      Interval History/Significant Events: transfer to floor    Review of Systems   Constitutional: Positive for fever. Negative for activity change, appetite change, chills, diaphoresis and fatigue.   HENT: Negative for congestion, dental problem, ear discharge, ear pain and facial swelling.    Eyes: Negative for pain, discharge and itching.   Respiratory: Positive for cough, shortness of breath and wheezing. Negative for apnea and chest tightness.    Cardiovascular: Negative for chest pain and leg swelling.   Gastrointestinal: Negative for abdominal distention and abdominal pain.    Endocrine: Negative for cold intolerance, heat intolerance and polydipsia.   Genitourinary: Negative for difficulty urinating, dysuria and enuresis.   Musculoskeletal: Negative for arthralgias and back pain.   Skin: Negative for color change and pallor.   Allergic/Immunologic: Negative for environmental allergies and food allergies.   Neurological: Negative for dizziness, facial asymmetry, light-headedness and headaches.   Hematological: Negative for adenopathy. Does not bruise/bleed easily.   Psychiatric/Behavioral: Negative for agitation and behavioral problems.     Objective:     Vital Signs (Most Recent):  Temp: 97.5 °F (36.4 °C) (01/04/17 1100)  Pulse: 76 (01/04/17 1100)  Resp: (!) 22 (01/04/17 1100)  BP: (!) 89/54 (01/04/17 1100)  SpO2: 96 % (01/04/17 1100) Vital Signs (24h Range):  Temp:  [97.5 °F (36.4 °C)-99 °F (37.2 °C)] 97.5 °F (36.4 °C)  Pulse:  [] 76  Resp:  [17-38] 22  BP: ()/(25-80) 89/54     Weight: 88.5 kg (195 lb 1.7 oz)  Body mass index is 27.21 kg/(m^2).    Intake/Output Summary (Last 24 hours) at 01/04/17 1238  Last data filed at 01/04/17 1000   Gross per 24 hour   Intake          3247.08 ml   Output             1305 ml   Net          1942.08 ml      Physical Exam   Constitutional: He is oriented to person, place, and time. He appears well-developed and well-nourished.   HENT:   Head: Normocephalic and atraumatic.   Nose: Nose normal.   Eyes: EOM are normal. Pupils are equal, round, and reactive to light.   Neck: Normal range of motion. Neck supple.   Cardiovascular: Normal rate and regular rhythm.  Exam reveals gallop.    Murmur heard.  tachycardia   Pulmonary/Chest: Effort normal. No respiratory distress. He has no wheezes. He has rales.   Looks much better, good air entry B.   Abdominal: Soft. Bowel sounds are normal. There is no tenderness.   Musculoskeletal: Normal range of motion. He exhibits no edema.   Neurological: He is alert and oriented to person, place, and time.    Skin: Skin is dry.   Psychiatric: He has a normal mood and affect. His behavior is normal. Judgment and thought content normal.   Nursing note and vitals reviewed.      Significant Labs:   ABGs:   Recent Labs  Lab 01/03/17  0226   PH 7.417   PCO2 27.4*   HCO3 17.6*   POCSATURATED 100   BE -7     Blood Culture:   Recent Labs  Lab 01/03/17  0210 01/03/17  0225   LABBLOO No Growth to date  No Growth to date No Growth to date  No Growth to date     BMP:   Recent Labs  Lab 01/04/17  0601   *      K 4.4      CO2 18*   BUN 46*   CREATININE 1.7*   CALCIUM 8.8   MG 2.2     CBC:   Recent Labs  Lab 01/03/17  0210 01/04/17  0601   WBC 12.99* 8.63   HGB 14.3 13.8*   HCT 44.3 43.1   * 77*     Lactic Acid:   Recent Labs  Lab 01/03/17  1120 01/03/17  1352 01/03/17  1942   LACTATE 1.8 2.4* 1.4     Respiratory Culture:   Urine Culture:   All pertinent labs within the past 24 hours have been reviewed.    Significant Imaging: I have reviewed all pertinent imaging results/findings within the past 24 hours.    Assessment/Plan:      * Acute on chronic respiratory failure with hypoxemia  Non-invasive ventilatory support.  Baseline DLCO 39% of predicted and does not appear to have chronic CO2 retention.    Severe sepsis, resolved as of 1/4/2017  Acute renal and respiratory failure with fever, elevated WBC count, lactic acid of 5.1, and tachypnea.  Urinalysis is normal and no GI symptoms by history or exam.  Probably viral upper respiratory infection causing COPD exacerbation and secondary bacterial infection.  Blood pressure is low normal and he has a history of systolic heart failure and pulmonary hypertension.  Careful with fluid resuscitation and follow serial lactic acid levels.  No recent hospitalizations and no clear signs of pneumonia.  Change to Moxifloxacin.    Chronic obstructive pulmonary disease with acute exacerbation  COPD exacerbation sec to Viral URI and CHF exacerbation  Much improved.    Continue Nebs, and supplemental oxygen and Home Trilogy Vent nightly. Use low dose steroids      Chronic systolic congestive heart failure  Appears fluid overloaded  Will hold IVF and start lasix again      Reduced EF about 40% with a low BNP over the last 12 months of about 500.  Careful fluid resuscitation.    Acute renal failure  Acute on Ch Renal failure sec to URI/ CHF    Will continue IV Abx and give Lasix  May need Renal input.        Baseline creatinine over the last 12 months is about 1.3 and up to 1.6 with elevated BUN probably pre-renal.  Careful fluid resuscitation and follow blood chemistries.    VTE Risk Mitigation         Ordered     Medium Risk of VTE  Once      01/03/17 1608     heparin (porcine) injection 5,000 Units  Every 8 hours     Route:  Subcutaneous        01/03/17 3915          Lisa Chung MD  Department of Hospital Medicine   [facility]
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
## 2017-01-04 NOTE — SUBJECTIVE & OBJECTIVE
Interval History/Significant Events: transfer to floor    Review of Systems   Constitutional: Positive for fever. Negative for activity change, appetite change, chills, diaphoresis and fatigue.   HENT: Negative for congestion, dental problem, ear discharge, ear pain and facial swelling.    Eyes: Negative for pain, discharge and itching.   Respiratory: Positive for cough, shortness of breath and wheezing. Negative for apnea and chest tightness.    Cardiovascular: Negative for chest pain and leg swelling.   Gastrointestinal: Negative for abdominal distention and abdominal pain.   Endocrine: Negative for cold intolerance, heat intolerance and polydipsia.   Genitourinary: Negative for difficulty urinating, dysuria and enuresis.   Musculoskeletal: Negative for arthralgias and back pain.   Skin: Negative for color change and pallor.   Allergic/Immunologic: Negative for environmental allergies and food allergies.   Neurological: Negative for dizziness, facial asymmetry, light-headedness and headaches.   Hematological: Negative for adenopathy. Does not bruise/bleed easily.   Psychiatric/Behavioral: Negative for agitation and behavioral problems.     Objective:     Vital Signs (Most Recent):  Temp: 97.5 °F (36.4 °C) (01/04/17 1100)  Pulse: 76 (01/04/17 1100)  Resp: (!) 22 (01/04/17 1100)  BP: (!) 89/54 (01/04/17 1100)  SpO2: 96 % (01/04/17 1100) Vital Signs (24h Range):  Temp:  [97.5 °F (36.4 °C)-99 °F (37.2 °C)] 97.5 °F (36.4 °C)  Pulse:  [] 76  Resp:  [17-38] 22  BP: ()/(25-80) 89/54     Weight: 88.5 kg (195 lb 1.7 oz)  Body mass index is 27.21 kg/(m^2).    Intake/Output Summary (Last 24 hours) at 01/04/17 1238  Last data filed at 01/04/17 1000   Gross per 24 hour   Intake          3247.08 ml   Output             1305 ml   Net          1942.08 ml      Physical Exam   Constitutional: He is oriented to person, place, and time. He appears well-developed and well-nourished.   HENT:   Head: Normocephalic and  atraumatic.   Nose: Nose normal.   Eyes: EOM are normal. Pupils are equal, round, and reactive to light.   Neck: Normal range of motion. Neck supple.   Cardiovascular: Normal rate and regular rhythm.  Exam reveals gallop.    Murmur heard.  tachycardia   Pulmonary/Chest: Effort normal. No respiratory distress. He has no wheezes. He has rales.   Looks much better, good air entry B.   Abdominal: Soft. Bowel sounds are normal. There is no tenderness.   Musculoskeletal: Normal range of motion. He exhibits no edema.   Neurological: He is alert and oriented to person, place, and time.   Skin: Skin is dry.   Psychiatric: He has a normal mood and affect. His behavior is normal. Judgment and thought content normal.   Nursing note and vitals reviewed.      Significant Labs:   ABGs:   Recent Labs  Lab 01/03/17  0226   PH 7.417   PCO2 27.4*   HCO3 17.6*   POCSATURATED 100   BE -7     Blood Culture:   Recent Labs  Lab 01/03/17  0210 01/03/17  0225   LABBLOO No Growth to date  No Growth to date No Growth to date  No Growth to date     BMP:   Recent Labs  Lab 01/04/17  0601   *      K 4.4      CO2 18*   BUN 46*   CREATININE 1.7*   CALCIUM 8.8   MG 2.2     CBC:   Recent Labs  Lab 01/03/17  0210 01/04/17  0601   WBC 12.99* 8.63   HGB 14.3 13.8*   HCT 44.3 43.1   * 77*     Lactic Acid:   Recent Labs  Lab 01/03/17  1120 01/03/17  1352 01/03/17  1942   LACTATE 1.8 2.4* 1.4     Respiratory Culture:   Urine Culture:   All pertinent labs within the past 24 hours have been reviewed.    Significant Imaging: I have reviewed all pertinent imaging results/findings within the past 24 hours.

## 2017-01-04 NOTE — PLAN OF CARE
Problem: Patient Care Overview  Goal: Plan of Care Review  Outcome: Ongoing (interventions implemented as appropriate)  POC reviewed with pt and wife at bedside, all questions answered, indicated understanding. No vasoactive or sedative drips in use. Turns/repositions self independently, no skin breakdown noted, VSS with low-normal BPs noted. Denies pain/other needs at this time. Rested quietly throughout shift with no evident distress. 24 hour chart check done. Sasha Abbott RN

## 2017-01-04 NOTE — PLAN OF CARE
Pt placed on transport monitor and escorted to Radiology for V/Q scan by Maddy AVERY RN, in wheelchair,t 2050. Returned to room, at 2155 - ICU monitoring systems resumed. Tolerated well. Sasha Abbott RN

## 2017-01-04 NOTE — PLAN OF CARE
Problem: Patient Care Overview  Goal: Plan of Care Review  Outcome: Ongoing (interventions implemented as appropriate)  Patient refused neb tx, setup home trilogy machine for patient. Patient machine at bedside, patient said he will put on when ready. Patient o2 inline also. Will follow.

## 2017-01-04 NOTE — ASSESSMENT & PLAN NOTE
Acute on Ch Renal failure sec to URI/ CHF    Will continue IV Abx and give Lasix  May need Renal input.        Baseline creatinine over the last 12 months is about 1.3 and up to 1.6 with elevated BUN probably pre-renal.  Careful fluid resuscitation and follow blood chemistries.
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
## 2017-01-04 NOTE — PROGRESS NOTES
Pt seen and examined in ICU, chart reviewed.  Appears more comfortable, off the BIPAP.  C/O leg cramps after leg doppler.   O/E VSS Afeb  Chest few B rales, some harsh inspirtions B, no wheezing  CVS: S1S2 RRR, no gallop  Ext trace edema B.    CXR clear    Imp; Acute on Ch Hypoxemic resp Failure  Acute Viral URI  ARF/ Dehydration  Lactic Acidosis    Plan: continue present care in ICU.  Continue IVF, IV Abx, nebs  Repeat labs in am.
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
## 2017-01-04 NOTE — PROGRESS NOTES
Progress Note  Critical Care    Admit Date: 1/3/2017   LOS: 1 day     Follow-up For: Resp Failure     SUBJECTIVE:   Change over last 24 hours: Restful night on home NPPV.  Less work of breathing this AM.    ROS:  Review of Systems   Constitutional: Negative for chills, fever and malaise/fatigue.   HENT: Negative for congestion.    Eyes: Negative for blurred vision.   Respiratory: Positive for cough (chronic) and shortness of breath (chronic with severe HERNANDEZ also chronic). Negative for sputum production.    Cardiovascular: Negative for chest pain and leg swelling.   Gastrointestinal: Negative for abdominal pain, nausea and vomiting.   Genitourinary: Negative for dysuria.   Musculoskeletal: Negative for myalgias.   Skin: Negative for rash.   Neurological: Negative for dizziness, focal weakness, weakness and headaches.   Endo/Heme/Allergies: Does not bruise/bleed easily.   Psychiatric/Behavioral: Negative for depression. The patient is not nervous/anxious.        Continuous Infusions:   sodium chloride 0.9% 50 mL/hr at 01/04/17 0700     Review of patient's allergies indicates:   Allergen Reactions    Doxycycline Nausea Only and Other (See Comments)     Dizziness     Pneumococcal vaccine        OBJECTIVE:     Vital Signs (Most Recent)  Temp: 97.8 °F (36.6 °C) (01/04/17 0700)  Pulse: 66 (01/04/17 0700)  Resp: (!) 24 (01/04/17 0700)  BP: (!) 127/46 (01/04/17 0700)  SpO2: (!) 92 % (01/04/17 0700)    Vital Signs Range (Last 24H):  Temp:  [97.8 °F (36.6 °C)-99 °F (37.2 °C)]   Pulse:  []   Resp:  [17-38]   BP: ()/(25-80)   SpO2:  [42 %-100 %]     I & O (Last 24H):  Intake/Output Summary (Last 24 hours) at 01/04/17 0753  Last data filed at 01/04/17 0700   Gross per 24 hour   Intake          3023.75 ml   Output             2375 ml   Net           648.75 ml     Physical Exam:  Physical Exam   Constitutional: He is oriented to person, place, and time. Vital signs are normal. He appears not lethargic and to not be  writhing in pain. He appears unhealthy.  Non-toxic appearance. He does not have a sickly appearance. No distress.   HENT:   Head: Normocephalic and atraumatic.   Mouth/Throat: Oropharynx is clear and moist.   Eyes: EOM and lids are normal. Pupils are equal, round, and reactive to light.   Neck: Neck supple.   Cardiovascular: Normal rate and regular rhythm.    Pulses:       Radial pulses are 2+ on the right side, and 2+ on the left side.        Dorsalis pedis pulses are 1+ on the right side, and 1+ on the left side.   Pulmonary/Chest: Accessory muscle usage (mild to moderate and improved) present. No respiratory distress. He has decreased breath sounds.   Abdominal: Soft. Normal appearance and bowel sounds are normal. He exhibits no distension. There is no tenderness.   Genitourinary:   Genitourinary Comments: Tapia    Musculoskeletal: Normal range of motion.   Lymphadenopathy:     He has no cervical adenopathy.   Neurological: He is alert and oriented to person, place, and time. He appears not lethargic.   Skin: Skin is warm and dry. He is not diaphoretic. There is cyanosis (chronic and mild to lips and moderate to distal digits).   Psychiatric: Mood, memory, affect and judgment normal.       Laboratory (Last 24H):  CBC:    Recent Labs  Lab 01/04/17  0601   WBC 8.63   HGB 13.8*   HCT 43.1   PLT 77*     CMP:    Recent Labs  Lab 01/04/17  0601   CALCIUM 8.8      K 4.4   CO2 18*      BUN 46*   CREATININE 1.7*       Microbiology Results (last 7 days)     Procedure Component Value Units Date/Time    Blood Culture #2 **CANNOT BE ORDERED STAT** [[id number]] Collected:  01/03/17 0225    Order Status:  Completed Specimen:  Blood from Peripheral, Antecubital, Left Updated:  01/03/17 1715     Blood Culture, Routine No Growth to date    Blood Culture #1 **CANNOT BE ORDERED STAT** [[id number]] Collected:  01/03/17 0210    Order Status:  Completed Specimen:  Blood from Peripheral, Antecubital, Left Updated:  01/03/17  1715     Blood Culture, Routine No Growth to date          Chest X-Ray:         Film and report reviewed:  No significant change from previous film    ASSESSMENT/PLAN:     Problem   Acute On Chronic Respiratory Failure With Hypoxemia   Chronic Obstructive Pulmonary Disease With Acute Exacerbation   Acute Renal Failure   Pulmonary Hypertension   Chronic Systolic Congestive Heart Failure   CAD; S/P CABG (coronary artery bypass graft)   Hyperglycemia, Unspecified   H/O Aortic Valve Replacement   Seizures   Severe Sepsis (Resolved)       PLAN:    1. Neuro: Neuro monitoring.  No seizures cont Keppra     2. Pulmonary: Encourage C&DB and OOB. Cont Duoneb, Budesonide, Formeterol, IV steroids, and IVAB.  Noct and PRN NPPV.      3. Cardiac: Cardiac monitoring. Cont ASA, statin and Lopressor. Repeat Echo pending. Dec IVFs. May benefit from Dobutrex pending Echo and hemodynamics.     4. Renal: Tapia in place, monitor I/Os. BMP in AM and cont slow IVFs     5. Infectious Disease: Follow fever curve. 101.5 temp in ED yesterday but Afeb since. UA and CXR unremarkable. Blood cultures NGTD. Cont Zosyn and Vanc. ID following.      6. Hematology/Oncology: Monitor for bleeding. PE/DVT work up unremarkable     7. Endocrine: monitor glucose and cont SSI.  Slowly wean steroids     8. Fluids/Electrolytes/Nutrition/GI:  IVFs and encourage cardiac diet.     9. Musculoskeletal: ROM     10. Pain Management: no issues      11. Discharge and Palliative Care: Plan home with family     Preventive Measures and Monitoring:   Stress Ulcer: Pepcid  Nutrition: Cardiac diet  Glucose control: SSI  Bowel prophylaxis: Dulcolax prn  DVT prophylaxis: SQ Hep/SCDs  Hx CAD on B-Blocker: Lopressor  Head of Bed/Reposition: Elevate HOB and turn Q1-2 hours   Early Mobility: once pulm status more stable  Tapia Day: #2  IVAB Day: #2  Code Status: Full  Pneumonia Vaccine: UTD  Flu Vaccine: UTD    Counseling/Consultation: I have discussed the care of this patient in  detail with multidisciplinary team during rounds, bedside nursing staff and Dr. Mario and Dr. Chung.    Clinically much improved and at baseline HERNANDZE.  Will transfer to Tele and continue to follow from pulm standpoint.     MATT Ceballos ACNP-BC [facility]

## 2017-01-04 NOTE — ASSESSMENT & PLAN NOTE
Appears fluid overloaded  Will hold IVF and start lasix again      Reduced EF about 40% with a low BNP over the last 12 months of about 500.  Careful fluid resuscitation.

## 2017-01-04 NOTE — CONSULTS
Clinical Pharmacy Consult Note: Vancomycin     Pharmacy consulted by Bakari Ceballos NP, to dose vancomycin for treatment of severe sepsis.     81 y.o. male with history of SOB and non-productive cough, runny nose, and sore throat admitted for ARF, severe sepsis, and leukocytosis.     The patient has the following labs and vitals:    Date: 1/3/2017   SCer: Estimated Creatinine Clearance: 39.9 mL/min (based on Cr of 1.6).     Lab Results   Component Value Date    BUN 35 (H) 01/03/2017        Ideal BW: 75.3 Kg  Actual BW: 88.5 Kg  Adjusted BW: 80.6 Kg   Will use 75.3 Kg for dosing weight.     Lab Results   Component Value Date    WBC 12.99 (H) 01/03/2017      Tmax/24h: 101.5   Cultures: Blood drawn 1/3/17      Anti-Infectives:     Vancomycin (day 1 of therapy)     Others:    Zosyn (day 1 of therapy)    Based on Estimated Creatinine Clearance: 39.9 mL/min (based on Cr of 1.6). and weight of 75.3 Kg, pharmacy will initiate vancomycin 1000 mg every 24 hours and draw a trough prior to 3rd dose.     Trough due 1/5/17 at 1600.     Pharmacy will continue to follow patients clinical status, renal function, C&S, and adjust dose as necessary to maintain trough levels between 15 and 20 mcg/mL.     Thank you for allowing us to participate in this patient's care.     Tricia Morel   1/3/2017 8:44 PM

## 2017-01-04 NOTE — ASSESSMENT & PLAN NOTE
He is well known to pulmonology .Consult pulmonology . PAP was 79 in echo 06/2016.  This can also contribute to the dyspnea.

## 2017-01-04 NOTE — ASSESSMENT & PLAN NOTE
COPD exacerbation sec to Viral URI and CHF exacerbation  Much improved.   Continue Nebs, and supplemental oxygen and Home Trilogy Vent nightly. Use low dose steroids

## 2017-01-05 NOTE — PLAN OF CARE
Problem: Breathing Pattern Ineffective (Adult)  Goal: Identify Related Risk Factors and Signs and Symptoms  Related risk factors and signs and symptoms are identified upon initiation of Human Response Clinical Practice Guideline (CPG)   Outcome: Ongoing (interventions implemented as appropriate)  o2 sat on nc 4l/m=92%; tolerates txs well.

## 2017-01-05 NOTE — PLAN OF CARE
CM scheduled hospital follow up with PCP.       01/05/17 1117   Final Note   Assessment Type Final Discharge Note   Discharge Disposition Home   Hospital Follow Up  Appt(s) scheduled? Yes   Offered [facility] -- Bedside Delivery? n/a   Discharge/Hospital Encounter Summary to ([facility]) PCP n/a   Referral to Outpatient Case Management complete? n/a   Referral to / orders for Home Health [facility]? n/a   30 day supply of medicines given at discharge, if documented non-compliance / non-adherence? n/a

## 2017-01-05 NOTE — PROGRESS NOTES
[facility]  Infectious Disease  Progress Note    Patient Name: Orion Rinaldi  MRN: [id number]  Admission Date: 1/3/2017  Length of Stay: 2 days  Attending Physician: Lisa Chung MD  Primary Care Provider: Daisy Oconnor MD    Isolation Status: No active isolations  Assessment/Plan:      * Acute on chronic respiratory failure with hypoxemia  VQ scan showed low probability of PE .  Continue oxygen supplementation.  NIV as needed .    Chronic obstructive pulmonary disease with acute exacerbation  Continue steroids .Continue Noctural NIV.  Switch to Avelox in am     Acute renal failure    IVF -gentle hydration .  Will closely monitor serum creatinine.      Anticipated Disposition:     Thank you for your consult. I will follow-up with patient. Please contact us if you have any additional questions.    Subjective:     Principal Problem:Acute on chronic respiratory failure with hypoxemia    Interval History: 81 year old man with COPd exacerbation .  VQ scan is negative for PE (low probability)    HPI:  81 year old man with history of COPD on home oxygen and trilogy vent at home,pulmonary hypertension (PAP 78) from echo done 06/2016, s/p AVR , CHF -with EF 45-50% who was admitted with worsening dyspnea that started at around 1 pm on day of admission. He denies any history of long distance travel, calf pain but has cough . There is also associated history of fever -T max 101. Since admission, ER vitals showed     Initial Vitals  BP Pulse Resp  Temp                          SpO2    105/64 123 40 101.5 °F (38.6 °C) 63 %  Initial work up showed lactic acid-5.1,   CBC -wbc 12.9, plat 100 ,seg 74.3. ,serum creatinine 1.6.,T bili 2.0.  Chest x-ray did not show any acute findings.UA had normal nitrite and LE. BNP 1524  He was seen and examined at bedside with his wife.          Review of Systems   Constitutional: Negative for activity change, appetite change, chills, diaphoresis, fatigue and fever.   HENT:  Negative for congestion, dental problem, ear discharge, ear pain and facial swelling.    Eyes: Negative for pain, discharge and itching.   Respiratory: Positive for cough and shortness of breath. Negative for apnea, chest tightness and wheezing.    Cardiovascular: Negative for chest pain and leg swelling.   Gastrointestinal: Negative for abdominal distention and abdominal pain.   Endocrine: Negative for cold intolerance, heat intolerance and polydipsia.   Genitourinary: Negative for difficulty urinating, dysuria and enuresis.   Musculoskeletal: Negative for arthralgias and back pain.   Skin: Negative for color change and pallor.   Allergic/Immunologic: Negative for environmental allergies and food allergies.   Neurological: Negative for dizziness, facial asymmetry, light-headedness and headaches.   Hematological: Negative for adenopathy. Does not bruise/bleed easily.   Psychiatric/Behavioral: Negative for agitation and behavioral problems.     Objective:     Vital Signs (Most Recent):  Temp: 97.4 °F (36.3 °C) (01/04/17 2355)  Pulse: 65 (01/04/17 2355)  Resp: 20 (01/04/17 2355)  BP: (!) 97/48 (01/04/17 2355)  SpO2: (!) 90 % (01/04/17 2355) Vital Signs (24h Range):  Temp:  [97.4 °F (36.3 °C)-98.3 °F (36.8 °C)] 97.4 °F (36.3 °C)  Pulse:  [55-90] 65  Resp:  [0-27] 20  BP: ()/(46-73) 97/48     Weight: 88.5 kg (195 lb 1.7 oz)  Body mass index is 27.21 kg/(m^2).    Estimated Creatinine Clearance: 37.5 mL/min (based on Cr of 1.7).    Physical Exam   Constitutional: He is oriented to person, place, and time. He appears well-developed and well-nourished.   HENT:   Head: Normocephalic and atraumatic.   Nose: Nose normal.   Eyes: EOM are normal. Pupils are equal, round, and reactive to light.   Neck: Normal range of motion. Neck supple.   Cardiovascular: Regular rhythm.    tachycardia   Pulmonary/Chest: No respiratory distress. He has no wheezes. He has no rales.   Mild tachypnoea   Abdominal: There is no tenderness.    Musculoskeletal: Normal range of motion. He exhibits no edema.   Neurological: He is alert and oriented to person, place, and time.   Skin: Skin is dry.       Significant Labs:   Blood Culture:   Recent Labs  Lab 01/03/17  0210 01/03/17  0225   LABBLOO No Growth to date  No Growth to date No Growth to date  No Growth to date     All pertinent labs within the past 24 hours have been reviewed.    Significant Imaging: I have reviewed all pertinent imaging results/findings within the past 24 hours.        Jersey Draper MD  Infectious Disease  [facility]

## 2017-01-05 NOTE — PLAN OF CARE
Problem: Patient Care Overview  Goal: Plan of Care Review  Outcome: Ongoing (interventions implemented as appropriate)  POC reviewed w/ pt and wife. CBG monitored. Fall precautions maintained and no falls on shift Lighting adjusted, bedrails upx2, bed in low position, no-skid socks on and call bell within reach. No signs of skin breakdown noted. Vital signs stable. No pain noted.  Gonzales Mcdaniel RN

## 2017-01-05 NOTE — NURSING
Pt d/c home. Iv removed and monitor returned. Fall precautions maintained and no falls on shift Lighting adjusted, bedrails upx2, bed in low position, no-skid socks on and call bell within reach. No signs of skin breakdown noted. Vital signs stable. No pain noted.  Gonzales Mcdaniel RN

## 2017-01-05 NOTE — PLAN OF CARE
Problem: Patient Care Overview  Goal: Plan of Care Review  Outcome: Ongoing (interventions implemented as appropriate)  Patient free of falls this shift. SOB on ambulation with increased recovery time after. Denies pain this shift. IV abx therapy maintained. No new breakdown noted.

## 2017-01-05 NOTE — PROGRESS NOTES
Progress Note  Pulmonology    Admit Date: 1/3/2017   LOS: 2 days     SUBJECTIVE:     Follow-up For: AECOPD    Interval history: Seen and examined at bedside. Reports that he is feeling better. He reports that he is ready to go home. No acute interval detrimental events.     Continuous Infusions:  n/a    Scheduled Meds:   arformoterol  15 mcg Nebulization BID    aspirin  81 mg Oral Daily    budesonide  0.5 mg Nebulization Q12H    famotidine  20 mg Oral Daily    [START ON 1/6/2017] furosemide  40 mg Oral Daily    heparin (porcine)  5,000 Units Subcutaneous Q8H    levalbuterol  0.63 mg Nebulization Q8H    levetiracetam  750 mg Oral BID    metoprolol tartrate  12.5 mg Oral BID    moxifloxacin  400 mg Intravenous Q24H    predniSONE  10 mg Oral BID    simvastatin  40 mg Oral QHS       Review of Systems:  Constitutional: no fever or chills  Respiratory: positive for dyspnea on exertion  Cardiovascular: no chest pain or palpitations    OBJECTIVE:     Vital Signs Range (Last 24H):  Temp:  [97.4 °F (36.3 °C)-98.3 °F (36.8 °C)]   Pulse:  [54-90]   Resp:  [0-22]   BP: ()/(46-58)   SpO2:  [90 %-100 %]     I & O (Last 24H):  Intake/Output Summary (Last 24 hours) at 01/05/17 1118  Last data filed at 01/05/17 0600   Gross per 24 hour   Intake             1150 ml   Output             1725 ml   Net             -575 ml     Physical Exam:  General: no distress  Neck: no jugular venous distention  Lungs:  normal respiratory effort, normal percussion bilaterally and diminished breath sounds bibasilar  Chest Wall: no tenderness  Heart: regular rate and rhythm, S1, S2 normal.  Abdomen: soft, non-tender non-distended; bowel sounds normal  Extremities: no cyanosis or edema, or clubbing  Neurologic: alert, oriented, thought content appropriate    Laboratory Data:  Reviewed recent labs. Appear stable other than abnormalities addressed in plan.     Radiology:  Reviewed recent imaging studies. Appear stable other than  abnormalities addressed in plan.         ASSESSMENT/PLAN:     Problem   Acute On Chronic Respiratory Failure With Hypoxemia   Pulmonary Emphysema   Chronic Obstructive Pulmonary Disease With Acute Exacerbation       Plan: Continue Current. OK to discharge from pulmonary stand point. Out patient pulmonary follow up. Will sign off for now. Do not hesitate to re consult if further pulmonary issues arise.

## 2017-01-05 NOTE — DISCHARGE SUMMARY
[facility] Hospital Medicine  Discharge Summary      Patient Name: Orion Rinaldi  MRN: [id number]  Admission Date: 1/3/2017  Hospital Length of Stay: 2 days  Discharge Date and Time: 1/5/2017 12:03 PM  Attending Physician: Lisa Chung MD   Discharging Provider: Jael Radford NP  Primary Care Provider: Daisy Oconnor MD      HPI:   Increased breathing rate and shortness of breath over the last 24 hours with a non-productive cough.  Uses continuous supplemental oxygen at home and CPAP at night.  Fevers and chills this morning and several family members have been sick over the last weeks with cough, runny nose, and sore throat.  Denies nausea, vomiting, or diarrhea.  No other recent illness or medication changes.  He follows with Dr. Downing of Pulmonary medicine and Dr. Senior of Cardiology.    * No surgery found *      Indwelling Lines/Drains at time of discharge:   Lines/Drains/Airways          No matching active lines, drains, or airways        Hospital Course:   Pt is a 81 y.o. male with a PMH of Sz, AVR, Pulm HTN (PAP 78), HLD, CAD (Hx CABG), O2 dependent COPD(noct NPPV) and Stg 4 Systolic Heart Failure (EF 45%) admitted with Acute on Ch Hypoxemic Resp Failure likely sec to Acute Viral URI vs Pneumonia. Pt initially admitted to ICU on BIPAP with Pulmonology consulted.  Pt weaned off BIPAP to baseline supplemental oxygen.  Pt treated with steroids, IV Avelox, and IVF.  BNP > 1500 with IV Lasix given.   Fever and acute SOB resolved and pt transferred to Telemetry Unit.  Blood culture results with NGTD. WBC and lactic acid normalized.  D-dimer elevated with V/Q scan completed showing low probability for PE.  JESSENIA noted.  Chest xray initially showed mild congestion with small pleural effusion.  Chest xray negative on the date of discharge.  Pt seen and examined on the date of discharge and deemed suitable for discharge to home accompanied by wife.  Current medications resumed with Levaquin x 7  days, Prednisone, and Lopressor 12.5 mg prescribed.  Pt instructed to follow up with PCP in 3 days and repeat creatinine at appointment.       Consults:   Consults         Status Ordering Provider     Inpatient consult to Hospitalist  Once     Provider:  Eliot Collins MD    Acknowledged INDIA MALDONADO     Inpatient consult to Pulmonology  Once     Provider:  Prabhu Mario MD    Final result INDIA MALDONADO.     IP consult to case management  Once     Provider:  (Not yet assigned)    Completed GALLO WINKLER     Pharmacy to dose Vancomycin consult  Once     Provider:  (Not yet assigned)    Acknowledged LAMAR MRORISON          Significant Diagnostic Studies:   Imaging Results         X-Ray Chest 1 View (Final result) Result time:  01/05/17 07:56:39    Final result by Darin Savage MD (01/05/17 07:56:39)    Impression:      Interval improvement.      Electronically signed by: DARIN SAVAGE MD  Date:     01/05/17  Time:    07:56     Narrative:    Exam: XR CHEST 1 VIEW,    Date:  01/05/17 05:55:00    History: resp failure    Comparison:  01/04/2017    Findings: Sternal wires.  Cardiac size is less pronounced.  The right lung is clear.  Left lung is clear as well with blunting of the left costophrenic angle.            X-Ray Chest 1 View (Final result) Result time:  01/04/17 08:05:42    Final result by Harpreet Alvarez III, MD (01/04/17 08:05:42)    Impression:     Cardiomegaly with chronic lung changes. Suspect mild congestion with probable small left effusion. Followup recommended      Electronically signed by: HARPREET ALVAREZ MD  Date:     01/04/17  Time:    08:05     Narrative:    Portable chest x-ray. 01/04/17 05:08:00    Clinical indication:  resp failure    Heart size is enlarged.. The lung fields again demonstrate interstitial coarsening which appears a little more pronounced. Suspicion of minimal left effusion. Vague increased density mid lung fields bilaterally probably related to technique  and overlying soft tissue structures. This is an extreme apical lordotic projection. Postop changes noted along the sternum.            NM Lung Ventilation Perfusion Imaging (Final result) Result time:  01/03/17 22:26:22    Final result by Ry Ardon MD (01/03/17 22:26:22)    Impression:         Low probability for pulmonary blister.  See above.      Electronically signed by: RY ARDON MD  Date:     01/03/17  Time:    22:26     Narrative:    Exam: NM LUNG VENTILATION AND PERFUSION IMAGING,    Date:  01/03/17 20:16:40    History: Shortness of breath.  COPD    Comparison:  No prior  studies available for comparison.    Pharmaceutical: 21.5 mCi technetium 99m DTPA by inhalation.  4.63 mCi technetium 99m MAA IV.    Previous chest x-ray dated 01/03/2017 has been reviewed and demonstrates no confluent pulmonary infiltrates or pleural effusions.    Findings:     Ventilation images demonstrate significant heterogeneity with central clumping of radiotracer likely due to patient's known COPD.  Ventilation images are also heterogeneous bilaterally.  However, there are no peripheral mismatched defects.  Findings correlate with a low probability for pulmonary embolism.            US Lower Extremity Veins Bilateral (Final result) Result time:  01/03/17 17:49:48    Final result by Lamar Meza Jr., MD (01/03/17 17:49:48)    Impression:         Negative for bilateral lower extremity DVT.      Electronically signed by: LAMAR MEZA  Date:     01/03/17  Time:    17:49     Narrative:    Bilateral lower extremity venous Doppler ultrasound.    Indication:     Rule out DVT. Swelling.    Findings:    Grayscale and color Doppler sonography was formed through the bilateral lower extremities.    The bilateral lower extremity veins are widely patent with normal augmentation, and compressibility and respiratory variability.            X-Ray Chest AP Portable (Final result) Result time:  01/03/17 08:13:03    Final  result by Abby Savage MD (01/03/17 08:13:03)    Impression:        Stable chest x-ray.  No acute findings.      Electronically signed by: ABBY SAVAGE MD  Date:     01/03/17  Time:    08:13     Narrative:    Procedure: XR CHEST AP PORTABLE, 01/03/17 02:12:00    Clinical indication:  Shortness of breath.    Findings: Comparison with 11/07/2016.  Sternal wires and mediastinal clips are noted.  Cardiomegaly.  Interstitial coarsening, unchanged.  Bilateral shoulder arthropathy.              Pending Diagnostic Studies:     Procedure Component Value Units Date/Time    Lactic acid, plasma [[id number]] Collected:  01/03/17 1647    Order Status:  Sent Lab Status:  In process Updated:  01/03/17 1648    Specimen:  Blood from Blood         Final Active Diagnoses:    Diagnosis Date Noted POA    PRINCIPAL PROBLEM:  Chronic obstructive pulmonary disease with acute exacerbation [J44.1] 03/12/2013 Yes    Acute renal failure [N17.9] 01/03/2017 Yes    Acute on chronic respiratory failure with hypoxemia [J96.21] 01/03/2017 Yes    Chronic systolic congestive heart failure [I50.22] 01/08/2016 Yes     Chronic    Pulmonary emphysema [J43.9]  Yes     Chronic      Problems Resolved During this Admission:    Diagnosis Date Noted Date Resolved POA    Severe sepsis [A41.9, R65.20] 01/03/2017 01/04/2017 Yes    CAD; S/P CABG (coronary artery bypass graft) [Z95.1] 11/25/2014 01/04/2017 Not Applicable     Chronic      No new Assessment & Plan notes have been filed under this hospital service since the last note was generated.  Service: Hospital Medicine      Discharged Condition: stable    Disposition: Home or Self Care    Follow Up:  Follow-up Information     Follow up with Daisy Oconnor MD In 3 days.    Specialty:  Internal Medicine    Why:  hospital follow up-repeat creatinine within 1 week    Contact information:    [address]  [place] [id number] [phone]          Patient Instructions:     Diet general    Order Specific Question Answer Comments   Na restriction, if any: 2gNa    Fluid restriction: Fluid - 1500mL      Activity as tolerated     Call MD for:  temperature >100.4     Call MD for:  difficulty breathing or increased cough     Call MD for:  persistent dizziness, light-headedness, or visual disturbances     Call MD for:  increased confusion or weakness       Medications:  Reconciled Home Medications:   Current Discharge Medication List      START taking these medications    Details   levoFLOXacin (LEVAQUIN) 250 MG tablet Take 1 tablet (250 mg total) by mouth once daily.  Qty: 7 tablet, Refills: 0      metoprolol tartrate (LOPRESSOR) 25 MG tablet Take 0.5 tablets (12.5 mg total) by mouth 2 (two) times daily.  Qty: 30 tablet, Refills: 0      predniSONE (DELTASONE) 10 MG tablet Take 1 tablet (10 mg total) by mouth 2 (two) times daily.  Qty: 8 tablet, Refills: 0         CONTINUE these medications which have NOT CHANGED    Details   albuterol-ipratropium 2.5mg-0.5mg/3mL (DUO-NEB) 0.5 mg-3 mg(2.5 mg base)/3 mL nebulizer solution Take 3 mLs by nebulization every 8 (eight) hours as needed for Wheezing.  Qty: 90 vial, Refills: 3    Comments: Please call patient to pick prescription once it is ready.  Associated Diagnoses: Chronic obstructive pulmonary disease, unspecified COPD type      aspirin 81 MG Chew Take 81 mg by mouth once daily.      budesonide-formoterol 80-4.5 mcg (SYMBICORT) 80-4.5 mcg/actuation HFAA Inhale 2 puffs into the lungs 2 (two) times daily. Pharmacy to adjust to cheaper tier options  Qty: 1 Inhaler, Refills: 11    Comments: Please call patient to pick prescription once it is ready.  Associated Diagnoses: Chronic obstructive pulmonary disease, unspecified COPD type      levetiracetam (KEPPRA) 750 MG Tab Take 750 mg by mouth 2 (two) times daily.      losartan (COZAAR) 25 MG tablet Take 0.5 tablets (12.5 mg total) by mouth once daily.  Qty: 30 tablet, Refills: 1      [facility]  by Misc.(Non-Drug; Combo Route) route.      simvastatin (ZOCOR) 40 MG tablet TAKE 1 TABLET EVERY EVENING.  Qty: 90 tablet, Refills: 3    Associated Diagnoses: Hyperlipidemia; S/P AVR      tiotropium (SPIRIVA WITH HANDIHALER) 18 mcg inhalation capsule Inhale 1 capsule (18 mcg total) into the lungs once daily. Pharmacy to adjust to cheaper tier options  Qty: 90 capsule, Refills: 4    Comments: Please call patient to pick prescription once it is ready.  Associated Diagnoses: Chronic obstructive pulmonary disease, unspecified COPD type      colchicine 0.6 mg tablet Take 1 tablet (0.6 mg total) by mouth once daily.  Qty: 7 tablet, Refills: 0      furosemide (LASIX) 20 MG tablet Take 2 tablets (40 mg total) by mouth once daily.  Qty: 30 tablet, Refills: 1      potassium chloride SA (K-DUR,KLOR-CON) 20 MEQ tablet Take 1 tablet (20 mEq total) by mouth 2 (two) times daily.  Qty: 60 tablet, Refills: 0         STOP taking these medications       metoprolol succinate (TOPROL-XL) 25 MG 24 hr tablet Comments:   Reason for Stopping:             Time spent on the discharge of patient: 40 minutes    Jael Radford NP  Department of Hospital Medicine  [facility]

## 2017-01-06 NOTE — SUBJECTIVE & OBJECTIVE
Interval History: 81 year old man with COPD exacerbation.  He feels better.    HPI:  81 year old man with history of COPD on home oxygen and trilogy vent at home,pulmonary hypertension (PAP 78) from echo done 06/2016, s/p AVR , CHF -with EF 45-50% who was admitted with worsening dyspnea that started at around 1 pm on day of admission. He denies any history of long distance travel, calf pain but has cough . There is also associated history of fever -T max 101. Since admission, ER vitals showed     Initial Vitals  BP Pulse Resp  Temp                          SpO2    105/64 123 40 101.5 °F (38.6 °C) 63 %  Initial work up showed lactic acid-5.1,   CBC -wbc 12.9, plat 100 ,seg 74.3. ,serum creatinine 1.6.,T bili 2.0.  Chest x-ray did not show any acute findings.UA had normal nitrite and LE. BNP 1524  He was seen and examined at bedside with his wife.          Review of Systems   Constitutional: Negative for activity change, appetite change, chills, diaphoresis, fatigue and fever.   HENT: Negative for congestion, dental problem, ear discharge, ear pain and facial swelling.    Eyes: Negative for pain, discharge and itching.   Respiratory: Positive for cough. Negative for apnea, chest tightness, shortness of breath and wheezing.    Cardiovascular: Negative for chest pain and leg swelling.   Gastrointestinal: Negative for abdominal distention and abdominal pain.   Endocrine: Negative for cold intolerance, heat intolerance and polydipsia.   Genitourinary: Negative for difficulty urinating, dysuria and enuresis.   Musculoskeletal: Negative for arthralgias and back pain.   Skin: Negative for color change and pallor.   Allergic/Immunologic: Negative for environmental allergies and food allergies.   Neurological: Negative for dizziness, facial asymmetry, light-headedness and headaches.   Hematological: Negative for adenopathy. Does not bruise/bleed easily.   Psychiatric/Behavioral: Negative for agitation and behavioral problems.      Objective:     Vital Signs (Most Recent):  Temp: 97.8 °F (36.6 °C) (01/05/17 1200)  Pulse: 73 (01/05/17 1200)  Resp: 18 (01/05/17 1200)  BP: (!) 104/53 (01/05/17 1200)  SpO2: (!) 94 % (01/05/17 1200) Vital Signs (24h Range):  Temp:  [97.6 °F (36.4 °C)-97.8 °F (36.6 °C)] 97.8 °F (36.6 °C)  Pulse:  [54-73] 73  Resp:  [18-20] 18  BP: (104-105)/(53-55) 104/53     Weight: 86.6 kg (191 lb)  Body mass index is 26.64 kg/(m^2).    Estimated Creatinine Clearance: 37.5 mL/min (based on Cr of 1.7).    Physical Exam   Constitutional: He is oriented to person, place, and time. He appears well-developed and well-nourished.   HENT:   Head: Normocephalic and atraumatic.   Nose: Nose normal.   Eyes: EOM are normal. Pupils are equal, round, and reactive to light.   Neck: Normal range of motion. Neck supple.   Cardiovascular: Regular rhythm.    tachycardia   Pulmonary/Chest: No respiratory distress. He has no wheezes. He has no rales.   Mild tachypnoea   Abdominal: There is no tenderness.   Musculoskeletal: Normal range of motion. He exhibits no edema.   Neurological: He is alert and oriented to person, place, and time.   Skin: Skin is dry.   Nursing note and vitals reviewed.      Significant Labs:   Blood Culture:   Recent Labs  Lab 01/03/17  0210 01/03/17  0225   LABBLOO No Growth to date  No Growth to date  No Growth to date No Growth to date  No Growth to date  No Growth to date       Significant Imaging: I have reviewed all pertinent imaging results/findings within the past 24 hours.

## 2017-01-06 NOTE — PROGRESS NOTES
[facility]  Infectious Disease  Progress Note    Patient Name: Orion Rinaldi  MRN: [id number]  Admission Date: 1/3/2017  Length of Stay: 2 days  Attending Physician: No att. providers found  Primary Care Provider: Daisy Oconnor MD    Isolation Status: No active isolations  Assessment/Plan:      * Chronic obstructive pulmonary disease with acute exacerbation  Continue steroids .Continue Noctural NIV.    Plan for home on Levaquin      Anticipated Disposition:     Thank you for your consult. I will sign off. Please contact us if you have any additional questions.    Subjective:     Principal Problem:Chronic obstructive pulmonary disease with acute exacerbation    Interval History: 81 year old man with COPD exacerbation.  He feels better.    HPI:  81 year old man with history of COPD on home oxygen and trilogy vent at home,pulmonary hypertension (PAP 78) from echo done 06/2016, s/p AVR , CHF -with EF 45-50% who was admitted with worsening dyspnea that started at around 1 pm on day of admission. He denies any history of long distance travel, calf pain but has cough . There is also associated history of fever -T max 101. Since admission, ER vitals showed     Initial Vitals  BP Pulse Resp  Temp                          SpO2    105/64 123 40 101.5 °F (38.6 °C) 63 %  Initial work up showed lactic acid-5.1,   CBC -wbc 12.9, plat 100 ,seg 74.3. ,serum creatinine 1.6.,T bili 2.0.  Chest x-ray did not show any acute findings.UA had normal nitrite and LE. BNP 1524  He was seen and examined at bedside with his wife.          Review of Systems   Constitutional: Negative for activity change, appetite change, chills, diaphoresis, fatigue and fever.   HENT: Negative for congestion, dental problem, ear discharge, ear pain and facial swelling.    Eyes: Negative for pain, discharge and itching.   Respiratory: Positive for cough. Negative for apnea, chest tightness, shortness of breath and wheezing.    Cardiovascular:  Negative for chest pain and leg swelling.   Gastrointestinal: Negative for abdominal distention and abdominal pain.   Endocrine: Negative for cold intolerance, heat intolerance and polydipsia.   Genitourinary: Negative for difficulty urinating, dysuria and enuresis.   Musculoskeletal: Negative for arthralgias and back pain.   Skin: Negative for color change and pallor.   Allergic/Immunologic: Negative for environmental allergies and food allergies.   Neurological: Negative for dizziness, facial asymmetry, light-headedness and headaches.   Hematological: Negative for adenopathy. Does not bruise/bleed easily.   Psychiatric/Behavioral: Negative for agitation and behavioral problems.     Objective:     Vital Signs (Most Recent):  Temp: 97.8 °F (36.6 °C) (01/05/17 1200)  Pulse: 73 (01/05/17 1200)  Resp: 18 (01/05/17 1200)  BP: (!) 104/53 (01/05/17 1200)  SpO2: (!) 94 % (01/05/17 1200) Vital Signs (24h Range):  Temp:  [97.6 °F (36.4 °C)-97.8 °F (36.6 °C)] 97.8 °F (36.6 °C)  Pulse:  [54-73] 73  Resp:  [18-20] 18  BP: (104-105)/(53-55) 104/53     Weight: 86.6 kg (191 lb)  Body mass index is 26.64 kg/(m^2).    Estimated Creatinine Clearance: 37.5 mL/min (based on Cr of 1.7).    Physical Exam   Constitutional: He is oriented to person, place, and time. He appears well-developed and well-nourished.   HENT:   Head: Normocephalic and atraumatic.   Nose: Nose normal.   Eyes: EOM are normal. Pupils are equal, round, and reactive to light.   Neck: Normal range of motion. Neck supple.   Cardiovascular: Regular rhythm.    tachycardia   Pulmonary/Chest: No respiratory distress. He has no wheezes. He has no rales.   Mild tachypnoea   Abdominal: There is no tenderness.   Musculoskeletal: Normal range of motion. He exhibits no edema.   Neurological: He is alert and oriented to person, place, and time.   Skin: Skin is dry.   Nursing note and vitals reviewed.      Significant Labs:   Blood Culture:   Recent Labs  Lab 01/03/17  0210  01/03/17  0225   LABBLOO No Growth to date  No Growth to date  No Growth to date No Growth to date  No Growth to date  No Growth to date       Significant Imaging: I have reviewed all pertinent imaging results/findings within the past 24 hours.        Jersey Draper MD  Infectious Disease  [facility]

## 2017-01-06 NOTE — PATIENT INSTRUCTIONS
Discharge Instructions: COPD  You have been diagnosed with chronic obstructive pulmonary disease (COPD). This is a name given to a group of diseases that limit the flow of air in and out of your lungs. This makes it harder to breathe. With COPD, you are also more likely to get lung infections. COPD includes chronic bronchitis and emphysema. COPD is most often caused by heavy, long-term cigarette smoking.  Home care  Quit smoking  · If you smoke, quit. It is the best thing you can do for your COPD and your overall health.  · Join a stop-smoking program. There are even telephone, text message, and Internet programs to help you quit.  · Ask your healthcare provider about medicines or other methods to help you quit.  · Ask family members to quit smoking as well.  · Don't allow people to smoke in your home, in your car, or when they are around you.  Protect yourself from infection  · Wash your hands often. Do your best to keep your hands away from your face. Most germs are spread from your hands to your mouth.  · Get a flu shot every year. Also ask your provider about pneumonia vaccines.  · Avoid crowds. It's especially important to do this in the winter when more people have colds and flu.  · To stay healthy, get enough sleep, exercise regularly, and eat a balanced diet. You should:  ¨ Get about 8 hours of sleep every night.  ¨ Try to exercise for at least 30 minutes on most days.  ¨ Have healthy foods including fruits and vegetables, 100% whole grains, lean meats and fish, and low-fat dairy products. Try to stay away from foods high in fats and sugar.  Take your medicines  Take your medicines exactly as directed. Don't skip doses.  Manage your stress  Stress can make COPD worse. Use this stress management technique:  · Find a quiet place and sit or lie in a comfortable position.  · Close your eyes and perform breathing exercises for several minutes. Ask your provider about the best way to breathe.  Pulmonary  rehabilitation  · Pulmonary rehab can help you feel better. These programs include exercise, breathing techniques, information about COPD, counseling, and help for smokers.  · Ask your provider or your local hospital about programs in your area.  When to call your healthcare provider  Call your provider immediately if you have any of the following:  · Shortness of breath, wheezing, or coughing  · Increased mucus  · Yellow, green, bloody, or smelly mucus  · Fever or chills  · Tightness in your chest that does not go away with rest or medicine  · An irregular heartbeat or a feeling that your heart is beating very fast  · Swollen ankles   © 5002-3465 [employer]. [address] [place] [address], [place] [address]. All rights reserved. This information is not intended as a substitute for professional medical care. Always follow your healthcare professional's instructions.

## 2017-01-25 PROBLEM — J96.21 ACUTE ON CHRONIC RESPIRATORY FAILURE WITH HYPOXEMIA: Status: RESOLVED | Noted: 2017-01-01 | Resolved: 2017-01-01

## 2017-01-25 PROBLEM — J96.20 RESPIRATORY FAILURE, ACUTE AND CHRONIC: Status: RESOLVED | Noted: 2017-01-01 | Resolved: 2017-01-01

## 2017-01-25 PROBLEM — J43.9 NOCTURNAL HYPOXEMIA DUE TO EMPHYSEMA: Chronic | Status: ACTIVE | Noted: 2017-01-01

## 2017-01-25 PROBLEM — G47.36 NOCTURNAL HYPOXEMIA DUE TO EMPHYSEMA: Chronic | Status: ACTIVE | Noted: 2017-01-01

## 2017-01-25 NOTE — PROGRESS NOTES
Subjective:      Orion Rinaldi is a 81 y.o. male with known COPD who presents without exacerbation.   Current symptoms include: chronic dyspnea.   Symptoms began several months ago.   Patient uses 2 pillows at night. Patient currently is on oxygen at 3 L/min per nasal cannula..   Patient was recently discharged from the hospital after an 24-hour stay.    His BNP was elevated greater than 1500.    He responded appropriately with noninvasive ventilation BiPAP and Lasix.  He was discharged on levofloxacin and steroids.  This exacerbation was likely related to a possible viral illness.  Patient also has pulmonary hypertension secondary to valvular heart disease.  COPD test score right now is 24.  MRC grade score is 2.  Patient has significant limitation to activities of daily living.    His wife is now doing most of the chores.  Immunizations are up-to-date  Medications reviewed Symbicort and DuoNeb and Spiriva.  Patient has been maximally treated with pulmonary rehabilitation and benefited  SP TAVR procedure  Weight dropped from 206 lb to 192 lb        The following portions of the patient's history were reviewed and updated as appropriate:   He  has a past medical history of Acute on chronic systolic CHF (congestive heart failure), NYHA class 4 (11/18/2014); Arthritis; Asthma; Cancer; Cardiomyopathy (11/25/2014); COPD (chronic obstructive pulmonary disease); Coronary artery disease; Hypercholesterolemia (11/4/2016); Mitral stenosis (11/25/2014); Pulmonary HTN (11/25/2014); S/P TAVR (transcatheter aortic valve replacement) (07/08/2013); and Seizures.  He has Pulmonary heart disease on his pertinent problem list.  He  has a past surgical history that includes Cardiac surgery; Tonsillectomy; Skin biopsy; Coronary artery bypass graft (1997); Cardiac catheterization; Cardiac valuve replacement; Aortic valve replacement; Cataract extraction w/  intraocular lens implant (OD 3/18/09); and Cataract extraction w/   intraocular lens implant (OS 4/1/09).  His family history includes Cancer in his paternal grandfather; Cataracts in his maternal grandmother, mother, and paternal grandmother; Heart disease in his brother; No Known Problems in his father, maternal aunt, maternal grandfather, maternal uncle, paternal aunt, paternal uncle, and sister. There is no history of Anemia, Arrhythmia, Asthma, Clotting disorder, Fainting, Heart attack, Heart failure, Hyperlipidemia, Hypertension, Strabismus, Retinal detachment, Macular degeneration, Glaucoma, Amblyopia, Blindness, Diabetes, Stroke, or Thyroid disease.  He  reports that he quit smoking about 19 years ago. His smoking use included Cigarettes. He has a 20.00 pack-year smoking history. He has never used smokeless tobacco. He reports that he does not drink alcohol or use illicit drugs.  He has a current medication list which includes the following prescription(s): albuterol-ipratropium 2.5mg-0.5mg/3ml, aspirin, budesonide-formoterol 80-4.5 mcg, colchicine, furosemide, levetiracetam, levetiracetam, losartan, metoprolol tartrate, oxygen-air delivery systems, potassium chloride sa, simvastatin, and tiotropium.     Current Outpatient Prescriptions on File Prior to Visit   Medication Sig Dispense Refill    albuterol-ipratropium 2.5mg-0.5mg/3mL (DUO-NEB) 0.5 mg-3 mg(2.5 mg base)/3 mL nebulizer solution Take 3 mLs by nebulization every 8 (eight) hours as needed for Wheezing. 90 vial 3    aspirin 81 MG Chew Take 81 mg by mouth once daily.      budesonide-formoterol 80-4.5 mcg (SYMBICORT) 80-4.5 mcg/actuation HFAA Inhale 2 puffs into the lungs 2 (two) times daily. Pharmacy to adjust to cheaper tier options 1 Inhaler 11    colchicine 0.6 mg tablet Take 1 tablet (0.6 mg total) by mouth once daily. 7 tablet 0    furosemide (LASIX) 20 MG tablet Take 2 tablets (40 mg total) by mouth once daily. 30 tablet 1    levetiracetam (KEPPRA) 750 MG Tab TAKE 1 TABLET TWICE DAILY 180 tablet 4     "levetiracetam (KEPPRA) 750 MG Tab Take 1 tablet (750 mg total) by mouth 2 (two) times daily. 180 tablet 3    losartan (COZAAR) 25 MG tablet Take 0.5 tablets (12.5 mg total) by mouth once daily. 30 tablet 1    metoprolol tartrate (LOPRESSOR) 25 MG tablet Take 0.5 tablets (12.5 mg total) by mouth 2 (two) times daily. 30 tablet 0    OXYGEN-AIR DELIVERY SYSTEMS MISC by Misc.(Non-Drug; Combo Route) route.      potassium chloride SA (K-DUR,KLOR-CON) 20 MEQ tablet Take 1 tablet (20 mEq total) by mouth 2 (two) times daily. 180 tablet 3    simvastatin (ZOCOR) 40 MG tablet TAKE 1 TABLET EVERY EVENING. 90 tablet 3    tiotropium (SPIRIVA WITH HANDIHALER) 18 mcg inhalation capsule Inhale 1 capsule (18 mcg total) into the lungs once daily. Pharmacy to adjust to cheaper tier options 90 capsule 4     No current facility-administered medications on file prior to visit.      He is allergic to doxycycline and pneumococcal vaccine..    Review of Systems  A comprehensive review of systems was negative except for: Respiratory: positive for dyspnea on exertion       Objective:        Visit Vitals    BP 90/68    Pulse 72    Resp 20    Ht 5' 11" (1.803 m)    Wt 87.2 kg (192 lb 3.9 oz)    SpO2 (!) 83%  Comment: on O2 @ 3L per NC    BMI 26.81 kg/m2     General appearance: alert, appears stated age, cooperative and no distress  Head: atraumatic  Eyes: negative findings: conjunctivae and sclerae normal and corneas clear  Neck: no JVD, supple, symmetrical, trachea midline and thyroid not enlarged, symmetric, no tenderness/mass/nodules  Lungs: diminished breath sounds bilaterally and Clear breath sounds, No wheezing  Heart: regular rate and rhythm, S1, S2 normal, no murmur, click, rub or gallop  Abdomen: soft, non-tender; bowel sounds normal; no masses,  no organomegaly  Extremities: edema trace and Homans sign is negative, no sign of DVT  Pulses: 2+ and symmetric  Skin: Skin color, texture, turgor normal. 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    This note was prepared using voice recognition system and is likely to have sound alike errors that may have been overlooked even after proof reading.  Please call me with any questions    Discussed diagnosis, its evaluation, treatment and usual course. All questions answered.    Thank you for the courtesy of participating in the care of this patient    Francis Downing MD

## 2017-01-25 NOTE — MR AVS SNAPSHOT
[facility]  [address] [facility] [address]  Phone: [phone]  Fax: [phone]                  Orion Rinaldi   2017 11:20 AM   Office Visit    Description:  Male : 1935   Provider:  Francis Downing MD   Department:  [facility]           Reason for Visit     COPD           Diagnoses this Visit        Comments    Moderate COPD (chronic obstructive pulmonary disease)    -  Primary     Chronic respiratory failure with hypoxia         Pulmonary hypertension         Nocturnal hypoxemia due to emphysema                To Do List           Future Appointments        Provider Department Dept Phone    3/7/2017 11:00 AM Jordan Solano MD [facility] - Cardiology [phone]    3/21/2017 1:00 PM LABORATORY, [facility] [phone]    3/21/2017 1:20 PM Alana Pineda MD [facility]cology [phone]    2017 10:15 AM Horace Mccloud MD [facility] - Ophthalmology [phone]      Goals (5 Years of Data)     None      Follow-Up and Disposition     Return in about 3 months (around 2017) for cxr, jonah.      [facility]     [facility] Care [facility] -  Assistance  Registered nurses in the [facility] provide clinical advisement, health education, appointment booking, and other advisory services.  Call for this free service at [phone].             Medications           Message regarding Medications     Verify the changes and/or additions to your medication regime listed below are the same as discussed with your clinician today.  If any of these changes or additions are incorrect, please notify your healthcare provider.             Verify that the below list of medications is an accurate representation of the medications you are currently taking.  If none reported, the list may be blank. If incorrect, please contact your healthcare provider. Carry this list with  "you in case of emergency.           Current Medications     albuterol-ipratropium 2.5mg-0.5mg/3mL (DUO-NEB) 0.5 mg-3 mg(2.5 mg base)/3 mL nebulizer solution Take 3 mLs by nebulization every 8 (eight) hours as needed for Wheezing.    aspirin 81 MG Chew Take 81 mg by mouth once daily.    budesonide-formoterol 80-4.5 mcg (SYMBICORT) 80-4.5 mcg/actuation HFAA Inhale 2 puffs into the lungs 2 (two) times daily. Pharmacy to adjust to cheaper tier options    colchicine 0.6 mg tablet Take 1 tablet (0.6 mg total) by mouth once daily.    furosemide (LASIX) 20 MG tablet Take 2 tablets (40 mg total) by mouth once daily.    levetiracetam (KEPPRA) 750 MG Tab TAKE 1 TABLET TWICE DAILY    levetiracetam (KEPPRA) 750 MG Tab Take 1 tablet (750 mg total) by mouth 2 (two) times daily.    losartan (COZAAR) 25 MG tablet Take 0.5 tablets (12.5 mg total) by mouth once daily.    metoprolol tartrate (LOPRESSOR) 25 MG tablet Take 0.5 tablets (12.5 mg total) by mouth 2 (two) times daily.    OXYGEN-AIR DELIVERY SYSTEMS MISC by [facility].(Non-Drug; Combo Route) route.    potassium chloride SA (K-DUR,KLOR-CON) 20 MEQ tablet Take 1 tablet (20 mEq total) by mouth 2 (two) times daily.    simvastatin (ZOCOR) 40 MG tablet TAKE 1 TABLET EVERY EVENING.    tiotropium (SPIRIVA WITH HANDIHALER) 18 mcg inhalation capsule Inhale 1 capsule (18 mcg total) into the lungs once daily. Pharmacy to adjust to cheaper tier options           Clinical Reference Information           Vital Signs - Last Recorded  Most recent update: 1/25/2017 11:09 AM by Marva López LPN    BP Pulse Resp Ht Wt SpO2    90/68 72 20 5' 11" (1.803 m) 87.2 kg (192 lb 3.9 oz) (!) 83%    BMI                26.81 kg/m2          Blood Pressure          Most Recent Value    BP  90/68      Allergies as of 1/25/2017     Doxycycline    Pneumococcal Vaccine      Immunizations Administered on Date of Encounter - 1/25/2017     None      Orders Placed During Today's Visit     Future Labs/Procedures " Expected by Expires    X-Ray Chest PA And Lateral  1/25/2017 1/25/2018    Spirometry with/without bronchodilator  As directed 1/25/2018      [facility] Sign-Up     Activating your [facility] account is as easy as 1-2-3!     1) Visit [email or web address], select Sign Up Now, enter this activation code and your date of birth, then select Next.  [email or web address]  Expires: 3/11/2017 12:10 PM      2) Create a username and password to use when you visit [facility] in the future and select a security question in case you lose your password and select Next.    3) Enter your e-mail address and click Sign Up!    Additional Information  If you have questions, please e-mail [email or web address] or call [phone] to talk to our [facility] staff. Remember, [facility] is NOT to be used for urgent needs. For medical emergencies, dial 911.

## 2017-03-29 NOTE — MR AVS SNAPSHOT
[facility]  [address] [facility] [address]  Phone: [phone]  Fax: [phone]                  Orion Rinaldi   3/29/2017 1:40 PM   Office Visit    Description:  Male : 1935   Provider:  Jordan Solano MD   Department:  [facility]           Reason for Visit     Congestive Heart Failure     Coronary Artery Disease           Diagnoses this Visit        Comments    Chronic systolic congestive heart failure    -  Primary     Cardiomyopathy, ischemic         Nonrheumatic aortic valve stenosis         H/O aortic valve replacement                To Do List           Future Appointments        Provider Department Dept Phone    2017 10:45 AM [facility] XR1- [facility] [phone]    2017 11:00 AM PULMONARY LAB, [facility] [phone]    2017 11:20 AM MD [facility] [place] [facility] [phone]    2017 10:15 AM Horace Mccloud MD [facility] - Ophthalmology [phone]      Goals (5 Years of Data)     None      Follow-Up and Disposition     Return in about 4 months (around 2017).      [facility]     [facility] Care [facility] -  Assistance  Registered nurses in the [facility] provide clinical advisement, health education, appointment booking, and other advisory services.  Call for this free service at [phone].             Medications           Message regarding Medications     Verify the changes and/or additions to your medication regime listed below are the same as discussed with your clinician today.  If any of these changes or additions are incorrect, please notify your healthcare provider.             Verify that the below list of medications is an accurate representation of the medications you are currently taking.  If none reported, the list may be blank. If incorrect, please contact your healthcare provider. Carry this list with you  "in case of emergency.           Current Medications     albuterol-ipratropium 2.5mg-0.5mg/3mL (DUO-NEB) 0.5 mg-3 mg(2.5 mg base)/3 mL nebulizer solution Take 3 mLs by nebulization every 8 (eight) hours as needed for Wheezing.    aspirin 81 MG Chew Take 81 mg by mouth once daily.    budesonide-formoterol 80-4.5 mcg (SYMBICORT) 80-4.5 mcg/actuation HFAA Inhale 2 puffs into the lungs 2 (two) times daily. Pharmacy to adjust to cheaper tier options    colchicine 0.6 mg tablet Take 1 tablet (0.6 mg total) by mouth once daily.    furosemide (LASIX) 20 MG tablet Take 2 tablets (40 mg total) by mouth once daily.    levetiracetam (KEPPRA) 750 MG Tab TAKE 1 TABLET TWICE DAILY    levetiracetam (KEPPRA) 750 MG Tab Take 1 tablet (750 mg total) by mouth 2 (two) times daily.    losartan (COZAAR) 25 MG tablet Take 0.5 tablets (12.5 mg total) by mouth once daily.    metoprolol tartrate (LOPRESSOR) 25 MG tablet Take 0.5 tablets (12.5 mg total) by mouth 2 (two) times daily.    OXYGEN-AIR DELIVERY SYSTEMS MISC by Misc.(Non-Drug; Combo Route) route.    potassium chloride SA (K-DUR,KLOR-CON) 20 MEQ tablet Take 1 tablet (20 mEq total) by mouth 2 (two) times daily.    simvastatin (ZOCOR) 40 MG tablet TAKE 1 TABLET EVERY EVENING.    tiotropium (SPIRIVA WITH HANDIHALER) 18 mcg inhalation capsule Inhale 1 capsule (18 mcg total) into the lungs once daily. Pharmacy to adjust to cheaper tier options           Clinical Reference Information           Your Vitals Were     BP Pulse Height Weight SpO2 BMI    120/60 (BP Location: Left arm, Patient Position: Sitting, BP Method: Manual) 62 5' 11" (1.803 m) 92.2 kg (203 lb 4.2 oz) 90% 28.35 kg/m2      Blood Pressure          Most Recent Value    BP  120/60      Allergies as of 3/29/2017     Doxycycline    Pneumococcal Vaccine      Immunizations Administered on Date of Encounter - 3/29/2017     None      [facility] Sign-Up     Activating your [facility] account is as easy as 1-2-3!     1) Visit " [email or web address], select Sign Up Now, enter this activation code and your date of birth, then select Next.  [email or web address]  Expires: 5/13/2017  2:04 PM      2) Create a username and password to use when you visit [facility] in the future and select a security question in case you lose your password and select Next.    3) Enter your e-mail address and click Sign Up!    Additional Information  If you have questions, please e-mail [email or web address] or call [phone] to talk to our [facility] staff. Remember, [facility] is NOT to be used for urgent needs. For medical emergencies, dial 911.         Language Assistance Services     ATTENTION: Language assistance services are available, free of charge. Please call [phone].      ATENCIÓN: Si habla español, tiene a novak disposición servicios gratuitos de asistencia lingüística. Llame al [phone].     CHÚ Ý: N?u b?n nói Ti?ng Vi?t, có các d?ch v? h? tr? ngôn ng? mi?n phí dành cho b?n. G?i s? [phone].         [facility] complies with applicable Federal civil rights laws and does not discriminate on the basis of race, color, national origin, age, disability, or sex.

## 2017-03-29 NOTE — PROGRESS NOTES
Subjective:   Patient ID:  Orion Rinaldi is a 81 y.o. male who presents for follow-up of Congestive Heart Failure and Coronary Artery Disease      HPI CHRONIC HFrEF, STAGE C, FC 3B   ISCHEMIC CMP    2- AORTIC STENOSIS, POST SAVR.  NO RECENT HOSPITALIZATIONS FOR ADHF. OR ACS  PATTERN OF HERNANDEZ- UNCHANGED. NO ORTHOPNEA OR PND  NO RECURRENT ANGINA  NO EXACERBATION OF PALPITATIONS. NO NEAR SYNCOPE OR SYNCOPE  NO UNUSUAL FEVER OR CHILLS  NO FOCAL CNS SYMPTOMS OR SIGNS TO SUGGEST TIA OR STROKE  NO UNUSUAL FEVER OR CHILLS    Review of Systems   Constitution: Negative for chills, fever, weakness, night sweats, weight gain and weight loss.   HENT: Negative for headaches and nosebleeds.    Eyes: Negative for blurred vision, double vision and visual disturbance.   Cardiovascular: Positive for dyspnea on exertion. Negative for chest pain, irregular heartbeat, leg swelling, orthopnea, palpitations, paroxysmal nocturnal dyspnea and syncope.   Respiratory: Negative for cough, hemoptysis and wheezing.    Endocrine: Negative for polydipsia and polyuria.   Hematologic/Lymphatic: Does not bruise/bleed easily.   Skin: Negative for rash.   Musculoskeletal: Negative for joint pain, joint swelling, muscle weakness and myalgias.   Gastrointestinal: Negative for abdominal pain, hematemesis, jaundice and melena.   Genitourinary: Negative for dysuria, hematuria and nocturia.   Neurological: Negative for dizziness, focal weakness and sensory change.   Psychiatric/Behavioral: Negative for depression. The patient does not have insomnia and is not nervous/anxious.      Family History   Problem Relation Age of Onset    Heart disease Brother     Cataracts Mother     No Known Problems Father     Cataracts Maternal Grandmother     No Known Problems Maternal Grandfather     Cataracts Paternal Grandmother     Cancer Paternal Grandfather     No Known Problems Sister     No Known Problems Maternal Aunt     No Known Problems Maternal Uncle      No Known Problems Paternal Aunt     No Known Problems Paternal Uncle     Anemia Neg Hx     Arrhythmia Neg Hx     Asthma Neg Hx     Clotting disorder Neg Hx     Fainting Neg Hx     Heart attack Neg Hx     Heart failure Neg Hx     Hyperlipidemia Neg Hx     Hypertension Neg Hx     Strabismus Neg Hx     Retinal detachment Neg Hx     Macular degeneration Neg Hx     Glaucoma Neg Hx     Amblyopia Neg Hx     Blindness Neg Hx     Diabetes Neg Hx     Stroke Neg Hx     Thyroid disease Neg Hx      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 90 vial 3    aspirin 81 MG Chew Take 81 mg by mouth once daily.      budesonide-formoterol 80-4.5 mcg (SYMBICORT) 80-4.5 mcg/actuation HFAA Inhale 2 puffs into the lungs 2 (two) times daily. Pharmacy to adjust to cheaper tier options 1 Inhaler 11    colchicine 0.6 mg tablet Take 1 tablet (0.6 mg total) by mouth once daily. 7 tablet 0    furosemide (LASIX) 20 MG tablet Take 2 tablets (40 mg total) by mouth once daily. 30 tablet 1    levetiracetam (KEPPRA) 750 MG Tab TAKE 1 TABLET TWICE DAILY 180 tablet 4    levetiracetam (KEPPRA) 750 MG Tab Take 1 tablet (750 mg total) by mouth 2 (two) times daily. 180 tablet 3    losartan (COZAAR) 25 MG tablet Take 0.5 tablets (12.5 mg total) by mouth once  daily. 30 tablet 1    metoprolol tartrate (LOPRESSOR) 25 MG tablet Take 0.5 tablets (12.5 mg total) by mouth 2 (two) times daily. 30 tablet 0    OXYGEN-AIR DELIVERY SYSTEMS MISC [place] [facility]Non-Drug; Combo Route) route.      potassium chloride SA (K-DUR,KLOR-CON) 20 MEQ tablet Take 1 tablet (20 mEq total) by mouth 2 (two) times daily. 180 tablet 3    simvastatin (ZOCOR) 40 MG tablet TAKE 1 TABLET EVERY EVENING. 90 tablet 3    tiotropium (SPIRIVA WITH HANDIHALER) 18 mcg inhalation capsule Inhale 1 capsule (18 mcg total) into the lungs once daily. Pharmacy to adjust to cheaper tier options 90 capsule 4     No current facility-administered medications on file prior to visit.      Review of patient's allergies indicates:   Allergen Reactions    Doxycycline Nausea Only and Other (See Comments)     Dizziness     Pneumococcal vaccine        Objective:     Physical Exam   Constitutional: He is oriented to person, place, and time. He appears well-developed. No distress.   HENT:   Head: Normocephalic.   Eyes: Conjunctivae are normal. Pupils are equal, round, and reactive to light.   Neck: Neck supple. No JVD present. No thyromegaly present.   Cardiovascular: Normal rate, regular rhythm, normal heart sounds and intact distal pulses.  Exam reveals no gallop and no friction rub.    No murmur heard.  Pulses:       Carotid pulses are 2+ on the right side, and 2+ on the left side.       Radial pulses are 2+ on the right side, and 2+ on the left side.        Femoral pulses are 2+ on the right side, and 2+ on the left side.       Popliteal pulses are 2+ on the right side, and 2+ on the left side.        Dorsalis pedis pulses are 2+ on the right side, and 2+ on the left side.        Posterior tibial pulses are 2+ on the right side, and 2+ on the left side.   Pulmonary/Chest: He has no wheezes. He has rhonchi in the right upper field and the left upper field. He has no rales. He exhibits no tenderness.   Abdominal: Soft. Bowel  sounds are normal. He exhibits no mass. There is no hepatosplenomegaly. There is no tenderness.   Musculoskeletal: He exhibits edema. He exhibits no tenderness.        Cervical back: Normal.        Thoracic back: Normal.        Lumbar back: Normal.   1+ BILATERAL EDEMA OF LE   Lymphadenopathy:     He has no cervical adenopathy.     He has no axillary adenopathy.        Right: No supraclavicular adenopathy present.        Left: No supraclavicular adenopathy present.   Neurological: He is alert and oriented to person, place, and time. He has normal strength. No sensory deficit. Gait normal.   Skin: Skin is warm. No cyanosis. No pallor. Nails show no clubbing.   Psychiatric: He has a normal mood and affect. His speech is normal and behavior is normal. Cognition and memory are normal.       Assessment:     1. Chronic systolic congestive heart failure    2. Cardiomyopathy, ischemic    3. Nonrheumatic aortic valve stenosis    4. H/O aortic valve replacement      CLINICALLY COMPENSATED HF  STABLE PATTERN OF ANGINA  NO SIGNIFICANT ARRHYTHMIAS  CNS STATUS STABLE  BP CONTROLLED  CARD MED WELL TOLERATED  Plan:     Chronic systolic congestive heart failure    Cardiomyopathy, ischemic    Nonrheumatic aortic valve stenosis    H/O aortic valve replacement    1- CONTINUE PRESENT CARD MANAGEMENT    2- RETURN IN 4 MONTHS

## 2017-04-25 NOTE — MR AVS SNAPSHOT
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         Moderate COPD (chronic obstructive pulmonary disease)         Persistent headaches     Urge to F/u with Dr Cramer           To Do List           Future Appointments        Provider Department Dept Phone    2017 10:15 AM Horace Mccloud MD [facility] [phone]    2017 9:15 AM [facility]- [facility] [phone]    2017 9:30 AM [facility], [facility] [phone]    2017 10:10 AM [facility] [phone]    2017 10:30 AM [facility] [phone]      Goals (5 Years of Data)     None      Follow-Up and Disposition     Return in about 6 months (around 10/25/2017) for See Dr Cramer for RENAE, CXr, 6MWd, CXR.      [facility]     [facility] Nurse Care Line -  Assistance  Unless otherwise directed by your provider, please contact [facility], our nurse care line that is available for  assistance.     Registered nurses in the [facility] provide: appointment scheduling, clinical advisement, health education, and other advisory services.  Call: [phone] (toll free)               Medications           Message regarding Medications     Verify the changes and/or additions to your medication regime listed below are the same as discussed with your clinician today.  If any of  "these changes or additions are incorrect, please notify your healthcare provider.             Verify that the below list of medications is an accurate representation of the medications you are currently taking.  If none reported, the list may be blank. If incorrect, please contact your healthcare provider. Carry this list with you in case of emergency.           Current Medications     albuterol-ipratropium 2.5mg-0.5mg/3mL (DUO-NEB) 0.5 mg-3 mg(2.5 mg base)/3 mL nebulizer solution Take 3 mLs by nebulization every 8 (eight) hours as needed for Wheezing.    aspirin 81 MG Chew Take 81 mg by mouth once daily.    budesonide-formoterol 80-4.5 mcg (SYMBICORT) 80-4.5 mcg/actuation HFAA Inhale 2 puffs into the lungs 2 (two) times daily. Pharmacy to adjust to cheaper tier options    colchicine 0.6 mg tablet Take 1 tablet (0.6 mg total) by mouth once daily.    furosemide (LASIX) 20 MG tablet Take 2 tablets (40 mg total) by mouth once daily.    levetiracetam (KEPPRA) 750 MG Tab TAKE 1 TABLET TWICE DAILY    levetiracetam (KEPPRA) 750 MG Tab Take 1 tablet (750 mg total) by mouth 2 (two) times daily.    losartan (COZAAR) 25 MG tablet Take 0.5 tablets (12.5 mg total) by mouth once daily.    metoprolol tartrate (LOPRESSOR) 25 MG tablet Take 0.5 tablets (12.5 mg total) by mouth 2 (two) times daily.    OXYGEN-AIR DELIVERY SYSTEMS MISC by Misc.(Non-Drug; Combo Route) route.    potassium chloride SA (K-DUR,KLOR-CON) 20 MEQ tablet Take 1 tablet (20 mEq total) by mouth 2 (two) times daily.    simvastatin (ZOCOR) 40 MG tablet TAKE 1 TABLET EVERY EVENING.    tiotropium (SPIRIVA WITH HANDIHALER) 18 mcg inhalation capsule Inhale 1 capsule (18 mcg total) into the lungs once daily. Pharmacy to adjust to cheaper tier options           Clinical Reference Information           Your Vitals Were     BP Pulse Resp Height Weight SpO2    120/64 60 18 5' 11" (1.803 m) 91.6 kg (202 lb) 94%    BMI                28.17 kg/m2          Blood Pressure       "    Most Recent Value    BP  120/64      Allergies as of 4/25/2017     Doxycycline    Pneumococcal Vaccine      Immunizations Administered on Date of Encounter - 4/25/2017     None      Orders Placed During Today's Visit     Future Labs/Procedures Expected by Expires    X-Ray Chest PA And Lateral  4/25/2017 4/25/2018    Complete PFT without bronchodilator - Clinic  As directed 4/25/2018    PULM - Arterial Blood Gases--in addition to PFT only  As directed 4/25/2018    Stress test, pulmonary  As directed 4/25/2018      [facility] Sign-Up     Activating your [facility] account is as easy as 1-2-3!     1) Visit [email or web address], select Sign Up Now, enter this activation code and your date of birth, then select Next.  [email or web address]  Expires: 5/13/2017  2:04 PM      2) Create a username and password to use when you visit [facility] in the future and select a security question in case you lose your password and select Next.    3) Enter your e-mail address and click Sign Up!    Additional Information  If you have questions, please e-mail [email or web address] or call [phone] to talk to our [facility] staff. Remember, [facility] is NOT to be used for urgent needs. For medical emergencies, dial 911.         Instructions    Thank You    Thank you for choosing the PULMONARY MEDICINE DEPARTMENT at [facility]. At [facility], your satisfaction is our priority. You may receive a survey asking about your experience with us. We would appreciate you taking the time to complete and return the survey. Our goal is to provide you with a level 5 or Very Good experience. We thank you for allowing us to serve you and value your feedback.    Your Nurse/Medical Assistant: ___Marva Chamberlain  ___________________________    Sincerely,  Pulmonary Department  Phone: [phone]  Fax: [phone]            Language Assistance Services     ATTENTION: Language assistance services are available, free of charge. Please call  [phone].      ATENCIÓN: Si habla español, tiene a novak disposición servicios gratuitos de asistencia lingüística. Llame al [phone].     CHÚ Ý: N?u b?n nói Ti?ng Vi?t, có các d?ch v? h? tr? ngôn ng? mi?n phí dành cho b?n. G?i s? [phone].         [facility] complies with applicable Federal civil rights laws and does not discriminate on the basis of race, color, national origin, age, disability, or sex.

## 2017-04-25 NOTE — ASSESSMENT & PLAN NOTE
CAT score 26  Immunizations are current  Meds: DUONEB, Symbicort and Spiriva  Completed Pul rehab  Spirometry stable

## 2017-04-25 NOTE — PATIENT INSTRUCTIONS
Thank You    Thank you for choosing the PULMONARY MEDICINE DEPARTMENT at [facility]. At [facility], your satisfaction is our priority. You may receive a survey asking about your experience with us. We would appreciate you taking the time to complete and return the survey. Our goal is to provide you with a level 5 or Very Good experience. We thank you for allowing us to serve you and value your feedback.    Your Nurse/Medical Assistant: ___Marva López  ___________________________    Sincerely,  Pulmonary Department  Phone: [phone]  Fax: [phone]

## 2017-04-25 NOTE — PROGRESS NOTES
Subjective:      Orion Rinaldi is a 81 y.o. male with known COPD   Last visit was 1/25/2017  COPD test score is 26.  Accompanied by his wife  Concern about headaches think  that this may be related to the bronchodilators  This is a very unusual concern  I note patient is in Keppra prescribed by Dr. Cramer  I have strongly urged him to follow-up with him but they do not want to  I reviewed his spirometry today actually shows improvement in the FEV1 by 13%.    Respiratory-wise patient still has chronic dyspnea MRC grade score 2.  No wheezing no cough.  He has a  moderate obstructive defect medications DuoNeb via's Symbicort and Spiriva  He is on oxygen 4 L/m.  He notes that when he ambulates his oxygen drops however I do not have data to verify this.    His fingers do seem a little bluish.  When I see him next time we'll get arterial blood gas  He is also followed up in the heart failure clinic note his last echocardiogram EF was 45% diastolic dysfunction and pulmonary atrial pressure is 69      The following portions of the patient's history were reviewed and updated as appropriate:   He  has a past medical history of Acute on chronic systolic CHF (congestive heart failure), NYHA class 4 (11/18/2014); Arthritis; Asthma; Cancer; Cardiomyopathy (11/25/2014); COPD (chronic obstructive pulmonary disease); Coronary artery disease; Hypercholesterolemia (11/4/2016); Mitral stenosis (11/25/2014); Pulmonary HTN (11/25/2014); S/P TAVR (transcatheter aortic valve replacement) (07/08/2013); and Seizures.  He has Pulmonary heart disease on his pertinent problem list.  He  has a past surgical history that includes Cardiac surgery; Tonsillectomy; Skin biopsy; Coronary artery bypass graft (1997); Cardiac catheterization; Cardiac valuve replacement; Aortic valve replacement; Cataract extraction w/  intraocular lens implant (OD 3/18/09); and Cataract extraction w/  intraocular lens implant (OS 4/1/09).  His family history includes  Cancer in his paternal grandfather; Cataracts in his maternal grandmother, mother, and paternal grandmother; Heart disease in his brother; No Known Problems in his father, maternal aunt, maternal grandfather, maternal uncle, paternal aunt, paternal uncle, and sister. There is no history of Anemia, Arrhythmia, Asthma, Clotting disorder, Fainting, Heart attack, Heart failure, Hyperlipidemia, Hypertension, Strabismus, Retinal detachment, Macular degeneration, Glaucoma, Amblyopia, Blindness, Diabetes, Stroke, or Thyroid disease.  He  reports that he quit smoking about 19 years ago. His smoking use included Cigarettes. He has a 20.00 pack-year smoking history. He has never used smokeless tobacco. He reports that he does not drink alcohol or use illicit drugs.  He has a current medication list which includes the following prescription(s): albuterol-ipratropium 2.5mg-0.5mg/3ml, aspirin, budesonide-formoterol 80-4.5 mcg, colchicine, furosemide, levetiracetam, levetiracetam, losartan, metoprolol tartrate, oxygen-air delivery systems, potassium chloride sa, simvastatin, and tiotropium.  Current Outpatient Prescriptions on File Prior to Visit   Medication Sig Dispense Refill    albuterol-ipratropium 2.5mg-0.5mg/3mL (DUO-NEB) 0.5 mg-3 mg(2.5 mg base)/3 mL nebulizer solution Take 3 mLs by nebulization every 8 (eight) hours as needed for Wheezing. 90 vial 3    aspirin 81 MG Chew Take 81 mg by mouth once daily.      budesonide-formoterol 80-4.5 mcg (SYMBICORT) 80-4.5 mcg/actuation [facility] Inhale 2 puffs into the lungs 2 (two) times daily. Pharmacy to adjust to cheaper tier options 1 Inhaler 11    colchicine 0.6 mg tablet Take 1 tablet (0.6 mg total) by mouth once daily. 7 tablet 0    furosemide (LASIX) 20 MG tablet Take 2 tablets (40 mg total) by mouth once daily. 30 tablet 1    levetiracetam (KEPPRA) 750 MG Tab TAKE 1 TABLET TWICE DAILY 180 tablet 4    levetiracetam (KEPPRA) 750 MG Tab Take 1 tablet (750 mg total) by mouth 2  "(two) times daily. 180 tablet 3    losartan (COZAAR) 25 MG tablet Take 0.5 tablets (12.5 mg total) by mouth once daily. 30 tablet 1    metoprolol tartrate (LOPRESSOR) 25 MG tablet Take 0.5 tablets (12.5 mg total) by mouth 2 (two) times daily. 30 tablet 0    OXYGEN-AIR DELIVERY SYSTEMS MISC by Misc.(Non-Drug; Combo Route) route.      potassium chloride SA (K-DUR,KLOR-CON) 20 MEQ tablet Take 1 tablet (20 mEq total) by mouth 2 (two) times daily. 180 tablet 3    simvastatin (ZOCOR) 40 MG tablet TAKE 1 TABLET EVERY EVENING. 90 tablet 3    tiotropium (SPIRIVA WITH HANDIHALER) 18 mcg inhalation capsule Inhale 1 capsule (18 mcg total) into the lungs once daily. Pharmacy to adjust to cheaper tier options 90 capsule 4     No current facility-administered medications on file prior to visit.      He is allergic to doxycycline and pneumococcal vaccine..    Review of Systems  A comprehensive review of systems was negative except for: Constitutional: positive for fatigue  Respiratory: positive for dyspnea on exertion  Neurological: positive for headaches  Behavioral/Psych: positive for good mood       Objective:      /64  Pulse 60  Resp 18  Ht 5' 11" (1.803 m)  Wt 91.6 kg (202 lb)  SpO2 (!) 94% Comment: oxygen 4.0 LPM  BMI 28.17 kg/m2     Physical Exam   Constitutional: He is oriented to person, place, and time. Vital signs are normal. He appears well-developed and well-nourished. He has a sickly appearance.   HENT:   Head: Normocephalic and atraumatic.   Nose: Nose normal.   Mouth/Throat: No oropharyngeal exudate.   Eyes: Conjunctivae and EOM are normal. Pupils are equal, round, and reactive to light. Left eye exhibits no discharge. No scleral icterus.   Neck: Normal range of motion. Neck supple. No JVD present. No tracheal deviation present. No thyromegaly present.   Cardiovascular: Normal rate and regular rhythm.  PMI is not displaced.    Murmur heard.  Pulmonary/Chest: Effort normal. No respiratory distress. " He has no wheezes.   Abdominal: Soft. Bowel sounds are normal. He exhibits no distension.   Musculoskeletal: Normal range of motion. He exhibits no edema or deformity.   Neurological: He is alert and oriented to person, place, and time. He has normal reflexes. No cranial nerve deficit. Coordination normal.   Skin: Skin is warm and dry. No rash noted. There is cyanosis. Nails show no clubbing.   Nursing note and vitals reviewed.          FEV1 2.16L ( 72%), FVC 3.24( 77%)  FEV1/FVc67  FEV1 improved by 13%  FVC improved by 7%    CXR  Allowing for differences in positioning and technique, there has been no significant interval change.  Previously described findings are again noted and stable in appearance.       Impression            Stable exam without acute infiltrate.         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  Possible vascular/ hypoxemia         Plan:      Return in about 6 months (around 10/25/2017) for See Dr Cramer for RENAE, CXr, 6MWd, CXR.    This note was prepared using voice recognition system and is likely to have sound alike errors that may  have been overlooked even after proof reading.  Please call me with any questions    Discussed diagnosis, its evaluation, treatment and usual course. All questions answered.    Thank you for the courtesy of participating in the care of this patient    Francis Downing MD

## 2017-05-16 PROBLEM — J96.21 ACUTE ON CHRONIC RESPIRATORY FAILURE WITH HYPOXIA: Status: ACTIVE | Noted: 2017-01-01

## 2017-05-16 NOTE — PROGRESS NOTES
Pt admitted to room 203, CPAP at bedside, pt has own personal O2 portable O2 at bedside, unable to obtain a pulse oximeter on pt, pt noted to have nailbeds discolored, ear lobe unable to obtain, resp called to come and assess pt, able to obtain pulse ox on ear lobe 98%, no distress noted, v/s within normal range, pt and spouse oriented to room, pt placed on heart monitor A Fib RVR. Admission assessment performed at bedside.

## 2017-05-16 NOTE — IP AVS SNAPSHOT
[facility]  [address] [place] [address] [address] J[place] OB[place]O [address]           Patient Discharge Instructions   Our goal is to set you up for success. This packet includes information on your condition, medications, and your home care.  It will help you care for yourself to prevent having to return to the hospital.     Please ask your nurse if you have any questions.      There are many details to remember when preparing to leave the hospital. Here is what you will need to do:    1. Take your medicine. If you are prescribed medications, review your Medication List on the following pages. You may have new medications to  at the pharmacy and others that you'll need to stop taking. Review the instructions for how and when to take your medications. Talk with your doctor or nurses if you are unsure of what to do.     2. Go to your follow-up appointments. Specific follow-up information is listed in the following pages. Your may be contacted by a nurse or clinical provider about future appointments. Be sure we have all of the phone numbers to reach you. Please contact your provider's office if you are unable to make an appointment.     3. Watch for warning signs. Your doctor or nurse will give you detailed warning signs to watch for and when to call for assistance. These instructions may also include educational information about your condition. If you experience any of warning signs to your health, call your doctor.               ** Verify the list of medication(s) below is accurate and up to date. Carry this with you in case of emergency. If your medications have changed, please notify your healthcare provider.             Medication List      START taking these medications        Additional Info                      dextromethorphan-guaifenesin  mg  mg per 12 hr tablet   Commonly known as:  MUCINEX DM   Refills:  0   Dose:  1 tablet    Last time this was given:  1 tablet on 5/20/2017  8:57  AM   Instructions:  Take 1 tablet by mouth 2 (two) times daily.     Begin Date    AM    Noon    PM    Bedtime       levoFLOXacin 500 MG tablet   Commonly known as:  LEVAQUIN   Quantity:  5 tablet   Refills:  0   Dose:  500 mg    Start Date:  2017   Instructions:  Take 1 tablet (500 mg total) by mouth once daily.     Begin Date    AM    Noon    PM    Bedtime       predniSONE 10 mg tablet pack   Commonly known as:  DELTASONE   Quantity:  32 tablet   Refills:  0    Start Date:  2017   Instructions:  Si tabs daily x 3 days, 3 tabs daily x 3 days, 2 tabs daily x 3 days, 1 tabs daily x 3 days, 1/2 tab daily x 3 days     Begin Date    AM    Noon    PM    Bedtime       roflumilast 500 mcg Tab   Quantity:  30 tablet   Refills:  0   Dose:  500 mcg    Last time this was given:  500 mcg on 2017  8:52 AM   Instructions:  Take 1 tablet (500 mcg total) by mouth once daily.     Begin Date    AM    Noon    PM    Bedtime       spironolactone 25 MG tablet   Commonly known as:  ALDACTONE   Quantity:  60 tablet   Refills:  0   Dose:  25 mg    Last time this was given:  25 mg on 2017  8:52 AM   Instructions:  Take 1 tablet (25 mg total) by mouth 2 (two) times daily.     Begin Date    AM    Noon    PM    Bedtime         CHANGE how you take these medications        Additional Info                      levetiracetam 750 MG Tab   Commonly known as:  KEPPRA   Quantity:  180 tablet   Refills:  3   Dose:  750 mg   What changed:  Another medication with the same name was removed. Continue taking this medication, and follow the directions you see here.    Last time this was given:  750 mg on 2017  8:56 AM   Instructions:  Take 1 tablet (750 mg total) by mouth 2 (two) times daily.     Begin Date    AM    Noon    PM    Bedtime         CONTINUE taking these medications        Additional Info                      albuterol-ipratropium 2.5mg-0.5mg/3mL 0.5 mg-3 mg(2.5 mg base)/3 mL nebulizer solution   Commonly known as:   DUO-NEB   Quantity:  90 vial   Refills:  3   Dose:  3 mL   Comments:  Please call patient to pick prescription once it is ready.    Last time this was given:  3 mLs on 5/20/2017  8:26 AM   Instructions:  Take 3 mLs by nebulization every 8 (eight) hours as needed for Wheezing.     Begin Date    AM    Noon    PM    Bedtime       aspirin 81 MG Chew   Refills:  0   Dose:  81 mg    Last time this was given:  81 mg on 5/20/2017  8:56 AM   Instructions:  Take 81 mg by mouth once daily.     Begin Date    AM    Noon    PM    Bedtime       budesonide-formoterol 80-4.5 mcg 80-4.5 mcg/actuation Hfaa   Commonly known as:  SYMBICORT   Quantity:  1 Inhaler   Refills:  11   Dose:  2 puff   Comments:  Please call patient to pick prescription once it is ready.    Instructions:  Inhale 2 puffs into the lungs 2 (two) times daily. Pharmacy to adjust to cheaper tier options     Begin Date    AM    Noon    PM    Bedtime       furosemide 20 MG tablet   Commonly known as:  LASIX   Quantity:  30 tablet   Refills:  1   Dose:  40 mg    Last time this was given:  20 mg on 5/19/2017  3:31 PM   Instructions:  Take 2 tablets (40 mg total) by mouth once daily.     Begin Date    AM    Noon    PM    Bedtime       losartan 25 MG tablet   Commonly known as:  COZAAR   Quantity:  30 tablet   Refills:  1   Dose:  12.5 mg    Last time this was given:  12.5 mg on 5/20/2017  8:50 AM   Instructions:  Take 0.5 tablets (12.5 mg total) by mouth once daily.     Begin Date    AM    Noon    PM    Bedtime       metoprolol tartrate 25 MG tablet   Commonly known as:  LOPRESSOR   Quantity:  30 tablet   Refills:  0   Dose:  12.5 mg    Last time this was given:  12.5 mg on 5/20/2017  8:51 AM   Instructions:  Take 0.5 tablets (12.5 mg total) by mouth 2 (two) times daily.     Begin Date    AM    Noon    PM    Bedtime       OXYGEN-AIR DELIVERY SYSTEMS MISC   Refills:  0    Instructions:  by Misc.(Non-Drug; Combo Route) route.     Begin Date    AM    Noon    PM    Bedtime        potassium chloride SA 20 MEQ tablet   Commonly known as:  K-DUR,KLOR-CON   Quantity:  180 tablet   Refills:  3   Dose:  20 mEq    Instructions:  Take 1 tablet (20 mEq total) by mouth 2 (two) times daily.     Begin Date    AM    Noon    PM    Bedtime       simvastatin 40 MG tablet   Commonly known as:  ZOCOR   Quantity:  90 tablet   Refills:  3    Last time this was given:  40 mg on 5/19/2017  8:56 PM   Instructions:  TAKE 1 TABLET EVERY EVENING.     Begin Date    AM    Noon    PM    Bedtime       tiotropium 18 mcg inhalation capsule   Commonly known as:  SPIRIVA WITH HANDIHALER   Quantity:  90 capsule   Refills:  4   Dose:  18 mcg   Comments:  Please call patient to pick prescription once it is ready.    Instructions:  Inhale 1 capsule (18 mcg total) into the lungs once daily. Pharmacy to adjust to cheaper tier options     Begin Date    AM    Noon    PM    Bedtime         STOP taking these medications     colchicine 0.6 mg tablet            Where to Get Your Medications      These medications were sent to [facility] [place] - [address] [facility]  [address] [facility] [place] [address]     Phone:  [phone]     levoFLOXacin 500 MG tablet    predniSONE 10 mg tablet pack    roflumilast 500 mcg Tab    spironolactone 25 MG tablet         You can get these medications from any pharmacy     You don't need a prescription for these medications     dextromethorphan-guaifenesin  mg  mg per 12 hr tablet                  Please bring to all follow up appointments:    1. A copy of your discharge instructions.  2. All medicines you are currently taking in their original bottles.  3. Identification and insurance card.    Please arrive 15 minutes ahead of scheduled appointment time.    Please call 24 hours in advance if you must reschedule your appointment and/or time.        Your Scheduled Appointments     Jul 27, 2017 10:15 AM CDT   Established Patient Visit with Horace Mccloud  [facility]    [address] [facility] [address]   [phone]            Nov 01, 2017  9:15 AM CDT   Diagnostic Xray with [facility] XR[facility]-   [facility]    [address] [place] [id number]   [phone]            Nov 01, 2017  9:30 AM CDT   Complete PFT with [facility]   [facility]    [address] [place] [address]   [phone]            Nov 01, 2017 10:10 AM CDT   Walk with [facility]MONARY [facility]   [facility] ([facility])    [address] [place] [id number] [phone]            Nov 01, 2017 10:30 AM CDT   ABG with [facility]MONARY [facility]   [facility]    [address] [place] [id number] [phone]              Follow-up Information     Follow up with Daisy Oconnor MD In 3 days.    Specialty:  Internal Medicine    Why:  BMP in 3 days     Contact information:    [address] [facility] [id number] [phone]          Follow up with [facility] In 1 week.    Specialty:  Pulmonology    Contact information:    [address] [place] [address] [phone]    Additional information:    (off [facility]) 2nd floor        Follow up with Jordan Solano MD In 1 week.    Specialties:  Cardiology, Nuclear Medicine    Contact information:    [address] [facility]  [facility] [address] [phone]          Discharge Instructions     Future Orders    Activity as tolerated     Diet general     Questions:    Total calories:      Fat restriction, if any:      Protein restriction, if any:      Na restriction, if any:  2gNa    Fluid restriction:  Fluid - 1500mL    Additional restrictions:  Cardiac (Low Na/Chol)        Primary Diagnosis     Your primary diagnosis was:  Respiratory Failure      Admission Information      "Date & Time Provider Department CSN    5/16/2017 10:17 AM Carl Lee MD [facility] [id number]      Care Providers     Provider Role Specialty Primary office phone    Carl Lee MD Attending Provider Internal Medicine [phone]    Carl Lee MD Team Attending  Internal Medicine [phone]    Anthony Hinton MD Consulting Physician  Pulmonary Disease [phone]    Maury Mario MD Consulting Physician  Cardiology [phone]      Important Medicare Message          Most Recent Value    Important Message from Medicare Regarding Discharge Appeal Rights  Given to patient/caregiver, Explained to patient/caregiver, Signed/date by patient/caregiver yes 05/19/2017 1146      Your Vitals Were     BP Pulse Temp Resp    128/67 (BP Location: Left arm, Patient Position: Lying, BP Method: Automatic) 90 97.7 °F (36.5 °C) (Axillary) 20    Height Weight SpO2 BMI    5' 11" (1.803 m) 86.4 kg (190 lb 8 oz) 94% 26.57 kg/m2      Recent Lab Values        9/13/2004 5/24/2006 10/23/2006                     7:51 AM  8:43 AM  7:13 AM         A1C 5.1 5.3 5.3                   Allergies as of 5/20/2017        Reactions    Doxycycline Nausea Only, Other (See Comments)    Dizziness     Pneumococcal Vaccine       [facility]     [facility] Nurse Care Line - 24/7 Assistance  Unless otherwise directed by your provider, please contact [facility], our nurse care line that is available for 24/7 assistance.     Registered nurses in the [facility] provide clinical advisement, health education, appointment booking, and other advisory services.  Call for this free service at [phone].        Advance Directives     An advance directive is a document which, in the event you are no longer able to make decisions for yourself, tells your healthcare team what kind of treatment you do or do not want to receive, or who you would like to make those decisions for you.  If you do not " currently have an advance directive, [facility] encourages you to create one.  For more information call:  [phone]), [phone] ([phone]),  or log on to [email or web address].        Smoking Cessation     If you would like to quit smoking:   You may be eligible for free services if you are a [place] resident and started smoking cigarettes before September 1, 1988.  Call the Smoking Cessation Trust (SCT) toll free at (881) 466-8230 or (828) 082-2395.   Call 0-454-QUIT-NOW if you do not meet the above criteria.   Contact us via email: [email or web address]   View our website for more information: [email or web address]        Language Assistance Services     ATTENTION: Language assistance services are available, free of charge. Please call [phone].      ATENCIÓN: Si habla español, tiene a novak disposición servicios gratuitos de asistencia lingüística. Llame al [phone].     [facility] Ý: N?u b?n nói Ti?ng Vi?t, có các d?ch v? h? tr? ngôn ng? mi?n phí dành cho b?n. G?i s? [phone].        Heart Failure Education       Heart Failure: Being Active  You have a condition called heart failure. Being active doesnt mean that you have to wear yourself out. Even a little movement each day helps to strengthen your heart. If you cant get out to exercise, you can do simple stretching and strengthening exercises at home. These are good ways to keep you well-conditioned and prevent you and your heart from becoming excessively weak.    Ideas to get you started  · Add a little movement to things you do now. Walk to mail letters. Park your car at the far end of the parking lot and walk to the store. Walk up a flight of stairs instead of taking the elevator.  · Choose activities you enjoy. You might walk, swim, or ride an exercise bike. Things like gardening and washing the car count, too. Other possibilities include: washing dishes, walking the dog, walking around the mall, and doing  aerobic activities with friends.  · Join a group exercise program at a [facility], a senior center, or a community center. Or look into a hospital cardiac rehabilitation program. Ask your doctor if you qualify.  Tips to keep you going  · Get up and get dressed each day. Go to a coffee shop and read a newspaper or go somewhere that you'll be in the presence of other active people. Youll feel more like being active.  · Make a plan. Choose one or more activities that you enjoy and that you can easily do. Then plan to do at least one each day. You might write your plan on a calendar.  · Go with a friend or a group if you like company. This can help you feel supported and stay motivated, too.  · Plan social events that you enjoy. This will keep you mentally engaged as well as physically motivated to do things you find pleasure in.  For your safety  · Talk with your healthcare provider before starting an exercise program.  · Exercise indoors when its too hot or too cold outside, or when the air quality is poor. Try walking at a shopping mall.  · Wear socks and sturdy shoes to maintain your balance and prevent falls.  · Start slowly. Do a few minutes several times a day at first. Increase your time and speed little by little.  · Stop and rest whenever you feel tired or get short of breath.  · Dont push yourself on days when you dont feel well.  Date Last Reviewed: 3/20/2016  © 1748-2203 [employer]. [address] [place] [address], [place] [address]. All rights reserved. This information is not intended as a substitute for professional medical care. Always follow your healthcare professional's instructions.              Heart Failure: Evaluating Your Heart  You have a condition called heart failure. To evaluate your condition, your doctor will examine you, ask questions, and do some tests. Along with looking for signs of heart failure, the doctor looks for any other health problems that may have led to heart  failure. The results of your evaluation will help your doctor form a treatment plan.  Health history and physical exam  Your visit will start with a health history. Tell the doctor about any symptoms youve noticed and about all medicines you take. Then youll have a physical exam. This includes listening to your heartbeat and breathing. Youll also be checked for swelling (edema) in your legs and neck. When you have fluid buildup or fluid in the lungs, it may be called congestive heart failure.  Diagnosing heart failure     During an echocardiogram, sound waves bounce off the heart. These are converted into a picture on the screen.   The following may be done to help your doctor form a diagnosis:  · X-rays show the size and shape of your heart. These pictures can also show fluid in your lungs.  · An electrocardiogram (ECG or EKG) shows the pattern of your heartbeat. Small pads (electrodes) are placed on your chest, arms, and legs. Wires connect the pads to the ECG machine, which records your hearts electrical signals. This can give the doctor information about heart function.  · An echocardiogram uses ultrasound waves to show the structure and movement of your heart muscle. This shows how well the heart pumps. It also shows the thickness of the heart walls, and if the heart is enlarged. It is one of the most useful, non-invasive tests as it provides information about the heart's general function. This helps your doctor make treatment decisions.  · Lab tests evaluate small amounts of blood or urine for signs of problems. A BNP lab test can help diagnose and evaluate heart failure. BNP stands for B-type natriuretic peptide. The ventricles secrete more BNP when heart failure worsens. Lab tests can also provide information about metabolic dysfunction or heart dysfunction.  Your treatment plan  Based on the results of your evaluation and tests, your doctor will develop a treatment plan. This plan is designed to relieve  some of your heart failure symptoms and help make you more comfortable. Your treatment plan may include:  · Medicine to help your heart work better and improve your quality of life  · Changes in what you eat and drink to help prevent fluid from backing up in your body  · Daily monitoring of your weight and heart failure symptoms to see how well your treatment plan is working  · Exercise to help you stay healthy  · Help with quitting smoking  · Emotional and psychological support to help adjust to the changes  · Referrals to other specialists to make sure you are being treated comprehensively  Date Last Reviewed: 3/21/2016  © 8943-0471 [employer]. [address] [place] [address]. All rights reserved. This information is not intended as a substitute for professional medical care. Always follow your healthcare professional's instructions.              Heart Failure: Making Changes to Your Diet  You have a condition called heart failure. When you have heart failure, excess fluid is more likely to build up in your body because your heart isn't working well. This makes the heart work harder to pump blood. Fluid buildup causes symptoms such as shortness of breath and swelling (edema). This is often referred to as congestive heart failure or CHF. Controlling the amount of salt (sodium) you eat may help stop fluid from building up. Your doctor may also tell you to reduce the amount of fluid you drink.  Reading food labels    Your healthcare provider will tell you how much sodium you can eat each day. Read food labels to keep track. Keep in mind that certain foods are high in salt. These include canned, frozen, and processed foods. Check the amount of sodium in each serving. Watch out for high-sodium ingredients. These include MSG (monosodium glutamate), baking soda, and sodium phosphate.   Eating less salt  Give yourself time to get used to eating less salt. It may take a little while. Here are some  tips to help:  · Take the saltshaker off the table. Replace it with salt-free herb mixes and spices.  · Eat fresh or plain frozen vegetables. These have much less salt than canned vegetables.  · Choose low-sodium snacks like sodium-free pretzels, crackers, or air-popped popcorn.  · Dont add salt to your food when youre cooking. Instead, season your foods with pepper, lemon, garlic, or onion.  · When you eat out, ask that your food be cooked without added salt.  · Avoid eating fried foods as these often have a great deal of salt.  If youre told to limit fluids  You may need to limit how much fluid you have to help prevent swelling. This includes anything that is liquid at room temperature, such as ice cream and soup. If your doctor tells you to limit fluid, try these tips:  · Measure drinks in a measuring cup before you drink them. This will help you meet daily goals.  · Chill drinks to make them more refreshing.  · Suck on frozen lemon wedges to quench thirst.  · Only drink when youre thirsty.  · Chew sugarless gum or suck on hard candy to keep your mouth moist.  · Weigh yourself daily to know if your body's fluid content is rising.  My sodium goal  Your healthcare provider may give you a sodium goal to meet each day. This includes sodium found in food as well as salt that you add. My goal is to eat no more than ___________ mg of sodium per day.     When to call your doctor  Call your doctor right away if you have any symptoms of worsening heart failure. These can include:  · Sudden weight gain  · Increased swelling of your legs or ankles  · Trouble breathing when youre resting or at night  · Increase in the number of pillows you have to sleep on  · Chest pain, pressure, discomfort, or pain in the jaw, neck, or back   Date Last Reviewed: 3/21/2016  © 3593-2764 [employer]. [address] [place] [address], [place] [address]. All rights reserved. This information is not intended as a substitute for  professional medical care. Always follow your healthcare professional's instructions.              Heart Failure: Medicines to Help Your Heart    You have a condition called heart failure (also known as congestive heart failure, or CHF). Your doctor will likely prescribe medicines for heart failure and any underlying health problems you have. Most heart failure patients take one or more types of medicinen. Your healthcare provider will work to find the combination of medicines that works best for you.  Heart failure medicines  Here are the most common heart failure medicines:  · ACE inhibitors lower blood pressure and decrease strain on the heart. This makes it easier for the heart to pump. Angiotensin receptor blockers have similar effects. These are prescribed for some patients instead of ACE inhibitors.  · Beta-blockers relieve stress on the heart. They also improve symptoms. They may also improve the heart's pumping action over time.  · Diuretics (also called water pills) help rid your body of excess water. This can help rid your body of swelling (edema). Having less fluid to pump means your heart doesnt have to work as hard. Some diuretics make your body lose a mineral called potassium. Your doctor will tell you if you need to take supplements or eat more foods high in potassium.  · Digoxin helps your heart pump with more strength. This helps your heart pump more blood with each beat. So, more oxygen-rich blood travels to the rest of the body.  · Aldosterone antagonists help alter hormones and decrease strain on the heart.  · Hydralazine and nitrates are two separate medicines used together to treat heart failure. They may come in one combination pill. They lower blood pressure and decrease how hard the heart has to pump.  Medicines for related conditions  Controlling other heart problems helps keep heart failure under control, too. Depending on other heart problems you have, medicines may be prescribed  to:  · Lower blood pressure (antihypertensives).  · Lower cholesterol levels (statins).  · Prevent blood clots (anticoagulants or aspirin).  · Keep the heartbeat steady (antiarrhythmics).  Date Last Reviewed: 3/5/2016  © 8862-9622 [employer]. [address] [place] [address]. All rights reserved. This information is not intended as a substitute for professional medical care. Always follow your healthcare professional's instructions.              Heart Failure: Procedures That May Help    The heart is a muscle that pumps oxygen-rich blood to all parts of the body. When you have heart failure, the heart is not able to pump as well as it should. Blood and fluid may back up into the lungs (congestive heart failure), and some parts of the body dont get enough oxygen-rich blood to work normally. These problems lead to the symptoms of heart failure.     Certain procedures may help the heart pump better in some cases of heart failure. Some procedures are done to treat health problems that may have caused the heart failure such as coronary artery disease or heart rhythm problems. For more serious heart failure, other options are available.  Treating artery and valve problems  If you have coronary artery disease or valve disease, procedures may be done to improve blood flow. This helps the heart pump better, which can improve heart failure symptoms. First, your doctor may do a cardiac catheterization to help detect clogged blood vessels or valve damage. During this procedure, a  thin tube (catheter) in inserted into a blood vessel and guided to the heart. There a dye is injected and a special type of X-ray (angiogram) is taken of the blood vessels. Procedures to open a blocked artery or fix damaged valves can also be done using catheterization.  · Angioplasty uses a balloon-tipped instrument at the end of the catheter. The balloon is inflated to widen the narrowed artery. In many cases, a stent is  expanded to further support the narrowed artery. A stent is a metal mesh tube.  · Valve surgery repairs or replacement of faulty valves can also be done during catheterization so blood can flow properly through the chambers of the heart.  Bypass surgery is another option to help treat blocked arteries. It uses a healthy blood vessel from elsewhere in the body. The healthy blood vessel is attached above and below the blocked area so that blood can flow around the blocked artery.  Treating heart rhythm problems  A device may be placed in the chest to help a weak heart maintain a healthy, heartbeat so the heart can pump more effectively:  · Pacemaker. A pacemaker is an implanted device that regulates your heartbeat electronically. It monitors your heart's rhythm and generates a painless electric impulse that helps the heart beat in a regular rhythm. A pacemaker is programmed to meet your specific heart rhythm needs.  · Biventricular pacing/cardiac resynchronization therapy. A type of pacemaker that paces both pumping chambers of the heart at the same time to coordinate contractions and to improve the heart's function. Some people with heart failure are candidates for this therapy.  · Implantable cardioverter defibrillator. A device similar to a pacemaker that senses when the heart is beating too fast and delivers an electrical shock to convert the fast rhythm to a normal rhythm. This can be a life saving device.  In severe cases  In more serious cases of heart failure when other treatments no longer work, other options may include:  · Ventricular assist devices (VADs). These are mechanical devices used to take over the pumping function for one or both of the heart's ventricles, or pumping chambers. A VAD may be necessary when heart failure progresses to the point that medicines and other treatments no longer help. In some cases, a VAD may be used as a bridge to transplant.  · Heart transplant. This is replacing the  diseased heart with a healthy one from a donor. This is an option for a few people who are very sick. A heart transplant is very serious and not an option for all patients. Your doctor can tell you more.  Date Last Reviewed: 3/20/2016  © 1884-0840 [employer]. [address] [place] [address]. All rights reserved. This information is not intended as a substitute for professional medical care. Always follow your healthcare professional's instructions.              Heart Failure: Tracking Your Weight  You have a condition called heart failure. When you have heart failure, a sudden weight gain or a steady rise in weight is a warning sign that your body is retaining too much water and salt. This could mean your heart failure is getting worse. If left untreated, it can cause problems for your lungs and result in shortness of breath. Weighing yourself each day is the best way to know if youre retaining water. If your weight goes up quickly, call your doctor. You will be given instructions on how to get rid of the excess water. You will likely need medicines and to avoid salt. This will help your heart work better.  Call your doctor if you gain more than 2 pounds in 1 day, more than 5 pounds in 1 week, or whatever weight gain you were told to report by your doctor. This is often a sign of worsening heart failure and needs to be evaluated and treated. Your doctor will tell you what to do next.   Tips for weighing yourself    · Weigh yourself at the same time each morning, wearing the same clothes. Weigh yourself after urinating and before eating.  · Use the same scale each day. Make sure the numbers are easy to read. Put the scale on a flat, hard surface -- not on a rug or carpet.  · Do not stop weighing yourself. If you forget one day, weigh again the next morning.  How to use your weight chart  · Keep your weight chart near the scale. Write your weight on the chart as soon as you get off the  scale.  · Fill in the month and the start date on the chart. Then write down your weight each day. Your chart will look like this:    · If you miss a day, leave the space blank. Weigh yourself the next day and write your weight in the next space.  · Take your weight chart with you when you go to see your doctor.  Date Last Reviewed: 3/20/2016  © 6134-2781 [employer]. [address] [place] [address], [place] [address]. All rights reserved. This information is not intended as a substitute for professional medical care. Always follow your healthcare professional's instructions.              Heart Failure: Warning Signs of a Flare-Up  You have a condition called heart failure. Once you have heart failure, flare-ups can happen. Below are signs that can mean your heart failure is getting worse. If you notice any of these warning signs, call your healthcare provider.  Swelling    · Your feet, ankles, or lower legs get puffier.  · You notice skin changes on your lower legs.  · Your shoes feel too tight.  · Your clothes are tighter in the waist.  · You have trouble getting rings on or off your fingers.  Shortness of breath  · You have to breathe harder even when youre doing your normal activities or when youre resting.  · You are short of breath walking up stairs or even short distances.  · You wake up at night short of breath or coughing.  · You need to use more pillows or sit up to sleep.  · You wake up tired or restless.  Other warning signs  · You feel weaker, dizzy, or more tired.  · You have chest pain or changes in your heartbeat.  · You have a cough that wont go away.  · You cant remember things or dont feel like eating.  Tracking your weight  Gaining weight is often the first warning sign that heart failure is getting worse. Gaining even a few pounds can be a sign that your body is retaining excess water and salt. Weighing yourself each day in the morning after you urinate and before you eat, is the best  way to know if you're retaining water. Get a scale that is easy to read and make sure you wear the same clothes and use the same scale every time you weigh. Your healthcare provider will show you how to track your weight. Call your doctor if you gain more than 2 pounds in 1 day, 5 pounds in 1 week, or whatever weight gain you were told to report by your doctor. This is often a sign of worsening heart failure and needs to be evaluated and treated before it compromises your breathing. Your doctor will tell you what to do next.    Date Last Reviewed: 3/15/2016  © 4397-2883 [employer]. [address] [place] [address], [place] [address]. All rights reserved. This information is not intended as a substitute for professional medical care. Always follow your healthcare professional's instructions.              [facility] Sign-Up     Activating your [facility] account is as easy as 1-2-3!     1) Visit [email or web address], select Sign Up Now, enter this activation code and your date of birth, then select Next.  [email or web address]  Expires: 7/4/2017  9:16 AM      2) Create a username and password to use when you visit [facility] in the future and select a security question in case you lose your password and select Next.    3) Enter your e-mail address and click Sign Up!    Additional Information  If you have questions, please e-mail [email or web address] or call [phone] to talk to our [facility] staff. Remember, [facility] is NOT to be used for urgent needs. For medical emergencies, dial 911.          [facility] -  complies with applicable Federal civil rights laws and does not discriminate on the basis of race, color, national origin, age, disability, or sex.

## 2017-05-16 NOTE — PROGRESS NOTES
Pt still SOB, pt placed on home trilogy machine, he refused our hospital equipment (Bipap)  HR 77, Sao2 96%, RR 24bpm on trilogy with 5lpm O2 bled in and his home settings.

## 2017-05-16 NOTE — TELEPHONE ENCOUNTER
Wife called stating that patient has increased shortness of breath with exertion. He is only able to take a few steps, complains of cough nonproductive she states that he uses his DUONEB at least 3 to 4 times daily without any relief. Advised wife to take patient to ER. She wants to know what you want her to do?

## 2017-05-16 NOTE — ED NOTES
No response received via secure chat from Dr. Lee regarding pt request for food; hospital medicine paged.

## 2017-05-16 NOTE — ED PROVIDER NOTES
SCRIBE #1 NOTE: I, Parvez Beaver, am scribing for, and in the presence of, Brittany Bernard MD. I have scribed the entire note.      History      Chief Complaint   Patient presents with    Shortness of Breath     pt c/o shortness of breath for the last several days. hx of copd, portable o2 on 5 lpm       Review of patient's allergies indicates:   Allergen Reactions    Doxycycline Nausea Only and Other (See Comments)     Dizziness     Pneumococcal vaccine         HPI   HPI    5/16/2017, 10:37 AM   History obtained from the patient      History of Present Illness: Orion Rinaldi is a 81 y.o. male patient who presents to the Emergency Department for SOB  which onset gradually 3-4 days ago. Symptoms are constant and moderate in severity. Sx are exacerbated with exertion and relieved by nothing. Associated sxs include non productive cough, chest congestion, and leg swelling. Pt states he is on 5 L of oxygen at home and wears a CPAP at night. Patient denies any fever, N/V/D, chills, CP, chest tightness, palpitations, lightheadedness, dizziness, abd pain, weakness/numbness and all other sxs at this time. No further complaints or concerns at this time.     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    Seizures        Past Surgical History:  Past Surgical History:   Procedure Laterality Date    AORTIC VALVE REPLACEMENT      CARDIAC CATHETERIZATION      CARDIAC SURGERY      CARDIAC VALVE SURGERY      CATARACT EXTRACTION  W/  INTRAOCULAR LENS IMPLANT  OD 3/18/09    CATARACT EXTRACTION W/  INTRAOCULAR LENS IMPLANT  OS 4/1/09    CORONARY ARTERY BYPASS GRAFT  1997    CABG x 3    SKIN BIOPSY      TONSILLECTOMY           Family History:  Family History   Problem Relation Age of Onset    Heart disease Brother     Cataracts Mother     No Known Problems Father     Cataracts Maternal Grandmother     No Known Problems Maternal Grandfather     Cataracts Paternal Grandmother     Cancer Paternal Grandfather     No Known Problems Sister     No Known Problems Maternal Aunt     No Known Problems Maternal Uncle     No Known Problems Paternal Aunt     No Known Problems Paternal Uncle     Anemia Neg Hx     Arrhythmia Neg Hx     Asthma Neg Hx     Clotting disorder Neg Hx     Fainting Neg Hx     Heart attack Neg Hx     Heart failure Neg Hx     Hyperlipidemia Neg Hx     Hypertension Neg Hx     Strabismus Neg Hx     Retinal detachment Neg Hx     Macular degeneration Neg Hx     Glaucoma Neg Hx     Amblyopia Neg Hx     Blindness Neg Hx     Diabetes Neg Hx     Stroke Neg Hx     Thyroid disease Neg Hx        Social History:  Social History     Social History Main Topics    Smoking status: Former Smoker     Packs/day: 1.00     Years: 20.00     Types: Cigarettes     Quit date: 9/12/1997    Smokeless tobacco: Never Used    Alcohol use No      Comment: Occasionally     Drug use: No    Sexual activity: Yes     Partners: Female       ROS   Review of Systems   Constitutional: Negative for chills and fever.   HENT: Positive for congestion (chest). Negative for sore throat.    Respiratory: Positive for cough and shortness of breath. Negative for chest tightness.    Cardiovascular: Positive for leg swelling. Negative for chest pain and palpitations.   Gastrointestinal: Negative for abdominal pain, nausea and vomiting.   Genitourinary: Negative for difficulty urinating and dysuria.   Musculoskeletal: Negative for back pain and neck  pain.   Skin: Negative for rash.   Neurological: Negative for dizziness, weakness, light-headedness, numbness and headaches.   Psychiatric/Behavioral: Negative for agitation and confusion.   All other systems reviewed and are negative.      Physical Exam    Initial Vitals   BP Pulse Resp Temp SpO2   05/16/17 1016 05/16/17 1016 05/16/17 1016 05/16/17 1016 05/16/17 1016   100/60 82 28 97.8 °F (36.6 °C) 91 %      Physical Exam  Nursing Notes and Vital Signs Reviewed.  Constitutional: Patient is in no acute distress. Well-developed and well-nourished.  Head: Atraumatic. Normocephalic.  Eyes: PERRL. EOM intact. Conjunctivae are not pale. No scleral icterus.  ENT: Mucous membranes are moist. Oropharynx is clear and symmetric.    Neck: Supple. Full ROM. No lymphadenopathy.  Cardiovascular: Regular rate. Regular rhythm. No murmurs, rubs, or gallops. Distal pulses are 2+ and symmetric.  Pulmonary/Chest: Tachypneic. Diminished breath sounds noted bilaterally. No wheezing, rales, or rhonchi.  Abdominal: Soft and non-distended.  There is no tenderness.  No rebound, guarding, or rigidity. Good bowel sounds.  Musculoskeletal: Moves all extremities. No obvious deformities. Trace BLE edema noted. No calf tenderness.  Skin: Warm and dry.  Neurological:  Alert, awake, and appropriate.  Normal speech.  No acute focal neurological deficits are appreciated.  Psychiatric: Normal affect. Good eye contact. Appropriate in content.    ED Course    Critical Care  Date/Time: 5/16/2017 2:13 PM  Performed by: ALIREZA MAN  Authorized by: ALIREZA MAN   Direct patient critical care time: 30 minutes  Additional history critical care time: 5 minutes  Ordering / reviewing critical care time: 8 minutes  Documentation critical care time: 5 minutes  Consulting other physicians critical care time: 5 minutes  Total critical care time (exclusive of procedural time) : 53 minutes  Critical care was necessary to treat or prevent imminent or  "life-threatening deterioration of the following conditions: respiratory failure and cardiac failure.  Critical care was time spent personally by me on the following activities: blood draw for specimens, evaluation of patient's response to treatment, ordering and performing treatments and interventions, pulse oximetry, re-evaluation of patient's condition, ordering and review of laboratory studies, examination of patient, development of treatment plan with patient or surrogate, discussions with consultants, obtaining history from patient or surrogate, ordering and review of radiographic studies and review of old charts.        ED Vital Signs:  Vitals:    05/16/17 1016 05/16/17 1050 05/16/17 1052 05/16/17 1100   BP: 100/60      Pulse: 82 79 77 79   Resp: (!) 28  (!) (P) 24 (!) 24   Temp: 97.8 °F (36.6 °C)      TempSrc: Oral      SpO2: (!) 91%  (!) 89% (!) 84%   Weight: 91.6 kg (202 lb)      Height: 5' 11" (1.803 m)       05/16/17 1102 05/16/17 1104 05/16/17 1215 05/16/17 1230   BP:   125/63 (!) 141/84   Pulse:  76 80 98   Resp:  (!) 25 (!) 28    Temp:       TempSrc:       SpO2: (!) 88%  98% 98%   Weight:       Height:        05/16/17 1345   BP: 126/66   Pulse: 77   Resp: (!) 26   Temp:    TempSrc:    SpO2: 97%   Weight:    Height:        Abnormal Lab Results:  Labs Reviewed   CBC W/ AUTO DIFFERENTIAL - Abnormal; Notable for the following:        Result Value    Hemoglobin 12.6 (*)     MCH 25.8 (*)     MCHC 30.7 (*)     RDW 19.6 (*)     Platelets 75 (*)     Lymph% 13.0 (*)     Mono% 16.0 (*)     All other components within normal limits   COMPREHENSIVE METABOLIC PANEL - Abnormal; Notable for the following:     Chloride 112 (*)     CO2 21 (*)     BUN, Bld 31 (*)     Albumin 3.4 (*)     Total Bilirubin 2.8 (*)     eGFR if non  57 (*)     All other components within normal limits   TROPONIN I - Abnormal; Notable for the following:     Troponin I 0.048 (*)     All other components within normal limits "   B-TYPE NATRIURETIC PEPTIDE - Abnormal; Notable for the following:     BNP 2560 (*)     All other components within normal limits   PROTIME-INR - Abnormal; Notable for the following:     Prothrombin Time 13.9 (*)     INR 1.4 (*)     All other components within normal limits   ISTAT PROCEDURE - Abnormal; Notable for the following:     POC PCO2 27.6 (*)     POC PO2 49 (*)     POC HCO3 16.7 (*)     POC SATURATED O2 85 (*)     All other components within normal limits        All Lab Results:  Results for orders placed or performed during the hospital encounter of 05/16/17   CBC auto differential   Result Value Ref Range    WBC 8.85 3.90 - 12.70 K/uL    RBC 4.88 4.60 - 6.20 M/uL    Hemoglobin 12.6 (L) 14.0 - 18.0 g/dL    Hematocrit 41.1 40.0 - 54.0 %    MCV 84 82 - 98 fL    MCH 25.8 (L) 27.0 - 31.0 pg    MCHC 30.7 (L) 32.0 - 36.0 %    RDW 19.6 (H) 11.5 - 14.5 %    Platelets 75 (L) 150 - 350 K/uL    MPV SEE COMMENT 9.2 - 12.9 fL    Gran% 65.0 38.0 - 73.0 %    Lymph% 13.0 (L) 18.0 - 48.0 %    Mono% 16.0 (H) 4.0 - 15.0 %    Eosinophil% 2.0 0.0 - 8.0 %    Basophil% 1.0 0.0 - 1.9 %    Bands 2.0 %    Myelocytes 1.0 %    Differential Method Manual    Comprehensive metabolic panel   Result Value Ref Range    Sodium 142 136 - 145 mmol/L    Potassium 4.7 3.5 - 5.1 mmol/L    Chloride 112 (H) 95 - 110 mmol/L    CO2 21 (L) 23 - 29 mmol/L    Glucose 85 70 - 110 mg/dL    BUN, Bld 31 (H) 8 - 23 mg/dL    Creatinine 1.2 0.5 - 1.4 mg/dL    Calcium 9.2 8.7 - 10.5 mg/dL    Total Protein 7.0 6.0 - 8.4 g/dL    Albumin 3.4 (L) 3.5 - 5.2 g/dL    Total Bilirubin 2.8 (H) 0.1 - 1.0 mg/dL    Alkaline Phosphatase 82 55 - 135 U/L    AST 21 10 - 40 U/L    ALT 14 10 - 44 U/L    Anion Gap 9 8 - 16 mmol/L    eGFR if African American >60 >60 mL/min/1.73 m^2    eGFR if non African American 57 (A) >60 mL/min/1.73 m^2   Troponin I   Result Value Ref Range    Troponin I 0.048 (H) 0.000 - 0.026 ng/mL   Brain natriuretic peptide   Result Value Ref Range    BNP  2560 (H) 0 - 99 pg/mL   Protime-INR   Result Value Ref Range    Prothrombin Time 13.9 (H) 9.0 - 12.5 sec    INR 1.4 (H) 0.8 - 1.2   ISTAT PROCEDURE   Result Value Ref Range    POC PH 7.391 7.35 - 7.45    POC PCO2 27.6 (LL) 35 - 45 mmHg    POC PO2 49 (LL) 80 - 100 mmHg    POC HCO3 16.7 (L) 24 - 28 mmol/L    POC BE -8 -2 to 2 mmol/L    POC SATURATED O2 85 (L) 95 - 100 %    Sample ARTERIAL     Site RR     Allens Test Pass     DelSys Nasal Can     Mode SPONT     Flow 5     FiO2 40          Imaging Results:  Imaging Results         X-Ray Chest AP Portable (Final result) Result time:  05/16/17 10:56:07    Final result by Abby Savage MD (05/16/17 10:56:07)    Impression:      Stable chest x-ray.  No acute findings.      Electronically signed by: ABBY SAVAGE MD  Date:     05/16/17  Time:    10:56     Narrative:    Exam: XR CHEST AP PORTABLE,    Date:  05/16/17 10:47:30    History: CHF    Comparison:  04/25/2017.    Findings: Sternal wires and mediastinal clips.  Mild cardiomegaly.  Decreased lung volumes.  Chronic increased interstitial markings are noted.  No interval change.  Bilateral shoulder arthritis.            The EKG was ordered, reviewed, and independently interpreted by the ED provider.  Interpretation time: 10:21  Rate: 77 BPM  Rhythm: atrial fibrillation  Interpretation: Right bundle branch block. Left anterior fascicular block. Bifascicular block. Abnormal ECG. No STEMI.           The Emergency Provider reviewed the vital signs and test results, which are outlined above.    ED Discussion     11:36 PM: Pt refused BiPAP machine in the ED and states he wants to use his own which is in his vehicle. Pt has his own trilogy machine which he is currently using at his bedside when not on his triology patient's Oxygen saturation decreases and respiratory rate increases.    12:17 PM: Discussed case with Cecille (Hospital Medicine). Dr. Lee agrees with current care and management of pt and accepts  admission.   Admitting Service: Hospital medicine   Admitting Physician: Dr. Lee  Admit to: Tele    12:24 PM: Re-evaluated pt. I have discussed test results, shared treatment plan, and the need for admission with patient and family at bedside. Pt and family express understanding at this time and agree with all information. All questions answered. Pt and family have no further questions or concerns at this time. Pt is ready for admit.    ED Medication(s):  Medications   metoprolol tartrate (LOPRESSOR) split tablet 12.5 mg (not administered)   simvastatin tablet 40 mg (not administered)   aspirin chewable tablet 81 mg (not administered)   losartan split tablet 12.5 mg (not administered)   levetiracetam tablet 750 mg (not administered)   enoxaparin injection 40 mg (not administered)   albuterol-ipratropium 2.5mg-0.5mg/3mL nebulizer solution 3 mL (not administered)   methylPREDNISolone sod suc(PF) 125 mg/2 mL injection 80 mg (not administered)   albuterol-ipratropium 2.5mg-0.5mg/3mL nebulizer solution 3 mL (3 mLs Nebulization Given 5/16/17 1104)   methylPREDNISolone sod suc(PF) 125 mg/2 mL injection 125 mg (125 mg Intravenous Given 5/16/17 1101)   furosemide injection 40 mg (40 mg Intravenous Given 5/16/17 1215)       New Prescriptions    No medications on file             Medical Decision Making    Medical Decision Making:   Clinical Tests:   Lab Tests: Ordered and Reviewed  Radiological Study: Reviewed and Ordered  Medical Tests: Ordered and Reviewed           Scribe Attestation:   Scribe #1: I performed the above scribed service and the documentation accurately describes the services I performed. I attest to the accuracy of the note.    Attending:   Physician Attestation Statement for Scribe #1: I, Brittany Bernard MD, personally performed the services described in this documentation, as scribed by Parvez Beaver, in my presence, and it is both accurate and complete.          Clinical Impression        ICD-10-CM ICD-9-CM   1. Acute on chronic respiratory failure with hypoxia J96.21 518.84     799.02   2. Supplemental oxygen dependent Z99.81 V46.2   3. Pulmonary hypertension I27.2 416.8   4. Acute exacerbation of chronic obstructive pulmonary disease (COPD) J44.1 491.21   5. Chronic systolic congestive heart failure I50.22 428.22     428.0       Disposition:   Disposition: Admitted (Tele)  Condition: Fair       Brittany Bernard MD  05/16/17 1254       Brittany Bernard MD  05/16/17 1413

## 2017-05-16 NOTE — ED NOTES
Received call from SAI Oden regarding pt request to eat.  States she is concerned about pt desaturating while off Trilogy machine, pt to remain NPO. States Sherry NP will see pt and evaluate.

## 2017-05-16 NOTE — ED NOTES
MAHENDRA Samuel NP at bedside.  Ok to order diet.  Ok to remove Trilogy and allow pt to eat.  Verbal order received for cardiac diet.

## 2017-05-17 NOTE — SUBJECTIVE & OBJECTIVE
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    Seizures        Past Surgical History:   Procedure Laterality Date    AORTIC VALVE REPLACEMENT      CARDIAC CATHETERIZATION      CARDIAC SURGERY      CARDIAC VALVE SURGERY      CATARACT EXTRACTION W/  INTRAOCULAR LENS IMPLANT  OD 3/18/09    CATARACT EXTRACTION W/  INTRAOCULAR LENS IMPLANT  OS 4/1/09    CORONARY ARTERY BYPASS GRAFT  1997    CABG x 3    SKIN BIOPSY      TONSILLECTOMY         Review of patient's allergies indicates:   Allergen Reactions    Doxycycline Nausea Only and Other (See Comments)     Dizziness     Pneumococcal vaccine        No current facility-administered medications on file prior to encounter.      Current Outpatient Prescriptions on File Prior to Encounter   Medication Sig    aspirin 81 MG Chew Take 81 mg by mouth once daily.    budesonide-formoterol 80-4.5 mcg (SYMBICORT) 80-4.5 mcg/actuation HFAA Inhale 2 puffs into the lungs 2 (two) times daily. Pharmacy to adjust to cheaper tier options    colchicine 0.6 mg tablet Take 1 tablet (0.6 mg total) by mouth once daily.    furosemide (LASIX) 20 MG tablet Take 2 tablets (40 mg total) by mouth once daily.    levetiracetam (KEPPRA) 750 MG Tab TAKE 1 TABLET TWICE DAILY    levetiracetam (KEPPRA) 750 MG Tab Take 1 tablet (750 mg total) by mouth 2 (two) times daily.    OXYGEN-AIR DELIVERY SYSTEMS MISC by Misc.(Non-Drug; Combo Route) route.    potassium chloride SA (K-DUR,KLOR-CON) 20 MEQ tablet Take 1 tablet (20 mEq total) by mouth 2 (two) times daily.    simvastatin (ZOCOR) 40 MG  tablet TAKE 1 TABLET EVERY EVENING.    tiotropium (SPIRIVA WITH HANDIHALER) 18 mcg inhalation capsule Inhale 1 capsule (18 mcg total) into the lungs once daily. Pharmacy to adjust to cheaper tier options    albuterol-ipratropium 2.5mg-0.5mg/3mL (DUO-NEB) 0.5 mg-3 mg(2.5 mg base)/3 mL nebulizer solution Take 3 mLs by nebulization every 8 (eight) hours as needed for Wheezing.    losartan (COZAAR) 25 MG tablet Take 0.5 tablets (12.5 mg total) by mouth once daily.    metoprolol tartrate (LOPRESSOR) 25 MG tablet Take 0.5 tablets (12.5 mg total) by mouth 2 (two) times daily.     Family History     Problem Relation (Age of Onset)    Cancer Paternal Grandfather    Cataracts Mother, Maternal Grandmother, Paternal Grandmother    Heart disease Brother    No Known Problems Father, Maternal Grandfather, Sister, Maternal Aunt, Maternal Uncle, Paternal Aunt, Paternal Uncle        Social History Main Topics    Smoking status: Former Smoker     Packs/day: 1.00     Years: 20.00     Types: Cigarettes     Quit date: 9/12/1997    Smokeless tobacco: Never Used    Alcohol use No      Comment: Occasionally     Drug use: No    Sexual activity: Yes     Partners: Female     Review of Systems   Constitutional: Positive for fatigue. Negative for activity change, appetite change, chills, diaphoresis, fever and unexpected weight change.   HENT: Negative for congestion, ear discharge, ear pain, facial swelling, hearing loss, postnasal drip, sore throat, tinnitus, trouble swallowing and voice change.    Eyes: Negative for photophobia, pain, discharge, redness, itching and visual disturbance.   Respiratory: Positive for cough and shortness of breath. Negative for choking, chest tightness, wheezing and stridor.    Cardiovascular: Negative for chest pain, palpitations and leg swelling.   Gastrointestinal: Negative for abdominal distention, abdominal pain, blood in stool, constipation, diarrhea, nausea and vomiting.   Endocrine: Negative for  cold intolerance, heat intolerance, polydipsia, polyphagia and polyuria.   Genitourinary: Negative for difficulty urinating, flank pain, frequency, hematuria and urgency.   Musculoskeletal: Negative for arthralgias, back pain, gait problem and myalgias.   Skin: Negative for color change, pallor, rash and wound.   Neurological: Positive for weakness. Negative for dizziness, tremors, seizures, syncope, facial asymmetry, speech difficulty, light-headedness, numbness and headaches.   Hematological: Negative for adenopathy. Does not bruise/bleed easily.   Psychiatric/Behavioral: Negative for agitation, confusion, hallucinations and suicidal ideas. The patient is not nervous/anxious.    All other systems reviewed and are negative.    Objective:     Vital Signs (Most Recent):  Temp: 98 °F (36.7 °C) (05/16/17 1600)  Pulse: 78 (05/16/17 1600)  Resp: (!) 22 (05/16/17 1600)  BP: 114/72 (05/16/17 1600)  SpO2: 98 % (05/16/17 1837) Vital Signs (24h Range):  Temp:  [97.8 °F (36.6 °C)-98 °F (36.7 °C)] 98 °F (36.7 °C)  Pulse:  [76-98] 78  Resp:  [22-28] 22  SpO2:  [84 %-98 %] 98 %  BP: (100-146)/(60-84) 114/72     Weight: 87.5 kg (193 lb)  Body mass index is 26.92 kg/(m^2).    Physical Exam   Constitutional: He is oriented to person, place, and time. He appears well-developed and well-nourished.   HENT:   Head: Normocephalic and atraumatic.   Nose: Nose normal.   Eyes: Conjunctivae and EOM are normal. Pupils are equal, round, and reactive to light.   Neck: Normal range of motion. Neck supple. No JVD present. No tracheal deviation present. No thyromegaly present.   Cardiovascular: Normal rate, regular rhythm, normal heart sounds and intact distal pulses.  Exam reveals no gallop and no friction rub.    No murmur heard.  Pulmonary/Chest: No stridor. He is in respiratory distress (mild). He has no wheezes. He has no rales. He exhibits no tenderness.   Abdominal: Soft. Bowel sounds are normal. He exhibits no distension. There is no  tenderness. There is no rebound and no guarding.   Musculoskeletal: Normal range of motion. He exhibits no edema, tenderness or deformity.   Neurological: He is alert and oriented to person, place, and time. He has normal reflexes. No cranial nerve deficit. Coordination normal.   Skin: Skin is warm and dry. No rash noted. No erythema. No pallor.   Psychiatric: He has a normal mood and affect. His behavior is normal. Judgment and thought content normal.   Nursing note and vitals reviewed.       Significant Labs:   CBC:   Recent Labs  Lab 05/16/17  1058   WBC 8.85   HGB 12.6*   HCT 41.1   PLT 75*     CMP:   Recent Labs  Lab 05/16/17  1058      K 4.7   *   CO2 21*   GLU 85   BUN 31*   CREATININE 1.2   CALCIUM 9.2   PROT 7.0   ALBUMIN 3.4*   BILITOT 2.8*   ALKPHOS 82   AST 21   ALT 14   ANIONGAP 9   EGFRNONAA 57*       Significant Imaging: I have reviewed all pertinent imaging results/findings within the past 24 hours.   Imaging Results         X-Ray Chest AP Portable (Final result) Result time:  05/16/17 10:56:07    Final result by Abby Savage MD (05/16/17 10:56:07)    Impression:      Stable chest x-ray.  No acute findings.      Electronically signed by: ABBY SAVAGE MD  Date:     05/16/17  Time:    10:56     Narrative:    Exam: XR CHEST AP PORTABLE,    Date:  05/16/17 10:47:30    History: CHF    Comparison:  04/25/2017.    Findings: Sternal wires and mediastinal clips.  Mild cardiomegaly.  Decreased lung volumes.  Chronic increased interstitial markings are noted.  No interval change.  Bilateral shoulder arthritis.

## 2017-05-17 NOTE — CONSULTS
Pulmonary Consult Note    Inpatient consult to Pulmonology  Consult performed by: ANGELA JONES  Consult ordered by: LOY NAILS        SUBJECTIVE: cough and chest congestion      History of Present Illness:  Patient is a 81 y.o. male presents with cough and chest congestin . Onset of symptoms was abrupt starting 3 days ago with gradually worsening course since that time. Patient denies fever or chills. Symptoms are aggravated by actiivty. Symptoms improve with rest and oxygen. No pleuritic pain, no chest pain.   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    Seizures      Past Surgical History:   Procedure Laterality Date    AORTIC VALVE REPLACEMENT      CARDIAC CATHETERIZATION      CARDIAC SURGERY      CARDIAC VALVE SURGERY      CATARACT EXTRACTION W/  INTRAOCULAR LENS IMPLANT  OD 3/18/09    CATARACT EXTRACTION W/  INTRAOCULAR LENS IMPLANT  OS 4/1/09    CORONARY ARTERY BYPASS GRAFT  1997    CABG x 3    SKIN BIOPSY      TONSILLECTOMY       Family History   Problem Relation Age of Onset    Heart disease Brother     Cataracts Mother     No Known Problems Father     Cataracts Maternal Grandmother     No Known Problems Maternal Grandfather     Cataracts Paternal Grandmother     Cancer Paternal Grandfather     No Known Problems Sister     No Known Problems Maternal Aunt     No Known Problems Maternal Uncle      No Known Problems Paternal Aunt     No Known Problems Paternal Uncle     Anemia Neg Hx     Arrhythmia Neg Hx     Asthma Neg Hx     Clotting disorder Neg Hx     Fainting Neg Hx     Heart attack Neg Hx     Heart failure Neg Hx     Hyperlipidemia Neg Hx     Hypertension Neg Hx     Strabismus Neg Hx     Retinal detachment Neg Hx     Macular degeneration Neg Hx     Glaucoma Neg Hx     Amblyopia Neg Hx     Blindness Neg Hx     Diabetes Neg Hx     Stroke Neg Hx     Thyroid disease Neg Hx      Social History   Substance Use Topics    Smoking status: Former Smoker     Packs/day: 1.00     Years: 20.00     Types: Cigarettes     Quit date: 9/12/1997    Smokeless tobacco: Never Used    Alcohol use No      Comment: Occasionally      Review of Systems   Constitutional: Negative for chills and fever.   HENT: Positive for congestion.    Eyes: Negative.    Respiratory: Positive for cough, sputum production, shortness of breath and wheezing.    Cardiovascular: Negative.    Gastrointestinal: Negative.    Genitourinary: Negative.    Musculoskeletal: Negative.    Skin: Negative for itching and rash.   Neurological: Negative.    Endo/Heme/Allergies: Negative.    Psychiatric/Behavioral: Negative.    All other systems reviewed and are negative.    OBJECTIVE:     Vital Signs:  Temp:  [97.8 °F (36.6 °C)]   Pulse:  [76-98]   Resp:  [22-28]   BP: (100-146)/(60-84)   SpO2:  [84 %-98 %]     Physical Exam   Constitutional: He is oriented to person, place, and time. He appears well-developed and well-nourished.   HENT:   Head: Normocephalic and atraumatic.   Mouth/Throat: Oropharyngeal exudate present.   Eyes: Conjunctivae are normal. Pupils are equal, round, and reactive to light.   Neck: Neck supple. No JVD present. No tracheal deviation present. No thyromegaly present.   Cardiovascular: Normal rate.  A regularly irregular rhythm present.   Murmur heard.   Systolic murmur is present with a grade of 2/6   Pulmonary/Chest:  Effort normal. He has decreased breath sounds. He has wheezes in the right lower field and the left lower field. He has no rhonchi. He has no rales.   Abdominal: Soft. Bowel sounds are normal.   Musculoskeletal: Normal range of motion. He exhibits no edema or tenderness.   Lymphadenopathy:     He has no cervical adenopathy.   Neurological: He is alert and oriented to person, place, and time.   Skin: Skin is warm and dry.   Nursing note and vitals reviewed.    Laboratory:  CBC:   Recent Labs  Lab 05/16/17  1058   WBC 8.85   RBC 4.88   HGB 12.6*   HCT 41.1   PLT 75*   MCV 84   MCH 25.8*   MCHC 30.7*     BMP:   Recent Labs  Lab 05/16/17  1058   GLU 85      K 4.7   *   CO2 21*   BUN 31*   CREATININE 1.2   CALCIUM 9.2     CMP:   Recent Labs  Lab 05/16/17  1058   GLU 85   CALCIUM 9.2   ALBUMIN 3.4*   PROT 7.0      K 4.7   CO2 21*   *   BUN 31*   CREATININE 1.2   ALKPHOS 82   ALT 14   AST 21   BILITOT 2.8*     LFTs:   Recent Labs  Lab 05/16/17  1058   ALT 14   AST 21   ALKPHOS 82   BILITOT 2.8*   PROT 7.0   ALBUMIN 3.4*     Coagulation:   Recent Labs  Lab 05/16/17  1058   INR 1.4*     Cardiac markers:   Recent Labs  Lab 05/16/17  1058   TROPONINI 0.048*     ABGs:   Recent Labs  Lab 05/16/17  1054   PH 7.391   PCO2 27.6*   PO2 49*   HCO3 16.7*   POCSATURATED 85*   BE -8       Diagnostic Results:  Labs: Reviewed  ECG: Reviewed  X-Ray: Reviewed  Echo: Reviewed    ASSESSMENT/PLAN:     1.0 Acute on chronic respiratory failure  2.0 Acute Congestive Heart failure  3.0 Exacerbation of chronic obstructive pulmonary disease     Patient Active Problem List   Diagnosis    Aortic stenosis    Moderate COPD (chronic obstructive pulmonary disease)    Lens replaced by other means - Both Eyes    Dry eye - Both Eyes    Posterior vitreous detachment, both eyes - Both Eyes    Brow ptosis - Both Eyes    Seizures    Dizziness    Pulmonary heart disease    Cardiomyopathy, ischemic    H/O aortic valve replacement     DJD (degenerative joint disease) of lumbar spine    Impaired gait    Thrombocytopenia, with anemia    Transient synovitis of right knee    Effusion of right knee    Arthritis of right knee    Pulmonary emphysema    Chronic respiratory failure with hypoxia    Mitral stenosis-moderate    Chronic systolic congestive heart failure    Pseudophakia    Refractive error    Nonexudative senile macular degeneration of retina    Atherosclerosis of arteries of extremities    Hypercholesterolemia    Acute renal failure    Pulmonary hypertension    Elevation of cardiac enzymes    Hyperglycemia, unspecified    Nocturnal hypoxemia due to emphysema    Acute on chronic respiratory failure with hypoxia         Plan: Medications: IV lasix, start antibiotics,  R/O Pulmonary Embolism - CTA ordered  Thanks      Anthony Hinton MD  Pulmonary / Critical Care Medicine    .

## 2017-05-17 NOTE — PROGRESS NOTES
[facility] Hospital Medicine  Progress Note    Patient Name: Orion Rinaldi  MRN: [id number]  Patient Class: IP- Inpatient   Admission Date: 5/16/2017  Length of Stay: 1 days  Attending Physician: Carl Lee MD  Primary Care Provider: Daisy Oconnor MD        Subjective:     Principal Problem:Acute on chronic respiratory failure with hypoxia    HPI:  Orion Rinaldi is a 80 yo male with PMHx of CHF, EF 45%, COPD, chronic respiratory failure on home O2 at 5 liters and home trilogy, who presented to ER with c/o cough and chest congestin. Onset of symptoms was abrupt starting 3 days ago with gradually worsening course since that time. Patient denies fever or chills. Symptoms are aggravated by actiivty. Symptoms improved with rest and oxygen. No pleuritic pain, no chest pain. Wife reports he uses his home trilogy 4 hours in the afternoon and all night regularly. Pulmonary has been consulted. He was admitted with acute on chronic respiratory failure.    Hospital Course:  Patient improved with nebulizers, IV diuretics, IV ABx, and O2. Pulmonary following. Trilogy at night and PRN. Vital signs and labs stable. On 5 liters of O2 - home dosage.    Interval History: feeling and breathing better today     Review of Systems   Constitutional: Positive for fatigue. Negative for activity change, appetite change, chills, diaphoresis, fever and unexpected weight change.   HENT: Negative for congestion, ear discharge, ear pain, facial swelling, hearing loss, postnasal drip, sore throat, tinnitus, trouble swallowing and voice change.    Eyes: Negative for photophobia, pain, discharge, redness, itching and visual disturbance.   Respiratory: Positive for cough and shortness of breath. Negative for choking, chest tightness, wheezing and stridor.    Cardiovascular: Negative for chest pain, palpitations and leg swelling.   Gastrointestinal: Negative for abdominal distention, abdominal pain, blood in stool,  constipation, diarrhea, nausea and vomiting.   Endocrine: Negative for cold intolerance, heat intolerance, polydipsia, polyphagia and polyuria.   Genitourinary: Negative for difficulty urinating, flank pain, frequency, hematuria and urgency.   Musculoskeletal: Negative for arthralgias, back pain, gait problem and myalgias.   Skin: Negative for color change, pallor, rash and wound.   Neurological: Positive for weakness. Negative for dizziness, tremors, seizures, syncope, facial asymmetry, speech difficulty, light-headedness, numbness and headaches.   Hematological: Negative for adenopathy. Does not bruise/bleed easily.   Psychiatric/Behavioral: Negative for agitation, confusion, hallucinations and suicidal ideas. The patient is not nervous/anxious.    All other systems reviewed and are negative.    Objective:     Vital Signs (Most Recent):  Temp: 97.7 °F (36.5 °C) (05/17/17 1240)  Pulse: 80 (05/17/17 1240)  Resp: (!) 22 (05/17/17 1240)  BP: 128/70 (05/17/17 1240)  SpO2: (!) 91 % (05/17/17 1240) Vital Signs (24h Range):  Temp:  [96.7 °F (35.9 °C)-98.8 °F (37.1 °C)] 97.7 °F (36.5 °C)  Pulse:  [69-92] 80  Resp:  [20-22] 22  SpO2:  [89 %-98 %] 91 %  BP: (100-128)/(58-71) 128/70     Weight: 86.7 kg (191 lb 2 oz)  Body mass index is 26.66 kg/(m^2).    Intake/Output Summary (Last 24 hours) at 05/17/17 1752  Last data filed at 05/17/17 1336   Gross per 24 hour   Intake              790 ml   Output             2500 ml   Net            -1710 ml      Physical Exam   Constitutional: He is oriented to person, place, and time. He appears well-developed and well-nourished.   HENT:   Head: Normocephalic and atraumatic.   Nose: Nose normal.   Eyes: Conjunctivae and EOM are normal. Pupils are equal, round, and reactive to light.   Neck: Normal range of motion. Neck supple. No JVD present. No tracheal deviation present. No thyromegaly present.   Cardiovascular: Normal rate, regular rhythm, normal heart sounds and intact distal pulses.   Exam reveals no gallop and no friction rub.    No murmur heard.  Pulmonary/Chest: No stridor. No respiratory distress (mild). He has no wheezes. He has no rales. He exhibits no tenderness.   Abdominal: Soft. Bowel sounds are normal. He exhibits no distension. There is no tenderness. There is no rebound and no guarding.   Musculoskeletal: Normal range of motion. He exhibits no edema, tenderness or deformity.   Neurological: He is alert and oriented to person, place, and time. He has normal reflexes. No cranial nerve deficit. Coordination normal.   Skin: Skin is warm and dry. No rash noted. No erythema. No pallor.   Psychiatric: He has a normal mood and affect. His behavior is normal. Judgment and thought content normal.   Nursing note and vitals reviewed.      Significant Labs:   CBC:   Recent Labs  Lab 05/16/17  1058 05/17/17  0652   WBC 8.85 3.36*   HGB 12.6* 12.3*   HCT 41.1 39.9*   PLT 75* 76*     CMP:   Recent Labs  Lab 05/16/17  1058 05/17/17  0652    141   K 4.7 4.6   * 112*   CO2 21* 21*   GLU 85 137*   BUN 31* 39*   CREATININE 1.2 1.3   CALCIUM 9.2 9.2   PROT 7.0  --    ALBUMIN 3.4*  --    BILITOT 2.8*  --    ALKPHOS 82  --    AST 21  --    ALT 14  --    ANIONGAP 9 8   EGFRNONAA 57* 52*       Significant Imaging: I have reviewed all pertinent imaging results/findings within the past 24 hours.    Assessment/Plan:      * Acute on chronic respiratory failure with hypoxia  -pulmonary consult  -IV ABx  -CT chest  -IV lasix  -trilogy  -Duonebs  -bronchodilators  -IV steroids  -Oxygen               Seizures  Continue keppra      Chronic systolic congestive heart failure  IV lasix      VTE Risk Mitigation         Ordered     enoxaparin injection 40 mg  Daily     Route:  Subcutaneous        05/16/17 1403     Medium Risk of VTE  Once      05/16/17 1403     Place sequential compression device  Until discontinued      05/16/17 1403          Ramonita Samuel NP  Department of Hospital Medicine   [facility]

## 2017-05-17 NOTE — PLAN OF CARE
Problem: Patient Care Overview  Goal: Plan of Care Review  Outcome: Ongoing (interventions implemented as appropriate)  Patient had a restful night, remains on 4 liters nasal cannula while awake, sats between 94-96%, home CPAP in use while sleeping, patient is SOB with minimal exertion.  Educated on fluid monitoring and weighing daily.  Remains SR on monitor, and free from falls.

## 2017-05-17 NOTE — ASSESSMENT & PLAN NOTE
-pulmonary consult  -IV ABx  -CT chest  -IV lasix  -trilogy  -Duonebs  -bronchodilators  -IV steroids  -Oxygen
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
## 2017-05-17 NOTE — H&P
[facility] Hospital Medicine  History & Physical    Patient Name: Orion Rinaldi  MRN: [id number]  Admission Date: 5/16/2017  Attending Physician: Carl Lee MD   Primary Care Provider: Daisy Oconnor MD         Patient information was obtained from patient, spouse/SO, past medical records and ER records.     Subjective:     Principal Problem:Acute on chronic respiratory failure with hypoxia    Chief Complaint:   Chief Complaint   Patient presents with    Shortness of Breath     pt c/o shortness of breath for the last several days. hx of copd, portable o2 on 5 lpm        HPI: Orion Rinaldi is a 82 yo male with PMHx of CHF, EF 45%, COPD, chronic respiratory failure on home O2 at 5 liters and home trilogy, who presented to ER with c/o cough and chest congestin. Onset of symptoms was abrupt starting 3 days ago with gradually worsening course since that time. Patient denies fever or chills. Symptoms are aggravated by actiivty. Symptoms improved with rest and oxygen. No pleuritic pain, no chest pain. Wife reports he uses his home trilogy 4 hours in the afternoon and all night regularly. Pulmonary has been consulted. He was admitted with acute on chronic respiratory failure.    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    Seizures        Past Surgical History:   Procedure Laterality Date    AORTIC VALVE REPLACEMENT      CARDIAC CATHETERIZATION      CARDIAC SURGERY      CARDIAC VALVE SURGERY      CATARACT EXTRACTION W/  INTRAOCULAR LENS IMPLANT  OD 3/18/09    CATARACT EXTRACTION W/   INTRAOCULAR LENS IMPLANT  OS 4/1/09    CORONARY ARTERY BYPASS GRAFT  1997    CABG x 3    SKIN BIOPSY      TONSILLECTOMY         Review of patient's allergies indicates:   Allergen Reactions    Doxycycline Nausea Only and Other (See Comments)     Dizziness     Pneumococcal vaccine        No current facility-administered medications on file prior to encounter.      Current Outpatient Prescriptions on File Prior to Encounter   Medication Sig    aspirin 81 MG Chew Take 81 mg by mouth once daily.    budesonide-formoterol 80-4.5 mcg (SYMBICORT) 80-4.5 mcg/actuation HFAA Inhale 2 puffs into the lungs 2 (two) times daily. Pharmacy to adjust to cheaper tier options    colchicine 0.6 mg tablet Take 1 tablet (0.6 mg total) by mouth once daily.    furosemide (LASIX) 20 MG tablet Take 2 tablets (40 mg total) by mouth once daily.    levetiracetam (KEPPRA) 750 MG Tab TAKE 1 TABLET TWICE DAILY    levetiracetam (KEPPRA) 750 MG Tab Take 1 tablet (750 mg total) by mouth 2 (two) times daily.    OXYGEN-AIR DELIVERY SYSTEMS MISC by [facility].(Non-Drug; Combo Route) route.    potassium chloride SA (K-DUR,KLOR-CON) 20 MEQ tablet Take 1 tablet (20 mEq total) by mouth 2 (two) times daily.    simvastatin (ZOCOR) 40 MG tablet TAKE 1 TABLET EVERY EVENING.    tiotropium (SPIRIVA WITH HANDIHALER) 18 mcg inhalation capsule Inhale 1 capsule (18 mcg total) into the lungs once daily. Pharmacy to adjust to cheaper tier options    albuterol-ipratropium 2.5mg-0.5mg/3mL (DUO-NEB) 0.5 mg-3 mg(2.5 mg base)/3 mL nebulizer solution Take 3 mLs by nebulization every 8 (eight) hours as needed for Wheezing.    losartan (COZAAR) 25 MG tablet Take 0.5 tablets (12.5 mg total) by mouth once daily.    metoprolol tartrate (LOPRESSOR) 25 MG tablet Take 0.5 tablets (12.5 mg total) by mouth 2 (two) times daily.     Family History     Problem Relation (Age of Onset)    Cancer Paternal Grandfather    Cataracts Mother, Maternal Grandmother, Paternal Grandmother     Heart disease Brother    No Known Problems Father, Maternal Grandfather, Sister, Maternal Aunt, Maternal Uncle, Paternal Aunt, Paternal Uncle        Social History Main Topics    Smoking status: Former Smoker     Packs/day: 1.00     Years: 20.00     Types: Cigarettes     Quit date: 9/12/1997    Smokeless tobacco: Never Used    Alcohol use No      Comment: Occasionally     Drug use: No    Sexual activity: Yes     Partners: Female     Review of Systems   Constitutional: Positive for fatigue. Negative for activity change, appetite change, chills, diaphoresis, fever and unexpected weight change.   HENT: Negative for congestion, ear discharge, ear pain, facial swelling, hearing loss, postnasal drip, sore throat, tinnitus, trouble swallowing and voice change.    Eyes: Negative for photophobia, pain, discharge, redness, itching and visual disturbance.   Respiratory: Positive for cough and shortness of breath. Negative for choking, chest tightness, wheezing and stridor.    Cardiovascular: Negative for chest pain, palpitations and leg swelling.   Gastrointestinal: Negative for abdominal distention, abdominal pain, blood in stool, constipation, diarrhea, nausea and vomiting.   Endocrine: Negative for cold intolerance, heat intolerance, polydipsia, polyphagia and polyuria.   Genitourinary: Negative for difficulty urinating, flank pain, frequency, hematuria and urgency.   Musculoskeletal: Negative for arthralgias, back pain, gait problem and myalgias.   Skin: Negative for color change, pallor, rash and wound.   Neurological: Positive for weakness. Negative for dizziness, tremors, seizures, syncope, facial asymmetry, speech difficulty, light-headedness, numbness and headaches.   Hematological: Negative for adenopathy. Does not bruise/bleed easily.   Psychiatric/Behavioral: Negative for agitation, confusion, hallucinations and suicidal ideas. The patient is not nervous/anxious.    All other systems reviewed and are  negative.    Objective:     Vital Signs (Most Recent):  Temp: 98 °F (36.7 °C) (05/16/17 1600)  Pulse: 78 (05/16/17 1600)  Resp: (!) 22 (05/16/17 1600)  BP: 114/72 (05/16/17 1600)  SpO2: 98 % (05/16/17 1837) Vital Signs (24h Range):  Temp:  [97.8 °F (36.6 °C)-98 °F (36.7 °C)] 98 °F (36.7 °C)  Pulse:  [76-98] 78  Resp:  [22-28] 22  SpO2:  [84 %-98 %] 98 %  BP: (100-146)/(60-84) 114/72     Weight: 87.5 kg (193 lb)  Body mass index is 26.92 kg/(m^2).    Physical Exam   Constitutional: He is oriented to person, place, and time. He appears well-developed and well-nourished.   HENT:   Head: Normocephalic and atraumatic.   Nose: Nose normal.   Eyes: Conjunctivae and EOM are normal. Pupils are equal, round, and reactive to light.   Neck: Normal range of motion. Neck supple. No JVD present. No tracheal deviation present. No thyromegaly present.   Cardiovascular: Normal rate, regular rhythm, normal heart sounds and intact distal pulses.  Exam reveals no gallop and no friction rub.    No murmur heard.  Pulmonary/Chest: No stridor. He is in respiratory distress (mild). He has no wheezes. He has no rales. He exhibits no tenderness.   Abdominal: Soft. Bowel sounds are normal. He exhibits no distension. There is no tenderness. There is no rebound and no guarding.   Musculoskeletal: Normal range of motion. He exhibits no edema, tenderness or deformity.   Neurological: He is alert and oriented to person, place, and time. He has normal reflexes. No cranial nerve deficit. Coordination normal.   Skin: Skin is warm and dry. No rash noted. No erythema. No pallor.   Psychiatric: He has a normal mood and affect. His behavior is normal. Judgment and thought content normal.   Nursing note and vitals reviewed.       Significant Labs:   CBC:   Recent Labs  Lab 05/16/17  1058   WBC 8.85   HGB 12.6*   HCT 41.1   PLT 75*     CMP:   Recent Labs  Lab 05/16/17  1058      K 4.7   *   CO2 21*   GLU 85   BUN 31*   CREATININE 1.2   CALCIUM  9.2   PROT 7.0   ALBUMIN 3.4*   BILITOT 2.8*   ALKPHOS 82   AST 21   ALT 14   ANIONGAP 9   EGFRNONAA 57*       Significant Imaging: I have reviewed all pertinent imaging results/findings within the past 24 hours.   Imaging Results         X-Ray Chest AP Portable (Final result) Result time:  05/16/17 10:56:07    Final result by Abby Savage MD (05/16/17 10:56:07)    Impression:      Stable chest x-ray.  No acute findings.      Electronically signed by: ABBY SAVAGE MD  Date:     05/16/17  Time:    10:56     Narrative:    Exam: XR CHEST AP PORTABLE,    Date:  05/16/17 10:47:30    History: CHF    Comparison:  04/25/2017.    Findings: Sternal wires and mediastinal clips.  Mild cardiomegaly.  Decreased lung volumes.  Chronic increased interstitial markings are noted.  No interval change.  Bilateral shoulder arthritis.                Assessment/Plan:     * Acute on chronic respiratory failure with hypoxia  -pulmonary consult  -IV ABx  -CT chest  -IV lasix  -trilogy  -Duonebs  -bronchodilators  -IV steroids  -Oxygen               Seizures  Continue keppra      Chronic systolic congestive heart failure  IV lasix      VTE Risk Mitigation         Ordered     enoxaparin injection 40 mg  Daily     Route:  Subcutaneous        05/16/17 1403     Medium Risk of VTE  Once      05/16/17 1403     Place sequential compression device  Until discontinued      05/16/17 1403        Ramonita Samuel NP  Department of Hospital Medicine   [facility]

## 2017-05-17 NOTE — SUBJECTIVE & OBJECTIVE
Interval History: feeling and breathing better today     Review of Systems   Constitutional: Positive for fatigue. Negative for activity change, appetite change, chills, diaphoresis, fever and unexpected weight change.   HENT: Negative for congestion, ear discharge, ear pain, facial swelling, hearing loss, postnasal drip, sore throat, tinnitus, trouble swallowing and voice change.    Eyes: Negative for photophobia, pain, discharge, redness, itching and visual disturbance.   Respiratory: Positive for cough and shortness of breath. Negative for choking, chest tightness, wheezing and stridor.    Cardiovascular: Negative for chest pain, palpitations and leg swelling.   Gastrointestinal: Negative for abdominal distention, abdominal pain, blood in stool, constipation, diarrhea, nausea and vomiting.   Endocrine: Negative for cold intolerance, heat intolerance, polydipsia, polyphagia and polyuria.   Genitourinary: Negative for difficulty urinating, flank pain, frequency, hematuria and urgency.   Musculoskeletal: Negative for arthralgias, back pain, gait problem and myalgias.   Skin: Negative for color change, pallor, rash and wound.   Neurological: Positive for weakness. Negative for dizziness, tremors, seizures, syncope, facial asymmetry, speech difficulty, light-headedness, numbness and headaches.   Hematological: Negative for adenopathy. Does not bruise/bleed easily.   Psychiatric/Behavioral: Negative for agitation, confusion, hallucinations and suicidal ideas. The patient is not nervous/anxious.    All other systems reviewed and are negative.    Objective:     Vital Signs (Most Recent):  Temp: 97.7 °F (36.5 °C) (05/17/17 1240)  Pulse: 80 (05/17/17 1240)  Resp: (!) 22 (05/17/17 1240)  BP: 128/70 (05/17/17 1240)  SpO2: (!) 91 % (05/17/17 1240) Vital Signs (24h Range):  Temp:  [96.7 °F (35.9 °C)-98.8 °F (37.1 °C)] 97.7 °F (36.5 °C)  Pulse:  [69-92] 80  Resp:  [20-22] 22  SpO2:  [89 %-98 %] 91 %  BP: (100-128)/(58-71)  128/70     Weight: 86.7 kg (191 lb 2 oz)  Body mass index is 26.66 kg/(m^2).    Intake/Output Summary (Last 24 hours) at 05/17/17 1752  Last data filed at 05/17/17 1336   Gross per 24 hour   Intake              790 ml   Output             2500 ml   Net            -1710 ml      Physical Exam   Constitutional: He is oriented to person, place, and time. He appears well-developed and well-nourished.   HENT:   Head: Normocephalic and atraumatic.   Nose: Nose normal.   Eyes: Conjunctivae and EOM are normal. Pupils are equal, round, and reactive to light.   Neck: Normal range of motion. Neck supple. No JVD present. No tracheal deviation present. No thyromegaly present.   Cardiovascular: Normal rate, regular rhythm, normal heart sounds and intact distal pulses.  Exam reveals no gallop and no friction rub.    No murmur heard.  Pulmonary/Chest: No stridor. No respiratory distress (mild). He has no wheezes. He has no rales. He exhibits no tenderness.   Abdominal: Soft. Bowel sounds are normal. He exhibits no distension. There is no tenderness. There is no rebound and no guarding.   Musculoskeletal: Normal range of motion. He exhibits no edema, tenderness or deformity.   Neurological: He is alert and oriented to person, place, and time. He has normal reflexes. No cranial nerve deficit. Coordination normal.   Skin: Skin is warm and dry. No rash noted. No erythema. No pallor.   Psychiatric: He has a normal mood and affect. His behavior is normal. Judgment and thought content normal.   Nursing note and vitals reviewed.      Significant Labs:   CBC:   Recent Labs  Lab 05/16/17  1058 05/17/17  0652   WBC 8.85 3.36*   HGB 12.6* 12.3*   HCT 41.1 39.9*   PLT 75* 76*     CMP:   Recent Labs  Lab 05/16/17  1058 05/17/17  0652    141   K 4.7 4.6   * 112*   CO2 21* 21*   GLU 85 137*   BUN 31* 39*   CREATININE 1.2 1.3   CALCIUM 9.2 9.2   PROT 7.0  --    ALBUMIN 3.4*  --    BILITOT 2.8*  --    ALKPHOS 82  --    AST 21  --    ALT  14  --    ANIONGAP 9 8   EGFRNONAA 57* 52*       Significant Imaging: I have reviewed all pertinent imaging results/findings within the past 24 hours.

## 2017-05-18 NOTE — PROGRESS NOTES
[facility] Hospital Medicine  Progress Note    Patient Name: Orino Rinaldi  MRN: [id number]  Patient Class: IP- Inpatient   Admission Date: 5/16/2017  Length of Stay: 2 days  Attending Physician: Carl Lee MD  Primary Care Provider: Daisy Oconnor MD        Subjective:     Principal Problem:Acute on chronic respiratory failure with hypoxia    HPI:  Orion Rinaldi is a 82 yo male with PMHx of CHF, EF 45%, COPD, chronic respiratory failure on home O2 at 5 liters and home trilogy, who presented to ER with c/o cough and chest congestin. Onset of symptoms was abrupt starting 3 days ago with gradually worsening course since that time. Patient denies fever or chills. Symptoms are aggravated by actiivty. Symptoms improved with rest and oxygen. No pleuritic pain, no chest pain. Wife reports he uses his home trilogy 4 hours in the afternoon and all night regularly. Pulmonary has been consulted. He was admitted with acute on chronic respiratory failure.    Hospital Course:  Patient improved with nebulizers, IV diuretics, IV ABx, and O2. Pulmonary following. Trilogy at night and PRN. Vital signs and labs stable. On 5 liters of O2 - home dosage.  5/18/17 - c/o weakness after IV Lasix, creatinine rising. Chest clear.       Interval History: Chronic respiratory failure - Lasix makes him weak. Creatinine rising.    Review of Systems   Constitutional: Positive for activity change and fatigue. Negative for appetite change, chills, diaphoresis, fever and unexpected weight change.   HENT: Negative for congestion, ear discharge, ear pain, facial swelling, hearing loss, postnasal drip, sore throat, tinnitus, trouble swallowing and voice change.    Eyes: Negative for photophobia, pain, discharge, redness, itching and visual disturbance.   Respiratory: Positive for shortness of breath (chronic). Negative for cough, choking, chest tightness, wheezing and stridor.    Cardiovascular: Negative for chest pain,  palpitations and leg swelling.   Gastrointestinal: Negative for abdominal distention, abdominal pain, blood in stool, constipation, diarrhea, nausea and vomiting.   Endocrine: Negative for cold intolerance, heat intolerance, polydipsia, polyphagia and polyuria.   Genitourinary: Negative for difficulty urinating, flank pain, frequency, hematuria and urgency.   Musculoskeletal: Negative for arthralgias, back pain, gait problem and myalgias.   Skin: Negative for color change, pallor, rash and wound.   Neurological: Positive for weakness. Negative for dizziness, tremors, seizures, syncope, facial asymmetry, speech difficulty, light-headedness, numbness and headaches.   Hematological: Negative for adenopathy. Does not bruise/bleed easily.   Psychiatric/Behavioral: Negative for agitation, confusion, hallucinations and suicidal ideas. The patient is not nervous/anxious.    All other systems reviewed and are negative.    Objective:     Vital Signs (Most Recent):  Temp: 97.5 °F (36.4 °C) (05/18/17 1100)  Pulse: 65 (05/18/17 1100)  Resp: 20 (05/18/17 1100)  BP: 112/80 (05/18/17 1100)  SpO2: 97 % (05/18/17 1100) Vital Signs (24h Range):  Temp:  [97.5 °F (36.4 °C)-98.1 °F (36.7 °C)] 97.5 °F (36.4 °C)  Pulse:  [64-94] 65  Resp:  [18-22] 20  SpO2:  [90 %-99 %] 97 %  BP: (111-122)/(53-84) 112/80     Weight: 87 kg (191 lb 12.8 oz)  Body mass index is 26.75 kg/(m^2).    Intake/Output Summary (Last 24 hours) at 05/18/17 1251  Last data filed at 05/18/17 0442   Gross per 24 hour   Intake              550 ml   Output             1450 ml   Net             -900 ml      Physical Exam   Constitutional: He is oriented to person, place, and time. He appears well-developed and well-nourished.   HENT:   Head: Normocephalic and atraumatic.   Nose: Nose normal.   Eyes: Conjunctivae and EOM are normal. Pupils are equal, round, and reactive to light.   Neck: Normal range of motion. Neck supple. No JVD present. No tracheal deviation present. No  thyromegaly present.   Cardiovascular: Normal rate, regular rhythm, normal heart sounds and intact distal pulses.  Exam reveals no gallop and no friction rub.    No murmur heard.  Pulmonary/Chest: No stridor. No respiratory distress. He has no wheezes. He has no rales. He exhibits no tenderness.   Abdominal: Soft. Bowel sounds are normal. He exhibits no distension. There is no tenderness. There is no rebound and no guarding.   Musculoskeletal: Normal range of motion. He exhibits no edema, tenderness or deformity.   Neurological: He is alert and oriented to person, place, and time. He has normal reflexes. No cranial nerve deficit. Coordination normal.   Skin: Skin is warm and dry. No rash noted. No erythema. No pallor.   Psychiatric: He has a normal mood and affect. His behavior is normal. Judgment and thought content normal.   Nursing note and vitals reviewed.      Significant Labs:   CBC:   Recent Labs  Lab 05/17/17  0652   WBC 3.36*   HGB 12.3*   HCT 39.9*   PLT 76*     CMP:   Recent Labs  Lab 05/17/17  0652 05/18/17  0609    139   K 4.6 4.6   * 111*   CO2 21* 19*   * 147*   BUN 39* 57*   CREATININE 1.3 1.5*   CALCIUM 9.2 8.7   PROT  --  6.9   ALBUMIN  --  3.2*   BILITOT  --  1.8*   ALKPHOS  --  73   AST  --  27   ALT  --  17   ANIONGAP 8 9   EGFRNONAA 52* 44*       Significant Imaging: I have reviewed all pertinent imaging results/findings within the past 24 hours.    Assessment/Plan:      * Acute on chronic respiratory failure with hypoxia  -pulmonary consult  -IV ABx  -CT chest  -IV lasix - hold  -trilogy  -Duonebs  -bronchodilators  -IV steroids  -Oxygen               Seizures  Continue keppra      Chronic systolic congestive heart failure  IV lasix changed to po      VTE Risk Mitigation         Ordered     enoxaparin injection 40 mg  Daily     Route:  Subcutaneous        05/16/17 1403     Medium Risk of VTE  Once      05/16/17 1403     Place sequential compression device  Until discontinued       05/16/17 1403          Ramonita Samuel NP  Department of Hospital Medicine   [facility]

## 2017-05-18 NOTE — PT/OT/SLP EVAL
Physical Therapy  Evaluation    Orion Rinaldi   MRN: [id number]   Admitting Diagnosis: Acute on chronic respiratory failure with hypoxia    PT Received On: 05/18/17  PT Start Time: 1010     PT Stop Time: 1040    PT Total Time (min): 30 min       Billable Minutes:  Evaluation 15 and Gait [id number]    Diagnosis: Acute on chronic respiratory failure with hypoxia  P.T. 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    Seizures       Past Surgical History:   Procedure Laterality Date    AORTIC VALVE REPLACEMENT      CARDIAC CATHETERIZATION      CARDIAC SURGERY      CARDIAC VALVE SURGERY      CATARACT EXTRACTION W/  INTRAOCULAR LENS IMPLANT  OD 3/18/09    CATARACT EXTRACTION W/  INTRAOCULAR LENS IMPLANT  OS 4/1/09    CORONARY ARTERY BYPASS GRAFT  1997    CABG x 3    SKIN BIOPSY      TONSILLECTOMY         Referring physician: SMILEY  Date referred to PT: 5/18/2017      General Precautions: Standard, respiratory  Orthopedic Precautions: N/A   Braces:              Patient History:  Lives With: child(rima), adult, spouse  Living Arrangements: house  Home Layout: Able to live on 1st floor  Stair Railings at Home: none  Transportation Available: family or friend will provide  Living Environment Comment: PT LIVES WITH SON AND WIFE IN SMALL ONE STORY HOME. PT WAS IND WITH GT AND O2 DEPENDENT  Equipment Currently Used at Home: oxygen  DME owned (not currently used): none    Previous Level of Function:  Ambulation Skills: independent (REQUIRES O2)  Transfer Skills: independent  ADL Skills:  independent    Subjective:  Communicated with NURSE [facility] AND [facility] CHART REVIEW  prior to session.   PT AGREED TO EVAL AND TX   Chief Complaint: SOB  Patient goals: GO HOME     Pain Ratin/10               Pain Rating Post-Intervention: 0/10    Objective:   Patient found with: peripheral IV, telemetry, oxygen     Cognitive Exam:  Oriented to: Person, Place, Time and Situation    Follows Commands/attention: Follows multistep  commands  Communication: clear/fluent  Safety awareness/insight to disability: intact    Physical Exam:  Postural examination/scapula alignment: Rounded shoulder    Skin integrity: Visible skin intact  Edema: None noted     Sensation:   Intact    Upper Extremity Range of Motion:  Right Upper Extremity: WFL  Left Upper Extremity: WFL    Upper Extremity Strength:  Right Upper Extremity: WFL  Left Upper Extremity: WFL    Lower Extremity Range of Motion:  Right Lower Extremity: WFL  Left Lower Extremity: WFL    Lower Extremity Strength:  Right Lower Extremity: WFL  Left Lower Extremity: WFL      Functional Mobility:  Bed Mobility:  Rolling/Turning to Left: Independent  Scooting/Bridging: Independent  Supine to Sit: Independent    Transfers:  Sit <> Stand Assistance: Supervision  Sit <> Stand Assistive Device: No Assistive Device  Bed <> Chair Technique: Stand Pivot  Bed <> Chair Assistance: Supervision  Bed <> Chair Assistive Device: No Assistive Device    Gait:   Gait Distance: PT GT TRAINED WITH NO AD AND O2 IN TOW X 60'  Assistance 1: Supervision  Gait Assistive Device: No device  Gait Pattern: swing-through gait  Gait Deviation(s): decreased cadence2    Therapeutic Activities and Exercises:  PT PERCY COMPLETED. P.T. DISCUSSED REC FOR ROLLATOR FOR ENERGY CONSERVATION. PT AGREED TO LEARN ABOUT ROLLATOR AND OPEN TO USING IT TO MAINTAIN INDEPENDENCE. PT LEFT SEATED EOB WITH WIFE PRESENT.     AM-PAC 6 CLICK MOBILITY  How much help from another person does this patient currently need?   1 = Unable,  Total/Dependent Assistance  2 = A lot, Maximum/Moderate Assistance  3 = A little, Minimum/Contact Guard/Supervision  4 = None, Modified [place]/Independent    Turning over in bed (including adjusting bedclothes, sheets and blankets)?: 4  Sitting down on and standing up from a chair with arms (e.g., wheelchair, bedside commode, etc.): 4  Moving from lying on back to sitting on the side of the bed?: 4  Moving to and from a bed to a chair (including a wheelchair)?: 4  Need to walk in hospital room?: 3  Climbing 3-5 steps with a railing?: 1  Total Score: 20     AM-PAC Raw Score CMS G-Code Modifier Level of Impairment Assistance   6 % Total / Unable   7 - 9 CM 80 - 100% Maximal Assist   10 - 14 CL 60 - 80% Moderate Assist   15 - 19 CK 40 - 60% Moderate Assist   20 - 22 CJ 20 - 40% Minimal Assist   23 CI 1-20% SBA / CGA   24 CH 0% Independent/ Mod I     Patient left SEATED EOB with call button in reach and WIFE present.    Assessment:   Orion Rinaldi is a 81 y.o. male with a medical diagnosis of Acute on chronic respiratory failure with hypoxia and presents with IMPAIRED STRENGTH AND ENDURANCE D/T DEPENDENCE ON O2. PT WILL BENEFIT FROM P.T. FOR EDUCATION AND SAFETY WITH ROLLATOR USE TO MAXIMIZE INDEPENDENCE AND CONSERVE ENERGY.     Rehab identified problem list/impairments: Rehab identified problem list/impairments: weakness, impaired endurance, impaired cardiopulmonary response to activity    Rehab potential is good.    Activity tolerance: FAIR    Discharge recommendations: Discharge Facility/Level Of Care Needs: home     Barriers to discharge: Barriers to Discharge: None    Equipment recommendations: Equipment Needed After Discharge: rollator     GOALS:   Physical Therapy Goals        Problem: Physical Therapy Goal    Goal Priority Disciplines Outcome Goal Variances Interventions   Physical Therapy Goal     PT/OT, PT      Description:  PT WILL BE SEEN FOR P.T. FOR A MIN OF 5 OUT OF 7 DAYS A WEEK  LTG:  5/25/17  1. PT WILL STAND T/F TO CHAIR IND.  2. PT WILL GT TRAIN IND OR MOD I WITH ROLLATOR X 50'   3. PT WILL PROPERLY DEMONSTRATE SAFE USE OF ROLLATOR                 PLAN:    Patient to be seen   to address the above listed problems via gait training, therapeutic activities  Plan of Care expires: 05/25/17  Plan of Care reviewed with: patient, spouse    Functional Assessment Tool Used: [place]  PAC  Score: CJ  Functional Limitation: Mobility: Walking and moving around  Mobility: Walking and Moving Around Current Status ():   Mobility: Walking and Moving Around Goal Status (): JOSE Magana, PT  05/18/2017

## 2017-05-18 NOTE — PLAN OF CARE
Problem: Patient Care Overview  Goal: Plan of Care Review  Outcome: Ongoing (interventions implemented as appropriate)  Fall prevention precautions maintained, pt remained free of falls throughout shift, call bell and personal items within reach, pt remains on oxygen via nasal cannula. 24 hour chart check completed. Will continue to monitor

## 2017-05-18 NOTE — SUBJECTIVE & OBJECTIVE
Interval History: Chronic respiratory failure - Lasix makes him weak. Creatinine rising.    Review of Systems   Constitutional: Positive for activity change and fatigue. Negative for appetite change, chills, diaphoresis, fever and unexpected weight change.   HENT: Negative for congestion, ear discharge, ear pain, facial swelling, hearing loss, postnasal drip, sore throat, tinnitus, trouble swallowing and voice change.    Eyes: Negative for photophobia, pain, discharge, redness, itching and visual disturbance.   Respiratory: Positive for shortness of breath (chronic). Negative for cough, choking, chest tightness, wheezing and stridor.    Cardiovascular: Negative for chest pain, palpitations and leg swelling.   Gastrointestinal: Negative for abdominal distention, abdominal pain, blood in stool, constipation, diarrhea, nausea and vomiting.   Endocrine: Negative for cold intolerance, heat intolerance, polydipsia, polyphagia and polyuria.   Genitourinary: Negative for difficulty urinating, flank pain, frequency, hematuria and urgency.   Musculoskeletal: Negative for arthralgias, back pain, gait problem and myalgias.   Skin: Negative for color change, pallor, rash and wound.   Neurological: Positive for weakness. Negative for dizziness, tremors, seizures, syncope, facial asymmetry, speech difficulty, light-headedness, numbness and headaches.   Hematological: Negative for adenopathy. Does not bruise/bleed easily.   Psychiatric/Behavioral: Negative for agitation, confusion, hallucinations and suicidal ideas. The patient is not nervous/anxious.    All other systems reviewed and are negative.    Objective:     Vital Signs (Most Recent):  Temp: 97.5 °F (36.4 °C) (05/18/17 1100)  Pulse: 65 (05/18/17 1100)  Resp: 20 (05/18/17 1100)  BP: 112/80 (05/18/17 1100)  SpO2: 97 % (05/18/17 1100) Vital Signs (24h Range):  Temp:  [97.5 °F (36.4 °C)-98.1 °F (36.7 °C)] 97.5 °F (36.4 °C)  Pulse:  [64-94] 65  Resp:  [18-22] 20  SpO2:  [90 %-99  %] 97 %  BP: (111-122)/(53-84) 112/80     Weight: 87 kg (191 lb 12.8 oz)  Body mass index is 26.75 kg/(m^2).    Intake/Output Summary (Last 24 hours) at 05/18/17 1251  Last data filed at 05/18/17 0442   Gross per 24 hour   Intake              550 ml   Output             1450 ml   Net             -900 ml      Physical Exam   Constitutional: He is oriented to person, place, and time. He appears well-developed and well-nourished.   HENT:   Head: Normocephalic and atraumatic.   Nose: Nose normal.   Eyes: Conjunctivae and EOM are normal. Pupils are equal, round, and reactive to light.   Neck: Normal range of motion. Neck supple. No JVD present. No tracheal deviation present. No thyromegaly present.   Cardiovascular: Normal rate, regular rhythm, normal heart sounds and intact distal pulses.  Exam reveals no gallop and no friction rub.    No murmur heard.  Pulmonary/Chest: No stridor. No respiratory distress. He has no wheezes. He has no rales. He exhibits no tenderness.   Abdominal: Soft. Bowel sounds are normal. He exhibits no distension. There is no tenderness. There is no rebound and no guarding.   Musculoskeletal: Normal range of motion. He exhibits no edema, tenderness or deformity.   Neurological: He is alert and oriented to person, place, and time. He has normal reflexes. No cranial nerve deficit. Coordination normal.   Skin: Skin is warm and dry. No rash noted. No erythema. No pallor.   Psychiatric: He has a normal mood and affect. His behavior is normal. Judgment and thought content normal.   Nursing note and vitals reviewed.      Significant Labs:   CBC:   Recent Labs  Lab 05/17/17  0652   WBC 3.36*   HGB 12.3*   HCT 39.9*   PLT 76*     CMP:   Recent Labs  Lab 05/17/17  0652 05/18/17  0609    139   K 4.6 4.6   * 111*   CO2 21* 19*   * 147*   BUN 39* 57*   CREATININE 1.3 1.5*   CALCIUM 9.2 8.7   PROT  --  6.9   ALBUMIN  --  3.2*   BILITOT  --  1.8*   ALKPHOS  --  73   AST  --  27   ALT  --  17    [facility] 8 9   EGFRNONAA 52* 44*       Significant Imaging: I have reviewed all pertinent imaging results/findings within the past 24 hours.

## 2017-05-18 NOTE — PROGRESS NOTES
Progress Note  Pulmonology    Admit Date: 5/16/2017   LOS: 1 day     SUBJECTIVE: Improved but still dyspnic     Follow-up For:  Acute on chronic respiratory failure and Congestive Heart failure        Continuous Infusions:   Scheduled Meds:   aspirin  81 mg Oral Daily    enoxaparin  40 mg Subcutaneous Daily    [START ON 5/18/2017] furosemide  40 mg Intravenous Daily    guaifenesin  600 mg Oral BID    levetiracetam  750 mg Oral BID    losartan  12.5 mg Oral Daily    methylPREDNISolone sod suc(PF)  80 mg Intravenous Q8H    metoprolol tartrate  12.5 mg Oral BID    moxifloxacin  400 mg Intravenous Q24H    simvastatin  40 mg Oral QHS       Review of Systems:  Constitutional: no fever or chills, positive for fatigue and malaise  Respiratory: positive for cough and dyspnea on exertion  Cardiovascular: no chest pain or palpitations    OBJECTIVE:     Vital Signs Range (Last 24H):  Temp:  [96.7 °F (35.9 °C)-98.2 °F (36.8 °C)]   Pulse:  [69-89]   Resp:  [20-22]   BP: (100-128)/(58-75)   SpO2:  [89 %-96 %]     I & O (Last 24H):  Intake/Output Summary (Last 24 hours) at 05/17/17 2015  Last data filed at 05/17/17 1925   Gross per 24 hour   Intake              910 ml   Output             2000 ml   Net            -1090 ml     Physical Exam:  General: mild distress  Neck: jugular venous distention - 7 cm above sternal notch  Lungs:  rales bibasilar and bilaterally  Chest Wall: no tenderness  Heart: irregularly irregular rhythm  Abdomen: soft, non-tender non-distended; bowel sounds normal  Extremities: edema +1  Neurologic: alert, oriented, thought content appropriate    Laboratory:  CBC:   Recent Labs  Lab 05/17/17  0652   WBC 3.36*   RBC 4.78   HGB 12.3*   HCT 39.9*   PLT 76*   MCV 84   MCH 25.7*   MCHC 30.8*     BMP:   Recent Labs  Lab 05/17/17  0652   *      K 4.6   *   CO2 21*   BUN 39*   CREATININE 1.3   CALCIUM 9.2     CMP:   Recent Labs  Lab 05/16/17  1058 05/17/17  0652   GLU 85 137*   CALCIUM  9.2 9.2   ALBUMIN 3.4*  --    PROT 7.0  --     141   K 4.7 4.6   CO2 21* 21*   * 112*   BUN 31* 39*   CREATININE 1.2 1.3   ALKPHOS 82  --    ALT 14  --    AST 21  --    BILITOT 2.8*  --      LFTs:   Recent Labs  Lab 05/16/17  1058   ALT 14   AST 21   ALKPHOS 82   BILITOT 2.8*   PROT 7.0   ALBUMIN 3.4*     Coagulation:   Recent Labs  Lab 05/16/17  1058   INR 1.4*     Cardiac markers:   Recent Labs  Lab 05/16/17  1058   TROPONINI 0.048*     ABGs:   Recent Labs  Lab 05/16/17  1054   PH 7.391   PCO2 27.6*   PO2 49*   HCO3 16.7*   POCSATURATED 85*   BE -8       Chest X-Ray: I personally reviewed the films and findings are:, air trapping/emphysema, pulmonary edema    Diagnostic Results:  CTA - negative    ASSESSMENT/PLAN:     Patient Active Problem List   Diagnosis    Aortic stenosis    Moderate COPD (chronic obstructive pulmonary disease)    Lens replaced by other means - Both Eyes    Dry eye - Both Eyes    Posterior vitreous detachment, both eyes - Both Eyes    Brow ptosis - Both Eyes    Seizures    Dizziness    Pulmonary heart disease    Cardiomyopathy, ischemic    H/O aortic valve replacement    DJD (degenerative joint disease) of lumbar spine    Impaired gait    Thrombocytopenia, with anemia    Transient synovitis of right knee    Effusion of right knee    Arthritis of right knee    Pulmonary emphysema    Chronic respiratory failure with hypoxia    Mitral stenosis-moderate    Chronic systolic congestive heart failure    Pseudophakia    Refractive error    Nonexudative senile macular degeneration of retina    Atherosclerosis of arteries of extremities    Hypercholesterolemia    Acute renal failure    Pulmonary hypertension    Elevation of cardiac enzymes    Hyperglycemia, unspecified    Nocturnal hypoxemia due to emphysema    Acute on chronic respiratory failure with hypoxia         Plan: Increase losartan to 25 BID  Continue diuresis  Jet nebs      Time greater than: 30  Minutes    Anthony Hinton MD  Pulmonary Medicine    .
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
## 2017-05-18 NOTE — PLAN OF CARE
Problem: Patient Care Overview  Goal: Plan of Care Review  PT REQUIRES S FOR GT X 60 WITH NO AD AND O2 IN TOW   Outcome: Ongoing (interventions implemented as appropriate)  Patient remains free from falls, fall precaution in place. Stand by assist.   VS stable. Denies pain. SOB on exertion. 5L NC. Patient walked with PT down hallway.  No other C/O at this time.Call bell and belongings within reach, reminded to call for assistance.

## 2017-05-18 NOTE — ASSESSMENT & PLAN NOTE
-pulmonary consult  -IV ABx  -CT chest  -IV lasix - hold  -trilogy  -Duonebs  -bronchodilators  -IV steroids  -Oxygen

## 2017-05-19 NOTE — PROGRESS NOTES
[facility] Hospital Medicine  Progress Note    Patient Name: Orion Rinaldi  MRN: [id number]  Patient Class: IP- Inpatient   Admission Date: 5/16/2017  Length of Stay: 3 days  Attending Physician: Carl Lee MD  Primary Care Provider: Daisy Oconnor MD        Subjective:     Principal Problem:Acute on chronic respiratory failure with hypoxia    HPI:  Orion Rinaldi is a 82 yo male with PMHx of CHF, EF 45%, COPD, chronic respiratory failure on home O2 at 5 liters and home trilogy, who presented to ER with c/o cough and chest congestin. Onset of symptoms was abrupt starting 3 days ago with gradually worsening course since that time. Patient denies fever or chills. Symptoms are aggravated by actiivty. Symptoms improved with rest and oxygen. No pleuritic pain, no chest pain. Wife reports he uses his home trilogy 4 hours in the afternoon and all night regularly. Pulmonary has been consulted. He was admitted with acute on chronic respiratory failure.    Hospital Course:  Patient improved with nebulizers, IV diuretics, IV ABx, and O2. Pulmonary following. Trilogy at night and PRN. Vital signs and labs stable. On 5 liters of O2 - home dosage.  5/18/17 - c/o weakness after IV Lasix, creatinine rising. Chest clear. Refused IV lasix b/c it makes him feel bad  5-19-17 - more coarse breath sounds, refused lasix 40 po/IV. Dr. Hinton wanted  to see patient regarding heart failure. Dr. Mario rounded late and verbally told NP patient should stay overnight. Plan d/c in am.       Interval History: plan home in am on Lasix po 20-40 mg (patient states the higher dosage causes severe weakness)    Review of Systems   Constitutional: Positive for activity change and fatigue. Negative for appetite change, chills, diaphoresis, fever and unexpected weight change.   HENT: Negative for congestion, ear discharge, ear pain, facial swelling, hearing loss, postnasal drip, sore throat, tinnitus, trouble  swallowing and voice change.    Eyes: Negative for photophobia, pain, discharge, redness, itching and visual disturbance.   Respiratory: Positive for cough and shortness of breath (chronic). Negative for choking, chest tightness, wheezing and stridor.    Cardiovascular: Negative for chest pain, palpitations and leg swelling.   Gastrointestinal: Negative for abdominal distention, abdominal pain, blood in stool, constipation, diarrhea, nausea and vomiting.   Endocrine: Negative for cold intolerance, heat intolerance, polydipsia, polyphagia and polyuria.   Genitourinary: Negative for difficulty urinating, flank pain, frequency, hematuria and urgency.   Musculoskeletal: Negative for arthralgias, back pain, gait problem and myalgias.   Skin: Negative for color change, pallor, rash and wound.   Neurological: Positive for weakness. Negative for dizziness, tremors, seizures, syncope, facial asymmetry, speech difficulty, light-headedness, numbness and headaches.   Hematological: Negative for adenopathy. Does not bruise/bleed easily.   Psychiatric/Behavioral: Negative for agitation, confusion, hallucinations and suicidal ideas. The patient is not nervous/anxious.    All other systems reviewed and are negative.    Objective:     Vital Signs (Most Recent):  Temp: 98 °F (36.7 °C) (05/19/17 1600)  Pulse: 76 (05/19/17 1600)  Resp: 20 (05/19/17 1600)  BP: 121/71 (05/19/17 1600)  SpO2: (!) 92 % (05/19/17 1600) Vital Signs (24h Range):  Temp:  [97.2 °F (36.2 °C)-98 °F (36.7 °C)] 98 °F (36.7 °C)  Pulse:  [56-84] 76  Resp:  [20-28] 20  SpO2:  [91 %-100 %] 92 %  BP: (121-142)/(61-71) 121/71     Weight: 85.3 kg (188 lb)  Body mass index is 26.22 kg/(m^2).    Intake/Output Summary (Last 24 hours) at 05/19/17 1845  Last data filed at 05/19/17 1800   Gross per 24 hour   Intake              250 ml   Output             1750 ml   Net            -1500 ml      Physical Exam   Constitutional: He is oriented to person, place, and time. He appears  well-developed and well-nourished.   HENT:   Head: Normocephalic and atraumatic.   Nose: Nose normal.   Eyes: Conjunctivae and EOM are normal. Pupils are equal, round, and reactive to light.   Neck: Normal range of motion. Neck supple. No JVD present. No tracheal deviation present. No thyromegaly present.   Cardiovascular: Normal rate, regular rhythm, normal heart sounds and intact distal pulses.  Exam reveals no gallop and no friction rub.    No murmur heard.  Pulmonary/Chest: No stridor. No respiratory distress. He has wheezes. He has rales. He exhibits no tenderness.   Abdominal: Soft. Bowel sounds are normal. He exhibits no distension. There is no tenderness. There is no rebound and no guarding.   Musculoskeletal: Normal range of motion. He exhibits no edema, tenderness or deformity.   Neurological: He is alert and oriented to person, place, and time. He has normal reflexes. No cranial nerve deficit. Coordination normal.   Skin: Skin is warm and dry. No rash noted. No erythema. No pallor.   Psychiatric: He has a normal mood and affect. His behavior is normal. Judgment and thought content normal.   Nursing note and vitals reviewed.      Significant Labs:   CBC:   Recent Labs  Lab 05/19/17  0523   WBC 4.00   HGB 13.4*   HCT 43.0   *     CMP:   Recent Labs  Lab 05/18/17  0609 05/19/17  0523    142   K 4.6 4.4   * 111*   CO2 19* 23   * 136*   BUN 57* 66*   CREATININE 1.5* 1.4   CALCIUM 8.7 9.4   PROT 6.9 7.0   ALBUMIN 3.2* 3.3*   BILITOT 1.8* 1.8*   ALKPHOS 73 72   AST 27 22   ALT 17 19   ANIONGAP 9 8   EGFRNONAA 44* 47*       Significant Imaging: I have reviewed all pertinent imaging results/findings within the past 24 hours.    Assessment/Plan:      * Acute on chronic respiratory failure with hypoxia  -pulmonary consult  -IV ABx  -CT chest  -IV lasix - 20 mg po  -trilogy  -Duonebs  -bronchodilators  -IV steroids  -Oxygen  -mucinex DM             Chronic systolic congestive heart  failure  IV lasix changed to po  -Cardiology consult per Dr. Hinton      Seizures  Continue keppra      Pulmonary heart disease  Outpatient management      Pulmonary hypertension  Outpatient management      VTE Risk Mitigation         Ordered     enoxaparin injection 40 mg  Daily     Route:  Subcutaneous        05/16/17 1403     Medium Risk of VTE  Once      05/16/17 1403     Place sequential compression device  Until discontinued      05/16/17 1403          Ramonita Samuel NP  Department of Hospital Medicine   [facility]

## 2017-05-19 NOTE — CONSULTS
Consult Note  Cardiology    Consult Requested By: Sherry Samuel NP   Reason for Consult: CHF LVEF 45 %    SUBJECTIVE:     History of Present Illness:  Mr Rinaldi is a 81 y.o. male presents with PMHx of COPD, Heart Failure, TAVR 2013, HLD, CABG and chronic respiratory failure. He presented to [facility] with increased shortness of breath 3 days ago. He on home O 2 at 5 L and home trilogy. Patient has continued to experience shortness of breath and orthopnea. He has been evaluated by pulmonary as well, currently siding up on side of bed. Troponin 0.048>0.049. BNP 2597 three day ago and 2560 on yesterday. 2 D Echo on 5/17/2017 shows mildly depressed left ventricular systolic function (EF 45-50%).     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    Seizures      Past Surgical History:   Procedure Laterality Date    AORTIC VALVE REPLACEMENT      CARDIAC CATHETERIZATION      CARDIAC SURGERY      CARDIAC VALVE SURGERY      CATARACT EXTRACTION W/  INTRAOCULAR LENS IMPLANT  OD 3/18/09    CATARACT EXTRACTION W/  INTRAOCULAR LENS IMPLANT  OS 4/1/09    CORONARY ARTERY BYPASS GRAFT  1997    CABG x 3    SKIN BIOPSY      TONSILLECTOMY       Family History   Problem Relation Age of Onset    Heart disease Brother     Cataracts Mother     No Known Problems Father     Cataracts Maternal Grandmother     No Known Problems Maternal Grandfather     Cataracts Paternal  Grandmother     Cancer Paternal Grandfather     No Known Problems Sister     No Known Problems Maternal Aunt     No Known Problems Maternal Uncle     No Known Problems Paternal Aunt     No Known Problems Paternal Uncle     Anemia Neg Hx     Arrhythmia Neg Hx     Asthma Neg Hx     Clotting disorder Neg Hx     Fainting Neg Hx     Heart attack Neg Hx     Heart failure Neg Hx     Hyperlipidemia Neg Hx     Hypertension Neg Hx     Strabismus Neg Hx     Retinal detachment Neg Hx     Macular degeneration Neg Hx     Glaucoma Neg Hx     Amblyopia Neg Hx     Blindness Neg Hx     Diabetes Neg Hx     Stroke Neg Hx     Thyroid disease Neg Hx      Social History   Substance Use Topics    Smoking status: Former Smoker     Packs/day: 1.00     Years: 20.00     Types: Cigarettes     Quit date: 9/12/1997    Smokeless tobacco: Never Used    Alcohol use No      Comment: Occasionally         Review of Systems:  Constitutional: negative for fever or chills   Eyes: negative  Ears, nose, mouth, throat, and face: negative  Respiratory: positive for shortness of breath, orthopnea and PND  Cardiovascular: negative for chest pain, palpitations, syncope or near syncope  Gastrointestinal: negative for nausea or vomiting  Genitourinary:negative  Hematologic/lymphatic: negative  Musculoskeletal:negative,   Neurological: negative  Behavioral/Psych: negative  Endocrine: negative  Allergic/Immunologic: negative    OBJECTIVE:     Vital Signs (Most Recent)  Temp: 97.3 °F (36.3 °C) (05/19/17 1101)  Pulse: 84 (05/19/17 1437)  Resp: 20 (05/19/17 1437)  BP: 127/62 (05/19/17 1101)  SpO2: (!) 92 % (05/19/17 1437)    Vital Signs Range (Last 24H):  Temp:  [97.2 °F (36.2 °C)-97.9 °F (36.6 °C)]   Pulse:  [56-84]   Resp:  [20-28]   BP: (121-142)/(61-71)   SpO2:  [91 %-100 %]     Current Facility-Administered Medications   Medication    albuterol-ipratropium 2.5mg-0.5mg/3mL nebulizer solution 3 mL    arformoterol nebulizer solution 15  mcg    aspirin chewable tablet 81 mg    budesonide nebulizer solution 0.5 mg    dextromethorphan-guaifenesin  mg per 12 hr tablet 1 tablet    enoxaparin injection 40 mg    furosemide tablet 20 mg    lactulose 20 gram/30 mL solution Soln 10 g    levetiracetam tablet 750 mg    losartan split tablet 12.5 mg    methylPREDNISolone sod suc(PF) 125 mg/2 mL injection 40 mg    metoprolol tartrate (LOPRESSOR) split tablet 12.5 mg    moxifloxacin tablet 400 mg    simvastatin tablet 40 mg     No current facility-administered medications on file prior to encounter.      Current Outpatient Prescriptions on File Prior to Encounter   Medication Sig    aspirin 81 MG Chew Take 81 mg by mouth once daily.    budesonide-formoterol 80-4.5 mcg (SYMBICORT) 80-4.5 mcg/actuation HFAA Inhale 2 puffs into the lungs 2 (two) times daily. Pharmacy to adjust to cheaper tier options    colchicine 0.6 mg tablet Take 1 tablet (0.6 mg total) by mouth once daily.    furosemide (LASIX) 20 MG tablet Take 2 tablets (40 mg total) by mouth once daily.    levetiracetam (KEPPRA) 750 MG Tab TAKE 1 TABLET TWICE DAILY    levetiracetam (KEPPRA) 750 MG Tab Take 1 tablet (750 mg total) by mouth 2 (two) times daily.    OXYGEN-AIR DELIVERY SYSTEMS MISC by [facility].(Non-Drug; Combo Route) route.    potassium chloride SA (K-DUR,KLOR-CON) 20 MEQ tablet Take 1 tablet (20 mEq total) by mouth 2 (two) times daily.    simvastatin (ZOCOR) 40 MG tablet TAKE 1 TABLET EVERY EVENING.    tiotropium (SPIRIVA WITH HANDIHALER) 18 mcg inhalation capsule Inhale 1 capsule (18 mcg total) into the lungs once daily. Pharmacy to adjust to cheaper tier options    albuterol-ipratropium 2.5mg-0.5mg/3mL (DUO-NEB) 0.5 mg-3 mg(2.5 mg base)/3 mL nebulizer solution Take 3 mLs by nebulization every 8 (eight) hours as needed for Wheezing.    losartan (COZAAR) 25 MG tablet Take 0.5 tablets (12.5 mg total) by mouth once daily.    metoprolol tartrate (LOPRESSOR) 25 MG tablet  Take 0.5 tablets (12.5 mg total) by mouth 2 (two) times daily.       Physical Exam:  General appearance: alert, appears stated age and cooperative  Head: Normocephalic, without obvious abnormality, atraumatic  Eyes:  conjunctivae/corneas clear. PERRL, EOM's intact. Fundi benign.  Nose: no discharge  Throat: normal findings: tongue midline and normal  Neck: no adenopathy, no carotid bruit, no JVD, supple, symmetrical, trachea midline and thyroid not enlarged, symmetric, no tenderness/mass/nodules  Back:  no skin lesions, erythema, or scars  Lungs:  BBS rhonchi Rt posterior mid and lower lowes   Chest wall: no tenderness  Heart: regular rate and rhythm, S1, S2 normal, no murmur, click, rub or gallop  Abdomen: soft, non-tender; bowel sounds normal; no masses,  no organomegaly  Extremities: extremities normal, atraumatic, no cyanosis or edema  Pulses: 1+ and symmetric  Neurologic: Grossly normal    Laboratory:  Chemistry:   Lab Results   Component Value Date     05/19/2017    K 4.4 05/19/2017     (H) 05/19/2017    CO2 23 05/19/2017    BUN 66 (H) 05/19/2017    CREATININE 1.4 05/19/2017    GLUCOSE 103 01/07/2010    CALCIUM 9.4 05/19/2017     Cardiac Markers:   Lab Results   Component Value Date    CKMB 2.4 01/08/2013    TROPONINI 0.049 (H) 05/18/2017    TROPONINI 0.07 01/08/2013     Cardiac Markers (Last 3):   Lab Results   Component Value Date    CKMB 2.4 01/08/2013    CKMB 2.6 01/08/2013    TROPONINI 0.049 (H) 05/18/2017    TROPONINI 0.048 (H) 05/16/2017    TROPONINI 0.136 (H) 01/03/2017    TROPONINI 0.07 01/08/2013    TROPONINI 0.09 (H) 01/08/2013     CBC:   Lab Results   Component Value Date    WBC 4.00 05/19/2017    HGB 13.4 (L) 05/19/2017    HCT 43.0 05/19/2017    HCT 30 (L) 06/20/2013    MCV 83 05/19/2017     (L) 05/19/2017     Lipids:   Lab Results   Component Value Date    CHOL 123 03/25/2013    TRIG 83 03/25/2013    HDL 40 03/25/2013     Coagulation:   Lab Results   Component Value Date    INR  1.4 (H) 05/16/2017    APTT 26.8 01/03/2017       Diagnostic Results:  ECG: Reviewed  X-Ray: Reviewed  US: Reviewed  CT: Reviewed  Echo: Reviewed      ASSESSMENT/PLAN:     Patient Active Problem List   Diagnosis    Aortic stenosis    Moderate COPD (chronic obstructive pulmonary disease)    Lens replaced by other means - Both Eyes    Dry eye - Both Eyes    Posterior vitreous detachment, both eyes - Both Eyes    Brow ptosis - Both Eyes    Seizures    Dizziness    Pulmonary heart disease    Cardiomyopathy, ischemic    H/O aortic valve replacement    DJD (degenerative joint disease) of lumbar spine    Impaired gait    Thrombocytopenia, with anemia    Transient synovitis of right knee    Effusion of right knee    Arthritis of right knee    Pulmonary emphysema    Chronic respiratory failure with hypoxia    Mitral stenosis-moderate    Chronic systolic congestive heart failure    Pseudophakia    Refractive error    Nonexudative senile macular degeneration of retina    Atherosclerosis of arteries of extremities    Hypercholesterolemia    Acute renal failure    Pulmonary hypertension    Elevation of cardiac enzymes    Hyperglycemia, unspecified    Nocturnal hypoxemia due to emphysema    Acute on chronic respiratory failure with hypoxia      Patient presents with Chronic Respiratory Failure and Diastolic Heart Failure. BNP continues to be elevated compared to admit with little improvement in respiratory status. Will benefit from more IV diuresis.     Plan:     Will continue current medications and treatment plan  Low sodium diet of 1.5 grams daily and limit fluid intake to 1.5 liters daily  Continue Lasix IV, B blocker, ARB and Statin     Chart reviewed. Dr. Mario agrees with plan as outlined above.

## 2017-05-19 NOTE — PLAN OF CARE
Problem: Patient Care Overview  Goal: Plan of Care Review  PT REQUIRES S FOR GT X 60 WITH NO AD AND O2 IN TOW   Outcome: Ongoing (interventions implemented as appropriate)  Patient remains free from falls, fall precaution in place. Up ad kenia.   VS stable. HERNANDEZ. 5L NC. Pending D/C in AM.   No other C/O at this time.Call bell and belongings within reach, reminded to call for assistance.

## 2017-05-19 NOTE — PLAN OF CARE
Problem: Patient Care Overview  Goal: Plan of Care Review  PT REQUIRES S FOR GT X 60 WITH NO AD AND O2 IN TOW   Outcome: Unable to achieve outcome(s) by discharge  PT REFUSED ROLLATOR AT THIS TIME AND IS AT PLOF WITH GROSS FUNC MOBILITY. PT WILL BE D/C TO MOBILITY PROGRAM

## 2017-05-19 NOTE — ASSESSMENT & PLAN NOTE
-pulmonary consult  -IV ABx  -CT chest  -IV lasix - 20 mg po  -trilogy  -Duonebs  -bronchodilators  -IV steroids  -Oxygen  -mucinex DM

## 2017-05-19 NOTE — PLAN OF CARE
Problem: Patient Care Overview  Goal: Plan of Care Review  PT REQUIRES S FOR GT X 60 WITH NO AD AND O2 IN TOW   Outcome: Ongoing (interventions implemented as appropriate)  Pt alert and oriented. Stable on 4.5L NC, humidification added. Home trilogy used at night, pt tolerated well. HERNANDEZ noted. Skin appears dusky with discolored nailbed noted. IV solumedrol and IV antibodies administered. Denies any pain. Ambulated in room independently, repositioned self in the bed. Pt remained free of falls during shift, urinal at bedside, call light in reach, wife at bedside and offers support, room free of clutter, side rails up X2, pt on telemetry monitor SB-SR with PVC's, will continue to monitor.

## 2017-05-19 NOTE — PLAN OF CARE
Problem: Patient Care Overview  Goal: Plan of Care Review  PT REQUIRES S FOR GT X 60 WITH NO AD AND O2 IN TOW   Outcome: Ongoing (interventions implemented as appropriate)  Patient 'ss po2 and breathing are better after neb treatment

## 2017-05-19 NOTE — PT/OT/SLP PROGRESS
Physical Therapy  Treatment    Orion Rinaldi   MRN: [id number]   Admitting Diagnosis: Acute on chronic respiratory failure with hypoxia    PT Received On: 17  PT Start Time: 914     PT Stop Time: 924    PT Total Time (min): 10 min       Billable Minutes:  Therapeutic Activity 10    Treatment Type: Treatment  PT/PTA: PT             General Precautions: Standard, respiratory  Orthopedic Precautions: N/A   Braces:           Subjective:  Communicated with NURSE PARK AND EPIC CHART REVIEW  prior to session.   PT MET IN . PT REFUSED TRAINING.    Pain Ratin/10              Pain Rating Post-Intervention: 0/10    Objective:   Patient found with: peripheral IV, telemetry, oxygen    Functional Mobility:  Bed Mobility:        Transfers:  Sit <> Stand Assistance: Activity did not occur    Gait:   Gait Distance: did not occur     Therapeutic Activities and Exercises:  PT AND WIFE PRESENT IN . P.T. EDUCATED PT AND WIFE ON ROLLATOR AND DEMONSTRATED USE. PT REFUSED TRAINING AND REPORTED HE IS FINE JUST GETTING AROUND WITHOUT IT SHORT DISTANCES FOR NOW. PT WIFE REPORTED THEY DON'T HAVE ENOUGH ROOM FOR IT AT THIS TIME. PT REQUESTED NO P.T. SERVICES AS HE IS AT HIS PLOF.    AM-PAC 6 CLICK MOBILITY  How much help from another person does this patient currently need?   1 = Unable, Total/Dependent Assistance  2 = A lot, Maximum/Moderate Assistance  3 = A little, Minimum/Contact Guard/Supervision  4 = None, Modified [place]/Independent    Turning over in bed (including adjusting bedclothes, sheets and blankets)?: 4  Sitting down on and standing up from a chair with arms (e.g., wheelchair, bedside commode, etc.): 4  Moving from lying on back to sitting on the side of the bed?: 4  Moving to and from a bed to a chair (including a wheelchair)?: 4  Need to walk in hospital room?: 4  Climbing 3-5 steps with a railing?: 1  Total Score: 21    AM-PAC Raw Score CMS G-Code Modifier Level of Impairment Assistance   6 %  Total / Unable   7 - 9 CM 80 - 100% Maximal Assist   10 - 14 CL 60 - 80% Moderate Assist   15 - 19 CK 40 - 60% Moderate Assist   20 - 22 CJ 20 - 40% Minimal Assist   23 CI 1-20% SBA / CGA   24 CH 0% Independent/ Mod I     Patient left supine with call button in reach.    Assessment:  PT UNDERSTOOD EDUCATION AND REQUEST NO THERAPY AND NO ROLLATOR AT THIS TIME. PT WILL BE D/C FROM P.T. SERVICES TO MOBILITY PROGRAM    Rehab identified problem list/impairments: Rehab identified problem list/impairments: impaired endurance    Discharge recommendations: Discharge Facility/Level Of Care Needs: home     Barriers to discharge: Barriers to Discharge: None (pt at [facility])    Equipment recommendations: Equipment Needed After Discharge: rollator     GOALS:   Physical Therapy Goals        Problem: Physical Therapy Goal    Goal Priority Disciplines Outcome Goal Variances Interventions   Physical Therapy Goal     PT/OT, PT      Description:  PT WILL BE SEEN FOR P.T. FOR A MIN OF 5 OUT OF 7 DAYS A WEEK  LT17  1. PT WILL STAND T/F TO CHAIR IND.  2. PT WILL GT TRAIN IND OR MOD I WITH ROLLATOR X 50'   3. PT WILL PROPERLY DEMONSTRATE SAFE USE OF ROLLATOR                 PLAN:    Patient to be seen    to address the above listed problems via gait training, therapeutic activities  Plan of Care expires: 17  Plan of Care reviewed with: patient         Stella Magana, PT  2017

## 2017-05-19 NOTE — PROGRESS NOTES
Progress Note  Pulmonology    Admit Date: 5/16/2017   LOS: 2 days     SUBJECTIVE: stil dyspnic but improved     Follow-up For:  Congestive Heart failure and exacerbation of chronic obstructive pulmonary disease .  Still weak, BNP elevated , diuresis has helped      Continuous Infusions:   Scheduled Meds:   aspirin  81 mg Oral Daily    enoxaparin  40 mg Subcutaneous Daily    guaifenesin  600 mg Oral BID    levetiracetam  750 mg Oral BID    losartan  12.5 mg Oral Daily    methylPREDNISolone sod suc(PF)  40 mg Intravenous Q8H    metoprolol tartrate  12.5 mg Oral BID    [START ON 5/19/2017] moxifloxacin  400 mg Oral Daily    simvastatin  40 mg Oral QHS       Review of Systems:  Constitutional: no fever or chills, positive for fatigue and malaise  Respiratory: positive for cough and dyspnea on exertion  Cardiovascular: no chest pain or palpitations    OBJECTIVE:     Vital Signs Range (Last 24H):  Temp:  [97.5 °F (36.4 °C)-98.3 °F (36.8 °C)]   Pulse:  [64-94]   Resp:  [18-28]   BP: ()/(50-84)   SpO2:  [90 %-99 %]     I & O (Last 24H):  Intake/Output Summary (Last 24 hours) at 05/18/17 2337  Last data filed at 05/18/17 2102   Gross per 24 hour   Intake             1110 ml   Output             2000 ml   Net             -890 ml     Physical Exam:  General: mild distress  Neck: jugular venous distention - 7 cm above sternal notch  Lungs:  rales bibasilar and bilaterally  Chest Wall: no tenderness  Heart: regularly irregular rhythm  Abdomen: soft, non-tender non-distended; bowel sounds normal  Extremities: no cyanosis or edema, or clubbing  Neurologic: alert, oriented, thought content appropriate    Laboratory:  CBC:   Recent Labs  Lab 05/17/17  0652   WBC 3.36*   RBC 4.78   HGB 12.3*   HCT 39.9*   PLT 76*   MCV 84   MCH 25.7*   MCHC 30.8*     BMP:   Recent Labs  Lab 05/18/17  0609   *      K 4.6   *   CO2 19*   BUN 57*   CREATININE 1.5*   CALCIUM 8.7   MG 2.3     CMP:   Recent Labs  Lab  05/18/17  0609   *   CALCIUM 8.7   ALBUMIN 3.2*   PROT 6.9      K 4.6   CO2 19*   *   BUN 57*   CREATININE 1.5*   ALKPHOS 73   ALT 17   AST 27   BILITOT 1.8*     LFTs:   Recent Labs  Lab 05/18/17  0609   ALT 17   AST 27   ALKPHOS 73   BILITOT 1.8*   PROT 6.9   ALBUMIN 3.2*     Cardiac markers:   Recent Labs  Lab 05/18/17  0609   TROPONINI 0.049*     Microbiology Results (last 7 days)     ** No results found for the last 168 hours. **          Chest X-Ray: I personally reviewed the films and findings are:, air trapping/emphysema, pulmonary edema    Diagnostic Results:  Echo: Reviewed  LAST ECHO AVAILABLE:  Date of Procedure: 01/04/2017    TEST DESCRIPTION   Technical Quality: This is a technically challenging study. There is poor endocardial definition.     Aorta: The aortic root is normal in size, measuring 4.0 cm at sinotubular junction and 3.9 cm at Sinuses of Valsalva. The proximal ascending aorta is normal in size, measuring 4.4 cm across.     Left Atrium: The left atrial volume index is severely enlarged, measuring 58.15 cc/m2.     Left Ventricle: The left ventricle is normal in size, with an end-diastolic diameter of 4.6 cm, and an end-systolic diameter of 3.4 cm. LV wall thickness is normal, with the septum measuring 0.9 cm and the posterior wall measuring 1.2 cm across. Relative   wall thickness was increased at 0.52, and the LV mass index was 94.7 g/m2 consistent with concentric remodeling. Global left ventricular systolic function appears mildly depressed. Visually estimated ejection fraction is 45-50%. The LV Doppler derived   stroke volume equals 78.0 ccs.   The E/e'(lat) is 21, consistent with significant diastolic dysfunction.     Right Atrium: The right atrium is moderately enlarged, measuring 5.5 cm in length and 4.1 cm in width in the apical view.     Right Ventricle: The right ventricle is normal in size. Global right ventricular systolic function appears mildly to moderately  depressed. Tricuspid annular plane systolic excursion (TAPSE) is 1.4 cm. The estimated PA systolic pressure is 69 mmHg.     Aortic Valve:  There is a percutaneously replaced bioprosthesis in the aortic position. The aortic valve prosthesis is well seated. The peak gradient obtained across the aortic valve is 24.0 mmHg, with a mean gradient of 13.57 mmHg. Using a left   ventricular outflow tract diameter of 2.2 cm, a left ventricular outflow tract velocity time integral of 21 cm, and a peak instantaneous transvalvular velocity time integral of 60 cm, the effective prosthetic valve area is 1.3 cm2. The effective   prosthetic valve area corrected for BSA is 0.62 cm2. Additionally, there is trivial aortic regurgitation.     Mitral Valve:  The mitral valve is moderately sclerotic. The pressure half time is 140 msec. The calculated mitral valve area is 1.57 cm2 consistent with trivial to mild mitral stenosis. There is mitral annular calcification.     Tricuspid Valve:  There is mild tricuspid regurgitation.     IVC: IVC is enlarged but collapses > 50% with a sniff, suggesting intermediate right atrial pressure of 8 mmHg.     Atrial Septum: The atrial septum is intact.     Intracavitary: There is no evidence of pericardial effusion, intracavity mass, thrombi, or vegetation.   CONCLUSIONS     1 - Biatrial enlargement.     2 - Concentric remodeling.     3 - Mildly depressed left ventricular systolic function (EF 45-50%).     4 - Left ventricular diastolic dysfunction.     5 - Mildly to moderately depressed right ventricular systolic function .     6 - Pulmonary hypertension. The estimated PA systolic pressure is 69 mmHg.     7 - S/p transcatheter AVR, effective prosthetic valve area corrected for BSA is 0.62 cm2.     8 - Trivial aortic regurgitation.     9 - Trivial to mild mitral stenosis, MVA = 1.57 cm2.     10 - Mild tricuspid regurgitation.     11 - Intermediate central venous pressure.     This document has been  electronically    SIGNED BY: Shanel Moeller MD On: 01/04/2017 18:10  ASSESSMENT/PLAN:     Problem   Pulmonary Heart Disease   Pulmonary Hypertension           Patient Active Problem List   Diagnosis    Aortic stenosis    Moderate COPD (chronic obstructive pulmonary disease)    Lens replaced by other means - Both Eyes    Dry eye - Both Eyes    Posterior vitreous detachment, both eyes - Both Eyes    Brow ptosis - Both Eyes    Seizures    Dizziness    Pulmonary heart disease    Cardiomyopathy, ischemic    H/O aortic valve replacement    DJD (degenerative joint disease) of lumbar spine    Impaired gait    Thrombocytopenia, with anemia    Transient synovitis of right knee    Effusion of right knee    Arthritis of right knee    Pulmonary emphysema    Chronic respiratory failure with hypoxia    Mitral stenosis-moderate    Chronic systolic congestive heart failure    Pseudophakia    Refractive error    Nonexudative senile macular degeneration of retina    Atherosclerosis of arteries of extremities    Hypercholesterolemia    Acute renal failure    Pulmonary hypertension    Elevation of cardiac enzymes    Hyperglycemia, unspecified    Nocturnal hypoxemia due to emphysema    Acute on chronic respiratory failure with hypoxia         Plan: Consider repeat of echocardiogram and cardiology consult for Congestive Heart failure.    Continue present regimen      Anthony Hinton MD  Pulmonary / Critical Care Medicine    .

## 2017-05-19 NOTE — SUBJECTIVE & OBJECTIVE
Interval History: plan home in am on Lasix po 20-40 mg (patient states the higher dosage causes severe weakness)    Review of Systems   Constitutional: Positive for activity change and fatigue. Negative for appetite change, chills, diaphoresis, fever and unexpected weight change.   HENT: Negative for congestion, ear discharge, ear pain, facial swelling, hearing loss, postnasal drip, sore throat, tinnitus, trouble swallowing and voice change.    Eyes: Negative for photophobia, pain, discharge, redness, itching and visual disturbance.   Respiratory: Positive for cough and shortness of breath (chronic). Negative for choking, chest tightness, wheezing and stridor.    Cardiovascular: Negative for chest pain, palpitations and leg swelling.   Gastrointestinal: Negative for abdominal distention, abdominal pain, blood in stool, constipation, diarrhea, nausea and vomiting.   Endocrine: Negative for cold intolerance, heat intolerance, polydipsia, polyphagia and polyuria.   Genitourinary: Negative for difficulty urinating, flank pain, frequency, hematuria and urgency.   Musculoskeletal: Negative for arthralgias, back pain, gait problem and myalgias.   Skin: Negative for color change, pallor, rash and wound.   Neurological: Positive for weakness. Negative for dizziness, tremors, seizures, syncope, facial asymmetry, speech difficulty, light-headedness, numbness and headaches.   Hematological: Negative for adenopathy. Does not bruise/bleed easily.   Psychiatric/Behavioral: Negative for agitation, confusion, hallucinations and suicidal ideas. The patient is not nervous/anxious.    All other systems reviewed and are negative.    Objective:     Vital Signs (Most Recent):  Temp: 98 °F (36.7 °C) (05/19/17 1600)  Pulse: 76 (05/19/17 1600)  Resp: 20 (05/19/17 1600)  BP: 121/71 (05/19/17 1600)  SpO2: (!) 92 % (05/19/17 1600) Vital Signs (24h Range):  Temp:  [97.2 °F (36.2 °C)-98 °F (36.7 °C)] 98 °F (36.7 °C)  Pulse:  [56-84] 76  Resp:   [20-28] 20  SpO2:  [91 %-100 %] 92 %  BP: (121-142)/(61-71) 121/71     Weight: 85.3 kg (188 lb)  Body mass index is 26.22 kg/(m^2).    Intake/Output Summary (Last 24 hours) at 05/19/17 1845  Last data filed at 05/19/17 1800   Gross per 24 hour   Intake              250 ml   Output             1750 ml   Net            -1500 ml      Physical Exam   Constitutional: He is oriented to person, place, and time. He appears well-developed and well-nourished.   HENT:   Head: Normocephalic and atraumatic.   Nose: Nose normal.   Eyes: Conjunctivae and EOM are normal. Pupils are equal, round, and reactive to light.   Neck: Normal range of motion. Neck supple. No JVD present. No tracheal deviation present. No thyromegaly present.   Cardiovascular: Normal rate, regular rhythm, normal heart sounds and intact distal pulses.  Exam reveals no gallop and no friction rub.    No murmur heard.  Pulmonary/Chest: No stridor. No respiratory distress. He has wheezes. He has rales. He exhibits no tenderness.   Abdominal: Soft. Bowel sounds are normal. He exhibits no distension. There is no tenderness. There is no rebound and no guarding.   Musculoskeletal: Normal range of motion. He exhibits no edema, tenderness or deformity.   Neurological: He is alert and oriented to person, place, and time. He has normal reflexes. No cranial nerve deficit. Coordination normal.   Skin: Skin is warm and dry. No rash noted. No erythema. No pallor.   Psychiatric: He has a normal mood and affect. His behavior is normal. Judgment and thought content normal.   Nursing note and vitals reviewed.      Significant Labs:   CBC:   Recent Labs  Lab 05/19/17  0523   WBC 4.00   HGB 13.4*   HCT 43.0   *     CMP:   Recent Labs  Lab 05/18/17  0609 05/19/17  0523    142   K 4.6 4.4   * 111*   CO2 19* 23   * 136*   BUN 57* 66*   CREATININE 1.5* 1.4   CALCIUM 8.7 9.4   PROT 6.9 7.0   ALBUMIN 3.2* 3.3*   BILITOT 1.8* 1.8*   ALKPHOS 73 72   AST 27 22    ALT 17 19   ANIONGAP 9 8   EGFRNONAA 44* 47*       Significant Imaging: I have reviewed all pertinent imaging results/findings within the past 24 hours.

## 2017-05-20 NOTE — PLAN OF CARE
Problem: Patient Care Overview  Goal: Plan of Care Review  PT REQUIRES S FOR GT X 60 WITH NO AD AND O2 IN TOW   Outcome: Ongoing (interventions implemented as appropriate)  Pt alert and oriented, rested during shift. Home trilogy QHS, 4-5L NC. Sched nebs. PO Aldactone, urinal at bedside. Pt ambulated in room independently, remained free of falls during shift, family offers support, call light in reach, room free of clutter, side rails up X2, pt on telemetry monitor SR, will continue to monitor.
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
## 2017-05-20 NOTE — PROGRESS NOTES
Attempted to make follow up appointments for patient but nothing available for weeks.  Advised patient to call on Monday to make appointments with primary care, pulmonology, and cardiology.  Patient stated understanding.

## 2017-05-20 NOTE — PROGRESS NOTES
Discussed discharge information with Patient  Family was at the bedside  IV removed  Heart monitor removed  Patient educated regarding medications, diet, activity  Patient has no questions or concerns regarding discharge instructions and education  Patient stated they will notify staff when ready to be accompanied to front door via wheelchair

## 2017-05-21 PROBLEM — J44.1 COPD EXACERBATION: Status: ACTIVE | Noted: 2017-01-01

## 2017-05-21 NOTE — HPI
Orion Rinaldi is a 80 yo male with PMHx of CHF, EF 45%, COPD, TAVR 2013, CAD s/p CABG, chronic respiratory failure on home O2 at 5 liters and home trilogy, who presented to ER with c/o cough and chest congestin. Onset of symptoms was abrupt starting 3 days ago with gradually worsening course since that time. Patient denies fever or chills. Symptoms are aggravated by actiivty. Symptoms improved with rest and oxygen. No pleuritic pain, no chest pain. Wife reports he uses his home trilogy 4 hours in the afternoon and all night regularly. Pulmonary has been consulted. He was admitted to ICU with acute on chronic respiratory failure.
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
## 2017-05-22 NOTE — PLAN OF CARE
05/22/17 1216   Final Note   Assessment Type Final Discharge Note   Discharge Disposition Home   Hospital Follow Up  Appt(s) scheduled? Yes   Offered [facility] -- Bedside Delivery? n/a   Discharge/Hospital Encounter Summary to ([facility]) PCP n/a   Referral to Outpatient Case Management complete? n/a   Referral to / orders for Home Health Complete? n/a   Any social issues identified prior to discharge? Yes   Did you assess the readiness or willingness of the family or caregiver to support self management of care? Yes

## 2017-05-23 NOTE — TELEPHONE ENCOUNTER
"----- Message from Gladis Garza RN sent at 5/23/2017  9:05 AM CDT -----  Morning,  I just completed a "transition of care" call for Orion Rinaldi.  He was recently discharged from [facility] with COPD.  He was started on Spironaldactone and was told to have a BMP done in 3 days.  Wife states that they were told to STOP the Lasix and Potassium, but these are still listed on the discharge summary.  They cannot afford the co-pay for an office visit at this time, but he is asking if he can come in for "lab only".  Would you please ask Dr. Downing if that is ok?    Thanks.  Gladis Garza RN  Care Coordination Call Center    "

## 2017-05-23 NOTE — PATIENT INSTRUCTIONS
Discharge Instructions: COPD  You have been diagnosed with chronic obstructive pulmonary disease (COPD). This is a name given to a group of diseases that limit the flow of air in and out of your lungs. This makes it harder to breathe. With COPD, you are also more likely to get lung infections. COPD includes chronic bronchitis and emphysema. COPD is most often caused by heavy, long-term cigarette smoking.  Home care  Quit smoking  · If you smoke, quit. It is the best thing you can do for your COPD and your overall health.  · Join a stop-smoking program. There are even telephone, text message, and Internet programs to help you quit.  · Ask your healthcare provider about medicines or other methods to help you quit.  · Ask family members to quit smoking as well.  · Don't allow people to smoke in your home, in your car, or when they are around you.  Protect yourself from infection  · Wash your hands often. Do your best to keep your hands away from your face. Most germs are spread from your hands to your mouth.  · Get a flu shot every year. Also ask your provider about pneumonia vaccines.  · Avoid crowds. It's especially important to do this in the winter when more people have colds and flu.  · To stay healthy, get enough sleep, exercise regularly, and eat a balanced diet. You should:  ¨ Get about 8 hours of sleep every night.  ¨ Try to exercise for at least 30 minutes on most days.  ¨ Have healthy foods including fruits and vegetables, 100% whole grains, lean meats and fish, and low-fat dairy products. Try to stay away from foods high in fats and sugar.  Take your medicines  Take your medicines exactly as directed. Don't skip doses.  Manage your stress  Stress can make COPD worse. Use this stress management technique:  · Find a quiet place and sit or lie in a comfortable position.  · Close your eyes and perform breathing exercises for several minutes. Ask your provider about the best way to breathe.  Pulmonary  rehabilitation  · Pulmonary rehab can help you feel better. These programs include exercise, breathing techniques, information about COPD, counseling, and help for smokers.  · Ask your provider or your local hospital about programs in your area.  When to call your healthcare provider  Call your provider immediately if you have any of the following:  · Shortness of breath, wheezing, or coughing  · Increased mucus  · Yellow, green, bloody, or smelly mucus  · Fever or chills  · Tightness in your chest that does not go away with rest or medicine  · An irregular heartbeat or a feeling that your heart is beating very fast  · Swollen ankles   Date Last Reviewed: 5/1/2016  © 1986-8353 [employer]. [address] [place] [address], [place] [address]. All rights reserved. This information is not intended as a substitute for professional medical care. Always follow your healthcare professional's instructions.

## 2017-05-23 NOTE — TELEPHONE ENCOUNTER
Called to notify patient's wife that we were able to obtain a free month trail of Daliresp for Mr. Rinaldi.    Shipping via [employer] today for delivery on Wednesday 5/23/17.  Verified shipping address.    Informed the patient that I will call  in the morning to see if there is more help available for him after the free trial.  Mrs. Rinaldi was extremely thankful for all the help provided.    Tried to call Ms. Gladis RN from [place] back at [phone], no answer.  Will follow up tomorrow.

## 2017-05-24 NOTE — TELEPHONE ENCOUNTER
----- Message from Adriana Drew sent at 5/24/2017  8:43 AM CDT -----  Contact: wife  needs callback rg pt diuretic meds (refused to elaborate)..[phone]

## 2017-05-24 NOTE — TELEPHONE ENCOUNTER
----- Message from Pedro Henry sent at 5/24/2017 10:23 AM CDT -----  Contact: Maribel Rinaldi (Spouse)  Maribel Rinaldi (Spouse) is requesting a call to nurse in regards to f/u on a change that was made with pt medication.            Please call pt back at [phone]

## 2017-05-24 NOTE — TELEPHONE ENCOUNTER
Informed pt wife she would need to speak to cardiology about meds. Transferred call to cardiology.

## 2017-05-24 NOTE — TELEPHONE ENCOUNTER
Patient's wife called and would like to speak with Dr. Solano or his nurse concerning patient taking spironolactone and lasix--states patient's output of urine has decreased since being discharged from hospital on 5/20/17

## 2017-06-07 NOTE — TELEPHONE ENCOUNTER
----- Message from Shereen Monroe sent at 6/7/2017  9:16 AM CDT -----  Contact: wife Maribel  Calling concerning patient blood work for patient. Please call Maribel GAVIN @ [phone]. Thanks, damaris

## 2017-06-26 NOTE — TELEPHONE ENCOUNTER
Spoke with patient wife about the patient Efrem.  Patient will come in on Thursday 06/30/2017 to do paperwork to see if he will get approved through drug company.

## 2017-07-03 PROBLEM — J96.21 ACUTE ON CHRONIC RESPIRATORY FAILURE WITH HYPOXIA: Status: ACTIVE | Noted: 2017-01-01

## 2017-07-03 PROBLEM — I25.10 CORONARY ARTERY DISEASE INVOLVING NATIVE CORONARY ARTERY OF NATIVE HEART WITHOUT ANGINA PECTORIS: Chronic | Status: ACTIVE | Noted: 2017-01-01

## 2017-07-03 PROBLEM — N39.0 ACUTE URINARY TRACT INFECTION: Status: ACTIVE | Noted: 2017-01-01

## 2017-07-03 PROBLEM — R09.02 HYPOXIA: Status: ACTIVE | Noted: 2017-01-01

## 2017-07-03 PROBLEM — J96.01 ACUTE HYPOXEMIC RESPIRATORY FAILURE: Status: ACTIVE | Noted: 2017-01-01

## 2017-07-03 PROBLEM — J81.0 ACUTE PULMONARY EDEMA: Status: ACTIVE | Noted: 2017-01-01

## 2017-07-03 NOTE — ED NOTES
Pt resting in ER stretcher, aaox4, rr e/u, NAD noted. Pt remains on cardiac monitor with vss noted. Bed low and locked, call light in reach, side rails up x2. Family at bedside.  Pt verbalized understanding of status and POC; denies further needs. Will continue to monitor.

## 2017-07-03 NOTE — PROGRESS NOTES
Report received from ER, RN.  Received patient from ED via stretcher, connected to monitors and O2.  Patient tachypneic and short of breath.  Placed patient on home triology as ordered.  O2 sats 97%, SOB decreased.  Oriented to room, unit, and call light.  Instructed to call for needs.  All questions answered.  Dr. Downing at the bedside.  Report given to VILLA Persaud.

## 2017-07-03 NOTE — PLAN OF CARE
CM met with pt, spouse, and son in the pts room in the ED.  Pt still having difficulty breathing.  Bedside nurse discussed pt being moved to ICU and pt and wife stated preference to bypass ICU and be placed on telemetry floor due to a negative experience in ICU during last admission.  Pt's wife brought pt trilogy from home for this reason.    Tx team discussed and agreed pt will need to go to ICU for care and close monitoring while on trilogy.  Bedside nurse explained decision to pt and both he and family were agreeable.  Pt to be transferred this evening.    CM will follow up with pt and continue to assess for d/c needs.       07/03/17 0094   Discharge Assessment   Assessment Type Discharge Planning Assessment   Confirmed/corrected address and phone number on facesheet? Yes   Assessment information obtained from? Patient;Medical Record;Caregiver   Prior to hospitilization cognitive status: Alert/Oriented   Prior to hospitalization functional status: Assistive Equipment   Current cognitive status: Alert/Oriented   Current Functional Status: Assistive Equipment   Arrived From home or self-care   Lives With spouse;child(rima), adult   Able to Return to Prior Arrangements yes   Is patient able to care for self after discharge? Yes   How many people do you have in your home that can help with your care after discharge? 2   Who are your caregiver(s) and their phone number(s)? Maribel Rinaldi, spouse: [phone] (home), [phone] (mobile)   Patient's perception of discharge disposition home or selfcare   Readmission Within The Last 30 Days no previous admission in last 30 days   Patient currently being followed by outpatient case management? No   Patient currently receives home health services? No   Does the patient currently use HME? Yes   Patient currently receives private duty nursing? No   Patient currently receives any other outside agency services? No   Equipment Currently Used at Home oxygen;respiratory  supplies  (trilogy )   Do you have any problems affording any of your prescribed medications? No   Is the patient taking medications as prescribed? yes   Do you have any financial concerns preventing you from receiving the healthcare you need? No   Does the patient have transportation to healthcare appointments? Yes   Transportation Available family or friend will provide   On Dialysis? No   Does the patient receive services at the Coumadin Clinic? No   Are there any open cases? No   Discharge Plan A Other  (Pending POC)   Discharge Plan B Other  (Pending POC )

## 2017-07-03 NOTE — TELEPHONE ENCOUNTER
Returned call. Spoke with patient's spouse.  Spouse verbalized patient unable to urinate, feels urge, go to commode sit and strain with only drops.      Patient was asked the following questions;  1. Does lower part of abdomen feels bloated - Yes  2. Do you feel urge to urinate all the time, even after urinating a little, - Yes  3. Have you ever seen a urologist of been dx with prostate problems in the past - Yes, Dr. Bee and he won't be able to see us same day.    Patient was instructed to go to nearest ED.    Patient spouse wasn't happy with idea do to cost and ED waiting period.  Patient spouse eventually agreed to seek service at nearest ED.

## 2017-07-03 NOTE — ASSESSMENT & PLAN NOTE
Telemetry monitoring  IV lasix  Metolazone 5 mg once given in the ED  daily weights  low sodium diet -  fluid restriction  Recent 2 D ECHO - EF 45 %  Strict I & O  Supplemental oxygen   Continue losartan

## 2017-07-03 NOTE — TELEPHONE ENCOUNTER
----- Message from Rosa Marshall sent at 7/3/2017  9:29 AM CDT -----  Pt wife(Maribel) at [phone]//states is calling regarding swelling in pt's feet and legs//and his medication//also he has a problem urinating//please call asap//thanks/[facility]

## 2017-07-03 NOTE — ED PROVIDER NOTES
SCRIBE #1 NOTE: I, Sinai Khan, am scribing for, and in the presence of, Gee Lin MD. I have scribed the entire note.      History      Chief Complaint   Patient presents with    Shortness of Breath     pt states he is SOB and can't urinate        Review of patient's allergies indicates:   Allergen Reactions    Doxycycline Nausea Only and Other (See Comments)     Dizziness     Pneumococcal vaccine         HPI   HPI    7/3/2017, 1:36 PM   History obtained from the wife and patient      History of Present Illness: Orion Rinaldi is a 81 y.o. male patient, with a PMHx of CHF, CAD, and COPD, who presents to the Emergency Department for difficulty urinating which onset gradually 3 days ago. Wife states that pt has been unable to urinate despite being on Lasix. Sx have been constant and moderate in severity. No modifying factors noted. No associated sx included. Pt denies any fever, chills, abdominal pain, or n/v/d.    Pt is also complaining of worsening SOB which onset gradually this morning. Pt has an O2 sat of 74% in triage. Sx have been constant and moderate to severe in degree. Sx are exacerbated with exertion. Wife states that pt became more SOB while walking from parking lot to ED lobby. No mitigating factors noted. No associated sx included. Pt denies any fever, chills, diaphoresis, CP, leg swelling, calf pain, n/v, or dizziness. Wife states that pt is on 5L at-home O2. No further complaints at this time.     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    Seizures        Past Surgical History:  Past Surgical History:   Procedure Laterality Date    AORTIC VALVE REPLACEMENT      CARDIAC CATHETERIZATION      CARDIAC SURGERY      CARDIAC VALVE SURGERY      CATARACT EXTRACTION W/  INTRAOCULAR LENS IMPLANT  OD 3/18/09    CATARACT EXTRACTION W/  INTRAOCULAR LENS IMPLANT  OS 4/1/09    CORONARY ARTERY BYPASS GRAFT  1997    CABG x 3    SKIN BIOPSY      TONSILLECTOMY           Family History:  Family History   Problem Relation Age of Onset    Heart disease Brother     Cataracts Mother     No Known Problems Father     Cataracts Maternal Grandmother     No Known Problems Maternal Grandfather     Cataracts Paternal Grandmother     Cancer Paternal Grandfather     No Known Problems Sister     No Known Problems Maternal Aunt     No Known Problems Maternal Uncle     No Known Problems Paternal Aunt     No Known Problems Paternal Uncle     Anemia Neg Hx     Arrhythmia Neg Hx     Asthma Neg Hx     Clotting disorder Neg Hx     Fainting Neg Hx     Heart attack Neg Hx     Heart failure Neg Hx     Hyperlipidemia Neg Hx     Hypertension Neg Hx     Strabismus Neg Hx     Retinal detachment Neg Hx     Macular degeneration Neg Hx     Glaucoma Neg Hx     Amblyopia Neg Hx     Blindness Neg Hx     Diabetes Neg Hx     Stroke Neg Hx     Thyroid disease Neg Hx        Social History:  Social History     Social History Main Topics    Smoking status: Former Smoker     Packs/day: 1.00     Years: 20.00     Types: Cigarettes     Quit date: 9/12/1997    Smokeless tobacco: Never Used    Alcohol use No      Comment: Occasionally     Drug use: No    Sexual activity: Yes     Partners: Female       ROS   Review of Systems   Constitutional: Negative for chills, diaphoresis and fever.   HENT: Negative for sore throat.    Respiratory: Positive for shortness of breath.    Cardiovascular: Negative for chest pain and leg swelling.  "  Gastrointestinal: Negative for abdominal pain, blood in stool, constipation, diarrhea, nausea and vomiting.   Genitourinary: Positive for difficulty urinating. Negative for flank pain.   Musculoskeletal: Negative for back pain.        (-) calf pain   Skin: Negative for rash.   Neurological: Negative for dizziness, weakness, light-headedness, numbness and headaches.   Hematological: Does not bruise/bleed easily.     Physical Exam      Initial Vitals [07/03/17 1330]   BP Pulse Resp Temp SpO2   (!) 92/57 107 (!) 30 98 °F (36.7 °C) (!) 74 %      MAP       68.67          Physical Exam  Nursing Notes and Vital Signs Reviewed.  Constitutional: Patient is in no acute distress. Well-developed and well-nourished.  Head: Atraumatic. Normocephalic.  Eyes: PERRL. EOM intact. Conjunctivae are not pale. No scleral icterus.  ENT: Mucous membranes are moist. Oropharynx is clear and symmetric.    Neck: Supple. Full ROM. No lymphadenopathy.  Cardiovascular: Regular rate. Regular rhythm. No murmurs, rubs, or gallops. Distal pulses are 2+ and symmetric.  Pulmonary/Chest: Tachypneic. Clear to auscultation bilaterally. Mild expiratory wheezing. No rales or rhonchi.   Abdominal: Soft and non-distended.  There is no tenderness.  No rebound, guarding, or rigidity.   Musculoskeletal: Moves all extremities. No obvious deformities. 2+ BLE edema. No calf tenderness.  Skin: Warm and dry. Slightly cyanotic.   Neurological:  Alert, awake, and appropriate.  Normal speech.  No acute focal neurological deficits are appreciated.  Psychiatric: Anxious affect. Good eye contact. Appropriate in content.    ED Course    Procedures  ED Vital Signs:  Vitals:    07/03/17 1330 07/03/17 1405 07/03/17 1410 07/03/17 1412   BP: (!) 92/57   (!) 91/41   Pulse: 107 87 90 86   Resp: (!) 30 (!) 30 (!) 30 (!) 26   Temp: 98 °F (36.7 °C)      TempSrc: Oral      SpO2: (!) 74% (!) 91% (!) 91% (!) 91%   Height: 5' 11" (1.803 m)       07/03/17 1415 07/03/17 1423 07/03/17 " 1430 07/03/17 1513   BP:  (!) 82/37 (!) 96/46 (!) 111/47   Pulse: 87 86 88 93   Resp: (!) 22 (!) 31 (!) 29 (!) 22   Temp:       TempSrc:       SpO2: (!) 30% (!) 93% (!) 93% (!) 93%   Height:        07/03/17 1548   BP: (!) 105/53   Pulse: 89   Resp: (!) 38   Temp:    TempSrc:    SpO2: (!) 93%   Height:        Abnormal Lab Results:  Labs Reviewed   CBC W/ AUTO DIFFERENTIAL - Abnormal; Notable for the following:        Result Value    RBC 4.30 (*)     Hemoglobin 11.5 (*)     Hematocrit 36.4 (*)     MCH 26.7 (*)     MCHC 31.6 (*)     RDW 21.3 (*)     Platelets 120 (*)     Lymph # 0.8 (*)     Lymph% 15.2 (*)     All other components within normal limits   COMPREHENSIVE METABOLIC PANEL - Abnormal; Notable for the following:     CO2 18 (*)     BUN, Bld 33 (*)     Albumin 3.0 (*)     Total Bilirubin 1.8 (*)     eGFR if non  57 (*)     All other components within normal limits   B-TYPE NATRIURETIC PEPTIDE - Abnormal; Notable for the following:     BNP 2,317 (*)     All other components within normal limits   PROTIME-INR - Abnormal; Notable for the following:     Prothrombin Time 14.0 (*)     INR 1.4 (*)     All other components within normal limits   URINALYSIS - Abnormal; Notable for the following:     Appearance, UA Hazy (*)     Protein, UA 1+ (*)     Ketones, UA Trace (*)     Bilirubin (UA) 1+ (*)     Leukocytes, UA Trace (*)     All other components within normal limits   URINALYSIS MICROSCOPIC - Abnormal; Notable for the following:     WBC, UA 25 (*)     Bacteria, UA Moderate (*)     All other components within normal limits   ISTAT PROCEDURE - Abnormal; Notable for the following:     POC PCO2 20.9 (*)     POC PO2 57 (*)     POC HCO3 13.7 (*)     POC SATURATED O2 91 (*)     All other components within normal limits   CULTURE, URINE   CULTURE, URINE   TROPONIN I        All Lab Results:  Results for orders placed or performed during the hospital encounter of 07/03/17   CBC auto differential   Result Value  Ref Range    WBC 5.51 3.90 - 12.70 K/uL    RBC 4.30 (L) 4.60 - 6.20 M/uL    Hemoglobin 11.5 (L) 14.0 - 18.0 g/dL    Hematocrit 36.4 (L) 40.0 - 54.0 %    MCV 85 82 - 98 fL    MCH 26.7 (L) 27.0 - 31.0 pg    MCHC 31.6 (L) 32.0 - 36.0 %    RDW 21.3 (H) 11.5 - 14.5 %    Platelets 120 (L) 150 - 350 K/uL    MPV SEE COMMENT 9.2 - 12.9 fL    Gran # 3.8 1.8 - 7.7 K/uL    Lymph # 0.8 (L) 1.0 - 4.8 K/uL    Mono # 0.8 0.3 - 1.0 K/uL    Eos # 0.0 0.0 - 0.5 K/uL    Baso # 0.05 0.00 - 0.20 K/uL    Gran% 69.0 38.0 - 73.0 %    Lymph% 15.2 (L) 18.0 - 48.0 %    Mono% 14.9 4.0 - 15.0 %    Eosinophil% 0.7 0.0 - 8.0 %    Basophil% 0.9 0.0 - 1.9 %    Aniso Slight     Poik Slight     Ovalocytes Occasional     Tear Drop Cells Occasional     Stomatocytes Present     Differential Method Automated    Comprehensive metabolic panel   Result Value Ref Range    Sodium 137 136 - 145 mmol/L    Potassium 4.5 3.5 - 5.1 mmol/L    Chloride 110 95 - 110 mmol/L    CO2 18 (L) 23 - 29 mmol/L    Glucose 110 70 - 110 mg/dL    BUN, Bld 33 (H) 8 - 23 mg/dL    Creatinine 1.2 0.5 - 1.4 mg/dL    Calcium 9.1 8.7 - 10.5 mg/dL    Total Protein 7.7 6.0 - 8.4 g/dL    Albumin 3.0 (L) 3.5 - 5.2 g/dL    Total Bilirubin 1.8 (H) 0.1 - 1.0 mg/dL    Alkaline Phosphatase 108 55 - 135 U/L    AST 19 10 - 40 U/L    ALT 19 10 - 44 U/L    Anion Gap 9 8 - 16 mmol/L    eGFR if African American >60 >60 mL/min/1.73 m^2    eGFR if non African American 57 (A) >60 mL/min/1.73 m^2   Troponin I   Result Value Ref Range    Troponin I 0.025 0.000 - 0.026 ng/mL   Brain natriuretic peptide   Result Value Ref Range    BNP 2,317 (H) 0 - 99 pg/mL   Protime-INR   Result Value Ref Range    Prothrombin Time 14.0 (H) 9.0 - 12.5 sec    INR 1.4 (H) 0.8 - 1.2   Urinalysis Catheterized   Result Value Ref Range    Specimen UA Urine, Catheterized     Color, UA Yellow Yellow, Straw, Kia    Appearance, UA Hazy (A) Clear    pH, UA 6.0 5.0 - 8.0    Specific Gravity, UA 1.025 1.005 - 1.030    Protein, UA 1+ (A)  Negative    Glucose, UA Negative Negative    Ketones, UA Trace (A) Negative    Bilirubin (UA) 1+ (A) Negative    Occult Blood UA Negative Negative    Nitrite, UA Negative Negative    Urobilinogen, UA Negative <2.0 EU/dL    Leukocytes, UA Trace (A) Negative   Urinalysis Microscopic   Result Value Ref Range    RBC, UA 0 0 - 4 /hpf    WBC, UA 25 (H) 0 - 5 /hpf    Bacteria, UA Moderate (A) None-Occ /hpf    Squam Epithel, UA 3 /hpf    Hyaline Casts, UA 1 0-1/lpf /lpf    Microscopic Comment SEE COMMENT    ISTAT PROCEDURE   Result Value Ref Range    POC PH 7.426 7.35 - 7.45    POC PCO2 20.9 (LL) 35 - 45 mmHg    POC PO2 57 (LL) 80 - 100 mmHg    POC HCO3 13.7 (L) 24 - 28 mmol/L    POC BE -11 -2 to 2 mmol/L    POC SATURATED O2 91 (L) 95 - 100 %    Sample ARTERIAL     Site RR     Allens Test Pass     DelSys Venti Mask     Mode SPONT     Flow 14     FiO2 55          Imaging Results:  Imaging Results          X-Ray Chest AP Portable (Final result)  Result time 07/03/17 14:08:09    Final result by Torri Espinoza MD (07/03/17 14:08:09)                 Impression:     Suspect small bilateral pleural effusions.  Increased markings in mostly attributed to by low lung volumes.  Early interstitial edema and/or pulmonary venous congestion is also a possibility.          Electronically signed by: TORRI ESPINOZA MD  Date:     07/03/17  Time:    14:08              Narrative:    Exam: XR CHEST AP PORTABLE    Clinical History: Shortness of breath. Asthma    Findings:     Low lung volumes with associated increased interstitial markings in the lower lobes.  Blunting of the bilateral costophrenic angles. Mild cardiomegaly.  Status post CABG.                             The EKG was ordered, reviewed, and independently interpreted by the ED provider.  Interpretation time: 13:37  Rate: 89 BPM  Rhythm: normal sinus rhythm  Interpretation: 1st degree AV block. Incomplete RBBB. Poor R wave progression. No STEMI.         The Emergency Provider reviewed  the vital signs and test results, which are outlined above.    ED Discussion   Patient declines BiPAP at this time as he is feeling better.  ABG results pending    3:23 PM: Discussed case with Cecille West NP ([facility]). Cecille agrees with current care and management of pt and accepts admission.   Admitting Service: Hospital medicine   Admitting Physician: Dr. Chung  Admit to: Telemetry     3:27 PM: Re-evaluated pt. I have discussed test results, shared treatment plan, and the need for admission with patient and family at bedside. Pt and family express understanding at this time and agree with all information. All questions answered. Pt and family have no further questions or concerns at this time. Pt is ready for admit.    4:30 Dr Chung in ED to eval pt.      ED Medication(s):  Medications   nitroGLYCERIN 2% TD oint ointment 0.5 inch (0.5 inches Topical Given 7/3/17 1351)   metOLazone tablet 5 mg (not administered)   furosemide injection 60 mg (not administered)   morphine injection 1 mg (not administered)   albuterol-ipratropium 2.5mg-0.5mg/3mL nebulizer solution 3 mL (3 mLs Nebulization Given 7/3/17 1415)   methylPREDNISolone sodium succinate injection 125 mg (125 mg Intravenous Given 7/3/17 1351)   furosemide injection 40 mg (40 mg Intravenous Given 7/3/17 1350)   cefTRIAXone (ROCEPHIN) 1 g in dextrose 5 % 50 mL IVPB (1 g Intravenous New Bag 7/3/17 1518)     Medical Decision Making    Medical Decision Making:   Clinical Tests:   Lab Tests: Ordered and Reviewed  Radiological Study: Ordered and Reviewed  Medical Tests: Ordered and Reviewed           Scribe Attestation:   Scribe #1: I performed the above scribed service and the documentation accurately describes the services I performed. I attest to the accuracy of the note.    Attending:   Physician Attestation Statement for Scribe #1: I, Gee Lin MD, personally performed the services described in this documentation, as scribed by Sinai Khan, in my  presence, and it is both accurate and complete.          Clinical Impression       ICD-10-CM ICD-9-CM   1. Hypoxia R09.02 799.02   2. Urinary tract infection without hematuria, site unspecified N39.0 599.0   3. Shortness of breath R06.02 786.05   4. Edema, unspecified type R60.9 782.3   5. Chronic obstructive pulmonary disease with acute exacerbation J44.1 491.21   6. Congestive heart failure, unspecified congestive heart failure chronicity, unspecified congestive heart failure type I50.9 428.0       Disposition:   Disposition: Admitted  Condition: Fair         Gee Lin MD  07/03/17 1700       Gee Lin MD  07/03/17 1702
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
## 2017-07-03 NOTE — ED NOTES
Pt spouse stated he is taking another fluid pill at night but can not remember the name of the medication. Attempted to give names of diuretic medications but unable to recognize the name of the medication. Did not take fluid pills today due not being able to urinate at home.

## 2017-07-03 NOTE — SUBJECTIVE & OBJECTIVE
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    Seizures        Past Surgical History:   Procedure Laterality Date    AORTIC VALVE REPLACEMENT      CARDIAC CATHETERIZATION      CARDIAC SURGERY      CARDIAC VALVE SURGERY      CATARACT EXTRACTION W/  INTRAOCULAR LENS IMPLANT  OD 3/18/09    CATARACT EXTRACTION W/  INTRAOCULAR LENS IMPLANT  OS 4/1/09    CORONARY ARTERY BYPASS GRAFT  1997    CABG x 3    SKIN BIOPSY      TONSILLECTOMY         Review of patient's allergies indicates:   Allergen Reactions    Doxycycline Nausea Only and Other (See Comments)     Dizziness     Pneumococcal vaccine        No current facility-administered medications on file prior to encounter.      Current Outpatient Prescriptions on File Prior to Encounter   Medication Sig    albuterol-ipratropium 2.5mg-0.5mg/3mL (DUO-NEB) 0.5 mg-3 mg(2.5 mg base)/3 mL nebulizer solution Take 3 mLs by nebulization every 8 (eight) hours as needed for Wheezing.    aspirin 81 MG Chew Take 81 mg by mouth once daily.    budesonide-formoterol 80-4.5 mcg (SYMBICORT) 80-4.5 mcg/actuation HFAA Inhale 2 puffs into the lungs 2 (two) times daily. Pharmacy to adjust to cheaper tier options    furosemide (LASIX) 20 MG tablet Take 2 tablets (40 mg total) by mouth once daily.    levetiracetam (KEPPRA) 750 MG Tab Take 1 tablet (750 mg total) by mouth 2 (two) times daily.    losartan (COZAAR) 25 MG tablet Take 0.5 tablets (12.5 mg total) by mouth once daily.    metoprolol tartrate (LOPRESSOR) 25 MG tablet Take 0.5 tablets (12.5 mg total) by mouth 2 (two) times  daily.    OXYGEN-AIR DELIVERY SYSTEMS MISC by [facility].(Non-Drug; Combo Route) route.    roflumilast 500 mcg Tab Take 1 tablet (500 mcg total) by mouth once daily.    simvastatin (ZOCOR) 40 MG tablet TAKE 1 TABLET EVERY EVENING.    tiotropium (SPIRIVA WITH HANDIHALER) 18 mcg inhalation capsule Inhale 1 capsule (18 mcg total) into the lungs once daily. Pharmacy to adjust to cheaper tier options    potassium chloride SA (K-DUR,KLOR-CON) 20 MEQ tablet Take 1 tablet (20 mEq total) by mouth 2 (two) times daily.    predniSONE (DELTASONE) 10 mg tablet pack Si tabs daily x 3 days, 3 tabs daily x 3 days, 2 tabs daily x 3 days, 1 tabs daily x 3 days, 1/2 tab daily x 3 days    spironolactone (ALDACTONE) 25 MG tablet Take 1 tablet (25 mg total) by mouth 2 (two) times daily.     Family History     Problem Relation (Age of Onset)    Cancer Paternal Grandfather    Cataracts Mother, Maternal Grandmother, Paternal Grandmother    Heart disease Brother    No Known Problems Father, Maternal Grandfather, Sister, Maternal Aunt, Maternal Uncle, Paternal Aunt, Paternal Uncle        Social History Main Topics    Smoking status: Former Smoker     Packs/day: 1.00     Years: 20.00     Types: Cigarettes     Quit date: 1997    Smokeless tobacco: Never Used    Alcohol use No      Comment: Occasionally     Drug use: No    Sexual activity: Yes     Partners: Female     Review of Systems   Constitutional: Negative for activity change, appetite change, chills, fatigue and fever.   HENT: Negative.    Eyes: Negative for pain and redness.   Respiratory: Positive for shortness of breath. Negative for cough.    Cardiovascular: Positive for leg swelling.   Gastrointestinal: Negative for abdominal pain, constipation, diarrhea, nausea and vomiting.   Genitourinary: Positive for decreased urine volume. Negative for difficulty urinating, dysuria and hematuria.   Musculoskeletal: Negative for arthralgias and myalgias.   Skin: Positive for color  change (finger tips cyanotic). Negative for pallor, rash and wound.   Neurological: Negative for dizziness, weakness and headaches.   Psychiatric/Behavioral: Negative for confusion.     Objective:     Vital Signs (Most Recent):  Temp: 98 °F (36.7 °C) (07/03/17 1330)  Pulse: 85 (07/03/17 1703)  Resp: (!) 32 (07/03/17 1703)  BP: 120/68 (07/03/17 1703)  SpO2: 97 % (07/03/17 1703) Vital Signs (24h Range):  Temp:  [98 °F (36.7 °C)] 98 °F (36.7 °C)  Pulse:  [] 85  Resp:  [22-38] 32  SpO2:  [30 %-97 %] 97 %  BP: ()/(37-68) 120/68        There is no height or weight on file to calculate BMI.    Physical Exam   Constitutional: He is oriented to person, place, and time. He appears well-developed and well-nourished. He has a sickly appearance.   Appears chronically ill   HENT:   Head: Normocephalic and atraumatic.   Eyes: Conjunctivae are normal. Pupils are equal, round, and reactive to light. No scleral icterus.   Neck: Normal range of motion. Neck supple.   Cardiovascular: Normal rate, regular rhythm and normal heart sounds.  Exam reveals no gallop and no friction rub.    No murmur heard.  Pulmonary/Chest: Tachypnea noted. He is in respiratory distress. He has rales in the right lower field and the left lower field.   Abdominal: Soft. Bowel sounds are normal.   Musculoskeletal: Normal range of motion. He exhibits edema. He exhibits no tenderness.        Right lower leg: He exhibits edema.        Left lower leg: He exhibits edema.        Legs:  Neurological: He is alert and oriented to person, place, and time.   Skin: Skin is warm and dry. Capillary refill takes more than 3 seconds.   Psychiatric: He has a normal mood and affect. His behavior is normal.   Nursing note and vitals reviewed.       Significant Labs:   CBC:   Recent Labs  Lab 07/03/17  1340   WBC 5.51   HGB 11.5*   HCT 36.4*   *     CMP:   Recent Labs  Lab 07/03/17  1340      K 4.5      CO2 18*      BUN 33*   CREATININE 1.2    CALCIUM 9.1   PROT 7.7   ALBUMIN 3.0*   BILITOT 1.8*   ALKPHOS 108   AST 19   ALT 19   ANIONGAP 9   EGFRNONAA 57*     All pertinent labs within the past 24 hours have been reviewed.    Significant Imaging: I have reviewed and interpreted all pertinent imaging results/findings within the past 24 hours.

## 2017-07-03 NOTE — H&P
[facility] Hospital Medicine  History & Physical    Patient Name: Orion Rinaldi  MRN: [id number]  Admission Date: 7/3/2017  Attending Physician: Lisa Chung MD   Primary Care Provider: Daisy Oconnor MD         Patient information was obtained from patient and ER records.     Subjective:     Principal Problem:Acute on chronic respiratory failure with hypoxia    Chief Complaint:   Chief Complaint   Patient presents with    Shortness of Breath     pt states he is SOB and can't urinate         HPI: Orion Rinaldi is a 80 yo male with Chronic Respiratory Failure (uses 5 L NC along with Triology vent at home), COPD, Systolic Heart Failure, S/P TAVR and Pulmonary HTN who presents to the ED due to decreased urinary output despite taking lasix over a 4 day period. While ambulating into the ED, he became more SOB. On Venti mask PO2 57, pH 7.42, CO2 20.9.  Bicarb 18. CBC is essentially stable, BNP = 2317, CXR indicates small bilateral pleural effusions and early interstitial edema. Pt has increased respiratory rate (30 - 40s). IV lasix, metolazone, morphine given in the ED. Pt is admitted to ICU for closer observation.    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    Seizures        Past Surgical History:   Procedure Laterality Date    AORTIC VALVE REPLACEMENT      CARDIAC CATHETERIZATION      CARDIAC SURGERY      CARDIAC VALVE SURGERY      CATARACT EXTRACTION W/  INTRAOCULAR LENS IMPLANT  OD 3/18/09    CATARACT EXTRACTION W/  INTRAOCULAR LENS IMPLANT  OS 4/1/09    CORONARY ARTERY BYPASS GRAFT       CABG x 3    SKIN BIOPSY      TONSILLECTOMY         Review of patient's allergies indicates:   Allergen Reactions    Doxycycline Nausea Only and Other (See Comments)     Dizziness     Pneumococcal vaccine        No current facility-administered medications on file prior to encounter.      Current Outpatient Prescriptions on File Prior to Encounter   Medication Sig    albuterol-ipratropium 2.5mg-0.5mg/3mL (DUO-NEB) 0.5 mg-3 mg(2.5 mg base)/3 mL nebulizer solution Take 3 mLs by nebulization every 8 (eight) hours as needed for Wheezing.    aspirin 81 MG Chew Take 81 mg by mouth once daily.    budesonide-formoterol 80-4.5 mcg (SYMBICORT) 80-4.5 mcg/actuation HFAA Inhale 2 puffs into the lungs 2 (two) times daily. Pharmacy to adjust to cheaper tier options    furosemide (LASIX) 20 MG tablet Take 2 tablets (40 mg total) by mouth once daily.    levetiracetam (KEPPRA) 750 MG Tab Take 1 tablet (750 mg total) by mouth 2 (two) times daily.    losartan (COZAAR) 25 MG tablet Take 0.5 tablets (12.5 mg total) by mouth once daily.    metoprolol tartrate (LOPRESSOR) 25 MG tablet Take 0.5 tablets (12.5 mg total) by mouth 2 (two) times daily.    OXYGEN-AIR DELIVERY SYSTEMS MISC by [facility].(Non-Drug; Combo Route) route.    roflumilast 500 mcg Tab Take 1 tablet (500 mcg total) by mouth once daily.    simvastatin (ZOCOR) 40 MG tablet TAKE 1 TABLET EVERY EVENING.    tiotropium (SPIRIVA WITH HANDIHALER) 18 mcg inhalation capsule Inhale 1 capsule (18 mcg total) into the lungs once daily. Pharmacy to adjust to cheaper tier options    potassium chloride SA (K-DUR,KLOR-CON) 20 MEQ tablet Take 1 tablet (20 mEq total) by mouth 2 (two) times daily.    predniSONE (DELTASONE) 10 mg tablet pack Si tabs daily x 3 days, 3 tabs daily x 3 days, 2 tabs daily x 3 days, 1 tabs daily x 3 days, 1/2 tab daily x 3 days    spironolactone (ALDACTONE) 25 MG tablet Take 1 tablet (25 mg total) by mouth 2 (two) times daily.     Family  History     Problem Relation (Age of Onset)    Cancer Paternal Grandfather    Cataracts Mother, Maternal Grandmother, Paternal Grandmother    Heart disease Brother    No Known Problems Father, Maternal Grandfather, Sister, Maternal Aunt, Maternal Uncle, Paternal Aunt, Paternal Uncle        Social History Main Topics    Smoking status: Former Smoker     Packs/day: 1.00     Years: 20.00     Types: Cigarettes     Quit date: 9/12/1997    Smokeless tobacco: Never Used    Alcohol use No      Comment: Occasionally     Drug use: No    Sexual activity: Yes     Partners: Female     Review of Systems   Constitutional: Negative for activity change, appetite change, chills, fatigue and fever.   HENT: Negative.    Eyes: Negative for pain and redness.   Respiratory: Positive for shortness of breath. Negative for cough.    Cardiovascular: Positive for leg swelling.   Gastrointestinal: Negative for abdominal pain, constipation, diarrhea, nausea and vomiting.   Genitourinary: Positive for decreased urine volume. Negative for difficulty urinating, dysuria and hematuria.   Musculoskeletal: Negative for arthralgias and myalgias.   Skin: Positive for color change (finger tips cyanotic). Negative for pallor, rash and wound.   Neurological: Negative for dizziness, weakness and headaches.   Psychiatric/Behavioral: Negative for confusion.     Objective:     Vital Signs (Most Recent):  Temp: 98 °F (36.7 °C) (07/03/17 1330)  Pulse: 85 (07/03/17 1703)  Resp: (!) 32 (07/03/17 1703)  BP: 120/68 (07/03/17 1703)  SpO2: 97 % (07/03/17 1703) Vital Signs (24h Range):  Temp:  [98 °F (36.7 °C)] 98 °F (36.7 °C)  Pulse:  [] 85  Resp:  [22-38] 32  SpO2:  [30 %-97 %] 97 %  BP: ()/(37-68) 120/68        There is no height or weight on file to calculate BMI.    Physical Exam   Constitutional: He is oriented to person, place, and time. He appears well-developed and well-nourished. He has a sickly appearance.   Appears chronically ill   HENT:    Head: Normocephalic and atraumatic.   Eyes: Conjunctivae are normal. Pupils are equal, round, and reactive to light. No scleral icterus.   Neck: Normal range of motion. Neck supple.   Cardiovascular: Normal rate, regular rhythm and normal heart sounds.  Exam reveals no gallop and no friction rub.    No murmur heard.  Pulmonary/Chest: Tachypnea noted. He is in respiratory distress. He has rales in the right lower field and the left lower field.   Abdominal: Soft. Bowel sounds are normal.   Musculoskeletal: Normal range of motion. He exhibits edema. He exhibits no tenderness.        Right lower leg: He exhibits edema.        Left lower leg: He exhibits edema.        Legs:  Neurological: He is alert and oriented to person, place, and time.   Skin: Skin is warm and dry. Capillary refill takes more than 3 seconds.   Psychiatric: He has a normal mood and affect. His behavior is normal.   Nursing note and vitals reviewed.       Significant Labs:   CBC:   Recent Labs  Lab 07/03/17  1340   WBC 5.51   HGB 11.5*   HCT 36.4*   *     CMP:   Recent Labs  Lab 07/03/17  1340      K 4.5      CO2 18*      BUN 33*   CREATININE 1.2   CALCIUM 9.1   PROT 7.7   ALBUMIN 3.0*   BILITOT 1.8*   ALKPHOS 108   AST 19   ALT 19   ANIONGAP 9   EGFRNONAA 57*     All pertinent labs within the past 24 hours have been reviewed.    Significant Imaging: I have reviewed and interpreted all pertinent imaging results/findings within the past 24 hours.    Assessment/Plan:     * Acute on chronic respiratory failure with hypoxia    Pt uses Trilogy vent at home, resume as needed  Supplemental oxygen to maintain sat > 88 %          Acute on chronic systolic CHF (congestive heart failure), NYHA class 4    Telemetry monitoring  IV lasix  Metolazone 5 mg once given in the ED  daily weights  low sodium diet -  fluid restriction  Recent 2 D ECHO - EF 45 %  Strict I & O  Supplemental oxygen   Continue losartan              Acute pulmonary  edema    Supplemental oxygen            Moderate COPD (chronic obstructive pulmonary disease)    Supplemental oxygen  DuoNebs  Continue roflumilast  Ipratropium treatments          Acute urinary tract infection    Urine culture  Rocephin daily            Coronary artery disease involving native coronary artery of native heart without angina pectoris    Continue ASA, Simvastatin & metoprolol          Seizures    Seizure precautions  Continue Keppra            VTE Risk Mitigation         Ordered     enoxaparin injection 40 mg  Daily     Route:  Subcutaneous        07/03/17 1832     Medium Risk of VTE  Once      07/03/17 1822     Place LANI hose  Until discontinued      07/03/17 1822     Place sequential compression device  Until discontinued      07/03/17 1822        Elo Roe NP  Department of Hospital Medicine   [facility]

## 2017-07-03 NOTE — HPI
Orion Rinaldi is a 82 yo male with Chronic Respiratory Failure (uses 5 L NC along with Triology vent at home), COPD, Systolic Heart Failure, S/P TAVR and Pulmonary HTN who presented to the ED due to decreased urinary output despite taking lasix over a 4 day period. While ambulating into the ED, he became more SOB. On Venti mask PO2 57, pH 7.42, CO2 20.9.  Bicarb 18. CBC was stable, BNP = 2317, CXR indicated small bilateral pleural effusions and early interstitial edema. Pt has increased respiratory rate (30 - 40s). IV lasix, metolazone, morphine given in the ED. Pt was admitted to ICU for closer observation.

## 2017-07-04 PROBLEM — R09.02 HYPOXIA: Status: ACTIVE | Noted: 2017-01-01

## 2017-07-04 PROBLEM — R09.02 HYPOXIA: Status: RESOLVED | Noted: 2017-01-01 | Resolved: 2017-01-01

## 2017-07-04 NOTE — PROGRESS NOTES
[facility] Hospital Medicine  Progress Note    Patient Name: Orion Rinaldi  MRN: [id number]  Patient Class: IP- Inpatient   Admission Date: 7/3/2017  Length of Stay: 1 days  Attending Physician: Lisa Chung MD  Primary Care Provider: Daisy Oconnor MD        Subjective:     Principal Problem:Acute on chronic respiratory failure with hypoxia    HPI:  Orion Rinaldi is a 82 yo male with Chronic Respiratory Failure (uses 5 L NC along with Triology vent at home), COPD, Systolic Heart Failure, S/P TAVR and Pulmonary HTN who presents to the ED due to decreased urinary output despite taking lasix over a 4 day period. While ambulating into the ED, he became more SOB. On Venti mask PO2 57, pH 7.42, CO2 20.9.  Bicarb 18. CBC is essentially stable, BNP = 2317, CXR indicates small bilateral pleural effusions and early interstitial edema. Pt has increased respiratory rate (30 - 40s). IV lasix, metolazone, morphine given in the ED. Pt is admitted to ICU for closer observation.    Hospital Course:  Feels lot better after he put out over 5 l urine overnite and SOB/ leg swelling much better, slept good on his Trilogy vent and ate heartily this am. Transferred out to tele.     Interval History: feels much better, great urine output, leg edema almost resolved. SOB/ Oxygenation improved, appetite improved.    Review of Systems   Constitutional: Negative for activity change, appetite change, chills, fatigue and fever.   HENT: Negative.    Eyes: Negative for pain and redness.   Respiratory: Positive for shortness of breath. Negative for cough.    Cardiovascular: Positive for leg swelling.   Gastrointestinal: Negative for abdominal pain, constipation, diarrhea, nausea and vomiting.   Genitourinary: Positive for decreased urine volume. Negative for difficulty urinating, dysuria and hematuria.   Musculoskeletal: Negative for arthralgias and myalgias.   Skin: Positive for color change (finger tips cyanotic). Negative  for pallor, rash and wound.   Neurological: Negative for dizziness, weakness and headaches.   Psychiatric/Behavioral: Negative for confusion.     Objective:     Vital Signs (Most Recent):  Temp: 97.8 °F (36.6 °C) (07/04/17 1105)  Pulse: 74 (07/04/17 1500)  Resp: 20 (07/04/17 1500)  BP: (!) 139/55 (07/04/17 1300)  SpO2: (!) 90 % (07/04/17 1500) Vital Signs (24h Range):  Temp:  [97.7 °F (36.5 °C)-98.7 °F (37.1 °C)] 97.8 °F (36.6 °C)  Pulse:  [] 74  Resp:  [19-36] 20  SpO2:  [78 %-99 %] 90 %  BP: ()/() 139/55     Weight: 84 kg (185 lb 3 oz)  Body mass index is 25.83 kg/m².    Intake/Output Summary (Last 24 hours) at 07/04/17 1625  Last data filed at 07/04/17 1500   Gross per 24 hour   Intake           759.83 ml   Output             8585 ml   Net         -7825.17 ml      Physical Exam   Constitutional: He is oriented to person, place, and time. He appears well-developed and well-nourished. He has a sickly appearance.   Appears chronically ill   HENT:   Head: Normocephalic and atraumatic.   Eyes: Conjunctivae are normal. Pupils are equal, round, and reactive to light. No scleral icterus.   Neck: Normal range of motion. Neck supple.   Cardiovascular: Normal rate, regular rhythm and normal heart sounds.  Exam reveals no gallop and no friction rub.    No murmur heard.  Pulmonary/Chest: Tachypnea noted. He is in respiratory distress. He has rales in the right lower field and the left lower field.   Abdominal: Soft. Bowel sounds are normal.   Musculoskeletal: Normal range of motion. He exhibits edema. He exhibits no tenderness.        Right lower leg: He exhibits edema.        Left lower leg: He exhibits edema.        Legs:  Neurological: He is alert and oriented to person, place, and time.   Skin: Skin is warm and dry. Capillary refill takes more than 3 seconds.   Psychiatric: He has a normal mood and affect. His behavior is normal.   Nursing note and vitals reviewed.      Significant Labs:   BMP:   Recent  Labs  Lab 07/04/17  0439   *   *   K 4.4      CO2 19*   BUN 40*   CREATININE 1.3   CALCIUM 9.0   MG 2.2     CBC:   Recent Labs  Lab 07/03/17  1340 07/04/17  0439   WBC 5.51 2.61*   HGB 11.5* 11.0*   HCT 36.4* 34.3*   * 129*     All pertinent labs within the past 24 hours have been reviewed.    Significant Imaging: I have reviewed all pertinent imaging results/findings within the past 24 hours.    Assessment/Plan:      * Acute on chronic respiratory failure with hypoxia    Pt uses Trilogy vent at home, resume as needed  Supplemental oxygen to maintain sat > 88 %    Improving with diuresis, continue Lasix and trilogy          Acute pulmonary edema    Supplemental oxygen  Improving with Lasix.            Acute on chronic systolic CHF (congestive heart failure), NYHA class 4    Telemetry monitoring  IV lasix  Metolazone 5 mg once given in the ED  daily weights  low sodium diet -  fluid restriction  Recent 2 D ECHO - EF 45 %  Strict I & O  Supplemental oxygen   Continue losartan    Continue aggressive diuresis            Moderate COPD (chronic obstructive pulmonary disease)    Supplemental oxygen  DuoNebs  Continue roflumilast  Ipratropium treatments    Sec to CHF--improving          Coronary artery disease involving native coronary artery of native heart without angina pectoris    Continue ASA, Simvastatin & metoprolol          Acute urinary tract infection    Urine culture  Rocephin daily    Mild Cystits        Seizures    Seizure precautions  Continue Keppra            VTE Risk Mitigation         Ordered     enoxaparin injection 40 mg  Daily     Route:  Subcutaneous        07/03/17 1832     Medium Risk of VTE  Once      07/03/17 1822     Place LANI hose  Until discontinued      07/03/17 1822     Place sequential compression device  Until discontinued      07/03/17 1822          Lisa Chung MD  Department of Hospital Medicine   [facility]

## 2017-07-04 NOTE — HPI
Orion Rinaldi is a 81 y.o. male with a PMH of Seizure disorder,  SP TAVR, CABG, CAD, Pulm HTN secondary to Chronic lung disease and Valvular disease, HLD, COPD, Systolic Heart Failure CHF NYHA class 4 and Chronic Resp Failure home O2 dependent.      Seen in clinic: home O2 dependent at 4 lpm on 24/7, Symbicort and Spiriva.    Frequent hospitalizations for(6 weeks ago) with Acute on Chronic Resp Failure, Acute on Chronic heart failure and COPD Exacerbation.      During that hospitalization he refused Lasix due to cramping and was discharged on Spironolactone.  He returned back to [facility] ED today with complaints of SOB > baseline and dysuria progressive to mod-severe over last 3 days.  Wife reported patient unable to urinate while on home Lasix.  O2 SAT 74% in triage and had severe HERNANDEZ ambulating from parking lot to ED.  No improvement on NC 5 lpm at home.  In ED RR 30, anxious with wheezes.    UA showed leucocytes  Given IV lasix, Rocephin and placed on NPPV and admitted to ICU.

## 2017-07-04 NOTE — PLAN OF CARE
Problem: Patient Care Overview  Goal: Plan of Care Review  Pt tolerates nebs and O2 well. Using Trilogy from home. SOB noted prior to 1900 neb tx, but improved.

## 2017-07-04 NOTE — ASSESSMENT & PLAN NOTE
Pt uses Trilogy vent at home, resume as needed  Supplemental oxygen to maintain sat > 88 %    Improving with diuresis, continue Lasix and trilogy

## 2017-07-04 NOTE — PLAN OF CARE
Problem: Chronic Obstructive Pulmonary Disease (Adult)  Goal: Signs and Symptoms of Listed Potential Problems Will be Absent, Minimized or Managed (Chronic Obstructive Pulmonary Disease)  Signs and symptoms of listed potential problems will be absent, minimized or managed by discharge/transition of care (reference Chronic Obstructive Pulmonary Disease (Adult) CPG).   Outcome: Ongoing (interventions implemented as appropriate)  Client currently on NIPPV. No observable hemodynamic compromise or respiratory distress at the moment. Client remains coherent. Oximetry: 98%    Problem: Cardiac: Heart Failure (Adult)  Goal: Signs and Symptoms of Listed Potential Problems Will be Absent, Minimized or Managed (Cardiac: Heart Failure)  Signs and symptoms of listed potential problems will be absent, minimized or managed by discharge/transition of care (reference Cardiac: Heart Failure (Adult) CPG).   Outcome: Ongoing (interventions implemented as appropriate)  Client stable and free of any hemodynamic compromise. Currently on lasix continuous infusion, beta-blocker, and an ARB. Urine output: > 3 liters. Will monitor.

## 2017-07-04 NOTE — PLAN OF CARE
Problem: Patient Care Overview  Goal: Plan of Care Review  Received pt from ICU. Lasix gtt running at 5ml/hr. Educated pt and wife on fluid restrictions. FPP in place. NADN. VSS. Will continue to monitor until report is given.

## 2017-07-04 NOTE — NURSING
Client applied home trilogy mask on. Oximetry: 100% while on 5 liters of oxygen. No respiratory distress at the moment. Client continues to show a favorable response to loop diuretic therapy. Will continue to monitor.

## 2017-07-04 NOTE — PROGRESS NOTES
[facility]  Pulmonology  Progress Note    Patient Name: Orion Rinaldi  MRN: [id number]  Admission Date: 7/3/2017  Hospital Length of Stay: 1 days  Code Status: Full Code  Attending Provider: Lisa Chung MD  Primary Care Provider: Daisy Oconnor MD   Principal Problem: Acute on chronic respiratory failure with hypoxia    Orion Rinaldi is a 81 y.o. male with a PMH of Seizure disorder,  SP TAVR, CABG, CAD, Pulm HTN secondary to Chronic lung disease and Valvular disease, HLD, COPD, Systolic Heart Failure CHF NYHA class 4 and Chronic Resp Failure home O2 dependent.       Seen in clinic: home O2 dependent at 4 lpm on 24/7, Symbicort and Spiriva.    Frequent hospitalizations for(6 weeks ago) with Acute on Chronic Resp Failure, Acute on Chronic heart failure and COPD Exacerbation.       During that hospitalization he refused Lasix due to cramping and was discharged on Spironolactone.  He returned back to [facility] ED today with complaints of SOB > baseline and dysuria progressive to mod-severe over last 3 days.  Wife reported patient unable to urinate while on home Lasix.  O2 SAT 74% in triage and had severe HERNANDEZ ambulating from parking lot to ED.  No improvement on NC 5 lpm at home.  In ED RR 30, anxious with wheezes.    UA showed leucocytes  Given IV lasix, Rocephin and placed on NPPV and admitted to ICU.     Overnight on Lasix 10 mg/hr  Net 7 lit -ve  Leg edema improved  Strable chronic respiratory failure      Subjective:     Interval History:     I have reviewed the patient's medical history in detail and updated the computerized patient record.      Objective:     Vital Signs (Most Recent):  Temp: 97.7 °F (36.5 °C) (07/04/17 0705)  Pulse: 62 (07/04/17 0809)  Resp: (!) 23 (07/04/17 0809)  BP: 92/74 (07/04/17 0805)  SpO2: (!) 91 % (07/04/17 0809) Vital Signs (24h Range):  Temp:  [97.7 °F (36.5 °C)-98.7 °F (37.1 °C)] 97.7 °F (36.5 °C)  Pulse:  [] 62  Resp:  [19-38] 23  SpO2:  [30 %-99 %] 91  %  BP: ()/() 92/74     Weight: 84 kg (185 lb 3 oz)  Body mass index is 25.83 kg/m².      Intake/Output Summary (Last 24 hours) at 07/04/17 0852  Last data filed at 07/04/17 0800   Gross per 24 hour   Intake              346 ml   Output             7010 ml   Net            -6664 ml     Oxygen Concentration (%):  [44] 44    Physical Exam   Constitutional: He is oriented to person, place, and time. He appears well-developed and well-nourished.   HENT:   Head: Normocephalic and atraumatic.   Nose: Nose normal.   Mouth/Throat: Oropharynx is clear and moist. No oropharyngeal exudate.   Eyes: Conjunctivae and EOM are normal. Pupils are equal, round, and reactive to light. Left eye exhibits no discharge.   Neck: Normal range of motion. Neck supple. No JVD present. No tracheal deviation present. No thyromegaly present.   Cardiovascular: Normal rate and regular rhythm.    Murmur heard.  Pulmonary/Chest: Effort normal and breath sounds normal. No respiratory distress. He has no wheezes. He has no rales.   Abdominal: Soft. Bowel sounds are normal.   Genitourinary:   Genitourinary Comments: richey   Musculoskeletal: Normal range of motion. He exhibits edema.   Neurological: He is alert and oriented to person, place, and time.   Skin: Skin is warm and dry.   Nursing note and vitals reviewed.      Vents:  Oxygen Concentration (%): 44 (07/03/17 2002)    Lines/Drains/Airways     Drain                 Urethral Catheter 07/03/17 1355 Latex;Double-lumen 16 Fr. less than 1 day          Peripheral Intravenous Line                 Peripheral IV - Single Lumen 07/03/17 1358 Right Antecubital less than 1 day                Significant Labs:    CBC/Anemia Profile:    Recent Labs  Lab 07/03/17  1340 07/04/17  0439   WBC 5.51 2.61*   HGB 11.5* 11.0*   HCT 36.4* 34.3*   * 129*   MCV 85 83   RDW 21.3* 20.8*        Chemistries:    Recent Labs  Lab 07/03/17  1340 07/04/17  0439    135*   K 4.5 4.4    104   CO2 18* 19*    BUN 33* 40*   CREATININE 1.2 1.3   CALCIUM 9.1 9.0   ALBUMIN 3.0* 2.9*   PROT 7.7 7.3   BILITOT 1.8* 1.5*   ALKPHOS 108 107   ALT 19 13   AST 19 13   MG  --  2.2       ABGs:   Recent Labs  Lab 07/03/17  1347   PH 7.426   PCO2 20.9*   HCO3 13.7*   POCSATURATED 91*   BE -11     POCT Glucose: No results for input(s): POCTGLUCOSE in the last 48 hours.  All pertinent labs within the past 24 hours have been reviewed.    Significant Imaging:  I have reviewed and interpreted all pertinent imaging results/findings within the past 24 hours.    Assessment/Plan:     Acute on chronic systolic CHF (congestive heart failure), NYHA class 4    Spirolactone on hold  Lasix gtt reduced to 5 mg /hr          Acute urinary tract infection    Ceftriaxone        Acute pulmonary edema    Continue Lasix 5 mg/hr another 24 hrs then DC  Alize transfer to Telemetry  Watch K and Mag        Seizures    PO Keppra        Moderate COPD (chronic obstructive pulmonary disease)    Oxygen: keep sats > 90%  Roflumilast  Arformoterol Neb  Pulmicort nebs  Enoxaparin SC  TRILOGY with Sleep and Naps        * Acute on chronic respiratory failure with hypoxia    TRILOGY and Oxygen  Immunizations are current          Coronary artery disease involving native coronary artery of native heart without angina pectoris    Aspirin, Losartan, Metoprolol, Simvastatin          DW ICU team and Dr Juliet Herrmann transfer to Floor       Francis Downing MD  Pulmonology  [facility]

## 2017-07-04 NOTE — ASSESSMENT & PLAN NOTE
Supplemental oxygen  DuoNebs  Continue roflumilast  Ipratropium treatments    Sec to CHF--improving

## 2017-07-04 NOTE — ASSESSMENT & PLAN NOTE
Telemetry monitoring  IV lasix  Metolazone 5 mg once given in the ED  daily weights  low sodium diet -  fluid restriction  Recent 2 D ECHO - EF 45 %  Strict I & O  Supplemental oxygen   Continue losartan    Continue aggressive diuresis

## 2017-07-04 NOTE — NURSING
Assumed client care. Client remains alert and coherent. Increased work of breathing noted with minimal exertion. Client remains hemodynamically stable. Oximetry: 95% while on 4 liters nasal cannula. Client's home trilogy device at bedside and ready for use tonight when client sleeps. Respiratory therapist aware. NSR per telemetry. Plan of care reviewed with client and family as well. Plan discussed to administer diuretic therapy via continuous infusion overnight. All questions answered with the client and family. All family agreed with plan of care. Will monitor labs for therapeutic response to furosemide therapy and trend creatinine and cardiac monitors. Inquired of EICU to hold pm dose of spironolactone secondary to lasix infusion.

## 2017-07-04 NOTE — SUBJECTIVE & OBJECTIVE
Interval History: feels much better, great urine output, leg edema almost resolved. SOB/ Oxygenation improved, appetite improved.    Review of Systems   Constitutional: Negative for activity change, appetite change, chills, fatigue and fever.   HENT: Negative.    Eyes: Negative for pain and redness.   Respiratory: Positive for shortness of breath. Negative for cough.    Cardiovascular: Positive for leg swelling.   Gastrointestinal: Negative for abdominal pain, constipation, diarrhea, nausea and vomiting.   Genitourinary: Positive for decreased urine volume. Negative for difficulty urinating, dysuria and hematuria.   Musculoskeletal: Negative for arthralgias and myalgias.   Skin: Positive for color change (finger tips cyanotic). Negative for pallor, rash and wound.   Neurological: Negative for dizziness, weakness and headaches.   Psychiatric/Behavioral: Negative for confusion.     Objective:     Vital Signs (Most Recent):  Temp: 97.8 °F (36.6 °C) (07/04/17 1105)  Pulse: 74 (07/04/17 1500)  Resp: 20 (07/04/17 1500)  BP: (!) 139/55 (07/04/17 1300)  SpO2: (!) 90 % (07/04/17 1500) Vital Signs (24h Range):  Temp:  [97.7 °F (36.5 °C)-98.7 °F (37.1 °C)] 97.8 °F (36.6 °C)  Pulse:  [] 74  Resp:  [19-36] 20  SpO2:  [78 %-99 %] 90 %  BP: ()/() 139/55     Weight: 84 kg (185 lb 3 oz)  Body mass index is 25.83 kg/m².    Intake/Output Summary (Last 24 hours) at 07/04/17 1625  Last data filed at 07/04/17 1500   Gross per 24 hour   Intake           759.83 ml   Output             8585 ml   Net         -7825.17 ml      Physical Exam   Constitutional: He is oriented to person, place, and time. He appears well-developed and well-nourished. He has a sickly appearance.   Appears chronically ill   HENT:   Head: Normocephalic and atraumatic.   Eyes: Conjunctivae are normal. Pupils are equal, round, and reactive to light. No scleral icterus.   Neck: Normal range of motion. Neck supple.   Cardiovascular: Normal rate, regular  rhythm and normal heart sounds.  Exam reveals no gallop and no friction rub.    No murmur heard.  Pulmonary/Chest: Tachypnea noted. He is in respiratory distress. He has rales in the right lower field and the left lower field.   Abdominal: Soft. Bowel sounds are normal.   Musculoskeletal: Normal range of motion. He exhibits edema. He exhibits no tenderness.        Right lower leg: He exhibits edema.        Left lower leg: He exhibits edema.        Legs:  Neurological: He is alert and oriented to person, place, and time.   Skin: Skin is warm and dry. Capillary refill takes more than 3 seconds.   Psychiatric: He has a normal mood and affect. His behavior is normal.   Nursing note and vitals reviewed.      Significant Labs:   BMP:   Recent Labs  Lab 07/04/17  0439   *   *   K 4.4      CO2 19*   BUN 40*   CREATININE 1.3   CALCIUM 9.0   MG 2.2     CBC:   Recent Labs  Lab 07/03/17  1340 07/04/17  0439   WBC 5.51 2.61*   HGB 11.5* 11.0*   HCT 36.4* 34.3*   * 129*     All pertinent labs within the past 24 hours have been reviewed.    Significant Imaging: I have reviewed all pertinent imaging results/findings within the past 24 hours.

## 2017-07-04 NOTE — HOSPITAL COURSE
Overnight on Lasix 10 mg/hr  Net 7 lit -ve  Leg edema improved  Strable chronic respiratory failure

## 2017-07-04 NOTE — SUBJECTIVE & OBJECTIVE
Subjective:     Interval History:     I have reviewed the patient's medical history in detail and updated the computerized patient record.      Objective:     Vital Signs (Most Recent):  Temp: 97.7 °F (36.5 °C) (07/04/17 0705)  Pulse: 62 (07/04/17 0809)  Resp: (!) 23 (07/04/17 0809)  BP: 92/74 (07/04/17 0805)  SpO2: (!) 91 % (07/04/17 0809) Vital Signs (24h Range):  Temp:  [97.7 °F (36.5 °C)-98.7 °F (37.1 °C)] 97.7 °F (36.5 °C)  Pulse:  [] 62  Resp:  [19-38] 23  SpO2:  [30 %-99 %] 91 %  BP: ()/() 92/74     Weight: 84 kg (185 lb 3 oz)  Body mass index is 25.83 kg/m².      Intake/Output Summary (Last 24 hours) at 07/04/17 0852  Last data filed at 07/04/17 0800   Gross per 24 hour   Intake              346 ml   Output             7010 ml   Net            -6664 ml     Oxygen Concentration (%):  [44] 44    Physical Exam   Constitutional: He is oriented to person, place, and time. He appears well-developed and well-nourished.   HENT:   Head: Normocephalic and atraumatic.   Nose: Nose normal.   Mouth/Throat: Oropharynx is clear and moist. No oropharyngeal exudate.   Eyes: Conjunctivae and EOM are normal. Pupils are equal, round, and reactive to light. Left eye exhibits no discharge.   Neck: Normal range of motion. Neck supple. No JVD present. No tracheal deviation present. No thyromegaly present.   Cardiovascular: Normal rate and regular rhythm.    Murmur heard.  Pulmonary/Chest: Effort normal and breath sounds normal. No respiratory distress. He has no wheezes. He has no rales.   Abdominal: Soft. Bowel sounds are normal.   Genitourinary:   Genitourinary Comments: richey   Musculoskeletal: Normal range of motion. He exhibits edema.   Neurological: He is alert and oriented to person, place, and time.   Skin: Skin is warm and dry.   Nursing note and vitals reviewed.      Vents:  Oxygen Concentration (%): 44 (07/03/17 2002)    Lines/Drains/Airways     Drain                 Urethral Catheter 07/03/17 6475  Latex;Double-lumen 16 Fr. less than 1 day          Peripheral Intravenous Line                 Peripheral IV - Single Lumen 07/03/17 1358 Right Antecubital less than 1 day                Significant Labs:    CBC/Anemia Profile:    Recent Labs  Lab 07/03/17  1340 07/04/17  0439   WBC 5.51 2.61*   HGB 11.5* 11.0*   HCT 36.4* 34.3*   * 129*   MCV 85 83   RDW 21.3* 20.8*        Chemistries:    Recent Labs  Lab 07/03/17  1340 07/04/17  0439    135*   K 4.5 4.4    104   CO2 18* 19*   BUN 33* 40*   CREATININE 1.2 1.3   CALCIUM 9.1 9.0   ALBUMIN 3.0* 2.9*   PROT 7.7 7.3   BILITOT 1.8* 1.5*   ALKPHOS 108 107   ALT 19 13   AST 19 13   MG  --  2.2       ABGs:   Recent Labs  Lab 07/03/17  1347   PH 7.426   PCO2 20.9*   HCO3 13.7*   POCSATURATED 91*   BE -11     POCT Glucose: No results for input(s): POCTGLUCOSE in the last 48 hours.  All pertinent labs within the past 24 hours have been reviewed.    Significant Imaging:  I have reviewed and interpreted all pertinent imaging results/findings within the past 24 hours.

## 2017-07-04 NOTE — HOSPITAL COURSE
Pt admitted to Critical Care due to Acute on Chronic Hypoxic Respiratory Failure, Decompensated Systolic Heart Failure, COPD and UTI. He received supplemental oxygen via NPPV and diuresis with IV lasix drip. He diuresed well, SOB and leg swelling decreased and Trilogy vent was utilized at night. Rocephin IV was given for UTI. Over course, pt diuresed at least 12 L of fluid and lost approx 20 # weight. Lasix infusion was discontinued and Demadex 10 mg daily given. Case was discussed with Dr. Solano. Cardiology recommended Torsemide however, pt could not afford monthly copay. Lasix was continued. There was a slight increase in serum Cr 1.5 to 1.9. At time of discharge, pt/family were advised to hold lasix and aldactone until Sunday 7/9/17. Urine culture isolated E coli which was sensitive to Bactrim DS. He was advised to start Bactrim on Sunday 7/9/17. He was seen and examined and determined to be safe and stable for discharge. He/family were advised of prescriptions and follow up appointments.

## 2017-07-04 NOTE — ASSESSMENT & PLAN NOTE
Oxygen: keep sats > 90%  Roflumilast  Arformoterol Neb  Pulmicort nebs  Enoxaparin SC  TRILOGY with Sleep and Naps

## 2017-07-04 NOTE — EICU
EICU note, called by nurse Princess for order to hold Aldactone as patient to be started on Furosemide drip.    Brief history: Patient with chronic respiratory failure, systolic heart heart failure, COPD, pulmonary hypertension    Lab review: BNP 2317  K 4.5    Camera assessment: Mild respiratory distress to be started on Bipap    Recommendation:  Agree with holding Aldactone for now.  Recheck electrolytes and response to furosemide drip in the morning.

## 2017-07-04 NOTE — NURSING
Client asleep with trilogy. No respiratory distress. Oximetry:100%, no hemodynamic compromise at this moment. Thus far, client has diuresed 2200 ml this shift alone; therapeutic response to lasix observed. Will continue to monitor.

## 2017-07-04 NOTE — NURSING
Uneventful shift. Client has diuresed four liters this shift alone. BP:117/53 MAP: 74, Oximetry: 96%. Client remains in no observable respiratory distress. Furosemide drip continues to infuse as ordered. Steroid therapy remains on hold as client is scheduled to receive am phosphodiesterase-4 inhibitor this morning for attenuation of pulmonary inflammation. Client stable, will continue to monitor.

## 2017-07-05 PROBLEM — J81.0 ACUTE PULMONARY EDEMA: Status: RESOLVED | Noted: 2017-01-01 | Resolved: 2017-01-01

## 2017-07-05 NOTE — PLAN OF CARE
Problem: Patient Care Overview  Goal: Plan of Care Review  Outcome: Ongoing (interventions implemented as appropriate)   Lasix infusion discontinued. Willie Frank'gabriella per protocol. FPP in place. SHELLY DELEON. Discussed plan of care with pt. States understanding and has no needs at this time. Will continue to monitor until report is given.

## 2017-07-05 NOTE — PLAN OF CARE
07/05/17 1659   Medicare Message   Important Message from Medicare regarding Discharge Appeal Rights Given to patient/caregiver;Explained to patient/caregiver;Signed/date by patient/caregiver   Date IMM was signed 07/05/17   Time IMM was signed 1035

## 2017-07-05 NOTE — ASSESSMENT & PLAN NOTE
Pt uses Trilogy vent at home, resume as needed  Supplemental oxygen to maintain sat > 88 %    Improving with diuresis, continue Lasix and trilogy    Much better, d/c lasix drip, start Demadex 10 daily  D/W Dr. rose

## 2017-07-05 NOTE — ASSESSMENT & PLAN NOTE
Telemetry monitoring  IV lasix  Metolazone 5 mg once given in the ED  daily weights  low sodium diet -  fluid restriction  Recent 2 D ECHO - EF 45 %  Strict I & O  Supplemental oxygen   Continue losartan    Continue aggressive diuresis-- improved  D/C Lasix    Start Demadex-- d/c soon

## 2017-07-05 NOTE — SUBJECTIVE & OBJECTIVE
Interval History: looks lot better, SOB. Leg edema better, wants to go home.    Review of Systems   Constitutional: Negative for activity change, appetite change, chills, fatigue and fever.   HENT: Negative.    Eyes: Negative for pain and redness.   Respiratory: Positive for shortness of breath. Negative for cough.    Cardiovascular: Positive for leg swelling.   Gastrointestinal: Negative for abdominal pain, constipation, diarrhea, nausea and vomiting.   Genitourinary: Positive for decreased urine volume. Negative for difficulty urinating, dysuria and hematuria.   Musculoskeletal: Negative for arthralgias and myalgias.   Skin: Positive for color change (finger tips cyanotic). Negative for pallor, rash and wound.   Neurological: Negative for dizziness, weakness and headaches.   Psychiatric/Behavioral: Negative for confusion.     Objective:     Vital Signs (Most Recent):  Temp: 97.4 °F (36.3 °C) (07/05/17 0701)  Pulse: 79 (07/05/17 1357)  Resp: 20 (07/05/17 1357)  BP: (!) 92/54 (07/05/17 0701)  SpO2: (!) 90 % (07/05/17 1357) Vital Signs (24h Range):  Temp:  [96.1 °F (35.6 °C)-97.6 °F (36.4 °C)] 97.4 °F (36.3 °C)  Pulse:  [63-79] 79  Resp:  [16-20] 20  SpO2:  [90 %-100 %] 90 %  BP: ()/(44-68) 92/54     Weight: 84 kg (185 lb 3 oz)  Body mass index is 25.83 kg/m².    Intake/Output Summary (Last 24 hours) at 07/05/17 1650  Last data filed at 07/05/17 0345   Gross per 24 hour   Intake              240 ml   Output             2950 ml   Net            -2710 ml      Physical Exam   Constitutional: He is oriented to person, place, and time. He appears well-developed and well-nourished. He has a sickly appearance.   Appears chronically ill   HENT:   Head: Normocephalic and atraumatic.   Eyes: Conjunctivae are normal. Pupils are equal, round, and reactive to light. No scleral icterus.   Neck: Normal range of motion. Neck supple.   Cardiovascular: Normal rate, regular rhythm and normal heart sounds.  Exam reveals no gallop  and no friction rub.    No murmur heard.  Pulmonary/Chest: No tachypnea. No respiratory distress. He has no rales.   Abdominal: Soft. Bowel sounds are normal.   Musculoskeletal: Normal range of motion. He exhibits no edema or tenderness.        Legs:  Neurological: He is alert and oriented to person, place, and time.   Skin: Skin is warm and dry. Capillary refill takes more than 3 seconds.   Psychiatric: He has a normal mood and affect. His behavior is normal.   Nursing note and vitals reviewed.      Significant Labs:   BMP:   Recent Labs  Lab 07/05/17  0452   GLU 86      K 3.6   CL 99   CO2 24   BUN 56*   CREATININE 1.5*   CALCIUM 9.4   MG 2.1     CBC:   Recent Labs  Lab 07/04/17  0439 07/05/17  0452   WBC 2.61* 5.77   HGB 11.0* 12.3*   HCT 34.3* 37.8*   * 138*     All pertinent labs within the past 24 hours have been reviewed.    Significant Imaging: I have reviewed all pertinent imaging results/findings within the past 24 hours.

## 2017-07-05 NOTE — SUBJECTIVE & OBJECTIVE
Subjective:     Interval History:   Net -ve 11 lit    Objective:     Vital Signs (Most Recent):  Temp: 97.4 °F (36.3 °C) (07/05/17 0701)  Pulse: 79 (07/05/17 1357)  Resp: 20 (07/05/17 1357)  BP: (!) 92/54 (07/05/17 0701)  SpO2: (!) 90 % (07/05/17 1357) Vital Signs (24h Range):  Temp:  [96.1 °F (35.6 °C)-97.6 °F (36.4 °C)] 97.4 °F (36.3 °C)  Pulse:  [63-79] 79  Resp:  [16-20] 20  SpO2:  [90 %-100 %] 90 %  BP: ()/(44-68) 92/54     Weight: 84 kg (185 lb 3 oz)  Body mass index is 25.83 kg/m².      Intake/Output Summary (Last 24 hours) at 07/05/17 1745  Last data filed at 07/05/17 0345   Gross per 24 hour   Intake              240 ml   Output             2950 ml   Net            -2710 ml       Physical Exam   Constitutional: He is oriented to person, place, and time. He appears well-developed and well-nourished.   HENT:   Head: Normocephalic and atraumatic.   Nose: Nose normal.   Mouth/Throat: Oropharynx is clear and moist.   Eyes: Conjunctivae and EOM are normal. Pupils are equal, round, and reactive to light.   Neck: Normal range of motion. Neck supple. No JVD present. No tracheal deviation present. No thyromegaly present.   Cardiovascular: Normal rate and regular rhythm.    Murmur heard.  Pulmonary/Chest: Effort normal and breath sounds normal. No respiratory distress. He has no wheezes. He has no rales.   Abdominal: Soft. Bowel sounds are normal.   Musculoskeletal: Normal range of motion. He exhibits no edema.   varicose veins   Neurological: He is alert and oriented to person, place, and time. He has normal reflexes.   Skin: Skin is warm and dry.   Nursing note and vitals reviewed.      Vents:  Oxygen Concentration (%): 40 (07/04/17 2100)    Lines/Drains/Airways     Peripheral Intravenous Line                 Peripheral IV - Single Lumen 07/03/17 1358 Right Antecubital 2 days                Significant Labs:    CBC/Anemia Profile:    Recent Labs  Lab 07/04/17  0439 07/05/17  0452   WBC 2.61* 5.77   HGB 11.0*  12.3*   HCT 34.3* 37.8*   * 138*   MCV 83 83   RDW 20.8* 20.8*        Chemistries:    Recent Labs  Lab 07/04/17  0439 07/05/17  0452   * 136   K 4.4 3.6    99   CO2 19* 24   BUN 40* 56*   CREATININE 1.3 1.5*   CALCIUM 9.0 9.4   ALBUMIN 2.9* 3.0*   PROT 7.3 7.6   BILITOT 1.5* 1.6*   ALKPHOS 107 99   ALT 13 15   AST 13 17   MG 2.2 2.1       All pertinent labs within the past 24 hours have been reviewed.    Significant Imaging:  I have reviewed and interpreted all pertinent imaging results/findings within the past 24 hours.

## 2017-07-05 NOTE — PROGRESS NOTES
RT attempted to give treatment for this occurrence. Pt eating at this time. RT to check back later.

## 2017-07-05 NOTE — PROGRESS NOTES
[facility] Hospital Medicine  Progress Note    Patient Name: Orion Rinaldi  MRN: [id number]  Patient Class: IP- Inpatient   Admission Date: 7/3/2017  Length of Stay: 2 days  Attending Physician: Lisa Chung MD  Primary Care Provider: Daisy Oconnor MD        Subjective:     Principal Problem:Acute on chronic respiratory failure with hypoxia    HPI:  Orion Rinaldi is a 82 yo male with Chronic Respiratory Failure (uses 5 L NC along with Triology vent at home), COPD, Systolic Heart Failure, S/P TAVR and Pulmonary HTN who presents to the ED due to decreased urinary output despite taking lasix over a 4 day period. While ambulating into the ED, he became more SOB. On Venti mask PO2 57, pH 7.42, CO2 20.9.  Bicarb 18. CBC is essentially stable, BNP = 2317, CXR indicates small bilateral pleural effusions and early interstitial edema. Pt has increased respiratory rate (30 - 40s). IV lasix, metolazone, morphine given in the ED. Pt is admitted to ICU for closer observation.    Hospital Course:  Feels lot better after he put out over 5 l urine overnite and SOB/ leg swelling much better, slept good on his Trilogy vent and ate heartily this am. Transferred out to tele. Looks even better, Lasix drip turned off, as he put out about 12 L of urine till this am.     Interval History: looks lot better, SOB. Leg edema better, wants to go home.    Review of Systems   Constitutional: Negative for activity change, appetite change, chills, fatigue and fever.   HENT: Negative.    Eyes: Negative for pain and redness.   Respiratory: Positive for shortness of breath. Negative for cough.    Cardiovascular: Positive for leg swelling.   Gastrointestinal: Negative for abdominal pain, constipation, diarrhea, nausea and vomiting.   Genitourinary: Positive for decreased urine volume. Negative for difficulty urinating, dysuria and hematuria.   Musculoskeletal: Negative for arthralgias and myalgias.   Skin: Positive for color  change (finger tips cyanotic). Negative for pallor, rash and wound.   Neurological: Negative for dizziness, weakness and headaches.   Psychiatric/Behavioral: Negative for confusion.     Objective:     Vital Signs (Most Recent):  Temp: 97.4 °F (36.3 °C) (07/05/17 0701)  Pulse: 79 (07/05/17 1357)  Resp: 20 (07/05/17 1357)  BP: (!) 92/54 (07/05/17 0701)  SpO2: (!) 90 % (07/05/17 1357) Vital Signs (24h Range):  Temp:  [96.1 °F (35.6 °C)-97.6 °F (36.4 °C)] 97.4 °F (36.3 °C)  Pulse:  [63-79] 79  Resp:  [16-20] 20  SpO2:  [90 %-100 %] 90 %  BP: ()/(44-68) 92/54     Weight: 84 kg (185 lb 3 oz)  Body mass index is 25.83 kg/m².    Intake/Output Summary (Last 24 hours) at 07/05/17 1659  Last data filed at 07/05/17 0345   Gross per 24 hour   Intake              240 ml   Output             2950 ml   Net            -2710 ml      Physical Exam   Constitutional: He is oriented to person, place, and time. He appears well-developed and well-nourished. He has a sickly appearance.   Appears chronically ill   HENT:   Head: Normocephalic and atraumatic.   Eyes: Conjunctivae are normal. Pupils are equal, round, and reactive to light. No scleral icterus.   Neck: Normal range of motion. Neck supple.   Cardiovascular: Normal rate, regular rhythm and normal heart sounds.  Exam reveals no gallop and no friction rub.    No murmur heard.  Pulmonary/Chest: No tachypnea. No respiratory distress. He has no rales.   Abdominal: Soft. Bowel sounds are normal.   Musculoskeletal: Normal range of motion. He exhibits no edema or tenderness.        Legs:  Neurological: He is alert and oriented to person, place, and time.   Skin: Skin is warm and dry. Capillary refill takes more than 3 seconds.   Psychiatric: He has a normal mood and affect. His behavior is normal.   Nursing note and vitals reviewed.      Significant Labs:   BMP:   Recent Labs  Lab 07/05/17  0452   GLU 86      K 3.6   CL 99   CO2 24   BUN 56*   CREATININE 1.5*   CALCIUM 9.4   MG  2.1     CBC:   Recent Labs  Lab 07/04/17  0439 07/05/17  0452   WBC 2.61* 5.77   HGB 11.0* 12.3*   HCT 34.3* 37.8*   * 138*     All pertinent labs within the past 24 hours have been reviewed.    Significant Imaging: I have reviewed all pertinent imaging results/findings within the past 24 hours.    Assessment/Plan:      * Acute on chronic respiratory failure with hypoxia    Pt uses Trilogy vent at home, resume as needed  Supplemental oxygen to maintain sat > 88 %    Improving with diuresis, continue Lasix and trilogy    Much better, d/c lasix drip, start Demadex 10 daily  D/W Dr. rose          Acute on chronic systolic CHF (congestive heart failure), NYHA class 4    Telemetry monitoring  IV lasix  Metolazone 5 mg once given in the ED  daily weights  low sodium diet -  fluid restriction  Recent 2 D ECHO - EF 45 %  Strict I & O  Supplemental oxygen   Continue losartan    Continue aggressive diuresis-- improved  D/C Lasix    Start Demadex-- d/c soon            Moderate COPD (chronic obstructive pulmonary disease)    Supplemental oxygen  DuoNebs  Continue roflumilast  Ipratropium treatments    Sec to CHF--improving          Coronary artery disease involving native coronary artery of native heart without angina pectoris    Continue ASA, Simvastatin & metoprolol          Acute urinary tract infection    Urine culture  Rocephin daily    Mild Cystitis-- D/c Rocephin        Seizures    Seizure precautions  Continue Keppra            VTE Risk Mitigation         Ordered     enoxaparin injection 40 mg  Daily     Route:  Subcutaneous        07/03/17 1832     Medium Risk of VTE  Once      07/03/17 1822     Place LANI hose  Until discontinued      07/03/17 1822     Place sequential compression device  Until discontinued      07/03/17 1822          Lisa Chung MD  Department of Hospital Medicine   [facility]

## 2017-07-05 NOTE — PROGRESS NOTES
[facility]  Pulmonology  Progress Note    Patient Name: Orion Rinaldi  MRN: [id number]  Admission Date: 7/3/2017  Hospital Length of Stay: 2 days  Code Status: Full Code  Attending Provider: Lisa Chung MD  Primary Care Provider: Daisy Oconnor MD   Principal Problem: Acute on chronic respiratory failure with hypoxia    Orion Rinaldi is a 81 y.o. male with a PMH of Seizure disorder,  SP TAVR, CABG, CAD, Pulm HTN secondary to Chronic lung disease and Valvular disease, HLD, COPD, Systolic Heart Failure CHF NYHA class 4 and Chronic Resp Failure home O2 dependent.       Seen in clinic: home O2 dependent at 4 lpm on 24/7, Symbicort and Spiriva.    Frequent hospitalizations for(6 weeks ago) with Acute on Chronic Resp Failure, Acute on Chronic heart failure and COPD Exacerbation.       During that hospitalization he refused Lasix due to cramping and was discharged on Spironolactone.  He returned back to [facility] today with complaints of SOB > baseline and dysuria progressive to mod-severe over last 3 days.  Wife reported patient unable to urinate while on home Lasix.  O2 SAT 74% in triage and had severe HERNANDEZ ambulating from parking lot to ED.  No improvement on NC 5 lpm at home.  In ED RR 30, anxious with wheezes.    UA showed leucocytes  Given IV lasix, Rocephin and placed on NPPV and admitted to ICU.    Off Lasix gtt  Net 11 lit -ve  Leg edema improved  Strable chronic respiratory failure      Subjective:     Interval History:   Net -ve 11 lit    Objective:     Vital Signs (Most Recent):  Temp: 97.4 °F (36.3 °C) (07/05/17 0701)  Pulse: 79 (07/05/17 1357)  Resp: 20 (07/05/17 1357)  BP: (!) 92/54 (07/05/17 0701)  SpO2: (!) 90 % (07/05/17 1357) Vital Signs (24h Range):  Temp:  [96.1 °F (35.6 °C)-97.6 °F (36.4 °C)] 97.4 °F (36.3 °C)  Pulse:  [63-79] 79  Resp:  [16-20] 20  SpO2:  [90 %-100 %] 90 %  BP: ()/(44-68) 92/54     Weight: 84 kg (185 lb 3 oz)  Body mass index is 25.83  kg/m².      Intake/Output Summary (Last 24 hours) at 07/05/17 1745  Last data filed at 07/05/17 0345   Gross per 24 hour   Intake              240 ml   Output             2950 ml   Net            -2710 ml       Physical Exam   Constitutional: He is oriented to person, place, and time. He appears well-developed and well-nourished.   HENT:   Head: Normocephalic and atraumatic.   Nose: Nose normal.   Mouth/Throat: Oropharynx is clear and moist.   Eyes: Conjunctivae and EOM are normal. Pupils are equal, round, and reactive to light.   Neck: Normal range of motion. Neck supple. No JVD present. No tracheal deviation present. No thyromegaly present.   Cardiovascular: Normal rate and regular rhythm.    Murmur heard.  Pulmonary/Chest: Effort normal and breath sounds normal. No respiratory distress. He has no wheezes. He has no rales.   Abdominal: Soft. Bowel sounds are normal.   Musculoskeletal: Normal range of motion. He exhibits no edema.   varicose veins   Neurological: He is alert and oriented to person, place, and time. He has normal reflexes.   Skin: Skin is warm and dry.   Nursing note and vitals reviewed.      Vents:  Oxygen Concentration (%): 40 (07/04/17 2100)    Lines/Drains/Airways     Peripheral Intravenous Line                 Peripheral IV - Single Lumen 07/03/17 1358 Right Antecubital 2 days                Significant Labs:    CBC/Anemia Profile:    Recent Labs  Lab 07/04/17  0439 07/05/17  0452   WBC 2.61* 5.77   HGB 11.0* 12.3*   HCT 34.3* 37.8*   * 138*   MCV 83 83   RDW 20.8* 20.8*        Chemistries:    Recent Labs  Lab 07/04/17  0439 07/05/17  0452   * 136   K 4.4 3.6    99   CO2 19* 24   BUN 40* 56*   CREATININE 1.3 1.5*   CALCIUM 9.0 9.4   ALBUMIN 2.9* 3.0*   PROT 7.3 7.6   BILITOT 1.5* 1.6*   ALKPHOS 107 99   ALT 13 15   AST 13 17   MG 2.2 2.1       All pertinent labs within the past 24 hours have been reviewed.    Significant Imaging:  I have reviewed and interpreted all pertinent  imaging results/findings within the past 24 hours.  Findings:  Single view of the chest.   Aorta demonstrates atherosclerotic disease. Sternotomy wires are intact.    Cardiac silhouette is normal.  The lungs demonstrate no evidence of consolidation.  No evidence of pleural effusion or pneumothorax.  Bones demonstrate degenerative changes    Assessment/Plan:     Acute on chronic systolic CHF (congestive heart failure), NYHA class 4    Spirolactone on hold  Change to Torsemide  Follow with cardiology          Acute urinary tract infection    Ceftriaxone  Change to PO        Seizures    PO Keppra        Moderate COPD (chronic obstructive pulmonary disease)    Oxygen: keep sats > 90%  Roflumilast  Arformoterol Neb  Pulmicort nebs  Enoxaparin SC  TRILOGY with Sleep and Naps        * Acute on chronic respiratory failure with hypoxia    TRILOGY and Oxygen  Immunizations are current          Coronary artery disease involving native coronary artery of native heart without angina pectoris    Aspirin, Losartan, Metoprolol, Simvastatin          Okay to DC home       Francis Downing MD  Pulmonology  [facility]

## 2017-07-05 NOTE — PLAN OF CARE
Problem: Patient Care Overview  Goal: Plan of Care Review  Outcome: Ongoing (interventions implemented as appropriate)  Patient doing well this shift. Remains free from falls an injury. No complaints of pain. Lasix infusing at 5mL/hr. POC reviewed with patient. O2 via NC, patient uses bipap at night. Will continue to monitor.

## 2017-07-06 PROBLEM — N39.0 ACUTE URINARY TRACT INFECTION: Status: RESOLVED | Noted: 2017-01-01 | Resolved: 2017-01-01

## 2017-07-06 PROBLEM — J96.21 ACUTE ON CHRONIC RESPIRATORY FAILURE WITH HYPOXIA: Status: RESOLVED | Noted: 2017-01-01 | Resolved: 2017-01-01

## 2017-07-06 NOTE — PROGRESS NOTES
Discharge orders received, orders reviewed with patient and family, pt and family instructed when to take each medicine next.  Pt instructed on where to get Rx; bedside pharmacy delivered abx. Pt and family informed of follow up appointments.  PIV out, tele off, pt dressed self in own clothing. Pt and family verbalized understanding and does not have any questions at this time.  Pt escorted to lobby via wheelchair via staff.  Pt personal belongings with pt and family.

## 2017-07-06 NOTE — NURSING
Blood pressure noted at 0400 = 78/33, MAP=52. Pt lying on left side, reading possibly positional. Pt asked to ly on his back, BP rechecked. LE=897/63, MAP=80. Pt asymptomatic. Recheck vitals reported to oncoming nurse.

## 2017-07-06 NOTE — DISCHARGE SUMMARY
[facility] Hospital Medicine  Discharge Summary      Patient Name: Orion Rinaldi  MRN: [id number]  Admission Date: 7/3/2017  Hospital Length of Stay: 3 days  Discharge Date and Time:  07/06/2017 1:43 PM  Attending Physician: Lisa Chung MD   Discharging Provider: Elo Roe NP  Primary Care Provider: Daisy Oconnor MD      HPI:   Orion Rinaldi is a 80 yo male with Chronic Respiratory Failure (uses 5 L NC along with Triology vent at home), COPD, Systolic Heart Failure, S/P TAVR and Pulmonary HTN who presented to the ED due to decreased urinary output despite taking lasix over a 4 day period. While ambulating into the ED, he became more SOB. On Venti mask PO2 57, pH 7.42, CO2 20.9.  Bicarb 18. CBC was stable, BNP = 2317, CXR indicated small bilateral pleural effusions and early interstitial edema. Pt has increased respiratory rate (30 - 40s). IV lasix, metolazone, morphine given in the ED. Pt was admitted to ICU for closer observation.    * No surgery found *      Indwelling Lines/Drains at time of discharge:   Lines/Drains/Airways          No matching active lines, drains, or airways        Hospital Course:   Pt admitted to Critical Care due to Acute on Chronic Hypoxic Respiratory Failure, Decompensated Systolic Heart Failure, COPD and UTI. He received supplemental oxygen via NPPV and diuresis with IV lasix drip. He diuresed well, SOB and leg swelling decreased and Trilogy vent was utilized at night. Rocephin IV was given for UTI. Over course, pt diuresed at least 12 L of fluid and lost approx 20 # weight. Lasix infusion was discontinued and Demadex 10 mg daily given. Case was discussed with Dr. Solano. Cardiology recommended Torsemide however, pt could not afford monthly copay. Lasix was continued. There was a slight increase in serum Cr 1.5 to 1.9. At time of discharge, pt/family were advised to hold lasix and aldactone until Sunday 7/9/17. Urine culture isolated E coli which was  sensitive to Bactrim DS. He was advised to start Bactrim on Sunday 7/9/17. He was seen and examined and determined to be safe and stable for discharge. He/family were advised of prescriptions and follow up appointments. Pt was to alternate Lasix 20 mg daily with lasix 40 mg daily.      Consults:   Consults         Status Ordering Provider     Inpatient consult to Hospitalist  Once     Provider:  (Not yet assigned)    Acknowledged OKSANA SANDY     Inpatient consult to Pulmonology  Once     Provider:  (Not yet assigned)    Completed MARY LOU GATES          Significant Diagnostic Studies:   Imaging Results          X-Ray Chest 1 View (Final result)  Result time 07/05/17 08:25:56    Final result by Harpreet Butterfield MD (07/05/17 08:25:56)                 Impression:       No acute process seen.      Electronically signed by: HARPREET BUTTERFIELD MD  Date:     07/05/17  Time:    08:25              Narrative:    Clinical Data:Followup    Comparison:  7/4/17    Findings:  Single view of the chest.   Aorta demonstrates atherosclerotic disease. Sternotomy wires are intact.    Cardiac silhouette is normal.  The lungs demonstrate no evidence of consolidation.  No evidence of pleural effusion or pneumothorax.  Bones demonstrate degenerative changes.                             X-Ray Chest AP Portable (Final result)  Result time 07/04/17 08:11:56    Final result by Salvatore Kelly MD (07/04/17 08:11:56)                 Impression:     See above.      Electronically signed by: SALVATORE KELLY  Date:     07/04/17  Time:    08:11              Narrative:    Chest x-ray single view    Indication: Hypoxia.    Findings: Comparison study 7/3/2017.  No change.                             X-Ray Chest AP Portable (Final result)  Result time 07/03/17 14:08:09    Final result by Farshad Espinoza MD (07/03/17 14:08:09)                 Impression:     Suspect small bilateral pleural effusions.  Increased markings in mostly attributed to by low lung  volumes.  Early interstitial edema and/or pulmonary venous congestion is also a possibility.          Electronically signed by: TORRI ARCHIBALD MD  Date:     07/03/17  Time:    14:08              Narrative:    Exam: XR CHEST AP PORTABLE    Clinical History: Shortness of breath. Asthma    Findings:     Low lung volumes with associated increased interstitial markings in the lower lobes.  Blunting of the bilateral costophrenic angles. Mild cardiomegaly.  Status post CABG.                              Pending Diagnostic Studies:     None        Final Active Diagnoses:    Diagnosis Date Noted POA    Moderate COPD (chronic obstructive pulmonary disease) [J44.9] 03/12/2013 Yes    Coronary artery disease involving native coronary artery of native heart without angina pectoris [I25.10] 07/03/2017 Yes     Chronic    Seizures [R56.9]  Yes     Chronic      Problems Resolved During this Admission:    Diagnosis Date Noted Date Resolved POA    PRINCIPAL PROBLEM:  Acute on chronic respiratory failure with hypoxia [J96.21] 07/03/2017 07/06/2017 Yes    Acute on chronic systolic CHF (congestive heart failure), NYHA class 4 [I50.23] 12/18/2014 07/06/2017 Yes    Acute pulmonary edema [J81.0] 07/03/2017 07/05/2017 Yes    Acute urinary tract infection [N39.0] 07/03/2017 07/06/2017 Yes    Hypoxia [R09.02] 07/04/2017 07/04/2017 Yes      No new Assessment & Plan notes have been filed under this hospital service since the last note was generated.  Service: Hospital Medicine      Discharged Condition: stable    Disposition: Home or Self Care    Follow Up:  Follow-up Information     Jordan Solano MD In 1 week.    Specialties:  Cardiology, Nuclear Medicine  Why:  Hosp F/U - Heart Failure  Contact information:  [address]  [place] [address]  [phone]             Daisy Oconnor MD In 3 days.    Specialty:  Internal Medicine  Why:  Hosp F/U - E coli UTI and Heart Failure  Contact information:  [address]  [place]  [id number] [phone]                 Patient Instructions:     Diet general     Activity as tolerated     Call MD for:  temperature >100.4     Call MD for:  difficulty breathing or increased cough       Medications:  Reconciled Home Medications:   Current Discharge Medication List      START taking these medications    Details   sulfamethoxazole-trimethoprim 800-160mg (BACTRIM DS) 800-160 mg Tab Take 1 tablet by mouth 2 (two) times daily.  Qty: 6 tablet, Refills: 0         CONTINUE these medications which have CHANGED    Details   roflumilast 500 mcg Tab Take 1 tablet (500 mcg total) by mouth once daily.  Qty: 30 tablet, Refills: 0         CONTINUE these medications which have NOT CHANGED    Details   albuterol-ipratropium 2.5mg-0.5mg/3mL (DUO-NEB) 0.5 mg-3 mg(2.5 mg base)/3 mL nebulizer solution Take 3 mLs by nebulization every 8 (eight) hours as needed for Wheezing.  Qty: 90 vial, Refills: 3    Comments: Please call patient to pick prescription once it is ready.  Associated Diagnoses: Chronic obstructive pulmonary disease, unspecified COPD type      aspirin 81 MG Chew Take 81 mg by mouth once daily.      budesonide-formoterol 80-4.5 mcg (SYMBICORT) 80-4.5 mcg/actuation HFAA Inhale 2 puffs into the lungs 2 (two) times daily. Pharmacy to adjust to cheaper tier options  Qty: 1 Inhaler, Refills: 11    Comments: Please call patient to pick prescription once it is ready.  Associated Diagnoses: Chronic obstructive pulmonary disease, unspecified COPD type      furosemide (LASIX) 20 MG tablet Take 2 tablets (40 mg total) by mouth once daily.  Qty: 30 tablet, Refills: 1      levetiracetam (KEPPRA) 750 MG Tab Take 1 tablet (750 mg total) by mouth 2 (two) times daily.  Qty: 180 tablet, Refills: 3    Associated Diagnoses: Seizure disorder      losartan (COZAAR) 25 MG tablet Take 0.5 tablets (12.5 mg total) by mouth once daily.  Qty: 30 tablet, Refills: 1      metoprolol tartrate (LOPRESSOR) 25 MG tablet Take 0.5 tablets  (12.5 mg total) by mouth 2 (two) times daily.  Qty: 30 tablet, Refills: 0      OXYGEN-AIR DELIVERY SYSTEMS MISC by Misc.(Non-Drug; Combo Route) route.      simvastatin (ZOCOR) 40 MG tablet TAKE 1 TABLET EVERY EVENING.  Qty: 90 tablet, Refills: 3    Associated Diagnoses: Hyperlipidemia; S/P AVR      tiotropium (SPIRIVA WITH HANDIHALER) 18 mcg inhalation capsule Inhale 1 capsule (18 mcg total) into the lungs once daily. Pharmacy to adjust to cheaper tier options  Qty: 90 capsule, Refills: 4    Comments: Please call patient to pick prescription once it is ready.  Associated Diagnoses: Chronic obstructive pulmonary disease, unspecified COPD type      potassium chloride SA (K-DUR,KLOR-CON) 20 MEQ tablet Take 1 tablet (20 mEq total) by mouth 2 (two) times daily.  Qty: 180 tablet, Refills: 3    Associated Diagnoses: Congestive heart failure, unspecified congestive heart failure chronicity, unspecified congestive heart failure type; Chronic hypokalemia      spironolactone (ALDACTONE) 25 MG tablet Take 1 tablet (25 mg total) by mouth 2 (two) times daily.  Qty: 60 tablet, Refills: 0         STOP taking these medications       predniSONE (DELTASONE) 10 mg tablet pack Comments:   Reason for Stopping:             Time spent on the discharge of patient: >30 minutes    Elo Roe NP  Department of Hospital Medicine  [facility]

## 2017-07-06 NOTE — PROGRESS NOTES
"Pt family member asked if pt could stop taking daliresp bc they didn't see any improvement and it is too expensive.  MD Cahng asked via secure chat and MD stated pt "needs to give it more time to work".  Will notify pt and family.  "

## 2017-07-06 NOTE — DISCHARGE INSTRUCTIONS
Hold Lasix until Sunday 7/9/17 - On Sunday take lasix 20 mg one day then alternate with lasix 40 mg the next day  Hold aldactone until Sunday 7/9/17 and take twice daily    Start taking the Bactrim DS on Sunday 7/9/17
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
## 2017-07-06 NOTE — PROGRESS NOTES
Pt and fly requested bed alarm to be turned off at this time. Fly states we would prefer to have it turned off because it startles him when it goes off. Nurse discussed the importance of having the alarm in place. Bed alarm disabled now per request, spouse to remain at bedside and will watch pt when he get up out of bed. Will continue to monitor.

## 2017-07-06 NOTE — NURSING
Pt visualized, rise and fall of chest noted, no signs of acute distress. NSR noted on  Monitor, HR=76. Spouse adamant about not disturbing patient, will continue to monitor

## 2017-07-06 NOTE — NURSING
Resp therapist contacted per pt and fly request to notify that patient is refusing all breathing tx's tonight d/t the treatments are causing him hoarseness. Will continue to monitor.

## 2017-07-07 NOTE — PATIENT INSTRUCTIONS

## 2017-07-07 NOTE — TELEPHONE ENCOUNTER
"----- Message from Gladis Garza RN sent at 7/7/2017  3:58 PM CDT -----  Hello,  I just completed a "transition of care" call with Orion Rinaldi.  He is a recent discharge from the hospital.  Can you please call the patient and wife to clarify him restarting his Potassium on Sunday when he starts back on Lasix and Aldactone.  I was going by the discharge summary and med list, but, Mrs. Rinaldi is saying that his Potassium has been discontinued by Dr. Solano.  Also, please call in a REFILL for his Aldactone to [facility] @ [phone].    Thanks so much.  Gladis Garza RN  Care Coordination Call Center    "

## 2017-07-07 NOTE — TELEPHONE ENCOUNTER
Called patient with the following medications orders  1. Lasix 20mg by mouth twice a day.  2. Aldactone 25mg by mouth at bedtime.  3. Do not restart Potassium.  MARYELLEN////Dr. Solano///Nicola RN      Spouse repeated instructions back correctly.

## 2017-07-10 NOTE — TELEPHONE ENCOUNTER
----- Message from Chantal Woods sent at 7/10/2017 10:49 AM CDT -----  Contact: pt  Pt request call concerning blood pressure medication, can be reached at [phone]///[id number]

## 2017-07-12 NOTE — PROGRESS NOTES
Subjective:   Patient ID:  Orion Rinaldi is a 81 y.o. male who presents for follow-up of Congestive Heart Failure      HPI HOSPITAL FOLLOW UP- RECENT ADMISSION FOR ADHF, ON CHRONIC SYSTOLIC HF STAGE D  PATTERN OF HERNANDEZ. IMPROVED SINCE DC,  LESS EDEMA.  NO ORTHOPNEA OR PND  NO CHEST PAIN. NO PALPITATIONS. NO SYNCOPE  NO FOCAL CNS SYMPTOMS OR SIGNS TO SUGGEST TIA OR STROKE  CARD MED GOOD COMPLIANCE    Review of Systems   Constitution: Positive for weakness. Negative for chills, fever, night sweats, weight gain and weight loss.   HENT: Negative for headaches and nosebleeds.    Eyes: Negative for blurred vision, double vision and visual disturbance.   Cardiovascular: Positive for dyspnea on exertion. Negative for chest pain, irregular heartbeat, leg swelling, orthopnea, palpitations, paroxysmal nocturnal dyspnea and syncope.   Respiratory: Negative for cough, hemoptysis and wheezing.    Endocrine: Negative for polydipsia and polyuria.   Hematologic/Lymphatic: Does not bruise/bleed easily.   Skin: Negative for rash.   Musculoskeletal: Negative for joint pain, joint swelling, muscle weakness and myalgias.   Gastrointestinal: Negative for abdominal pain, hematemesis, jaundice and melena.   Genitourinary: Negative for dysuria, hematuria and nocturia.   Neurological: Negative for dizziness, focal weakness and sensory change.   Psychiatric/Behavioral: Negative for depression. The patient does not have insomnia and is not nervous/anxious.      Family History   Problem Relation Age of Onset    Heart disease Brother     Cataracts Mother     No Known Problems Father     Cataracts Maternal Grandmother     No Known Problems Maternal Grandfather     Cataracts Paternal Grandmother     Cancer Paternal Grandfather     No Known Problems Sister     No Known Problems Maternal Aunt     No Known Problems Maternal Uncle     No Known Problems Paternal Aunt     No Known Problems Paternal Uncle     Anemia Neg Hx     Arrhythmia  Neg Hx     Asthma Neg Hx     Clotting disorder Neg Hx     Fainting Neg Hx     Heart attack Neg Hx     Heart failure Neg Hx     Hyperlipidemia Neg Hx     Hypertension Neg Hx     Strabismus Neg Hx     Retinal detachment Neg Hx     Macular degeneration Neg Hx     Glaucoma Neg Hx     Amblyopia Neg Hx     Blindness Neg Hx     Diabetes Neg Hx     Stroke Neg Hx     Thyroid disease Neg Hx      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 90 vial 3    aspirin 81 MG Chew Take 81 mg by mouth once daily.      budesonide-formoterol 80-4.5 mcg (SYMBICORT) 80-4.5 mcg/actuation HFAA Inhale 2 puffs into the lungs 2 (two) times daily. Pharmacy to adjust to cheaper tier options 1 Inhaler 11    furosemide (LASIX) 20 MG tablet Take 1 tablet (20 mg total) by mouth 2 (two) times daily. 180 tablet 3    levetiracetam (KEPPRA) 750 MG Tab Take 1 tablet (750 mg total) by mouth 2 (two) times daily. 180 tablet 3    metoprolol tartrate (LOPRESSOR) 25 MG tablet Take 0.5 tablets (12.5 mg total) by mouth 2 (two) times daily. 30 tablet 0    OXYGEN-AIR DELIVERY SYSTEMS MISC by Misc.(Non-Drug; Combo Route) route.      simvastatin (ZOCOR) 40 MG tablet TAKE 1 TABLET EVERY EVENING. 90 tablet 3    tiotropium (SPIRIVA WITH HANDIHALER) 18 mcg inhalation capsule  Inhale 1 capsule (18 mcg total) into the lungs once daily. Pharmacy to adjust to cheaper tier options 90 capsule 4    roflumilast 500 mcg Tab Take 1 tablet (500 mcg total) by mouth once daily. 30 tablet 0    spironolactone (ALDACTONE) 25 MG tablet Take 1 tablet (25 mg total) by mouth every evening. 90 tablet 3     No current facility-administered medications on file prior to visit.      Review of patient's allergies indicates:   Allergen Reactions    Doxycycline Nausea Only and Other (See Comments)     Dizziness     Pneumococcal vaccine        Objective:     Physical Exam   Constitutional: He is oriented to person, place, and time. He appears well-developed. No distress.   HENT:   Head: Normocephalic.   Eyes: Conjunctivae are normal. Pupils are equal, round, and reactive to light.   Neck: Neck supple. No JVD present. No thyromegaly present.   Cardiovascular: Normal rate, regular rhythm, normal heart sounds and intact distal pulses.  Exam reveals no gallop and no friction rub.    No murmur heard.  Pulses:       Carotid pulses are 2+ on the right side, and 2+ on the left side.       Radial pulses are 2+ on the right side, and 2+ on the left side.        Femoral pulses are 2+ on the right side, and 2+ on the left side.       Popliteal pulses are 2+ on the right side, and 2+ on the left side.        Dorsalis pedis pulses are 2+ on the right side, and 2+ on the left side.        Posterior tibial pulses are 2+ on the right side, and 2+ on the left side.   Pulmonary/Chest: Breath sounds normal. He has no wheezes. He has no rales. He exhibits no tenderness.   Abdominal: Soft. Bowel sounds are normal. He exhibits no mass. There is no hepatosplenomegaly. There is no tenderness.   Musculoskeletal: He exhibits no edema or tenderness.        Cervical back: Normal.        Thoracic back: Normal.        Lumbar back: Normal.   Lymphadenopathy:     He has no cervical adenopathy.     He has no axillary adenopathy.        Right: No  supraclavicular adenopathy present.        Left: No supraclavicular adenopathy present.   Neurological: He is alert and oriented to person, place, and time. He has normal strength. No sensory deficit. Gait normal.   Skin: Skin is warm. No cyanosis. No pallor. Nails show no clubbing.   Psychiatric: He has a normal mood and affect. His speech is normal and behavior is normal. Cognition and memory are normal.       Assessment:     1. Acute on chronic combined systolic and diastolic congestive heart failure    2. Cardiomyopathy, ischemic    3. Coronary artery disease involving native coronary artery of native heart without angina pectoris      CLINICALLY COMPENSATED HF  NO ACTIVE MYOCARDIAL ISCHEMIA  NO ARRHYTHMIAS  Plan:     Acute on chronic combined systolic and diastolic congestive heart failure    Cardiomyopathy, ischemic    Coronary artery disease involving native coronary artery of native heart without angina pectoris    CONTINUE PRESENT CARD MANAGEMENT    RETURN IN 6 WEEKS.

## 2017-07-12 NOTE — TELEPHONE ENCOUNTER
Telephoned and advised no Furosemide take Torsemide 20 mg daily per Dr. Solano  Beckie Gentry repeated back instructions

## 2017-07-12 NOTE — TELEPHONE ENCOUNTER
"Received phone call from Mrs. Rinaldi "we have a problem need to know if Dr. ROONEY wants Orion to continue Torsemide 20 mg daily from hospital or bid or does he restart Furosemide 20 mg bid"  We just received Torsemide prescription from [facility] but it says daily    Dr. Solano- please advise  "

## 2017-07-17 NOTE — PROGRESS NOTES
Subjective:      Patient ID: Orion Rinaldi is a 81 y.o. male.    Chief Complaint: Foot Pain (bilateral ball of foot pain)    Orion Rinaldi is a 81 y.o. year-old male presenting to podiatry clinic with complaint of bilateral plantar forefoot pain. Patient describes pain as 10/10 pins and needles sharp, aching, throbbing, burning, stabbing, radiating and dull type pain that has been ongoing for the past 8 months and has slightly improved. The pain is worse with pressure. The patient also relates 8 months ago when he was hospitalized he had the first episode of pain to his feet with swelling and discoloration with exquisite pain even to the light touch of the sheets.  While admitted they did have uric acid levels test which were elevated. Orion Rinaldi was told he had a slight case of gout.  Ever since then he has continued to have pain with any kind of pressure to his feet.  He also has calluses and hammertoes that are stiff.  He does have a son who is a physical therapist who tries to guide him on exercises but his wife explains that her  does not follow directions.  He also has a pair of shoes that they recently bottom which they feel does have cushion but he still has pain with them.        Review of Systems   Constitution: Negative for chills and fever.   Cardiovascular: Negative for chest pain.   Respiratory: Negative for shortness of breath.    Gastrointestinal: Negative for nausea and vomiting.           Objective:      Physical Exam   Constitutional: He is oriented to person, place, and time. He appears well-developed and well-nourished. No distress.   Cardiovascular:   Pulses:       Dorsalis pedis pulses are 1+ on the right side, and 1+ on the left side.        Posterior tibial pulses are 1+ on the right side, and 1+ on the left side.   Capillary fill time 3-5 seconds bilaterally.   Musculoskeletal:   Lower extremities:    Deformities: hammertoe with adductovarus rotation fourth and fifth digits  bilaterally.  Bilateral bunion deformities worse on the left than on the right. Fat pad atrophy plantar feet bilaterally.    Normal arch height and rectus feet bilaterally.     5/5 muscle strength and tone in all four quadrants bilaterally.     Pain-free range of motion in all four quadrants with significant stiffness and limitation bilaterally.     Pain on palpation: sub met heads and sulcus diffuse bilaterally.   Neurological: He is alert and oriented to person, place, and time. He displays no Babinski's sign on the right side. He displays no Babinski's sign on the left side.   Plantar protective threshold sensation by touch via 5.07 SWMF present bilaterally.    Skin:   Lower extremities:    Normal turgor, texture, temperature bilaterally. Digital hair present bilaterally. Warm equally bilaterally.     No edema, varicosities, pigmentary changes.    No wounds, drainage, malodor, erythema, interdigital maceration were noted.     Hyperkeratotic lesion: sub met head 4th right foot and plantarmedial HIPJ left.     Nails are thick dystrophic and discolored bilaterally.     Psychiatric: He has a normal mood and affect.   Nursing note and vitals reviewed.            Assessment:       Encounter Diagnoses   Name Primary?    Peripheral vascular disease Yes    Fat pad atrophy of foot     Xerosis cutis     Onychomycosis due to dermatophyte     Corn or callus     Hallux abducto valgus, left     Gastrocnemius equinus, unspecified laterality     Gout synovitis          Plan:       Orion was seen today for foot pain.    Diagnoses and all orders for this visit:    Peripheral vascular disease    Fat pad atrophy of foot    Xerosis cutis    Onychomycosis due to dermatophyte    Corn or callus    Hallux abducto valgus, left    Gastrocnemius equinus, unspecified laterality    Gout synovitis    Other orders  -     urea (CARMOL) 40 % Crea; Apply topically 3 (three) times daily. Apply to rough skin on feet      I counseled the  patient on his conditions, their implications and medical management. Patient was cautioned on potential risks with wounds or injuries and instructed to return to clinic for any open wounds or resting lower extremity pain.     Patient was educated and counseled regarding gout. I explained to the patient that a combination of diet and or metabolism has triggered a flare up of gout crystal deposits in the joint. Recommendations were given to the patient on diet selection. We also discussed the possibility of committing to medications for metabolic control if the frequency of gouty attack increase.  There is no current acute signs of gouty flareup so no aspiration performed today.    For fat pad atrophy, patient was guided on appropriate arch support to off load pressure along the ball of her feet. We discussed the use of orthotic inserts to improve weight and pressure distribution along the bottom of the feet. The patient was given recommendations for orthotic inserts to purchase and bring back on follow up to adjust and modify as needed. Prescription written for compounded topical cream Flurbiprofen 10%, Ciclobenzaprine 2%, Lidocaine 2%, Prilocaine 2% in Lipoderm Active Max for application of 1.6 gram to affected area up to five times daily for pain and inflammation.    Emphasized the importance of daily stretching of the tight heel cord to prevent deforming forces and increased pressure to the balls of the feet as written on the handout.  He was also given additional guidance on appropriate range of motion especially along the MPJs where he has significant contractures.  Also recommended shoes with extra depth to offload pressure both along the hammertoes and the bunions.   Currently bunion deformity is asymptomatic and patient was guided on accommodating bunions appropriately with wider toe box shoes.  The topical compounded anti-inflammatory cream should also suffice for any acute flareups of pain due to deformity  and contractures and also eased pain from gouty flareups.      Patient was also given a prescription for urea lotion and information on over the counter alternatives to apply to areas of recurrent skin build up.      .

## 2017-07-17 NOTE — PATIENT INSTRUCTIONS
Always wear proper shoe gear. Inspect your feet daily. Always call the clinic immediately if you notice something on your feet that is not normal such as a blister, wound, or foreign object stuck in your foot.       Apply cream or lotion to skin lesions as directed.  Eucerin or Amlactin as alternatives.    Wear recommended shoes and insoles and perform stretches and exercises as directed. Return with shoes and insoles on follow up for adjustments as needed.    Apply topical compounded cream to skin along affected areas as directed.      Eating to Prevent Gout  Gout is a painful form of arthritis caused by an excess of uric acid. This is a waste product made by the body. It builds up in the body and forms crystals that collect in the joints, bringing on a gout attack. Alcohol and certain foods can trigger a gout attack. Below are some guidelines for changing your diet to help you manage gout. Your healthcare provider can work with you to determine the best eating plan for you. Know that diet is only one part of managing gout. Take your medicines as prescribed and follow the other guidelines your healthcare provider has given you.  Foods to limit  Eating too many foods containing purines may increase the levels of uric acid in your body and increase your risk for a gout attack. It may be best to limit these high-purine foods:  · Alcohol (beer, red wine). You may be told to avoid alcohol completely.  · Certain fish (anchovies, sardines, fish roes, herring, tuna, mussels, codfish, scallops, trout, and shady)  · Certain meats (red meat, processed meat, muniz, turkey, wild game, and goose)  · Sauces and gravies made with meat  · Organ meats (such as liver, kidneys, sweetbreads, and tripe)  · Legumes (such as dried beans, peas)  · Mushrooms, spinach, asparagus, and cauliflower  · Yeast and yeast extract supplements  Foods to try  Some foods may be helpful for people with gout. You may want to try adding some of the  following foods to your diet:  · Dark berries: These include blueberries, blackberries, and cherries. These berries contain chemicals that may lower uric acid.  · Tofu: Tofu, which is made from soy, is a good source of protein. Studies have shown that it may be a better choice than meat for people with gout.  · Omega fatty acids: These acids are found in fatty fish (such as salmon), certain oils (such as flax, olive, or nut oils), or nuts. They may help prevent inflammation due to gout.  The following guidelines are recommended by the American Medical Association for people with gout. Your diet should be:  · High in fiber, whole grains, fruits, and vegetables.  · Low in protein (15% of calories should come from protein. Choose lean sources such as soy, lean meats, and poultry).  · Low in fat (no more than 30% of calories should come from fat, with only 10% coming from animal fat).   Date Last Reviewed: 6/17/2015  © 5883-5181 [employer] [address] [place] [address], [place] [address]. All rights reserved. This information is not intended as a substitute for professional medical care. Always follow your healthcare professional's instructions.

## 2017-07-19 NOTE — TELEPHONE ENCOUNTER
----- Message from Shana Watkins sent at 7/18/2017  8:23 AM CDT -----  Contact: pt wife - alex [phone]  States she wants to know why her labs were scheduled at the [facility] location? Only goes to [facility] and can be reached at [phone]//[email or web address]     Wants to know when the pt's potassium labs will be scheduled

## 2017-07-20 NOTE — TELEPHONE ENCOUNTER
Spoke with spouse and rescheduled labs to 08/11/2017 at [facility] location, same day as spouse appts.

## 2017-08-18 NOTE — TELEPHONE ENCOUNTER
----- Message from Jordan Solano MD sent at 8/18/2017  4:36 PM CDT -----  We need to call patient today.HOLD ALDACTONE FOR ONE WEEK, AND CHECK BMP NEXT THURSDAY

## 2017-08-18 NOTE — TELEPHONE ENCOUNTER
There was no answer, left message on voice mail of the following;    HOLD ALDACTONE FOR ONE WEEK, AND CHECK BMP NEXT THURSDAY     Left message also to return call as where to schedule labs.

## 2017-08-21 NOTE — TELEPHONE ENCOUNTER
8:09 AM   Note      Contacted patient's spouse after clinic hours and gave the following orders.     Hold Aldactone until further notice. Repeat BMP on Friday.  Spouse repeated instructions back. Spouse stated appt Tuesday, but will like to reschedule appt and lab for same day.  Patient spouse was instructed will call and rescheduled appts when return to office on 08/21/2017.

## 2017-08-21 NOTE — TELEPHONE ENCOUNTER
Contacted patient's spouse after clinic hours and gave the following orders.    Hold Aldactone until further notice. Repeat BMP on Friday.  Spouse repeated instructions back. Spouse stated appt Tuesday, but will like to reschedule appt and lab for same day.  Patient spouse was instructed will call and rescheduled appts when return to office on 08/21/2017.

## 2017-08-21 NOTE — TELEPHONE ENCOUNTER
Spoke with spouse and rescheduled lab and provider visit to 08/25/2017, Lab [id number], Dr. Solano at 0900.

## 2017-08-25 NOTE — PROGRESS NOTES
Subjective:   Patient ID:  Orion Rinaldi is a 81 y.o. male who presents for follow-up of Congestive Heart Failure (f/u)      HPI  CHRONIC SYSTOLIC HFrEF, STAGE D, FC 3B-  ISCHEMIC CMP  ALDACTONE AND LOSARTAN ARE IN HOLD BECAUSE OF WORSENING OF CR.  PATTERN OF HERNANDEZ,  UNCHANGED. ORTHOPNEA .  NO RECURRENT PND  NO RECURRENT PALPITATIONS. NO SYNCOPE  NO RECURRENT ANGINA.  NO FOCAL CNS SYMPTOMS OR SIGN TO SUGGEST TIA OR STROKE    Review of Systems   Constitution: Positive for weakness and malaise/fatigue. Negative for chills, fever, night sweats, weight gain and weight loss.   HENT: Negative for headaches and nosebleeds.    Eyes: Negative for blurred vision, double vision and visual disturbance.   Cardiovascular: Positive for dyspnea on exertion, leg swelling and orthopnea. Negative for chest pain, irregular heartbeat, palpitations, paroxysmal nocturnal dyspnea and syncope.   Respiratory: Negative for cough, hemoptysis and wheezing.    Endocrine: Negative for polydipsia and polyuria.   Hematologic/Lymphatic: Does not bruise/bleed easily.   Skin: Negative for rash.   Musculoskeletal: Negative for joint pain, joint swelling, muscle weakness and myalgias.   Gastrointestinal: Negative for abdominal pain, hematemesis, jaundice and melena.   Genitourinary: Negative for dysuria, hematuria and nocturia.   Neurological: Negative for dizziness, focal weakness and sensory change.   Psychiatric/Behavioral: Negative for depression. The patient does not have insomnia and is not nervous/anxious.      Family History   Problem Relation Age of Onset    Heart disease Brother     Cataracts Mother     No Known Problems Father     Cataracts Maternal Grandmother     No Known Problems Maternal Grandfather     Cataracts Paternal Grandmother     Cancer Paternal Grandfather     No Known Problems Sister     No Known Problems Maternal Aunt     No Known Problems Maternal Uncle     No Known Problems Paternal Aunt     No Known Problems  Paternal Uncle     Anemia Neg Hx     Arrhythmia Neg Hx     Asthma Neg Hx     Clotting disorder Neg Hx     Fainting Neg Hx     Heart attack Neg Hx     Heart failure Neg Hx     Hyperlipidemia Neg Hx     Hypertension Neg Hx     Strabismus Neg Hx     Retinal detachment Neg Hx     Macular degeneration Neg Hx     Glaucoma Neg Hx     Amblyopia Neg Hx     Blindness Neg Hx     Diabetes Neg Hx     Stroke Neg Hx     Thyroid disease Neg Hx      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 90 vial 3    aspirin 81 MG Chew Take 81 mg by mouth once daily.      budesonide-formoterol 80-4.5 mcg (SYMBICORT) 80-4.5 mcg/actuation HFAA Inhale 2 puffs into the lungs 2 (two) times daily. Pharmacy to adjust to cheaper tier options 1 Inhaler 11    levetiracetam (KEPPRA) 750 MG Tab Take 1 tablet (750 mg total) by mouth 2 (two) times daily. 180 tablet 3    metoprolol tartrate (LOPRESSOR) 25 MG tablet Take 0.5 tablets (12.5 mg total) by mouth 2 (two) times daily. 30 tablet 0    OXYGEN-AIR DELIVERY SYSTEMS MISC by Misc.(Non-Drug; Combo Route) route.      simvastatin (ZOCOR) 40 MG tablet TAKE 1 TABLET EVERY EVENING. 90 tablet 3    tiotropium (SPIRIVA WITH HANDIHALER) 18 mcg inhalation capsule Inhale 1 capsule (18 mcg total) into the lungs once  daily. Pharmacy to adjust to cheaper tier options 90 capsule 4    torsemide (DEMADEX) 20 MG Tab 20 mg once daily.       losartan (COZAAR) 25 MG tablet Take 0.5 tablets (12.5 mg total) by mouth nightly. 45 tablet 3    roflumilast 500 mcg Tab Take 1 tablet (500 mcg total) by mouth once daily. 30 tablet 0    spironolactone (ALDACTONE) 25 MG tablet Take 1 tablet (25 mg total) by mouth every evening. 90 tablet 3     No current facility-administered medications on file prior to visit.      Review of patient's allergies indicates:   Allergen Reactions    Doxycycline Nausea Only and Other (See Comments)     Dizziness     Pneumococcal vaccine        Objective:     Physical Exam   Constitutional: He is oriented to person, place, and time. He appears well-developed. No distress.   HENT:   Head: Normocephalic.   Eyes: Conjunctivae are normal. Pupils are equal, round, and reactive to light.   Neck: Neck supple. No JVD present. No thyromegaly present.   Cardiovascular: Normal rate, regular rhythm, normal heart sounds and intact distal pulses.  Exam reveals no gallop and no friction rub.    No murmur heard.  Pulses:       Carotid pulses are 2+ on the right side, and 2+ on the left side.       Radial pulses are 2+ on the right side, and 2+ on the left side.        Femoral pulses are 2+ on the right side, and 2+ on the left side.       Popliteal pulses are 2+ on the right side, and 2+ on the left side.        Dorsalis pedis pulses are 2+ on the right side, and 2+ on the left side.        Posterior tibial pulses are 2+ on the right side, and 2+ on the left side.   Pulmonary/Chest: Breath sounds normal. He has no wheezes. He has no rales. He exhibits no tenderness.   Abdominal: Soft. Bowel sounds are normal. He exhibits no mass. There is no hepatosplenomegaly. There is no tenderness.   Musculoskeletal: He exhibits edema. He exhibits no tenderness.        Cervical back: Normal.        Thoracic back: Normal.        Lumbar back:  Normal.   Lymphadenopathy:     He has no cervical adenopathy.     He has no axillary adenopathy.        Right: No supraclavicular adenopathy present.        Left: No supraclavicular adenopathy present.   Neurological: He is alert and oriented to person, place, and time. He has normal strength. No sensory deficit. Gait normal.   Skin: Skin is warm. No cyanosis. No pallor. Nails show no clubbing.   Psychiatric: He has a normal mood and affect. His speech is normal and behavior is normal. Cognition and memory are normal.       Assessment:     1. Acute on chronic combined systolic and diastolic congestive heart failure    2. Cardiomyopathy, ischemic      CLINICALLY COMPENSATED HF  NO ARRHYTHMIAS. NO ACTIVE MYOCARDIAL ISCHEMIA  CNS STATUS STABLE  RECENT BMP REVIEWED-  CR 1.4.  NORMAL K AND NA  Plan:     Acute on chronic combined systolic and diastolic congestive heart failure    Cardiomyopathy, ischemic      1- DC METOPROLOL    2- RESTART ALDACTONE AND LOSARTAN    3- CHECK BMP IN ONE WEEK.    4- RETURN IN 8 WEEKS.

## 2017-08-28 NOTE — TELEPHONE ENCOUNTER
Returned call and verified medication instructions;  Restart aldactone and losartan, hold potassium.    Spouse repeated instructions back correctly.

## 2017-08-28 NOTE — TELEPHONE ENCOUNTER
Please clarify dosage and time of day to take.    losartan (COZAAR) 25 MG tablet 45 tablet 3 7/12/2017 7/12/2018    Sig - Route: Take 0.5 tablets (12.5 mg total) by mouth nightly. - Oral    E-Prescribing Status: Receipt confirmed by pharmacy (7/12/2017  3:05 PM CDT)

## 2017-08-28 NOTE — TELEPHONE ENCOUNTER
Returned  with the following     08/28/17 12:24 PM   TAKE LOSARTAN  1/2 TAB IN AM AND ALDACTONE 1/2 TB IN THE EVENING     Patient notified , repeated instructions back correctly.

## 2017-08-28 NOTE — TELEPHONE ENCOUNTER
----- Message from Hortencia Powell sent at 8/28/2017  8:13 AM CDT -----  call pt wife Derik at [phone]//need to speak with you about medications  need to verify his medications//thks ht

## 2017-08-28 NOTE — TELEPHONE ENCOUNTER
----- Message from Hortencia Powell sent at 8/28/2017  9:10 AM CDT -----  Call pt wife Ms Rinaldi at [phone]////need to ask you one more question//jared castellanos

## 2017-09-18 PROBLEM — J96.01 ACUTE RESPIRATORY FAILURE WITH HYPOXIA AND HYPERCARBIA: Status: ACTIVE | Noted: 2017-01-01

## 2017-09-18 PROBLEM — J18.9 LLL PNEUMONIA: Status: ACTIVE | Noted: 2017-01-01

## 2017-09-18 PROBLEM — A41.9 SEPSIS: Status: ACTIVE | Noted: 2017-01-01

## 2017-09-18 PROBLEM — J96.02 ACUTE RESPIRATORY FAILURE WITH HYPOXIA AND HYPERCARBIA: Status: ACTIVE | Noted: 2017-01-01

## 2017-09-19 PROBLEM — J96.01 ACUTE HYPOXEMIC RESPIRATORY FAILURE: Status: ACTIVE | Noted: 2017-01-01

## 2017-09-19 NOTE — ED NOTES
Elo NP with hospital medicine at bedside for consult. Pt will be admitted to ICU.  Cont on BIPAP - voiding with lasix. Cont in sinus tach with O2 sat %. BP systolic dropped after Nitropaste. Will cont to monitor.

## 2017-09-19 NOTE — PLAN OF CARE
Problem: Patient Care Overview  Goal: Plan of Care Review  Outcome: Ongoing (interventions implemented as appropriate)  Patient remains on bipap during the night with no complaints.

## 2017-09-19 NOTE — PLAN OF CARE
Problem: Patient Care Overview  Goal: Plan of Care Review  Outcome: Ongoing (interventions implemented as appropriate)  Pt admitted from ED overnight.  Put on bipap and wears this through the night, tolerates well.  Taken off x1 for meds and snack- pt desats to mid 80's (on 4L NC) and requests to put bipap back on (off less than 5 min).  Pt denies pain through the shift and states he feels his breathing has improved.  C/o cramping in left lower leg- relieved by standing at edge of bed.  Given Lasix in ED- continues to diurese well through the night.  Wife at bedside through the night.

## 2017-09-19 NOTE — NURSING
Spoke with José GIBBS, he clarified with patient that at this time he wants to be a full code (including intubation) and would like more time to think about this.  Per NP ok for patient to eat.  Also ok to hold Losartan and Metoprolol due to low BP.

## 2017-09-19 NOTE — ASSESSMENT & PLAN NOTE
Platelet count 56,000  No signs of active bleeding  Will hold DVT prophylaxis for now  SCDs  Repeat labs  Outpatient follow up with Hematology

## 2017-09-19 NOTE — ED PROVIDER NOTES
SCRIBE #1 NOTE: I, Jaye Park, am scribing for, and in the presence of, Nicholas Weinstein MD. I have scribed the entire note.      History      Chief Complaint   Patient presents with    Shortness of Breath     worsening today. hx COPD. +cyanotic       Review of patient's allergies indicates:   Allergen Reactions    Doxycycline Nausea Only and Other (See Comments)     Dizziness     Pneumococcal vaccine         HPI   HPI    9/18/2017, 7:10 PM   History obtained from the patient      History of Present Illness: Orion Rinaldi is a 81 y.o. male patient with a PMHx of CHF who presents to the Emergency Department for SOB which onset gradually a few hours PTA. Symptoms are constant and severe. No mitigating or exacerbating factors reported. Pt's is cyanotic. No other associated sxs reported at this time. Patient denies any CP, worsening BLE edema/pain, recent travel/long car rides, cough, extremity weakness/numbness, fever, chills, n/v, and all other sxs at this time. Pt is currently on blood thinners. No further complaints or concerns at this time.       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    Seizures        Past Surgical History:  Past Surgical History:   Procedure Laterality Date    AORTIC VALVE REPLACEMENT      CARDIAC CATHETERIZATION      CARDIAC SURGERY      CARDIAC VALVE SURGERY      CATARACT EXTRACTION W/  INTRAOCULAR LENS IMPLANT  OD 3/18/09    CATARACT EXTRACTION W/  INTRAOCULAR LENS IMPLANT  OS 4/1/09     CORONARY ARTERY BYPASS GRAFT  1997    CABG x 3    SKIN BIOPSY      TONSILLECTOMY           Family History:  Family History   Problem Relation Age of Onset    Heart disease Brother     Cataracts Mother     No Known Problems Father     Cataracts Maternal Grandmother     No Known Problems Maternal Grandfather     Cataracts Paternal Grandmother     Cancer Paternal Grandfather     No Known Problems Sister     No Known Problems Maternal Aunt     No Known Problems Maternal Uncle     No Known Problems Paternal Aunt     No Known Problems Paternal Uncle     Anemia Neg Hx     Arrhythmia Neg Hx     Asthma Neg Hx     Clotting disorder Neg Hx     Fainting Neg Hx     Heart attack Neg Hx     Heart failure Neg Hx     Hyperlipidemia Neg Hx     Hypertension Neg Hx     Strabismus Neg Hx     Retinal detachment Neg Hx     Macular degeneration Neg Hx     Glaucoma Neg Hx     Amblyopia Neg Hx     Blindness Neg Hx     Diabetes Neg Hx     Stroke Neg Hx     Thyroid disease Neg Hx        Social History:  Social History     Social History Main Topics    Smoking status: Former Smoker     Packs/day: 1.00     Years: 20.00     Types: Cigarettes     Quit date: 9/12/1997    Smokeless tobacco: Never Used    Alcohol use No      Comment: Occasionally     Drug use: No    Sexual activity: Yes     Partners: Female       ROS   Review of Systems   Constitutional: Negative for chills and fever.   HENT: Negative for sore throat.    Respiratory: Positive for shortness of breath. Negative for cough.    Cardiovascular: Negative for chest pain and leg swelling.   Gastrointestinal: Negative for nausea and vomiting.   Genitourinary: Negative for dysuria.   Musculoskeletal: Negative for back pain and myalgias.   Skin: Positive for color change (cyanotic).   Neurological: Negative for weakness and numbness.   Hematological: Does not bruise/bleed easily.   All other systems reviewed and are negative.      Physical Exam      Initial  Vitals [09/18/17 1904]   BP Pulse Resp Temp SpO2   128/77 (!) 129 (!) 28 99.8 °F (37.7 °C) (!) 73 %      MAP       94          Physical Exam  Nursing Notes and Vital Signs Reviewed.  Constitutional: Patient is in severe distress. Well-developed and well-nourished.  Head: Atraumatic. Normocephalic.  Eyes: PERRL. EOM intact. Conjunctivae are not pale. No scleral icterus.  ENT: Mucous membranes are moist. Oropharynx is clear and symmetric.    Neck: Supple. Full ROM. No lymphadenopathy.  Cardiovascular: Tachycardic. Irregularly irreguar rhythm. No murmurs, rubs, or gallops. Distal pulses are 2+ and symmetric.  Pulmonary/Chest: Severe respiratory distress. Tachypnea. Crackles throughout. No wheezing, rales, or rhonchi.  Abdominal: Soft and non-distended.  There is no tenderness.  No rebound, guarding, or rigidity.   Musculoskeletal: Moves all extremities. No obvious deformities. 3+ BLE edema. No calf tenderness.  Skin: Warm and dry. Cyanotic.  Neurological:  Alert, awake, and appropriate.  Normal speech.  No acute focal neurological deficits are appreciated.  Psychiatric: Normal affect. Good eye contact. Appropriate in content.    ED Course    Critical Care  Date/Time: 9/18/2017 7:17 PM  Performed by: VLADIMIR GREWAL.  Authorized by: VLADIMRI GREWAL   Direct patient critical care time: 10 minutes  Additional history critical care time: 10 minutes  Ordering / reviewing critical care time: 5 minutes  Documentation critical care time: 5 minutes  Consulting other physicians critical care time: 10 minutes  Consult with family critical care time: 7 minutes  Total critical care time (exclusive of procedural time) : 47 minutes  Critical care time was exclusive of separately billable procedures and treating other patients and teaching time.  Critical care was necessary to treat or prevent imminent or life-threatening deterioration of the following conditions: respiratory failure.  Critical care was time spent personally by me on  the following activities: blood draw for specimens, development of treatment plan with patient or surrogate, discussions with consultants, interpretation of cardiac output measurements, evaluation of patient's response to treatment, examination of patient, obtaining history from patient or surrogate, ordering and performing treatments and interventions, ordering and review of laboratory studies, ordering and review of radiographic studies, pulse oximetry, re-evaluation of patient's condition and review of old charts.  Subsequent provider of critical care: I assumed direction of critical care for this patient from another provider of my specialty.        ED Vital Signs:  Vitals:    09/18/17 1941 09/18/17 1942 09/18/17 1956 09/18/17 2002   BP: (!) 112/51  96/81    Pulse: (!) 132 (!) 118 (!) 115 (!) 117   Resp: (!) 44 (!) 31 (!) 28 (!) 31   Temp:       TempSrc:       SpO2: (!) 88% 99% 100% 100%   Weight:       Height:        09/18/17 2011 09/18/17 2026 09/18/17 2111 09/18/17 2126   BP: (!) 93/53 (!) 98/51 98/61 108/62   Pulse: (!) 121 (!) 113 (!) 111 104   Resp: (!) 28 (!) 26 (!) 28 (!) 29   Temp:       TempSrc:       SpO2: 98% 97% 97% 96%   Weight:       Height:        09/18/17 2141 09/18/17 2145 09/18/17 2211 09/18/17 2251   BP: 98/63  (!) 80/46 107/64   Pulse: (!) 116 109 105 101   Resp: (!) 29 (!) 28 (!) 28 (!) 23   Temp:       TempSrc:       SpO2: (!) 92% 97% (!) 94% 100%   Weight:       Height:        09/18/17 2306 09/18/17 2336 09/18/17 2345   BP: (!) 101/51 97/64    Pulse: 101 96 98   Resp: (!) 27 (!) 22 20   Temp:      TempSrc:      SpO2: 99% 98% 99%   Weight:      Height:          Abnormal Lab Results:  Labs Reviewed   CBC W/ AUTO DIFFERENTIAL - Abnormal; Notable for the following:        Result Value    RBC 4.40 (*)     Hemoglobin 13.0 (*)     MCHC 31.0 (*)     RDW 20.0 (*)     Platelets 56 (*)     Gran # 8.0 (*)     Mono # 1.7 (*)     Gran% 73.2 (*)     Lymph% 11.3 (*)     Mono% 15.6 (*)     All other  components within normal limits   COMPREHENSIVE METABOLIC PANEL - Abnormal; Notable for the following:     CO2 15 (*)     BUN, Bld 69 (*)     Creatinine 1.9 (*)     Total Bilirubin 2.7 (*)     Anion Gap 18 (*)     eGFR if  38 (*)     eGFR if non  33 (*)     All other components within normal limits   PROTIME-INR - Abnormal; Notable for the following:     Prothrombin Time 15.4 (*)     INR 1.5 (*)     All other components within normal limits   TROPONIN I - Abnormal; Notable for the following:     Troponin I 0.040 (*)     All other components within normal limits   B-TYPE NATRIURETIC PEPTIDE - Abnormal; Notable for the following:     BNP 1,977 (*)     All other components within normal limits   CK-MB - Abnormal; Notable for the following:     MB% 5.5 (*)     All other components within normal limits   URINALYSIS - Abnormal; Notable for the following:     Occult Blood UA 2+ (*)     All other components within normal limits   LACTIC ACID, PLASMA - Abnormal; Notable for the following:     Lactate (Lactic Acid) 3.9 (*)     All other components within normal limits    Narrative:        critical result(s) called and verbal readback obtained from   LA 3.92 isis weir rn , 09/18/2017 20:05   ISTAT PROCEDURE - Abnormal; Notable for the following:     POC PH 7.452 (*)     POC PCO2 23.5 (*)     POC PO2 105 (*)     POC HCO3 16.4 (*)     All other components within normal limits   CULTURE, BLOOD   CULTURE, BLOOD   APTT   MAGNESIUM   PHOSPHORUS   CK   URINALYSIS MICROSCOPIC   LACTIC ACID, PLASMA        All Lab Results:  Results for orders placed or performed during the hospital encounter of 09/18/17   CBC auto differential   Result Value Ref Range    WBC 10.97 3.90 - 12.70 K/uL    RBC 4.40 (L) 4.60 - 6.20 M/uL    Hemoglobin 13.0 (L) 14.0 - 18.0 g/dL    Hematocrit 42.0 40.0 - 54.0 %    MCV 96 82 - 98 fL    MCH 29.5 27.0 - 31.0 pg    MCHC 31.0 (L) 32.0 - 36.0 g/dL    RDW 20.0 (H) 11.5 - 14.5 %     Platelets 56 (L) 150 - 350 K/uL    MPV SEE COMMENT 9.2 - 12.9 fL    Gran # 8.0 (H) 1.8 - 7.7 K/uL    Lymph # 1.2 1.0 - 4.8 K/uL    Mono # 1.7 (H) 0.3 - 1.0 K/uL    Eos # 0.0 0.0 - 0.5 K/uL    Baso # 0.03 0.00 - 0.20 K/uL    Gran% 73.2 (H) 38.0 - 73.0 %    Lymph% 11.3 (L) 18.0 - 48.0 %    Mono% 15.6 (H) 4.0 - 15.0 %    Eosinophil% 0.3 0.0 - 8.0 %    Basophil% 0.3 0.0 - 1.9 %    Differential Method Automated    Comprehensive metabolic panel   Result Value Ref Range    Sodium 142 136 - 145 mmol/L    Potassium 4.8 3.5 - 5.1 mmol/L    Chloride 109 95 - 110 mmol/L    CO2 15 (L) 23 - 29 mmol/L    Glucose 108 70 - 110 mg/dL    BUN, Bld 69 (H) 8 - 23 mg/dL    Creatinine 1.9 (H) 0.5 - 1.4 mg/dL    Calcium 9.9 8.7 - 10.5 mg/dL    Total Protein 8.2 6.0 - 8.4 g/dL    Albumin 3.9 3.5 - 5.2 g/dL    Total Bilirubin 2.7 (H) 0.1 - 1.0 mg/dL    Alkaline Phosphatase 96 55 - 135 U/L    AST 22 10 - 40 U/L    ALT 18 10 - 44 U/L    Anion Gap 18 (H) 8 - 16 mmol/L    eGFR if African American 38 (A) >60 mL/min/1.73 m^2    eGFR if non African American 33 (A) >60 mL/min/1.73 m^2   Protime-INR   Result Value Ref Range    Prothrombin Time 15.4 (H) 9.0 - 12.5 sec    INR 1.5 (H) 0.8 - 1.2   APTT   Result Value Ref Range    aPTT 27.3 21.0 - 32.0 sec   Magnesium   Result Value Ref Range    Magnesium 2.6 1.6 - 2.6 mg/dL   Phosphorus   Result Value Ref Range    Phosphorus 4.5 2.7 - 4.5 mg/dL   Troponin I   Result Value Ref Range    Troponin I 0.040 (H) 0.000 - 0.026 ng/mL   Brain natriuretic peptide   Result Value Ref Range    BNP 1,977 (H) 0 - 99 pg/mL   CK-MB   Result Value Ref Range     20 - 200 U/L    CPK MB 6.2 0.1 - 6.5 ng/mL    MB% 5.5 (H) 0.0 - 5.0 %   CK   Result Value Ref Range     20 - 200 U/L   Urinalysis   Result Value Ref Range    Specimen UA Urine, Clean Catch     Color, UA Yellow Yellow, Straw, Kia    Appearance, UA Clear Clear    pH, UA 5.0 5.0 - 8.0    Specific Gravity, UA 1.015 1.005 - 1.030    Protein, UA Negative  Negative    Glucose, UA Negative Negative    Ketones, UA Negative Negative    Bilirubin (UA) Negative Negative    Occult Blood UA 2+ (A) Negative    Nitrite, UA Negative Negative    Urobilinogen, UA Negative <2.0 EU/dL    Leukocytes, UA Negative Negative   Lactic acid, plasma   Result Value Ref Range    Lactate (Lactic Acid) 3.9 (HH) 0.5 - 2.2 mmol/L   Urinalysis Microscopic   Result Value Ref Range    RBC, UA 0 0 - 4 /hpf    Microscopic Comment SEE COMMENT    Lactic acid, plasma   Result Value Ref Range    Lactate (Lactic Acid) 1.3 0.5 - 2.2 mmol/L   ISTAT PROCEDURE   Result Value Ref Range    POC PH 7.452 (H) 7.35 - 7.45    POC PCO2 23.5 (LL) 35 - 45 mmHg    POC PO2 105 (H) 80 - 100 mmHg    POC HCO3 16.4 (L) 24 - 28 mmol/L    POC BE -8 -2 to 2 mmol/L    POC SATURATED O2 98 95 - 100 %    Rate 16     Sample ARTERIAL     Site RR     Allens Test Pass     DelSys CPAP/BiPAP     Mode BiPAP     FiO2 50     Spont Rate 28     Sp02 100     IP 12     EP 6          Imaging Results:  Imaging Results          CT Chest Without Contrast (Final result)  Result time 09/18/17 22:51:42    Final result by Ry Sheppard MD (09/18/17 22:51:42)                 Impression:      Patchy infiltrates in the left lower lobe and inferior lingula consistent with pneumonia.    All CT scans at this facility use dose modulation, iterative reconstruction and/or weight based dosing when appropriate to reduce radiation dose to as low as reasonably achievable.       Electronically signed by: RY SHEPPARD MD  Date:     09/18/17  Time:    22:51              Narrative:    Exam: CT CHEST WITHOUT CONTRAST,    Date:  09/18/17 22:25:00    History: SOB, evaluate for PNA vs pulmonary edema        Findings: There is patchy infiltrate in the left lower lobe and focal infiltrate with air bronchograms in the inferior lingula, consistent with pneumonia. Lung fields otherwise clear. No pleural effusion. No pathologically enlarged hilar or mediastinal lymph  nodes.                             X-Ray Chest 1 View (Final result)  Result time 09/18/17 19:48:48    Final result by Ry Sheppard MD (09/18/17 19:48:48)                 Impression:     See above      Electronically signed by: RY SHEPPARD MD  Date:     09/18/17  Time:    19:48              Narrative:    History: Shortness of breath    Upper normal heart size. Median sternotomy. There is focal infiltrate or atelectasis at the left lung base.                             The EKG was ordered, reviewed, and independently interpreted by the ED provider.  Interpretation time: 19:21  Rate: 131 BPM  Rhythm: atrial fibrillation with RVR  Interpretation: L anterior fascicular block. Inferior infarct. No STEMI.           The Emergency Provider reviewed the vital signs and test results, which are outlined above.    ED Discussion     Although pt has infiltrate on xray, concern for fluid overload with increased BNP and edema on exam.  Will gently hydrate.    7:17 PM: Pt put on BiPAP during initial assessment. Pt reports improvement on BiPAP    7:56 PM: Re-evaluated pt. Pt states his condition has slightly improved at this time.    8:45 PM: Re-evaluated pt. I have discussed test results, shared treatment plan, and the need for admission with patient and family at bedside. Pt and family express understanding at this time and agree with all information. All questions answered. Pt and family have no further questions or concerns at this time. Pt is ready for admit.    8:52 PM: Discussed case with Elo Roe NP (Hospital Medicine). Dr. Sahu agrees with current care and management of pt and accepts admission.   Admitting Service: Hospital medicine   Admitting Physician: Dr. Sahu  Admit to: ICU      ED Medication(s):  Medications   piperacillin-tazobactam 4.5 g in dextrose 5 % 100 mL IVPB (ready to mix system) (0 g Intravenous Stopped 9/18/17 6008)   ciprofloxacin (CIPRO)400mg/200ml D5W IVPB 400 mg (400 mg Intravenous New  Bag 9/18/17 2320)   roflumilast tablet 500 mcg (not administered)   losartan split tablet 12.5 mg (not administered)   levetiracetam tablet 750 mg (not administered)   metoprolol tartrate (LOPRESSOR) split tablet 12.5 mg (not administered)   simvastatin tablet 40 mg (not administered)   aspirin chewable tablet 81 mg (not administered)   furosemide injection 40 mg (not administered)   acetaminophen tablet 650 mg (not administered)   aluminum-magnesium hydroxide-simethicone 200-200-20 mg/5 mL suspension 30 mL (not administered)   pantoprazole injection 40 mg (not administered)   hydrALAZINE injection 10 mg (not administered)   ondansetron injection 4 mg (not administered)   arformoterol nebulizer solution 15 mcg (not administered)   vancomycin 1 g in dextrose 5 % 250 mL IVPB (ready to mix system) (not administered)   aspirin tablet 325 mg (325 mg Oral Given 9/18/17 1934)   nitroGLYCERIN 2% TD oint ointment 1 inch (1 inch Transdermal Given 9/18/17 1934)   furosemide injection 80 mg (80 mg Intravenous Given 9/18/17 1934)   sodium chloride 0.9% bolus 1,000 mL (0 mLs Intravenous Stopped 9/18/17 2132)       New Prescriptions    No medications on file             Medical Decision Making    Medical Decision Making:   Clinical Tests:   Lab Tests: Ordered and Reviewed  Radiological Study: Reviewed and Ordered  Medical Tests: Reviewed and Ordered           Scribe Attestation:   Scribe #1: I performed the above scribed service and the documentation accurately describes the services I performed. I attest to the accuracy of the note.    Attending:   Physician Attestation Statement for Scribe #1: I, Nicholas Weinstein MD, personally performed the services described in this documentation, as scribed by Jaye Park, in my presence, and it is both accurate and complete.          Clinical Impression       ICD-10-CM ICD-9-CM   1. Acute on chronic combined systolic and diastolic congestive heart failure I50.43 428.43     428.0   2. SOB  (shortness of breath) R06.02 786.05   3. Pneumonia of left lower lobe due to infectious organism J18.1 486   4. Elevated lactic acid level R79.89 276.2   5. Sepsis, due to unspecified organism A41.9 038.9     995.91   6. Acute respiratory failure with hypoxia J96.01 518.81   7. CHF (congestive heart failure) I50.9 428.0       Disposition:   Disposition: Admitted  Condition: Critical         Nicholas Weinstein MD  09/19/17 0011

## 2017-09-19 NOTE — PLAN OF CARE
Problem: Patient Care Overview  Goal: Plan of Care Review  Outcome: Ongoing (interventions implemented as appropriate)  Patient remains in ICU with continuous cardiac monitoring. Patient remains off of Bipap, on 4L Oxygen via nasal cannula throughout shift with good O2 saturation and patient reports improvement of shortness of breath. Patient tolerated diet well and had a BM this shift. Patient ambulated in room, sat on the side of the bed multiple times this shift with no difficulty. VSS, Dr. Zamora notified of patient's condition and he is to come evaluate patient for possible downgrade from ICU. Will continue to monitor.

## 2017-09-19 NOTE — SUBJECTIVE & OBJECTIVE
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    Seizures        Past Surgical History:   Procedure Laterality Date    AORTIC VALVE REPLACEMENT      CARDIAC CATHETERIZATION      CARDIAC SURGERY      CARDIAC VALVE SURGERY      CATARACT EXTRACTION W/  INTRAOCULAR LENS IMPLANT  OD 3/18/09    CATARACT EXTRACTION W/  INTRAOCULAR LENS IMPLANT  OS 4/1/09    CORONARY ARTERY BYPASS GRAFT  1997    CABG x 3    SKIN BIOPSY      TONSILLECTOMY         Review of patient's allergies indicates:   Allergen Reactions    Doxycycline Nausea Only and Other (See Comments)     Dizziness     Pneumococcal vaccine        No current facility-administered medications on file prior to encounter.      Current Outpatient Prescriptions on File Prior to Encounter   Medication Sig    albuterol-ipratropium 2.5mg-0.5mg/3mL (DUO-NEB) 0.5 mg-3 mg(2.5 mg base)/3 mL nebulizer solution Take 3 mLs by nebulization every 8 (eight) hours as needed for Wheezing.    aspirin 81 MG Chew Take 81 mg by mouth once daily.    budesonide-formoterol 80-4.5 mcg (SYMBICORT) 80-4.5 mcg/actuation HFAA Inhale 2 puffs into the lungs 2 (two) times daily. Pharmacy to adjust to cheaper tier options    levetiracetam (KEPPRA) 750 MG Tab Take 1 tablet (750 mg total) by mouth 2 (two) times daily.    losartan (COZAAR) 25 MG tablet Take 0.5 tablets (12.5 mg total) by mouth nightly.    metoprolol tartrate (LOPRESSOR) 25 MG tablet Take 0.5 tablets (12.5 mg total) by mouth 2 (two) times daily.    OXYGEN-AIR DELIVERY SYSTEMS MISC by Misc.(Non-Drug; Combo Route) route.     simvastatin (ZOCOR) 40 MG tablet TAKE 1 TABLET EVERY EVENING.    spironolactone (ALDACTONE) 25 MG tablet Take 1 tablet (25 mg total) by mouth every evening.    tiotropium (SPIRIVA WITH HANDIHALER) 18 mcg inhalation capsule Inhale 1 capsule (18 mcg total) into the lungs once daily. Pharmacy to adjust to cheaper tier options    torsemide (DEMADEX) 20 MG Tab 20 mg once daily.     roflumilast 500 mcg Tab Take 1 tablet (500 mcg total) by mouth once daily.     Family History     Problem Relation (Age of Onset)    Cancer Paternal Grandfather    Cataracts Mother, Maternal Grandmother, Paternal Grandmother    Heart disease Brother    No Known Problems Father, Maternal Grandfather, Sister, Maternal Aunt, Maternal Uncle, Paternal Aunt, Paternal Uncle        Social History Main Topics    Smoking status: Former Smoker     Packs/day: 1.00     Years: 20.00     Types: Cigarettes     Quit date: 9/12/1997    Smokeless tobacco: Never Used    Alcohol use No      Comment: Occasionally     Drug use: No    Sexual activity: Yes     Partners: Female     Review of Systems   Constitutional: Positive for activity change and chills. Negative for appetite change, diaphoresis, fatigue and fever.   HENT: Negative.    Eyes: Negative for pain and redness.   Respiratory: Positive for cough and shortness of breath. Negative for wheezing.    Cardiovascular: Positive for leg swelling. Negative for chest pain and palpitations.   Gastrointestinal: Negative for abdominal pain, diarrhea, nausea and vomiting.   Skin: Negative for pallor, rash and wound.     Objective:     Vital Signs (Most Recent):  Temp: 99.8 °F (37.7 °C) (09/18/17 1904)  Pulse: (!) 117 (09/18/17 2002)  Resp: (!) 31 (09/18/17 2002)  BP: (!) 112/51 (09/18/17 1941)  SpO2: 100 % (09/18/17 2002) Vital Signs (24h Range):  Temp:  [99.8 °F (37.7 °C)] 99.8 °F (37.7 °C)  Pulse:  [117-132] 117  Resp:  [28-44] 31  SpO2:  [88 %-100 %] 100 %  BP: (112-128)/(51-77) 112/51     Weight: 85.3 kg  (188 lb)  Body mass index is 26.22 kg/m².    Physical Exam   Constitutional: He is oriented to person, place, and time. He appears well-developed and well-nourished. He has a sickly appearance. He appears distressed. Face mask in place.   HENT:   Head: Normocephalic and atraumatic.   Eyes: Conjunctivae are normal. Pupils are equal, round, and reactive to light. No scleral icterus.   Neck: Normal range of motion. Neck supple.   Cardiovascular: Regular rhythm and normal heart sounds.  Tachycardia present.  Exam reveals no gallop and no friction rub.    No murmur heard.  Pulmonary/Chest: Tachypnea noted. He is in respiratory distress. He has rales in the right lower field and the left lower field.   Fine rales to LLL and diminished to RLL   Abdominal: Soft. Bowel sounds are normal.   Musculoskeletal: Normal range of motion. He exhibits edema. He exhibits no tenderness.        Right lower leg: He exhibits edema.        Legs:  Neurological: He is alert and oriented to person, place, and time.   Skin: Skin is warm and dry.   Dry skin   Psychiatric: He has a normal mood and affect. His behavior is normal.   Nursing note and vitals reviewed.       Significant Labs:   ABGs:   Recent Labs  Lab 09/18/17 1955   PH 7.452*   PCO2 23.5*   HCO3 16.4*   POCSATURATED 98   BE -8     CBC:   Recent Labs  Lab 09/18/17 1920   WBC 10.97   HGB 13.0*   HCT 42.0   PLT 56*     CMP:   Recent Labs  Lab 09/18/17 1920      K 4.8      CO2 15*      BUN 69*   CREATININE 1.9*   CALCIUM 9.9   PROT 8.2   ALBUMIN 3.9   BILITOT 2.7*   ALKPHOS 96   AST 22   ALT 18   ANIONGAP 18*   EGFRNONAA 33*     All pertinent labs within the past 24 hours have been reviewed.    Significant Imaging: I have reviewed all pertinent imaging results/findings within the past 24 hours.

## 2017-09-19 NOTE — HPI
Orion Rinaldi is a 80 yo male with Chronic Respiratory Failure (uses 5 L NC along with Triology vent at home), COPD, Systolic Heart Failure, S/P TAVR and Pulmonary HTN who presents to the ED with sudden onset of severe SOB this afternoon. Associated symptoms include chills, worsened leg swelling and generalized weakness. Pt is reported to be cyanotic upon arrival to the ED.  Vital signs note tachycardia with resp rate 28 - 44. CBC finds thrombocytopenia, CMP finds a metabolic alkalosis, BUN ^ 62, serum creatinine 1.9 and BNP 1977. Lactic acidosis = 3.9. CXR notes a LLL infiltrate. Pt is admitted with respiratory failure, pneumonia and decompensated heart failure

## 2017-09-19 NOTE — PHYSICIAN QUERY
PT Name: Orion Rinaldi  MR #: [id number]    Physician Query Form -Systemic Infectious Process Clarification     CDS/: Susnane Juarez               Contact information:  This form is a permanent document in the medical record.     Query Date: September 19, 2017     By submitting this query, we are merely seeking further clarification of documentation. Please utilize your independent clinical judgment when addressing the question(s) below.    The Medical record contains the following:     Indicators   Supporting Clinical Findings   Location in Medical Record   x HR RR BP Temp HR= 129, 132, 118, 121  BP= 112/51, 93/53, 80/46  RR= 28, 44, 31, 28  Temp= 99.8   9/18 Flowsheet     x Lactic Acid             Procalcitonin LA= 3.9   9/18 Lab   x WBC                Bands                     CRP WBC 2.31 9/20 Lab    Culture(s)      AMS, Confusion, LOC, etc.     x Organ Dysfunction / Failure  Acute on chronic Respiratory failure with hypoxia and hypercarbia  Acute on chronic combined systolic and diastolic congestive heart failure   9/18 HP       x Bacteremia or Sepsis / Septic Sepsis-   Criteria include CXR indicating LLL pneumonia, tachycardia, tachypnea and lactic acidosis = 3.9      9/18 HP   x Known or Suspected Source of Infection documented LLL pneumonia 9/18 HP    (Failed) Outpatient Treatment     x Medication Vancomycin IVPB  NS 1000Ml bolus  Pipercillin IVPB  Ciprofloxacin IVPB   9/18- 9/19 MAR   x Treatment Minimal fluid given as patient appears decompensated heart failure  Empiric ABX  Blood cultures pending  Will repeat lactic acid every 4 hours x2  CMP  CBC  CXR 9/18 HP   x Other Bilirubin 2.7  Bilirubin 2.1  Platelets= 56, 40, 37  Cr= 1.9   9/18- 9/19  Lab     Provider, please specify diagnosis or diagnoses associated with above clinical findings.      [  ] Severe Sepsis with Acute Organ Dysfunction/Failure (please specify organ dysfunction/failure): ___________________    [  ] Other Infectious Disease  (please specify): _________________________________    [  ] Other: __________________________________    [ x ] Clinically Undetermined    Please document in your progress notes daily for the duration of treatment until resolved and include in your discharge summary.

## 2017-09-19 NOTE — ASSESSMENT & PLAN NOTE
Presumed PNA - ? Fluid vs atelectasis  Will obtain CT of Chest - results pending  Empiric ABX initiated   Breathing treatments  Sputum culture pending

## 2017-09-19 NOTE — ASSESSMENT & PLAN NOTE
Supplemental oxygen to maintain sat > 88 %  Telemetry monitoring, daily weights, low sodium diet, fluid restriction, BNP in AM,  Lasix IV, continue losartan  Will hold Torsemide and spironolactone for now

## 2017-09-19 NOTE — CONSULTS
Consult Note  Cardiology    Consult Requested By: TEOFILO Srinivasan  Reason for Consult: Decompensated CHF    SUBJECTIVE:     History of Present Illness:  Patient is a 81 y.o. male with a PMHx of chronic respiratory failure (on home O2 and Trilogy), COPD, systolic CHF, s/p TAVR, and pulmonary HTN who presented to [facility] ED yesterday with a chief complaint of severe SOB associated with chills, leg swelling, and weakness, Patient denied any fever, associated chest pain, palpitations, nausea, vomiting, or diaphoresis. Workup in ED revealed lactic acidosis (3.9), elevated BNP (1977), and CXR showed LLL infiltrate and patient was subsequently admitted for further evaluation and treatment. Cardiology consulted to assist with management due to cardiac history. Patient seen and examined today, resting in bed. Reports SOB has improved. Still feels weak and tired. No chest pain. He reports compliance with his medications. Chart reviewed. Troponin chronically elevated and stable, 0.040. CT of chest showed patchy infiltrates in the left lower lobe and inferior lingula, consistent with pneumonia. 2D echo 5/17 reviewed and showed EF of 45% with right ventricular enlargement with mildly depressed systolic function.     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    Seizures      Past Surgical History:   Procedure Laterality Date    AORTIC VALVE REPLACEMENT       CARDIAC CATHETERIZATION      CARDIAC SURGERY      CARDIAC VALVE SURGERY      CATARACT EXTRACTION W/  INTRAOCULAR LENS IMPLANT  OD 3/18/09    CATARACT EXTRACTION W/  INTRAOCULAR LENS IMPLANT  OS 4/1/09    CORONARY ARTERY BYPASS GRAFT  1997    CABG x 3    SKIN BIOPSY      TONSILLECTOMY       Family History   Problem Relation Age of Onset    Heart disease Brother     Cataracts Mother     No Known Problems Father     Cataracts Maternal Grandmother     No Known Problems Maternal Grandfather     Cataracts Paternal Grandmother     Cancer Paternal Grandfather     No Known Problems Sister     No Known Problems Maternal Aunt     No Known Problems Maternal Uncle     No Known Problems Paternal Aunt     No Known Problems Paternal Uncle     Anemia Neg Hx     Arrhythmia Neg Hx     Asthma Neg Hx     Clotting disorder Neg Hx     Fainting Neg Hx     Heart attack Neg Hx     Heart failure Neg Hx     Hyperlipidemia Neg Hx     Hypertension Neg Hx     Strabismus Neg Hx     Retinal detachment Neg Hx     Macular degeneration Neg Hx     Glaucoma Neg Hx     Amblyopia Neg Hx     Blindness Neg Hx     Diabetes Neg Hx     Stroke Neg Hx     Thyroid disease Neg Hx      Social History   Substance Use Topics    Smoking status: Former Smoker     Packs/day: 1.00     Years: 20.00     Types: Cigarettes     Quit date: 9/12/1997    Smokeless tobacco: Never Used    Alcohol use No      Comment: Occasionally         Review of Systems:  Constitutional: +weakness, fatigue, chills  Eyes: negative  Ears, nose, mouth, throat, and face: negative  Respiratory: +SOB, cough  Cardiovascular: +lower extremity александр  Gastrointestinal: negative  Genitourinary:negative  Hematologic/lymphatic: negative  Musculoskeletal:negative,   Neurological: negative  Behavioral/Psych: negative  Endocrine: negative  Allergic/Immunologic: negative    OBJECTIVE:     Vital Signs (Most Recent)  Temp: 97.3 °F (36.3 °C) (09/19/17 0700)  Pulse: 78  (09/19/17 1000)  Resp: (!) 22 (09/19/17 1000)  BP: (!) 86/49 (09/19/17 1000)  SpO2: 98 % (09/19/17 1000)    Vital Signs Range (Last 24H):  Temp:  [97.3 °F (36.3 °C)-99.8 °F (37.7 °C)]   Pulse:  []   Resp:  [19-44]   BP: ()/(40-90)   SpO2:  [76 %-100 %]     Current Facility-Administered Medications   Medication    acetaminophen tablet 650 mg    aluminum-magnesium hydroxide-simethicone 200-200-20 mg/5 mL suspension 30 mL    arformoterol nebulizer solution 15 mcg    aspirin chewable tablet 81 mg    ciprofloxacin (CIPRO)400mg/200ml D5W IVPB 400 mg    hydrALAZINE injection 10 mg    influenza (FLUZONE,FLUARIX QUADRIVALENT) vaccine 0.5 mL    levetiracetam tablet 750 mg    losartan split tablet 12.5 mg    metoprolol tartrate (LOPRESSOR) split tablet 12.5 mg    ondansetron injection 4 mg    pantoprazole EC tablet 40 mg    piperacillin-tazobactam 4.5 g in dextrose 5 % 100 mL IVPB (ready to mix system)    roflumilast tablet 500 mcg    simvastatin tablet 40 mg    [START ON 9/20/2017] vancomycin (VANCOCIN) 1,250 mg in dextrose 5 % 250 mL IVPB     No current facility-administered medications on file prior to encounter.      Current Outpatient Prescriptions on File Prior to Encounter   Medication Sig    albuterol-ipratropium 2.5mg-0.5mg/3mL (DUO-NEB) 0.5 mg-3 mg(2.5 mg base)/3 mL nebulizer solution Take 3 mLs by nebulization every 8 (eight) hours as needed for Wheezing.    aspirin 81 MG Chew Take 81 mg by mouth once daily.    budesonide-formoterol 80-4.5 mcg (SYMBICORT) 80-4.5 mcg/actuation HFAA Inhale 2 puffs into the lungs 2 (two) times daily. Pharmacy to adjust to cheaper tier options    levetiracetam (KEPPRA) 750 MG Tab Take 1 tablet (750 mg total) by mouth 2 (two) times daily.    losartan (COZAAR) 25 MG tablet Take 0.5 tablets (12.5 mg total) by mouth nightly.    metoprolol tartrate (LOPRESSOR) 25 MG tablet Take 0.5 tablets (12.5 mg total) by mouth 2 (two) times daily.    OXYGEN-AIR DELIVERY  SYSTEMS MISC by Misc.(Non-Drug; Combo Route) route.    simvastatin (ZOCOR) 40 MG tablet TAKE 1 TABLET EVERY EVENING.    spironolactone (ALDACTONE) 25 MG tablet Take 1 tablet (25 mg total) by mouth every evening.    tiotropium (SPIRIVA WITH HANDIHALER) 18 mcg inhalation capsule Inhale 1 capsule (18 mcg total) into the lungs once daily. Pharmacy to adjust to cheaper tier options    torsemide (DEMADEX) 20 MG Tab 20 mg once daily.     roflumilast 500 mcg Tab Take 1 tablet (500 mcg total) by mouth once daily.       Physical Exam:  General appearance: alert, appears stated age and cooperative, on O2 via NC  Head: Normocephalic, without obvious abnormality, atraumatic  Neck: no adenopathy, no carotid bruit, +JVD  Lungs:  Crackles left base  Chest wall: no tenderness  Heart: regular rate and rhythm, S1, S2 normal,   Abdomen: soft, non-tendery  Extremities: extremities normal, atraumatic,+BLE edema  Skin: Skin color, texture, turgor normal. No rashes or lesions, scattered bruises bilateral UE  Neurologic: Grossly normal, AAO x 3    Laboratory:  Chemistry:   Lab Results   Component Value Date     09/19/2017    K 3.8 09/19/2017     (H) 09/19/2017    CO2 18 (L) 09/19/2017    BUN 65 (H) 09/19/2017    CREATININE 1.8 (H) 09/19/2017    GLUCOSE 103 01/07/2010    CALCIUM 9.2 09/19/2017     Cardiac Markers:   Lab Results   Component Value Date    CKMB 2.4 01/08/2013    TROPONINI 0.040 (H) 09/18/2017    TROPONINI 0.07 01/08/2013     Cardiac Markers (Last 3):   Lab Results   Component Value Date    CKMB 2.4 01/08/2013    CKMB 2.6 01/08/2013    TROPONINI 0.040 (H) 09/18/2017    TROPONINI 0.040 (H) 07/04/2017    TROPONINI 0.018 07/03/2017    TROPONINI 0.07 01/08/2013    TROPONINI 0.09 (H) 01/08/2013     CBC:   Lab Results   Component Value Date    WBC 9.50 09/19/2017    HGB 11.6 (L) 09/19/2017    HCT 37.4 (L) 09/19/2017    HCT 30 (L) 06/20/2013    MCV 95 09/19/2017    PLT 40 (L) 09/19/2017     Lipids:   Lab Results    Component Value Date    CHOL 123 03/25/2013    TRIG 83 03/25/2013    HDL 40 03/25/2013     Coagulation:   Lab Results   Component Value Date    INR 1.5 (H) 09/18/2017    APTT 27.3 09/18/2017       Diagnostic Results:  ECG: Reviewed  X-Ray: Reviewed  Echo: Reviewed      ASSESSMENT/PLAN:     Patient Active Problem List   Diagnosis    Aortic stenosis    Moderate COPD (chronic obstructive pulmonary disease)    Lens replaced by other means - Both Eyes    Dry eye - Both Eyes    Posterior vitreous detachment, both eyes - Both Eyes    Brow ptosis - Both Eyes    Seizures    Dizziness    Pulmonary heart disease    Cardiomyopathy, ischemic    H/O aortic valve replacement    DJD (degenerative joint disease) of lumbar spine    Impaired gait    Thrombocytopenia, with anemia    Transient synovitis of right knee    Effusion of right knee    Arthritis of right knee    Pulmonary emphysema    Chronic respiratory failure with hypoxia    Mitral stenosis-moderate    Acute on chronic combined systolic and diastolic congestive heart failure    Pseudophakia    Refractive error    Nonexudative senile macular degeneration of retina    Atherosclerosis of arteries of extremities    Hypercholesterolemia    Acute renal failure    Pulmonary hypertension    Elevation of cardiac enzymes    Hyperglycemia, unspecified    Respiratory failure, acute and chronic    Nocturnal hypoxemia due to emphysema    COPD exacerbation    Coronary artery disease involving native coronary artery of native heart without angina pectoris    LLL pneumonia    Sepsis    Acute respiratory failure with hypoxia and hypercarbia      Patient who presents with decompensated combined CHF in setting of sepsis/pneumonia. SOB improved this AM, continue current management with gentle diuresis. Continue abx and supportive care.   Plan:   -Continue ASA, ARB, lopressor  -Can continue gentle diuresis with IV Lasix (40 mg) for additional day, repeat  BMP in AM  -Continue abx and supportive care    Chart reviewed. Dr. Mario examined patient and agrees with plan as outlined above.

## 2017-09-19 NOTE — CONSULTS
[facility] -   Critical Care - Medicine  Consult Note    Patient Name: Orion Rnialdi  MRN: [id number]  Admission Date: 9/18/2017  Hospital Length of Stay: 1 days  Code Status: Full Code  Attending Physician: Reuben Zamora MD  Primary Care Provider: Daisy Oconnor MD   Principal Problem: Acute respiratory failure with hypoxia and hypercarbia    Consults  Subjective: shortness of breath, cough and chest congestion     HPI: 82 y/o with longstanding history of Chronic Obstructive Pulmonary Disease, chronic respiratory failure on oxygen and trilogy nocturnal ventilation presents with 1 day history of shortness of breath, cough, chills and chest congestion. On presentation he was cyanotic, tachycardic and noted to have left lower lobe infiltrate on Chest X Ray with lactic acidosis    Hospital/ICU Course: 9/19/2017 Less dyspnea but still weak. Able to get out of chair    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    Seizures        Past Surgical History:   Procedure Laterality Date    AORTIC VALVE REPLACEMENT      CARDIAC CATHETERIZATION      CARDIAC SURGERY      CARDIAC VALVE SURGERY      CATARACT EXTRACTION W/  INTRAOCULAR LENS IMPLANT  OD 3/18/09    CATARACT EXTRACTION W/  INTRAOCULAR LENS IMPLANT  OS 4/1/09    CORONARY ARTERY BYPASS GRAFT  1997    CABG x 3    SKIN BIOPSY      TONSILLECTOMY         Review of patient's allergies indicates:   Allergen Reactions    Doxycycline Nausea Only and Other (See Comments)     Dizziness     Pneumococcal vaccine        Family History     Problem Relation  (Age of Onset)    Cancer Paternal Grandfather    Cataracts Mother, Maternal Grandmother, Paternal Grandmother    Heart disease Brother    No Known Problems Father, Maternal Grandfather, Sister, Maternal Aunt, Maternal Uncle, Paternal Aunt, Paternal Uncle        Social History Main Topics    Smoking status: Former Smoker     Packs/day: 1.00     Years: 20.00     Types: Cigarettes     Quit date: 9/12/1997    Smokeless tobacco: Never Used    Alcohol use No      Comment: Occasionally     Drug use: No    Sexual activity: Yes     Partners: Female      Review of Systems   Constitutional: Positive for fatigue.   HENT: Positive for congestion, postnasal drip and rhinorrhea.    Respiratory: Positive for cough, chest tightness, shortness of breath, wheezing and stridor.    Musculoskeletal: Positive for arthralgias and neck pain.     Objective:     Vital Signs (Most Recent):  Temp: 97.3 °F (36.3 °C) (09/19/17 0700)  Pulse: 78 (09/19/17 1000)  Resp: (!) 22 (09/19/17 1000)  BP: (!) 86/49 (09/19/17 1000)  SpO2: 98 % (09/19/17 1000) Vital Signs (24h Range):  Temp:  [97.3 °F (36.3 °C)-99.8 °F (37.7 °C)] 97.3 °F (36.3 °C)  Pulse:  [] 78  Resp:  [19-44] 22  SpO2:  [76 %-100 %] 98 %  BP: ()/(40-90) 86/49     Weight: 85.2 kg (187 lb 13.3 oz)  Body mass index is 26.2 kg/m².      Intake/Output Summary (Last 24 hours) at 09/19/17 1033  Last data filed at 09/19/17 1000   Gross per 24 hour   Intake             1850 ml   Output             3590 ml   Net            -1740 ml       Physical Exam   Constitutional: He is oriented to person, place, and time. He appears well-developed and well-nourished. He appears distressed.   HENT:   Head: Normocephalic and atraumatic.   Mouth/Throat: Oropharyngeal exudate present.   Eyes: Conjunctivae are normal. Pupils are equal, round, and reactive to light.   Neck: Neck supple. No JVD present. No tracheal deviation present. No thyromegaly present.   Cardiovascular: Normal rate.  An irregular  rhythm present.   Murmur heard.   Systolic murmur is present with a grade of 2/6   Pulmonary/Chest: Effort normal. He has decreased breath sounds. He has wheezes in the right lower field and the left lower field. He has no rhonchi. He has no rales.   Abdominal: Soft. Bowel sounds are normal.   Musculoskeletal: Normal range of motion. He exhibits no edema or tenderness.   Lymphadenopathy:     He has no cervical adenopathy.   Neurological: He is alert and oriented to person, place, and time.   Skin: Skin is warm. He is diaphoretic.   Nursing note and vitals reviewed.      Vents:  Oxygen Concentration (%): 50 (09/19/17 0739)    Lines/Drains/Airways     Drain                 Urethral Catheter 09/18/17 2300 Double-lumen;Latex 16 Fr. less than 1 day          Peripheral Intravenous Line                 Peripheral IV - Single Lumen 09/18/17 1919 Right Antecubital less than 1 day         Peripheral IV - Single Lumen 09/18/17 2025 Left Forearm less than 1 day                Significant Labs:    CBC/Anemia Profile:    Recent Labs  Lab 09/18/17 1920 09/19/17 0447   WBC 10.97 9.50   HGB 13.0* 11.6*   HCT 42.0 37.4*   PLT 56* 40*   MCV 96 95   RDW 20.0* 19.6*        Chemistries:    Recent Labs  Lab 09/18/17 1920 09/19/17 0447    143   K 4.8 3.8    111*   CO2 15* 18*   BUN 69* 65*   CREATININE 1.9* 1.8*   CALCIUM 9.9 9.2   ALBUMIN 3.9 3.3*   PROT 8.2 6.9   BILITOT 2.7* 2.7*   ALKPHOS 96 71   ALT 18 15   AST 22 18   MG 2.6  --    PHOS 4.5  --        ABGs:   Recent Labs  Lab 09/18/17 1955   PH 7.452*   PCO2 23.5*   HCO3 16.4*   POCSATURATED 98   BE -8     Blood Culture: No results for input(s): LABBLOO in the last 48 hours.  BMP:   Recent Labs  Lab 09/18/17 1920 09/19/17 0447    96    143   K 4.8 3.8    111*   CO2 15* 18*   BUN 69* 65*   CREATININE 1.9* 1.8*   CALCIUM 9.9 9.2   MG 2.6  --      CMP:   Recent Labs  Lab 09/18/17 1920 09/19/17  0447    143   K 4.8 3.8    111*   CO2 15*  18*    96   BUN 69* 65*   CREATININE 1.9* 1.8*   CALCIUM 9.9 9.2   PROT 8.2 6.9   ALBUMIN 3.9 3.3*   BILITOT 2.7* 2.7*   ALKPHOS 96 71   AST 22 18   ALT 18 15   ANIONGAP 18* 14   EGFRNONAA 33* 35*     Cardiac Markers: No results for input(s): CKMB, TROPONINT, MYOGLOBIN in the last 48 hours.  Lactic Acid:   Recent Labs  Lab 09/18/17  1920 09/18/17  2330 09/19/17  0841   LACTATE 3.9* 1.3 2.4*     All pertinent labs within the past 24 hours have been reviewed.    Significant Imaging: CXR: I have reviewed all pertinent results/findings within the past 24 hours and my personal findings are:  LLL infiltrate    Assessment/Plan:     Active Diagnoses:    Diagnosis Date Noted POA    PRINCIPAL PROBLEM:  Acute respiratory failure with hypoxia and hypercarbia [J96.01, J96.02] 09/18/2017 Unknown    LLL pneumonia [J18.1] 09/18/2017 Yes    Sepsis [A41.9] 09/18/2017 Yes    Coronary artery disease involving native coronary artery of native heart without angina pectoris [I25.10] 07/03/2017 Yes     Chronic    Acute on chronic combined systolic and diastolic congestive heart failure [I50.43] 01/08/2016 Yes    Thrombocytopenia, with anemia [D69.6] 08/10/2015 Yes     Chronic    Moderate COPD (chronic obstructive pulmonary disease) [J44.9] 03/12/2013 Yes      Problems Resolved During this Admission:    Diagnosis Date Noted Date Resolved POA              Critical Care Daily Checklist:    A: Awake: RASS Goal/Actual Goal:    Actual: [place] Agitation Sedation Scale (RASS): Alert and calm   B: Spontaneous Breathing Trial Performed?     C: SAT & SBT Coordinated?  na                      D: Delirium: CAM-ICU Overall CAM-ICU: Negative   E: Early Mobility Performed? Yes   F: Feeding Goal:    Status:     Current Diet Order   Procedures    Diet Low Cholesterol/Saturated Fat Cardiac (Low Na/Chol)     Order Specific Question:   Additional restrictions:     Answer:   Cardiac (Low Na/Chol)      AS: Analgesia/Sedation adequate   T:  Thromboembolic Prophylaxis Y   H: HOB > 300 Yes   U: Stress Ulcer Prophylaxis (if needed) Y   G: Glucose Control adequate   B: Bowel Function Stool Occurrence: 1   I: Indwelling Catheter (Lines & Tapia) Necessity needed   D: De-escalation of Antimicrobials/Pharmacotherapies nma    Plan for the day/ETD out of bed monitor blood pressure and respiratory status.  Add solumedrol  Intensify jet nebs      Code Status:  Family/Goals of Care: Full Code  discussed           Critical Care Time: 40 minutes  Critical secondary to Patient has a condition that poses threat to life and bodily function: Severe Respiratory Distress     Critical care was time spent personally by me on the following activities: development of treatment plan with patient or surrogate and bedside caregivers, discussions with consultants, evaluation of patient's response to treatment, examination of patient, ordering and performing treatments and interventions, ordering and review of laboratory studies, ordering and review of radiographic studies, pulse oximetry, re-evaluation of patient's condition.  This critical care time did not overlap with that of any other provider or involve time for any procedures.    Thank you for your consult. I will follow-up with patient. Please contact us if you have any additional questions.     Anthony Hinton MD  Critical Care - Medicine  [facility] -

## 2017-09-19 NOTE — PLAN OF CARE
Problem: Patient Care Overview  Goal: Plan of Care Review  Outcome: Ongoing (interventions implemented as appropriate)  Patient currently resting on NC with no complaints. Tolerating schedule nebs. Will continue NIV use at night and as needed per ordered. Will continue to monitor. RT to follow.

## 2017-09-19 NOTE — ED NOTES
Pt states has been SOB getting worse over past week. Pt states his CPAP machine has not been helping and he has had to increase his oxygen.    Pt to bed #3 - in resp distress at this time.   Armband checked, patient verified. Verified by spelling and stated name on armband along with .   Patient placed in bed, acute distress noted, awake, alert, and oriented x 3, anxious and tachypic, respirations labored, and skin is cool and dry. Patient verbalized understanding of status and plan of care. Side rails up x 2, call light in reach, bed low and locked. Wife at bedside.

## 2017-09-19 NOTE — CONSULTS
Orion Rinaldi [id number] is a 81 y.o. male who has been consulted for vancomycin dosing.    Dx: Pneumonia, Sepsis  Goal trough 15-20    The patient has the following labs:     Date Creatinine (mg/dl)    BUN WBC Count   9/19/2017 Estimated Creatinine Clearance: 33.6 mL/min (based on SCr of 1.9 mg/dL (H)). Lab Results   Component Value Date    BUN 69 (H) 09/18/2017     Lab Results   Component Value Date    WBC 10.97 09/18/2017        Current weight is 85.2 kg (187 lb 13.3 oz)    The patient was started on Vancomycin with 1,000 mg in ED. Patient will be PULSE DOSED due to Scr 1.9 (CrCl 33.6 ml/min) Placeholder dose of 1,250 mg every 24 hours to be given pending Random level. Patient will be followed by pharmacy for changes in renal function, toxicity, and efficacy.  The vancomycin Random has been ordered for 9/19 at 1800.      Thank you for allowing us to participate in this patient's care.     Christen Montenegro

## 2017-09-19 NOTE — PLAN OF CARE
Assessment completed. Met with patient and family.  Patient stated he has pneumonia this time. Wife stated he still sneaks and drinks more fluids after he has taken his allotted amount for the day. He is aware his is to have 60 oz per day of fluid but he is thirsty and all his wife does is fuss. CM explained again why he has to comply for due to the heart failure.  Patient has Trilogy at home fro night time use and use O2 during the day time. CM explained Advance Directives - POA and Living Will. Patient stated he wants to have everything done if he stops breathing Patient to return home but d/c . No further needs at this time.     09/19/17 1055   Discharge Assessment   Assessment Type Discharge Planning Assessment   Confirmed/corrected address and phone number on facesheet? Yes   Assessment information obtained from? Patient;Caregiver;Medical Record   Expected Length of Stay (days) (TBD)   Communicated expected length of stay with patient/caregiver no   Prior to hospitilization cognitive status: Alert/Oriented   Prior to hospitalization functional status: Independent   Current cognitive status: Alert/Oriented   Current Functional Status: Independent   Lives With spouse;child(rima), adult   Able to Return to Prior Arrangements yes   Is patient able to care for self after discharge? Yes   Patient's perception of discharge disposition home or selfcare   Readmission Within The Last 30 Days no previous admission in last 30 days   Patient currently receives any other outside agency services? No   Equipment Currently Used at Home respiratory supplies;oxygen  (Trilogy)   Do you have any problems affording any of your prescribed medications? No   Is the patient taking medications as prescribed? yes   Does the patient have transportation home? Yes   Transportation Available car;family or friend will provide   Does the patient receive services at the [facility]? No   Discharge Plan A Home with family   Discharge Plan B  Home with family   Patient/Family In Agreement With Plan yes

## 2017-09-19 NOTE — H&P
[facility] Hospital Medicine  History & Physical    Patient Name: Orion Rinaldi  MRN: [id number]  Admission Date: 9/18/2017  Attending Physician: Cheikh Sahu MD  Primary Care Provider: Daisy Oconnor MD         Patient information was obtained from patient, relative(s) and ER records.     Subjective:     Principal Problem:Acute respiratory failure with hypoxia and hypercarbia    Chief Complaint:   Chief Complaint   Patient presents with    Shortness of Breath     worsening today. hx COPD. +cyanotic        HPI: Orion Rinaldi is a 80 yo male with Chronic Respiratory Failure (uses 5 L NC along with Triology vent at home), COPD, Systolic Heart Failure, S/P TAVR and Pulmonary HTN who presents to the ED with sudden onset of severe SOB this afternoon. Associated symptoms include chills, worsened leg swelling and generalized weakness. Pt is reported to be cyanotic upon arrival to the ED - thus hypoxic.  Vital signs note tachycardia with resp rate 28 - 44. CBC finds thrombocytopenia, CMP finds a metabolic alkalosis, BUN ^ 62, serum creatinine 1.9 and BNP 1977. Lactic acidosis = 3.9. CXR notes a LLL infiltrate. 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    Seizures        Past Surgical History:   Procedure Laterality Date    AORTIC VALVE REPLACEMENT      CARDIAC CATHETERIZATION      CARDIAC SURGERY      CARDIAC VALVE SURGERY      CATARACT EXTRACTION W/  INTRAOCULAR LENS IMPLANT  OD 3/18/09    CATARACT EXTRACTION W/   INTRAOCULAR LENS IMPLANT  OS 4/1/09    CORONARY ARTERY BYPASS GRAFT  1997    CABG x 3    SKIN BIOPSY      TONSILLECTOMY         Review of patient's allergies indicates:   Allergen Reactions    Doxycycline Nausea Only and Other (See Comments)     Dizziness     Pneumococcal vaccine        No current facility-administered medications on file prior to encounter.      Current Outpatient Prescriptions on File Prior to Encounter   Medication Sig    albuterol-ipratropium 2.5mg-0.5mg/3mL (DUO-NEB) 0.5 mg-3 mg(2.5 mg base)/3 mL nebulizer solution Take 3 mLs by nebulization every 8 (eight) hours as needed for Wheezing.    aspirin 81 MG Chew Take 81 mg by mouth once daily.    budesonide-formoterol 80-4.5 mcg (SYMBICORT) 80-4.5 mcg/actuation HFAA Inhale 2 puffs into the lungs 2 (two) times daily. Pharmacy to adjust to cheaper tier options    levetiracetam (KEPPRA) 750 MG Tab Take 1 tablet (750 mg total) by mouth 2 (two) times daily.    losartan (COZAAR) 25 MG tablet Take 0.5 tablets (12.5 mg total) by mouth nightly.    metoprolol tartrate (LOPRESSOR) 25 MG tablet Take 0.5 tablets (12.5 mg total) by mouth 2 (two) times daily.    OXYGEN-AIR DELIVERY SYSTEMS MISC [place] [facility] [place]Non-Drug; Combo Route) route.    simvastatin (ZOCOR) 40 MG tablet TAKE 1 TABLET EVERY EVENING.    spironolactone (ALDACTONE) 25 MG tablet Take 1 tablet (25 mg total) by mouth every evening.    tiotropium (SPIRIVA WITH HANDIHALER) 18 mcg inhalation capsule Inhale 1 capsule (18 mcg total) into the lungs once daily. Pharmacy to adjust to cheaper tier options    torsemide (DEMADEX) 20 MG Tab 20 mg once daily.     roflumilast 500 mcg Tab Take 1 tablet (500 mcg total) by mouth once daily.     Family History     Problem Relation (Age of Onset)    Cancer Paternal Grandfather    Cataracts Mother, Maternal Grandmother, Paternal Grandmother    Heart disease Brother    No Known Problems Father, Maternal Grandfather, Sister, Maternal Aunt, Maternal Uncle,  Paternal Aunt, Paternal Uncle        Social History Main Topics    Smoking status: Former Smoker     Packs/day: 1.00     Years: 20.00     Types: Cigarettes     Quit date: 9/12/1997    Smokeless tobacco: Never Used    Alcohol use No      Comment: Occasionally     Drug use: No    Sexual activity: Yes     Partners: Female     Review of Systems   Constitutional: Positive for activity change and chills. Negative for appetite change, diaphoresis, fatigue and fever.   HENT: Negative.    Eyes: Negative for pain and redness.   Respiratory: Positive for cough and shortness of breath. Negative for wheezing.    Cardiovascular: Positive for leg swelling. Negative for chest pain and palpitations.   Gastrointestinal: Negative for abdominal pain, diarrhea, nausea and vomiting.   Skin: Negative for pallor, rash and wound.     Objective:     Vital Signs (Most Recent):  Temp: 99.8 °F (37.7 °C) (09/18/17 1904)  Pulse: (!) 117 (09/18/17 2002)  Resp: (!) 31 (09/18/17 2002)  BP: (!) 112/51 (09/18/17 1941)  SpO2: 100 % (09/18/17 2002) Vital Signs (24h Range):  Temp:  [99.8 °F (37.7 °C)] 99.8 °F (37.7 °C)  Pulse:  [117-132] 117  Resp:  [28-44] 31  SpO2:  [88 %-100 %] 100 %  BP: (112-128)/(51-77) 112/51     Weight: 85.3 kg (188 lb)  Body mass index is 26.22 kg/m².    Physical Exam   Constitutional: He is oriented to person, place, and time. He appears well-developed and well-nourished. He has a sickly appearance. He appears distressed. Face mask in place.   HENT:   Head: Normocephalic and atraumatic.   Eyes: Conjunctivae are normal. Pupils are equal, round, and reactive to light. No scleral icterus.   Neck: Normal range of motion. Neck supple.   Cardiovascular: Regular rhythm and normal heart sounds.  Tachycardia present.  Exam reveals no gallop and no friction rub.    No murmur heard.  Pulmonary/Chest: Tachypnea noted. He is in respiratory distress. He has rales in the right lower field and the left lower field.   Fine rales to LLL and  diminished to RLL   Abdominal: Soft. Bowel sounds are normal.   Musculoskeletal: Normal range of motion. He exhibits edema. He exhibits no tenderness.        Right lower leg: He exhibits edema.        Legs:  Neurological: He is alert and oriented to person, place, and time.   Skin: Skin is warm and dry.   Dry skin   Psychiatric: He has a normal mood and affect. His behavior is normal.   Nursing note and vitals reviewed.       Significant Labs:   ABGs:   Recent Labs  Lab 09/18/17 1955   PH 7.452*   PCO2 23.5*   HCO3 16.4*   POCSATURATED 98   BE -8     CBC:   Recent Labs  Lab 09/18/17 1920   WBC 10.97   HGB 13.0*   HCT 42.0   PLT 56*     CMP:   Recent Labs  Lab 09/18/17 1920      K 4.8      CO2 15*      BUN 69*   CREATININE 1.9*   CALCIUM 9.9   PROT 8.2   ALBUMIN 3.9   BILITOT 2.7*   ALKPHOS 96   AST 22   ALT 18   ANIONGAP 18*   EGFRNONAA 33*     All pertinent labs within the past 24 hours have been reviewed.    Significant Imaging: I have reviewed all pertinent imaging results/findings within the past 24 hours.    Assessment/Plan:     * Acute respiratory failure with hypoxia and hypercarbia    Presently on BiPAP in the ED  Pt is sitting on side of bed to assist with respirations  He does not wish to be intubated - currently, he is compensated with PO2 105 with 50 % FIO2  Wean BiPAP as appropriate  Resume Trilogy vent for nighttime use          Sepsis    Criteria include CXR indicating LLL pneumonia, tachycardia, tachypnea and lactic acidosis = 3.9  Presumed PNA as pt is afebrile with no leukocytosis and no left shift  Minimal IV fluids given as patient appears decompensated heart failure  Empiric ABX  Blood cultures pending  Will repeat lactic acid every 4 hours x 2          LLL pneumonia    Presumed PNA - ? Fluid vs atelectasis  Will obtain CT of Chest - results pending  Empiric ABX initiated   Breathing treatments  Sputum culture pending          Acute on chronic combined systolic and  diastolic congestive heart failure    Supplemental oxygen to maintain sat > 88 %  Telemetry monitoring, daily weights, low sodium diet, fluid restriction, BNP in AM,  Lasix IV, continue losartan  Will hold Torsemide and spironolactone for now              Moderate COPD (chronic obstructive pulmonary disease)    Supplemental oxygen  DuoNebs  Continue formoterol          Coronary artery disease involving native coronary artery of native heart without angina pectoris    Continue ASA, metoprolol, simvastatin          Thrombocytopenia, with anemia    Platelet count 56,000  No signs of active bleeding  Will hold DVT prophylaxis for now  SCDs  Repeat labs  Outpatient follow up with Hematology            VTE Risk Mitigation         Ordered     Medium Risk of VTE  Once      09/18/17 0118         Critical Care Time - 30 to 74 minutes      Elo Roe NP  Department of Hospital Medicine   [facility]

## 2017-09-19 NOTE — PROGRESS NOTES
"Bipap settings changed to 10\5 (more tolerable for pt), per José Sanchez, SAI. Pt switched to home mask and continues to refuse  mepilex on bridge of nose. Area of redness noticed on bridge of nose. The importance of the use of mepilex  was explained to pt., but wife refused stating "we used it last time and it doesn't work"  "

## 2017-09-19 NOTE — ASSESSMENT & PLAN NOTE
Presently on BiPAP in the ED  Pt is sitting on side of bed to assist with respirations  He does not wish to be intubated - currently, he is compensated with PO2 105 with 50 % FIO2  Wean BiPAP as appropriate  Resume Trilogy vent for nighttime use

## 2017-09-19 NOTE — ASSESSMENT & PLAN NOTE
Criteria include CXR indicating LLL pneumonia, tachycardia, tachypnea and lactic acidosis = 3.9  Presumed PNA as pt is afebrile with no leukocytosis and no left shift  Minimal IV fluids given as patient appears decompensated heart failure  Empiric ABX  Blood cultures pending  Will repeat lactic acid every 4 hours x 2

## 2017-09-20 PROBLEM — J96.01 ACUTE HYPOXEMIC RESPIRATORY FAILURE: Status: ACTIVE | Noted: 2017-01-01

## 2017-09-20 PROBLEM — I48.0 PAF (PAROXYSMAL ATRIAL FIBRILLATION): Status: ACTIVE | Noted: 2017-01-01

## 2017-09-20 NOTE — ASSESSMENT & PLAN NOTE
- Criteria include CXR indicating LLL pneumonia, tachycardia, tachypnea and lactic acidosis = 3.9  - Low threshold for pressor support if indicated.

## 2017-09-20 NOTE — PLAN OF CARE
Problem: Patient Care Overview  Goal: Plan of Care Review  Outcome: Ongoing (interventions implemented as appropriate)  Plan of care reviewed with patient. Patient stable. Aaox3. Patient free from falls fall precautions in place. Assist x 1 to the bathroom. Call be;; and belongings with in reach reminded to call for assistance. Oxygen via NC. No signs and symptoms of infection at this time. Turns independently in bed. Transferred from ICU. Will cont to monitor

## 2017-09-20 NOTE — SUBJECTIVE & OBJECTIVE
Oncology Treatment Plan:   [No treatment plan]    Medications:  Continuous Infusions:   Scheduled Meds:   albuterol-ipratropium 2.5mg-0.5mg/3mL  3 mL Nebulization Q6H WAKE    arformoterol  15 mcg Nebulization BID    aspirin  81 mg Oral Daily    ciprofloxacin (CIPRO)400mg/200ml D5W IVPB  400 mg Intravenous Q12H    levetiracetam  750 mg Oral BID    losartan  12.5 mg Oral Nightly    methylPREDNISolone sodium succinate  80 mg Intravenous Q6H    metoprolol tartrate  25 mg Oral BID    pantoprazole  40 mg Oral Daily    piperacillin-tazobactam (ZOSYN) IVPB  4.5 g Intravenous Q8H    roflumilast  500 mcg Oral Daily    simvastatin  40 mg Oral QHS    torsemide  20 mg Oral Daily    [START ON 9/21/2017] vancomycin (VANCOCIN) IVPB  1,250 mg Intravenous Q24H     PRN Meds:acetaminophen, aluminum-magnesium hydroxide-simethicone, hydrALAZINE, influenza, ondansetron     Review of patient's allergies indicates:   Allergen Reactions    Doxycycline Nausea Only and Other (See Comments)     Dizziness     Pneumococcal vaccine         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    Seizures      Past Surgical History:   Procedure Laterality Date    AORTIC VALVE REPLACEMENT      CARDIAC CATHETERIZATION      CARDIAC SURGERY      CARDIAC VALVE SURGERY      CATARACT EXTRACTION W/  INTRAOCULAR LENS IMPLANT  OD 3/18/09    CATARACT EXTRACTION W/  INTRAOCULAR LENS IMPLANT  OS 4/1/09    CORONARY ARTERY BYPASS GRAFT  1997    CABG x 3    SKIN BIOPSY      TONSILLECTOMY       Family History     Problem Relation (Age of Onset)     Cancer Paternal Grandfather    Cataracts Mother, Maternal Grandmother, Paternal Grandmother    Heart disease Brother    No Known Problems Father, Maternal Grandfather, Sister, Maternal Aunt, Maternal Uncle, Paternal Aunt, Paternal Uncle        Social History Main Topics    Smoking status: Former Smoker     Packs/day: 1.00     Years: 20.00     Types: Cigarettes     Quit date: 9/12/1997    Smokeless tobacco: Never Used    Alcohol use No      Comment: Occasionally     Drug use: No    Sexual activity: Yes     Partners: Female       Review of Systems   Constitutional: Positive for activity change and fatigue. Negative for appetite change, chills, diaphoresis, fever and unexpected weight change.   HENT: Negative for congestion, dental problem, drooling, ear discharge, ear pain, facial swelling, hearing loss, mouth sores, nosebleeds, postnasal drip, rhinorrhea, sinus pressure, sneezing, sore throat, tinnitus, trouble swallowing and voice change.    Eyes: Negative for photophobia, pain, discharge, redness, itching and visual disturbance.   Respiratory: Negative for apnea, cough, choking, chest tightness, shortness of breath, wheezing and stridor.    Cardiovascular: Negative for chest pain, palpitations and leg swelling.   Gastrointestinal: Negative for abdominal distention, abdominal pain, anal bleeding, blood in stool, constipation, diarrhea, nausea, rectal pain and vomiting.   Endocrine: Negative for cold intolerance, heat intolerance, polydipsia, polyphagia and polyuria.   Genitourinary: Negative for decreased urine volume, difficulty urinating, discharge, dysuria, enuresis, flank pain, frequency, genital sores, hematuria, penile pain, penile swelling, scrotal swelling, testicular pain and urgency.        Tapia catheter in place   Musculoskeletal: Negative for arthralgias, back pain, gait problem, joint swelling, myalgias, neck pain and neck stiffness.   Skin: Negative for color change, pallor, rash and wound.    Allergic/Immunologic: Negative for environmental allergies, food allergies and immunocompromised state.   Neurological: Positive for weakness. Negative for dizziness, tremors, seizures, syncope, facial asymmetry, speech difficulty, light-headedness, numbness and headaches.   Hematological: Negative for adenopathy. Does not bruise/bleed easily.   Psychiatric/Behavioral: Positive for dysphoric mood. Negative for agitation, behavioral problems, confusion, decreased concentration, hallucinations, self-injury, sleep disturbance and suicidal ideas. The patient is nervous/anxious. The patient is not hyperactive.      Objective:     Vital Signs (Most Recent):  Temp: 97.7 °F (36.5 °C) (09/20/17 1611)  Pulse: 96 (09/20/17 1611)  Resp: 20 (09/20/17 1611)  BP: (!) 98/56 (09/20/17 1611)  SpO2: 95 % (09/20/17 1611) Vital Signs (24h Range):  Temp:  [97.7 °F (36.5 °C)-98.8 °F (37.1 °C)] 97.7 °F (36.5 °C)  Pulse:  [] 96  Resp:  [19-33] 20  SpO2:  [76 %-100 %] 95 %  BP: ()/(37-83) 98/56     Weight: 86 kg (189 lb 9.5 oz)  Body mass index is 26.44 kg/m².  Body surface area is 2.08 meters squared.      Intake/Output Summary (Last 24 hours) at 09/20/17 1704  Last data filed at 09/20/17 1400   Gross per 24 hour   Intake             1090 ml   Output             1465 ml   Net             -375 ml       Physical Exam   Constitutional: He is oriented to person, place, and time. He appears well-developed and well-nourished. He appears cachectic. He has a sickly appearance. He appears ill. He appears distressed.   HENT:   Head: Normocephalic.   Right Ear: External ear normal.   Left Ear: External ear normal.   Nose: Nose normal. Right sinus exhibits no maxillary sinus tenderness and no frontal sinus tenderness. Left sinus exhibits no maxillary sinus tenderness and no frontal sinus tenderness.   Mouth/Throat: Oropharynx is clear and moist. No oropharyngeal exudate.   Eyes: EOM and lids are normal. Pupils are equal, round, and  reactive to light. Right eye exhibits no discharge. Left eye exhibits no discharge. Right conjunctiva is not injected. Right conjunctiva has no hemorrhage. Left conjunctiva is not injected. Left conjunctiva has no hemorrhage. No scleral icterus. Right eye exhibits normal extraocular motion. Left eye exhibits normal extraocular motion.   Neck: Normal range of motion. Neck supple. No JVD present. No tracheal deviation present. No thyromegaly present.   Cardiovascular: Normal rate, regular rhythm and normal heart sounds.    Pulmonary/Chest: Effort normal and breath sounds normal. No stridor. No respiratory distress.   Abdominal: Soft. Bowel sounds are normal. He exhibits no mass. There is no hepatosplenomegaly, splenomegaly or hepatomegaly. There is no tenderness.   Musculoskeletal: Normal range of motion. He exhibits no edema or tenderness.   Lymphadenopathy:        Head (right side): No posterior auricular and no occipital adenopathy present.        Head (left side): No posterior auricular and no occipital adenopathy present.     He has no cervical adenopathy.        Right cervical: No superficial cervical, no deep cervical and no posterior cervical adenopathy present.       Left cervical: No superficial cervical, no deep cervical and no posterior cervical adenopathy present.     He has no axillary adenopathy.        Right: No supraclavicular adenopathy present.        Left: No supraclavicular adenopathy present.   Neurological: He is alert and oriented to person, place, and time. He has normal strength. No cranial nerve deficit. Coordination normal.   Skin: Skin is dry. No rash noted. He is not diaphoretic. No cyanosis or erythema. Nails show no clubbing.   Psychiatric: His behavior is normal. Judgment and thought content normal. His mood appears anxious. Cognition and memory are normal. He exhibits a depressed mood.   Vitals reviewed.      Significant Labs:   BMP:   Recent Labs  Lab 09/18/17  1920 09/19/17  9951  09/20/17  0414    96 172*    143 142   K 4.8 3.8 3.8    111* 112*   CO2 15* 18* 18*   BUN 69* 65* 57*   CREATININE 1.9* 1.8* 1.5*   CALCIUM 9.9 9.2 8.8   MG 2.6  --   --    , CBC:   Recent Labs  Lab 09/18/17 1920 09/19/17 0447 09/20/17 0414   WBC 10.97 9.50 2.31*   HGB 13.0* 11.6* 11.3*   HCT 42.0 37.4* 36.2*   PLT 56* 40* 37*    and CMP:   Recent Labs  Lab 09/18/17 1920 09/19/17 0447 09/20/17 0414    143 142   K 4.8 3.8 3.8    111* 112*   CO2 15* 18* 18*    96 172*   BUN 69* 65* 57*   CREATININE 1.9* 1.8* 1.5*   CALCIUM 9.9 9.2 8.8   PROT 8.2 6.9 6.6   ALBUMIN 3.9 3.3* 3.0*   BILITOT 2.7* 2.7* 2.1*   ALKPHOS 96 71 62   AST 22 18 16   ALT 18 15 14   ANIONGAP 18* 14 12   EGFRNONAA 33* 35* 44*       Diagnostic Results:  I have reviewed and interpreted all pertinent imaging results/findings within the past 24 hours.

## 2017-09-20 NOTE — PROGRESS NOTES
Trough = 12.1 @ 1529  Scr = 1.5 trended down from 1.8 yesterday   Blood cxs pending  Dx: pneumonia/sepsis (goal trough = 15-20)  Est crcl = 42.5 ml/min  Wbc = 2.3   tmax = 98.8 F  1st dose was entered as a 1x time and given at 2100 and  Maintenance doses were followed with 1st initial dose to be given 12 hrs later?? Nurse did not give this morning -adjusted dose 24hrs since last dose given  Continue current dosing and if scr improves again may need to increase armando  /Emmy Ling [facility] 9/20/2017 5:11 PM

## 2017-09-20 NOTE — ASSESSMENT & PLAN NOTE
- Platelet count 56,000  - No signs of active bleeding  - Pharmacological DVT prophylaxis held  - No indication for transfusion unless there is active signs of bleeding.

## 2017-09-20 NOTE — PROGRESS NOTES
Patient transferred from ICU to Telemetry 208 at 1410 via wheelchair, 4L NC during transfer. Bedside report given to VILLA Salas .

## 2017-09-20 NOTE — CONSULTS
Clinical Pharmacy Progress Note    Pharmacy consulted to dose vancomycin for treatment of pneumonia/sepsis.     81 y.o. male with history of COPD.     The patient has the following labs:     Date Creatinine (mg/dl)    BUN WBC Count   9/19/2017 Estimated Creatinine Clearance: 35.4 mL/min (based on SCr of 1.8 mg/dL (H)). Lab Results   Component Value Date    BUN 65 (H) 09/19/2017     Lab Results   Component Value Date    WBC 9.50 09/19/2017        Ideal BW: 75.3 kg    Actual BW: 85.2 kg  Adjusted (Dosing) BW: 79.3 kg    Tmax/24h 98.2 F  Cultures:   Blood- no growth to date    Vancomycin (day 1 of therapy)     18 hour vancomycin random level: 8 mcg / ml (drawn 9/19/17 at 1837)    Other anti-infectives: cipro 400 mg ivpb q12h (day 2 of therapy) ; zosyn 4.5 gm ivpb q8hr (day 2 of therapy)    Based on Estimated Creatinine Clearance: 35.4 mL/min (based on SCr of 1.8 mg/dL (H)). and adjusted dosing weight of 79.3 kg, pharmacy will PULSE DOSE vancomycin; since random level came back 8 mcg / ml, pharmacy will order a 15 mg/kg dose 1250 mg x 1 dose tonight at 2100 and order another random level 18 hours from administration time.  Random level due 9/20/17 at 1500.      Pharmacy will also enter a 1250 mg q24hr placeholder order on the patient's profile.       Pharmacy will continue to follow patients clinical status, renal function, C&S, and adjust dose as necessary to maintain trough levels between 15 mcg / ml and 20 mcg / ml.     Thank you for allowing us to participate in this patient's care.     José Tristan   9/19/2017 7:47 PM

## 2017-09-20 NOTE — PROGRESS NOTES
[facility] -   Pulmonology  Progress Note    Patient Name: Orion Rinaldi  MRN: [id number]  Admission Date: 9/18/2017  Hospital Length of Stay: 2 days  Code Status: Full Code  Attending Provider: Farshad Stephens, *  Primary Care Provider: Daisy Oconnor MD   Principal Problem: LLL pneumonia    Subjective: c/o blood inurine bag, less dyspnea   80 y/o with longstanding history of Chronic Obstructive Pulmonary Disease, chronic respiratory failure on oxygen and trilogy nocturnal ventilation presents with 1 day history of shortness of breath, cough, chills and chest congestion. On presentation he was cyanotic, tachycardic and noted to have left lower lobe infiltrate on Chest X Ray with lactic acidosis     Hospital/ICU Course: 9/19/2017 Less dyspnea but still weak. Able to get out of chair     Interval History: Transferred to floor, still dyspneic but improved    Objective:     Vital Signs (Most Recent):  Temp: 97.7 °F (36.5 °C) (09/20/17 1611)  Pulse: 80 (09/20/17 1700)  Resp: 20 (09/20/17 1611)  BP: (!) 98/56 (09/20/17 1611)  SpO2: 95 % (09/20/17 1611) Vital Signs (24h Range):  Temp:  [97.7 °F (36.5 °C)-98.8 °F (37.1 °C)] 97.7 °F (36.5 °C)  Pulse:  [] 80  Resp:  [19-33] 20  SpO2:  [76 %-100 %] 95 %  BP: ()/(37-83) 98/56     Weight: 86 kg (189 lb 9.5 oz)  Body mass index is 26.44 kg/m².      Intake/Output Summary (Last 24 hours) at 09/20/17 1900  Last data filed at 09/20/17 1400   Gross per 24 hour   Intake              850 ml   Output             1390 ml   Net             -540 ml       Physical Exam   Constitutional: He is oriented to person, place, and time. He appears well-developed and well-nourished.   HENT:   Head: Normocephalic and atraumatic.   Mouth/Throat: Oropharyngeal exudate present.   Eyes: Conjunctivae are normal. Pupils are equal, round, and reactive to light.   Neck: Neck supple. No JVD present. No tracheal deviation present. No thyromegaly present.   Cardiovascular:  Normal rate, regular rhythm and normal heart sounds.    No murmur heard.  Pulmonary/Chest: Effort normal. He has decreased breath sounds. He has wheezes in the right lower field and the left lower field. He has no rhonchi. He has no rales.   Abdominal: Soft. Bowel sounds are normal.   Musculoskeletal: Normal range of motion. He exhibits no edema or tenderness.   Lymphadenopathy:     He has no cervical adenopathy.   Neurological: He is alert and oriented to person, place, and time.   Skin: Skin is warm and dry.   Nursing note and vitals reviewed.      Vents:  Oxygen Concentration (%): 36 (09/20/17 1355)    Lines/Drains/Airways     Drain                 Urethral Catheter 09/18/17 2300 Double-lumen;Latex 16 Fr. 1 day          Peripheral Intravenous Line                 Peripheral IV - Single Lumen 09/18/17 1919 Right Antecubital 1 day         Peripheral IV - Single Lumen 09/18/17 2025 Left Forearm 1 day                Significant Labs:    CBC/Anemia Profile:    Recent Labs  Lab 09/18/17 1920 09/19/17 0447 09/20/17  0414   WBC 10.97 9.50 2.31*   HGB 13.0* 11.6* 11.3*   HCT 42.0 37.4* 36.2*   PLT 56* 40* 37*   MCV 96 95 94   RDW 20.0* 19.6* 19.8*        Chemistries:    Recent Labs  Lab 09/18/17 1920 09/19/17 0447 09/20/17  0414    143 142   K 4.8 3.8 3.8    111* 112*   CO2 15* 18* 18*   BUN 69* 65* 57*   CREATININE 1.9* 1.8* 1.5*   CALCIUM 9.9 9.2 8.8   ALBUMIN 3.9 3.3* 3.0*   PROT 8.2 6.9 6.6   BILITOT 2.7* 2.7* 2.1*   ALKPHOS 96 71 62   ALT 18 15 14   AST 22 18 16   MG 2.6  --   --    PHOS 4.5  --   --        A1C: No results for input(s): HGBA1C in the last 48 hours.  ABGs:   Recent Labs  Lab 09/18/17 1955   PH 7.452*   PCO2 23.5*   HCO3 16.4*   POCSATURATED 98   BE -8     Blood Culture:   Recent Labs  Lab 09/18/17 2015 09/18/17 2025   LABBLOO No Growth to date  No Growth to date No Growth to date  No Growth to date     BMP:   Recent Labs  Lab 09/18/17  1920 09/20/17  0414     < > 172*   NA  142  < > 142   K 4.8  < > 3.8     < > 112*   CO2 15*  < > 18*   BUN 69*  < > 57*   CREATININE 1.9*  < > 1.5*   CALCIUM 9.9  < > 8.8   MG 2.6  --   --    < > = values in this interval not displayed.  CMP:   Recent Labs  Lab 09/18/17  1920 09/19/17  0447 09/20/17  0414    143 142   K 4.8 3.8 3.8    111* 112*   CO2 15* 18* 18*    96 172*   BUN 69* 65* 57*   CREATININE 1.9* 1.8* 1.5*   CALCIUM 9.9 9.2 8.8   PROT 8.2 6.9 6.6   ALBUMIN 3.9 3.3* 3.0*   BILITOT 2.7* 2.7* 2.1*   ALKPHOS 96 71 62   AST 22 18 16   ALT 18 15 14   ANIONGAP 18* 14 12   EGFRNONAA 33* 35* 44*     Lactic Acid:   Recent Labs  Lab 09/18/17 1920 09/18/17  2330 09/19/17  0841   LACTATE 3.9* 1.3 2.4*     Respiratory Culture: No results for input(s): GSRESP, RESPIRATORYC in the last 48 hours.  Troponin:   Recent Labs  Lab 09/18/17 1920   TROPONINI 0.040*     All pertinent labs within the past 24 hours have been reviewed.    Significant Imaging:  CXR: I have reviewed all pertinent results/findings within the past 24 hours and my personal findings are:  LLL infiltrate    Assessment/Plan:     Active Diagnoses:    Diagnosis Date Noted POA    PRINCIPAL PROBLEM:  LLL pneumonia [J18.1] 09/18/2017 Yes    Acute hypoxemic respiratory failure [J96.01] 09/20/2017 Yes    PAF (paroxysmal atrial fibrillation) [I48.0] 09/20/2017 Unknown    Sepsis [A41.9] 09/18/2017 Yes    Coronary artery disease involving native coronary artery of native heart without angina pectoris [I25.10] 07/03/2017 Yes     Chronic    Acute on chronic combined systolic and diastolic congestive heart failure [I50.43] 01/08/2016 Yes    Thrombocytopenia, with anemia [D69.6] 08/10/2015 Yes     Chronic    Moderate COPD (chronic obstructive pulmonary disease) [J44.9] 03/12/2013 Yes      Problems Resolved During this Admission:    Diagnosis Date Noted Date Resolved POA       PLAN:  Review hematology note for thrombocytopenia and leukopenia.  Contineu pulmonary meds        Anthony Hinton MD  Pulmonology  [facility]

## 2017-09-20 NOTE — PLAN OF CARE
Problem: Patient Care Overview  Goal: Plan of Care Review  Outcome: Ongoing (interventions implemented as appropriate)  Tolerates nebs and oxygen therapy well; wearing avaps at night.

## 2017-09-20 NOTE — ASSESSMENT & PLAN NOTE
I have reviewed notes going back to 2015 and2016 with Dr. Figueredo who had seen the patient at that time a decision was made not to proceed with a bone marrow aspirate and biopsy which I agree with at this point I believe his low platelet count is most likely exacerbated by his acute medical problems.  The differential on his CBC is normal his hemoglobin is remained relatively stable he is not actively bleeding at this point and I would recommend that the patient I would observe the patient's platelet count to see if this been any dramatic change.  The wife and the patient do not wish to proceed with bone marrow aspirate and biopsy which revealed the next logical test to do

## 2017-09-20 NOTE — PROGRESS NOTES
Cardiology Progress Note        SUBJECTIVE:     History of Present Illness:  Patient is a 81 y.o. male who presents with pneumonia, CHF, and ? Sepsis. Patient seen and examined today, lying in bed. Still feels weak, but SOB improved. No chest pain. Went into afib overnight, HR controlled at time of exam. Labs reviewed. Platelets 37,000. Creatinine 1.5.       OBJECTIVE:     Vital Signs (Most Recent)  Temp: 98.6 °F (37 °C) (09/20/17 1105)  Pulse: 85 (09/20/17 1105)  Resp: 20 (09/20/17 1105)  BP: (!) 115/59 (09/20/17 1105)  SpO2: 95 % (09/20/17 1105)    Vital Signs Range (Last 24H):  Temp:  [97.7 °F (36.5 °C)-98.8 °F (37.1 °C)]   Pulse:  []   Resp:  [19-33]   BP: ()/(37-83)   SpO2:  [76 %-100 %]     Intake/Output last 3 shifts:  I/O last 3 completed shifts:  In: 3440 [P.O.:840; IV Piggyback:2600]  Out: 5005 [Urine:5005]    Intake/Output this shift:  I/O this shift:  In: 200 [IV Piggyback:200]  Out: 260 [Urine:260]    Review of patient's allergies indicates:   Allergen Reactions    Doxycycline Nausea Only and Other (See Comments)     Dizziness     Pneumococcal vaccine        Current Facility-Administered Medications   Medication    acetaminophen tablet 650 mg    albuterol-ipratropium 2.5mg-0.5mg/3mL nebulizer solution 3 mL    aluminum-magnesium hydroxide-simethicone 200-200-20 mg/5 mL suspension 30 mL    arformoterol nebulizer solution 15 mcg    aspirin chewable tablet 81 mg    ciprofloxacin (CIPRO)400mg/200ml D5W IVPB 400 mg    hydrALAZINE injection 10 mg    influenza (FLUZONE,FLUARIX QUADRIVALENT) vaccine 0.5 mL    levetiracetam tablet 750 mg    losartan split tablet 12.5 mg    methylPREDNISolone sodium succinate injection 80 mg    metoprolol tartrate (LOPRESSOR) split tablet 12.5 mg    ondansetron injection 4 mg    pantoprazole EC tablet 40 mg    piperacillin-tazobactam 4.5 g in dextrose 5 % 100 mL IVPB (ready to mix system)    roflumilast tablet 500 mcg    simvastatin tablet 40 mg     torsemide tablet 20 mg    [START ON 9/21/2017] vancomycin (VANCOCIN) 1,250 mg in dextrose 5 % 250 mL IVPB     No current facility-administered medications on file prior to encounter.      Current Outpatient Prescriptions on File Prior to Encounter   Medication Sig    albuterol-ipratropium 2.5mg-0.5mg/3mL (DUO-NEB) 0.5 mg-3 mg(2.5 mg base)/3 mL nebulizer solution Take 3 mLs by nebulization every 8 (eight) hours as needed for Wheezing.    aspirin 81 MG Chew Take 81 mg by mouth once daily.    budesonide-formoterol 80-4.5 mcg (SYMBICORT) 80-4.5 mcg/actuation HFAA Inhale 2 puffs into the lungs 2 (two) times daily. Pharmacy to adjust to cheaper tier options    levetiracetam (KEPPRA) 750 MG Tab Take 1 tablet (750 mg total) by mouth 2 (two) times daily.    losartan (COZAAR) 25 MG tablet Take 0.5 tablets (12.5 mg total) by mouth nightly.    metoprolol tartrate (LOPRESSOR) 25 MG tablet Take 0.5 tablets (12.5 mg total) by mouth 2 (two) times daily.    OXYGEN-AIR DELIVERY SYSTEMS MISC by [facility].(Non-Drug; Combo Route) route.    simvastatin (ZOCOR) 40 MG tablet TAKE 1 TABLET EVERY EVENING.    spironolactone (ALDACTONE) 25 MG tablet Take 1 tablet (25 mg total) by mouth every evening.    tiotropium (SPIRIVA WITH HANDIHALER) 18 mcg inhalation capsule Inhale 1 capsule (18 mcg total) into the lungs once daily. Pharmacy to adjust to cheaper tier options    torsemide (DEMADEX) 20 MG Tab 20 mg once daily.     roflumilast 500 mcg Tab Take 1 tablet (500 mcg total) by mouth once daily.       Physical Exam:  General appearance: alert, appears stated age and cooperative  Head: Normocephalic, without obvious abnormality, atraumatic  Neck: no adenopathy, no carotid bruit, no JVD  Back:  no skin lesions, erythema, or scars  Lungs: faint crackles left base  Chest wall: no tenderness  Heart: irregularly irregular rate and rhythm S1, S2 normal, no murmur, click, rub or gallop  Abdomen: soft, non-tender; bowel sounds normal; no masses,   no organomegaly  Extremities: extremities normal, atraumatic, 2+ BLE edema  Skin: Skin color, texture, turgor normal. Scattered bruises BUE  Neurologic: Grossly normal, AAO x 3    Laboratory:  Chemistry:   Lab Results   Component Value Date     09/20/2017    K 3.8 09/20/2017     (H) 09/20/2017    CO2 18 (L) 09/20/2017    BUN 57 (H) 09/20/2017    CREATININE 1.5 (H) 09/20/2017    GLUCOSE 103 01/07/2010    CALCIUM 8.8 09/20/2017     Cardiac Markers:   Lab Results   Component Value Date    CKMB 2.4 01/08/2013    TROPONINI 0.040 (H) 09/18/2017    TROPONINI 0.07 01/08/2013     Cardiac Markers (Last 3):   Lab Results   Component Value Date    CKMB 2.4 01/08/2013    CKMB 2.6 01/08/2013    TROPONINI 0.040 (H) 09/18/2017    TROPONINI 0.040 (H) 07/04/2017    TROPONINI 0.018 07/03/2017    TROPONINI 0.07 01/08/2013    TROPONINI 0.09 (H) 01/08/2013     CBC:   Lab Results   Component Value Date    WBC 2.31 (L) 09/20/2017    HGB 11.3 (L) 09/20/2017    HCT 36.2 (L) 09/20/2017    HCT 30 (L) 06/20/2013    MCV 94 09/20/2017    PLT 37 (LL) 09/20/2017     Lipids:   Lab Results   Component Value Date    CHOL 123 03/25/2013    TRIG 83 03/25/2013    HDL 40 03/25/2013     Coagulation:   Lab Results   Component Value Date    INR 1.5 (H) 09/18/2017    APTT 27.3 09/18/2017       Diagnostic Results:  ECG: Reviewed  X-Ray: Reviewed  Echo: Reviewed      ASSESSMENT/PLAN:     Patient Active Problem List   Diagnosis    Aortic stenosis    Moderate COPD (chronic obstructive pulmonary disease)    Lens replaced by other means - Both Eyes    Dry eye - Both Eyes    Posterior vitreous detachment, both eyes - Both Eyes    Brow ptosis - Both Eyes    Seizures    Dizziness    Pulmonary heart disease    Cardiomyopathy, ischemic    H/O aortic valve replacement    DJD (degenerative joint disease) of lumbar spine    Impaired gait    Thrombocytopenia, with anemia    Transient synovitis of right knee    Effusion of right knee    Arthritis  of right knee    Pulmonary emphysema    Respiratory failure with hypoxia    Mitral stenosis-moderate    Acute on chronic combined systolic and diastolic congestive heart failure    Pseudophakia    Refractive error    Nonexudative senile macular degeneration of retina    Atherosclerosis of arteries of extremities    Hypercholesterolemia    Acute renal failure    Pulmonary hypertension    Elevation of cardiac enzymes    Hyperglycemia, unspecified    Respiratory failure, acute and chronic    Nocturnal hypoxemia due to emphysema    COPD exacerbation    Coronary artery disease involving native coronary artery of native heart without angina pectoris    LLL pneumonia    Sepsis    Acute respiratory failure with hypoxia and hypercarbia    Acute hypoxemic respiratory failure      Patient who presents with CHF, pneumonia, and ? Sepsis. Slowly improving. Went into afib overnight, secondary to stress of illness/respiratory condition. HR controlled at time of exam. Will avoid anticoagulation at this time given platelet count/frequent falls. Recommend hematology consult given thrombocytopenia. Continue current mgmt. Add back po torsemide.   Plan:   -Continue BB, ARB  -Torsemide 20 mg daily  -No anticoagulation given thrombocytopenia/frequent falls  -Hematology consult  -Will add back aldactone if BP can tolerate    Chart reviewed. Dr. Mario examined patient and agrees with plan as outlined above.

## 2017-09-20 NOTE — SUBJECTIVE & OBJECTIVE
Interval History: Pt was seen and examined at bedside . He states feel better . Last night pt develop afib  .     Review of Systems   Constitutional: Positive for activity change. Negative for appetite change, diaphoresis, fatigue and fever.   HENT: Negative.    Eyes: Negative for pain and redness.   Respiratory: Positive for cough and shortness of breath. Negative for wheezing.    Cardiovascular: Positive for leg swelling. Negative for chest pain and palpitations.   Gastrointestinal: Negative for abdominal pain, diarrhea, nausea and vomiting.   Skin: Negative for pallor, rash and wound.     Objective:     Vital Signs (Most Recent):  Temp: 97.7 °F (36.5 °C) (09/20/17 1611)  Pulse: 80 (09/20/17 1700)  Resp: 20 (09/20/17 1611)  BP: (!) 98/56 (09/20/17 1611)  SpO2: 95 % (09/20/17 1611) Vital Signs (24h Range):  Temp:  [97.7 °F (36.5 °C)-98.8 °F (37.1 °C)] 97.7 °F (36.5 °C)  Pulse:  [] 80  Resp:  [19-33] 20  SpO2:  [76 %-100 %] 95 %  BP: ()/(37-83) 98/56     Weight: 86 kg (189 lb 9.5 oz)  Body mass index is 26.44 kg/m².    Intake/Output Summary (Last 24 hours) at 09/20/17 1738  Last data filed at 09/20/17 1400   Gross per 24 hour   Intake             1090 ml   Output             1465 ml   Net             -375 ml      Physical Exam   Constitutional: He is oriented to person, place, and time. He appears well-developed and well-nourished. He does not have a sickly appearance.   HENT:   Head: Normocephalic and atraumatic.   Eyes: Conjunctivae are normal. Pupils are equal, round, and reactive to light. No scleral icterus.   Neck: Normal range of motion. Neck supple.   Cardiovascular: Regular rhythm and normal heart sounds.  Tachycardia present.  Exam reveals no gallop and no friction rub.    No murmur heard.  Pulmonary/Chest: No tachypnea. No respiratory distress. He has rales in the right lower field and the left lower field.   Fine rales to LLL and diminished to RLL   Abdominal: Soft. Bowel sounds are normal.    Musculoskeletal: Normal range of motion. He exhibits edema. He exhibits no tenderness.        Right lower leg: He exhibits edema.        Legs:  Neurological: He is alert and oriented to person, place, and time.   Skin: Skin is warm and dry.   Dry skin   Psychiatric: He has a normal mood and affect. His behavior is normal.   Nursing note and vitals reviewed.      Significant Labs:   Blood Culture:   Recent Labs  Lab 09/18/17 2015 09/18/17 2025   LABBLOO No Growth to date  No Growth to date No Growth to date  No Growth to date     BMP:   Recent Labs  Lab 09/18/17 1920 09/20/17 0414     < > 172*     < > 142   K 4.8  < > 3.8     < > 112*   CO2 15*  < > 18*   BUN 69*  < > 57*   CREATININE 1.9*  < > 1.5*   CALCIUM 9.9  < > 8.8   MG 2.6  --   --    < > = values in this interval not displayed.  CBC:   Recent Labs  Lab 09/18/17 1920 09/19/17 0447 09/20/17  0414   WBC 10.97 9.50 2.31*   HGB 13.0* 11.6* 11.3*   HCT 42.0 37.4* 36.2*   PLT 56* 40* 37*       Significant Imaging: I have reviewed all pertinent imaging results/findings within the past 24 hours.

## 2017-09-20 NOTE — ASSESSMENT & PLAN NOTE
- Chronic low   - No signs of active bleeding  - Pharmacological DVT prophylaxis held  - No indication for transfusion unless there is active signs of bleeding.   -  Possible  Worse due to sepsis  - Hem/onc  On board

## 2017-09-20 NOTE — NURSING
Patient transferred from the ICU. Report received from [place] ICU nurse. Oriented tor room wife at bedside. Plan of care reviewed with patient. Patient stable. Aaox3. Patient free from falls fall precautions in place. Assist x 1 to the bathroom. Call bell and belongings with in reach reminded to call for assistance. Cardiac monitoring. Will cont to monitor

## 2017-09-20 NOTE — ASSESSMENT & PLAN NOTE
- CT scan concerning for pneumonia.  - On broad spectrum antibiotics.  - Continued BiPAP use.  - Close monitoring in the ICU.  - Appreciate Pulmonary input.

## 2017-09-20 NOTE — EICU
eICU Note :    Called by the [facility]:    Problem:data platelet count decreased from 40-37    Pertinent History and labs reviewed :patient with left lower lobe pneumonia, lactic acidosis 3.9      Treatment /Intervention given:anterior to monitor platelets transfuse for platelet less than 10        Meme Quinn M.D  eICU Physician

## 2017-09-20 NOTE — HOSPITAL COURSE
Patient is a 81 y.o. male with Chronic Respiratory Failure (uses 5 L NC along with Triology vent at home), ES COPD, Systolic Heart Failure, S/P TAVR and Pulmonary HTN, admitted with pneumonia, CHF, thrombocytopenia and new onset atrial fibrillation. Subsequently pt started on IV Lasix and IV Avelox and nebs and steroids. He responded to tx well and started improving. Cardiology and Pulmonary both were consulted and followed the pt closely. Pt developed new onset AF during the stay, likely triggered by illness/respiratory status, continue BB. No anticoagulation given due thrombocytopenia. Continue ASA. Hematology consulted because of ch thrombocytopenia who recommended BM biopsy. Patient declined BM biopsy. Pt remains in PAF on ASA alone, rate controlled with BB. He continues to do better, SOB improved. He wishes to be discharged home today and has already been cleared by Pulm and Cardiology. An extra tank of O2 has been arranged for him as well. He was seen and examined today and deemed stable to be discharged home.

## 2017-09-20 NOTE — SUBJECTIVE & OBJECTIVE
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    Past Surgical History:   Procedure Laterality Date    AORTIC VALVE REPLACEMENT      CARDIAC CATHETERIZATION      CARDIAC SURGERY      CARDIAC VALVE SURGERY      CATARACT EXTRACTION W/  INTRAOCULAR LENS IMPLANT  OD 3/18/09    CATARACT EXTRACTION W/  INTRAOCULAR LENS IMPLANT  OS 4/1/09    CORONARY ARTERY BYPASS GRAFT  1997    CABG x 3    SKIN BIOPSY      TONSILLECTOMY         Review of patient's allergies indicates:   Allergen Reactions    Doxycycline Nausea Only and Other (See Comments)     Dizziness     Pneumococcal vaccine        No current facility-administered medications on file prior to encounter.      Current Outpatient Prescriptions on File Prior to Encounter   Medication Sig    albuterol-ipratropium 2.5mg-0.5mg/3mL (DUO-NEB) 0.5 mg-3 mg(2.5 mg base)/3 mL nebulizer solution Take 3 mLs by nebulization every 8 (eight) hours as needed for Wheezing.    aspirin 81 MG Chew Take 81 mg by mouth once daily.    budesonide-formoterol 80-4.5 mcg (SYMBICORT) 80-4.5 mcg/actuation HFAA Inhale 2 puffs into the lungs 2 (two) times daily. Pharmacy to adjust to cheaper tier options    levetiracetam (KEPPRA) 750 MG Tab Take 1 tablet (750 mg total) by mouth 2 (two) times daily.    losartan (COZAAR) 25 MG tablet Take 0.5 tablets (12.5 mg total) by mouth nightly.    metoprolol tartrate (LOPRESSOR) 25 MG tablet Take 0.5 tablets (12.5 mg total) by mouth 2 (two) times daily.    OXYGEN-AIR DELIVERY  SYSTEMS MISC by Misc.(Non-Drug; Combo Route) route.    simvastatin (ZOCOR) 40 MG tablet TAKE 1 TABLET EVERY EVENING.    spironolactone (ALDACTONE) 25 MG tablet Take 1 tablet (25 mg total) by mouth every evening.    tiotropium (SPIRIVA WITH HANDIHALER) 18 mcg inhalation capsule Inhale 1 capsule (18 mcg total) into the lungs once daily. Pharmacy to adjust to cheaper tier options    torsemide (DEMADEX) 20 MG Tab 20 mg once daily.     roflumilast 500 mcg Tab Take 1 tablet (500 mcg total) by mouth once daily.     Family History     Problem Relation (Age of Onset)    Cancer Paternal Grandfather    Cataracts Mother, Maternal Grandmother, Paternal Grandmother    Heart disease Brother    No Known Problems Father, Maternal Grandfather, Sister, Maternal Aunt, Maternal Uncle, Paternal Aunt, Paternal Uncle        Social History Main Topics    Smoking status: Former Smoker     Packs/day: 1.00     Years: 20.00     Types: Cigarettes     Quit date: 9/12/1997    Smokeless tobacco: Never Used    Alcohol use No      Comment: Occasionally     Drug use: No    Sexual activity: Yes     Partners: Female     Review of Systems   Constitutional: Positive for activity change and chills. Negative for appetite change, diaphoresis, fatigue and fever.   HENT: Negative.    Eyes: Negative for pain and redness.   Respiratory: Positive for cough and shortness of breath. Negative for wheezing.    Cardiovascular: Positive for leg swelling. Negative for chest pain and palpitations.   Gastrointestinal: Negative for abdominal pain, diarrhea, nausea and vomiting.   Skin: Negative for pallor, rash and wound.     Objective:     Vital Signs (Most Recent):  Temp: 97.7 °F (36.5 °C) (09/19/17 1500)  Pulse: 103 (09/19/17 1800)  Resp: (!) 26 (09/19/17 1800)  BP: (!) 82/52 (09/19/17 1800)  SpO2: 97 % (09/19/17 1800) Vital Signs (24h Range):  Temp:  [97.3 °F (36.3 °C)-98.2 °F (36.8 °C)] 97.7 °F (36.5 °C)  Pulse:  [] 103  Resp:  [19-44] 26  SpO2:  [76  %-100 %] 97 %  BP: ()/(40-90) 82/52     Weight: 85.2 kg (187 lb 13.3 oz)  Body mass index is 26.2 kg/m².    Physical Exam   Constitutional: He is oriented to person, place, and time. He appears well-developed and well-nourished. He has a sickly appearance. He appears distressed. Face mask in place.   HENT:   Head: Normocephalic and atraumatic.   Eyes: Conjunctivae are normal. Pupils are equal, round, and reactive to light. No scleral icterus.   Neck: Normal range of motion. Neck supple.   Cardiovascular: Regular rhythm and normal heart sounds.  Tachycardia present.  Exam reveals no gallop and no friction rub.    No murmur heard.  Pulmonary/Chest: Tachypnea noted. He is in respiratory distress. He has rales in the right lower field and the left lower field.   Fine rales to LLL and diminished to RLL   Abdominal: Soft. Bowel sounds are normal.   Musculoskeletal: Normal range of motion. He exhibits edema. He exhibits no tenderness.        Right lower leg: He exhibits edema.        Legs:  Neurological: He is alert and oriented to person, place, and time.   Skin: Skin is warm and dry.   Dry skin   Psychiatric: He has a normal mood and affect. His behavior is normal.   Nursing note and vitals reviewed.       Significant Labs:   ABGs:     Recent Labs  Lab 09/18/17 1955   PH 7.452*   PCO2 23.5*   HCO3 16.4*   POCSATURATED 98   BE -8     CBC:     Recent Labs  Lab 09/18/17 1920 09/19/17  0447   WBC 10.97 9.50   HGB 13.0* 11.6*   HCT 42.0 37.4*   PLT 56* 40*     CMP:     Recent Labs  Lab 09/18/17 1920 09/19/17  0447    143   K 4.8 3.8    111*   CO2 15* 18*    96   BUN 69* 65*   CREATININE 1.9* 1.8*   CALCIUM 9.9 9.2   PROT 8.2 6.9   ALBUMIN 3.9 3.3*   BILITOT 2.7* 2.7*   ALKPHOS 96 71   AST 22 18   ALT 18 15   ANIONGAP 18* 14   EGFRNONAA 33* 35*     All pertinent labs within the past 24 hours have been reviewed.    Significant Imaging: I have reviewed all pertinent imaging results/findings within the  past 24 hours.

## 2017-09-20 NOTE — PROGRESS NOTES
[facility] Hospital Medicine  Progress Note    Patient Name: Orion Rinaldi  MRN: [id number]  Patient Class: IP- Inpatient   Admission Date: 9/18/2017  Length of Stay: 1 days  Attending Physician: Reuben Zamora MD  Primary Care Provider: Daisy Oconnor MD    Subjective:     Principal Problem:Acute respiratory failure with hypoxia and hypercarbia    HPI:  Orion Rinaldi is a 80 yo male with Chronic Respiratory Failure (uses 5 L NC along with Triology vent at home), COPD, Systolic Heart Failure, S/P TAVR and Pulmonary HTN who presents to the ED with sudden onset of severe SOB this afternoon. Associated symptoms include chills, worsened leg swelling and generalized weakness. Pt is reported to be cyanotic upon arrival to the ED.  Vital signs note tachycardia with resp rate 28 - 44. CBC finds thrombocytopenia, CMP finds a metabolic alkalosis, BUN ^ 62, serum creatinine 1.9 and BNP 1977. Lactic acidosis = 3.9. CXR notes a LLL infiltrate. Pt is admitted with respiratory failure, pneumonia and decompensated heart failure    Hospital Course:  9/19 - Reports ongoing dyspnea and cough.    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    Past Surgical History:   Procedure Laterality Date    AORTIC VALVE REPLACEMENT      CARDIAC CATHETERIZATION      CARDIAC SURGERY      CARDIAC VALVE SURGERY      CATARACT EXTRACTION W/  INTRAOCULAR LENS IMPLANT  OD 3/18/09    CATARACT EXTRACTION W/  INTRAOCULAR LENS IMPLANT  OS 4/1/09    CORONARY  ARTERY BYPASS GRAFT  1997    CABG x 3    SKIN BIOPSY      TONSILLECTOMY         Review of patient's allergies indicates:   Allergen Reactions    Doxycycline Nausea Only and Other (See Comments)     Dizziness     Pneumococcal vaccine        No current facility-administered medications on file prior to encounter.      Current Outpatient Prescriptions on File Prior to Encounter   Medication Sig    albuterol-ipratropium 2.5mg-0.5mg/3mL (DUO-NEB) 0.5 mg-3 mg(2.5 mg base)/3 mL nebulizer solution Take 3 mLs by nebulization every 8 (eight) hours as needed for Wheezing.    aspirin 81 MG Chew Take 81 mg by mouth once daily.    budesonide-formoterol 80-4.5 mcg (SYMBICORT) 80-4.5 mcg/actuation HFAA Inhale 2 puffs into the lungs 2 (two) times daily. Pharmacy to adjust to cheaper tier options    levetiracetam (KEPPRA) 750 MG Tab Take 1 tablet (750 mg total) by mouth 2 (two) times daily.    losartan (COZAAR) 25 MG tablet Take 0.5 tablets (12.5 mg total) by mouth nightly.    metoprolol tartrate (LOPRESSOR) 25 MG tablet Take 0.5 tablets (12.5 mg total) by mouth 2 (two) times daily.    OXYGEN-AIR DELIVERY SYSTEMS MISC by Misc.(Non-Drug; Combo Route) route.    simvastatin (ZOCOR) 40 MG tablet TAKE 1 TABLET EVERY EVENING.    spironolactone (ALDACTONE) 25 MG tablet Take 1 tablet (25 mg total) by mouth every evening.    tiotropium (SPIRIVA WITH HANDIHALER) 18 mcg inhalation capsule Inhale 1 capsule (18 mcg total) into the lungs once daily. Pharmacy to adjust to cheaper tier options    torsemide (DEMADEX) 20 MG Tab 20 mg once daily.     roflumilast 500 mcg Tab Take 1 tablet (500 mcg total) by mouth once daily.     Family History     Problem Relation (Age of Onset)    Cancer Paternal Grandfather    Cataracts Mother, Maternal Grandmother, Paternal Grandmother    Heart disease Brother    No Known Problems Father, Maternal Grandfather, Sister, Maternal Aunt, Maternal Uncle, Paternal Aunt, Paternal Uncle        Social History  Main Topics    Smoking status: Former Smoker     Packs/day: 1.00     Years: 20.00     Types: Cigarettes     Quit date: 9/12/1997    Smokeless tobacco: Never Used    Alcohol use No      Comment: Occasionally     Drug use: No    Sexual activity: Yes     Partners: Female     Review of Systems   Constitutional: Positive for activity change and chills. Negative for appetite change, diaphoresis, fatigue and fever.   HENT: Negative.    Eyes: Negative for pain and redness.   Respiratory: Positive for cough and shortness of breath. Negative for wheezing.    Cardiovascular: Positive for leg swelling. Negative for chest pain and palpitations.   Gastrointestinal: Negative for abdominal pain, diarrhea, nausea and vomiting.   Skin: Negative for pallor, rash and wound.     Objective:     Vital Signs (Most Recent):  Temp: 97.7 °F (36.5 °C) (09/19/17 1500)  Pulse: 103 (09/19/17 1800)  Resp: (!) 26 (09/19/17 1800)  BP: (!) 82/52 (09/19/17 1800)  SpO2: 97 % (09/19/17 1800) Vital Signs (24h Range):  Temp:  [97.3 °F (36.3 °C)-98.2 °F (36.8 °C)] 97.7 °F (36.5 °C)  Pulse:  [] 103  Resp:  [19-44] 26  SpO2:  [76 %-100 %] 97 %  BP: ()/(40-90) 82/52     Weight: 85.2 kg (187 lb 13.3 oz)  Body mass index is 26.2 kg/m².    Physical Exam   Constitutional: He is oriented to person, place, and time. He appears well-developed and well-nourished. He has a sickly appearance. He appears distressed. Face mask in place.   HENT:   Head: Normocephalic and atraumatic.   Eyes: Conjunctivae are normal. Pupils are equal, round, and reactive to light. No scleral icterus.   Neck: Normal range of motion. Neck supple.   Cardiovascular: Regular rhythm and normal heart sounds.  Tachycardia present.  Exam reveals no gallop and no friction rub.    No murmur heard.  Pulmonary/Chest: Tachypnea noted. He is in respiratory distress. He has rales in the right lower field and the left lower field.   Fine rales to LLL and diminished to RLL   Abdominal: Soft.  Bowel sounds are normal.   Musculoskeletal: Normal range of motion. He exhibits edema. He exhibits no tenderness.        Right lower leg: He exhibits edema.        Legs:  Neurological: He is alert and oriented to person, place, and time.   Skin: Skin is warm and dry.   Dry skin   Psychiatric: He has a normal mood and affect. His behavior is normal.   Nursing note and vitals reviewed.       Significant Labs:   ABGs:     Recent Labs  Lab 09/18/17 1955   PH 7.452*   PCO2 23.5*   HCO3 16.4*   POCSATURATED 98   BE -8     CBC:     Recent Labs  Lab 09/18/17 1920 09/19/17 0447   WBC 10.97 9.50   HGB 13.0* 11.6*   HCT 42.0 37.4*   PLT 56* 40*     CMP:     Recent Labs  Lab 09/18/17 1920 09/19/17 0447    143   K 4.8 3.8    111*   CO2 15* 18*    96   BUN 69* 65*   CREATININE 1.9* 1.8*   CALCIUM 9.9 9.2   PROT 8.2 6.9   ALBUMIN 3.9 3.3*   BILITOT 2.7* 2.7*   ALKPHOS 96 71   AST 22 18   ALT 18 15   ANIONGAP 18* 14   EGFRNONAA 33* 35*     All pertinent labs within the past 24 hours have been reviewed.    Significant Imaging: I have reviewed all pertinent imaging results/findings within the past 24 hours.    Assessment/Plan:      * Acute respiratory failure with hypoxia and hypercarbia              Acute hypoxemic respiratory failure    - CT scan concerning for pneumonia.  - On broad spectrum antibiotics.  - Continued BiPAP use.  - Close monitoring in the ICU.  - Appreciate Pulmonary input.            Sepsis    - Criteria include CXR indicating LLL pneumonia, tachycardia, tachypnea and lactic acidosis = 3.9  - Low threshold for pressor support if indicated.          LLL pneumonia    - CT chest suggestive of penumonia  - Continue current antibiotics.           Coronary artery disease involving native coronary artery of native heart without angina pectoris    - Continue ASA, metoprolol, simvastatin  - s/p TAVR          Acute on chronic combined systolic and diastolic congestive heart failure    Supplemental  oxygen to maintain sat > 88 %  Telemetry monitoring, daily weights, low sodium diet, fluid restriction, BNP in AM,  Lasix IV, continue losartan  Will hold Torsemide and spironolactone for now              Thrombocytopenia, with anemia    - Platelet count 56,000  - No signs of active bleeding  - Pharmacological DVT prophylaxis held  - No indication for transfusion unless there is active signs of bleeding.          Moderate COPD (chronic obstructive pulmonary disease)    - Supplemental oxygen  - DuoNebs  - Continue formoterol and BiPAP.            VTE Risk Mitigation         Ordered     Medium Risk of VTE  Once      09/18/17 3632      NOTE - Patient did not have hypercarbia on presentation    Critical care time spent on the evaluation and treatment of severe organ dysfunction, review of pertinent labs and imaging studies, discussions with consulting providers and discussions with patient/family: 35 minutes.    Reuben Zamora MD  Department of Hospital Medicine   [facility]

## 2017-09-20 NOTE — PROGRESS NOTES
[facility] Hospital Medicine  Progress Note    Patient Name: Orion Rinaldi  MRN: [id number]  Patient Class: IP- Inpatient   Admission Date: 9/18/2017  Length of Stay: 2 days  Attending Physician: Farshad Stephens, *  Primary Care Provider: Daisy Oconnor MD        Subjective:     Principal Problem:LLL pneumonia    HPI:  Orion Rinaldi is a 82 yo male with Chronic Respiratory Failure (uses 5 L NC along with Triology vent at home), COPD, Systolic Heart Failure, S/P TAVR and Pulmonary HTN who presents to the ED with sudden onset of severe SOB this afternoon. Associated symptoms include chills, worsened leg swelling and generalized weakness. Pt is reported to be cyanotic upon arrival to the ED.  Vital signs note tachycardia with resp rate 28 - 44. CBC finds thrombocytopenia, CMP finds a metabolic alkalosis, BUN ^ 62, serum creatinine 1.9 and BNP 1977. Lactic acidosis = 3.9. CXR notes a LLL infiltrate. Pt is admitted with respiratory failure, pneumonia and decompensated heart failure    Hospital Course:  9/19 - Reports ongoing dyspnea and cough.  9/20 Pt was seen and examined at bedside . He states feel better . Pt  Develop a PAF last night . Cardiology on board     Interval History: Pt was seen and examined at bedside . He states feel better . Last night pt develop afib  .     Review of Systems   Constitutional: Positive for activity change. Negative for appetite change, diaphoresis, fatigue and fever.   HENT: Negative.    Eyes: Negative for pain and redness.   Respiratory: Positive for cough and shortness of breath. Negative for wheezing.    Cardiovascular: Positive for leg swelling. Negative for chest pain and palpitations.   Gastrointestinal: Negative for abdominal pain, diarrhea, nausea and vomiting.   Skin: Negative for pallor, rash and wound.     Objective:     Vital Signs (Most Recent):  Temp: 97.7 °F (36.5 °C) (09/20/17 1611)  Pulse: 80 (09/20/17 1700)  Resp: 20 (09/20/17 1611)  BP:  (!) 98/56 (09/20/17 1611)  SpO2: 95 % (09/20/17 1611) Vital Signs (24h Range):  Temp:  [97.7 °F (36.5 °C)-98.8 °F (37.1 °C)] 97.7 °F (36.5 °C)  Pulse:  [] 80  Resp:  [19-33] 20  SpO2:  [76 %-100 %] 95 %  BP: ()/(37-83) 98/56     Weight: 86 kg (189 lb 9.5 oz)  Body mass index is 26.44 kg/m².    Intake/Output Summary (Last 24 hours) at 09/20/17 1738  Last data filed at 09/20/17 1400   Gross per 24 hour   Intake             1090 ml   Output             1465 ml   Net             -375 ml      Physical Exam   Constitutional: He is oriented to person, place, and time. He appears well-developed and well-nourished. He does not have a sickly appearance.   HENT:   Head: Normocephalic and atraumatic.   Eyes: Conjunctivae are normal. Pupils are equal, round, and reactive to light. No scleral icterus.   Neck: Normal range of motion. Neck supple.   Cardiovascular: Regular rhythm and normal heart sounds.  Tachycardia present.  Exam reveals no gallop and no friction rub.    No murmur heard.  Pulmonary/Chest: No tachypnea. No respiratory distress. He has rales in the right lower field and the left lower field.   Fine rales to LLL and diminished to RLL   Abdominal: Soft. Bowel sounds are normal.   Musculoskeletal: Normal range of motion. He exhibits edema. He exhibits no tenderness.        Right lower leg: He exhibits edema.        Legs:  Neurological: He is alert and oriented to person, place, and time.   Skin: Skin is warm and dry.   Dry skin   Psychiatric: He has a normal mood and affect. His behavior is normal.   Nursing note and vitals reviewed.      Significant Labs:   Blood Culture:   Recent Labs  Lab 09/18/17 2015 09/18/17 2025   LABBLOO No Growth to date  No Growth to date No Growth to date  No Growth to date     BMP:   Recent Labs  Lab 09/18/17  1920  09/20/17  0414     < > 172*     < > 142   K 4.8  < > 3.8     < > 112*   CO2 15*  < > 18*   BUN 69*  < > 57*   CREATININE 1.9*  < > 1.5*    CALCIUM 9.9  < > 8.8   MG 2.6  --   --    < > = values in this interval not displayed.  CBC:   Recent Labs  Lab 09/18/17  1920 09/19/17  0447 09/20/17  0414   WBC 10.97 9.50 2.31*   HGB 13.0* 11.6* 11.3*   HCT 42.0 37.4* 36.2*   PLT 56* 40* 37*       Significant Imaging: I have reviewed all pertinent imaging results/findings within the past 24 hours.    Assessment/Plan:      * LLL pneumonia    - CT chest suggestive of penumonia  - Continue current antibiotics.           PAF (paroxysmal atrial fibrillation)    - Platelet  Low  - hold oac for now  - cardiology on board  - cont bb           Acute hypoxemic respiratory failure    -improving .  - On broad spectrum antibiotics.  - Continued BiPAP use.              Acute respiratory failure with hypoxia and hypercarbia              Sepsis    - Criteria include CXR indicating LLL pneumonia, tachycardia, tachypnea and lactic acidosis = 3.9  - improving           Coronary artery disease involving native coronary artery of native heart without angina pectoris    - Continue ASA, metoprolol, simvastatin  - s/p TAVR          Acute on chronic combined systolic and diastolic congestive heart failure     Cont cHF core measures                Thrombocytopenia, with anemia    - Chronic low   - No signs of active bleeding  - Pharmacological DVT prophylaxis held  - No indication for transfusion unless there is active signs of bleeding.   -  Possible  Worse due to sepsis  - Hem/onc  On board           Moderate COPD (chronic obstructive pulmonary disease)    - Supplemental oxygen  - DuoNebs  - Continue formoterol and BiPAP.hs             VTE Risk Mitigation         Ordered     Medium Risk of VTE  Once      09/18/17 4947          Critical care time  35 min     Farshad Stephens MD  Department of Hospital Medicine   [facility]

## 2017-09-20 NOTE — ASSESSMENT & PLAN NOTE
- Criteria include CXR indicating LLL pneumonia, tachycardia, tachypnea and lactic acidosis = 3.9  - improving

## 2017-09-20 NOTE — PLAN OF CARE
Problem: Patient Care Overview  Goal: Plan of Care Review  Outcome: Ongoing (interventions implemented as appropriate)  POC reviewed with pt and wife, pt went into AFib last night, orders received for EKG and Metoprolol IV, rate controlled after med administered. Pt tolerated bipap overnight, no c/o SOB or pain, VSS.

## 2017-09-20 NOTE — HPI
81-year-old male history of recent admission to the hospital for pneumonia was found have a low platelet count previously been seen by hematology oncology service [facility] [place] in 2015 and 16 with low platelet count determination not to proceed with bone marrow at that time for possibility of myelodysplasia

## 2017-09-20 NOTE — CONSULTS
[facility] -   Hematology/Oncology  Consult Note    Patient Name: Orion Rinaldi  MRN: [id number]  Admission Date: 9/18/2017  Hospital Length of Stay: 2 days  Code Status: Full Code   Attending Provider: Farshad Stephens, *  Consulting Provider: Timur Hamlin MD  Primary Care Physician: Daisy Oconnor MD  Principal Problem:<principal problem not specified>    Consults  Subjective:     HPI:  81-year-old male history of recent admission to the hospital for pneumonia was found have a low platelet count previously been seen by hematology oncology service [facility] [place] in 2015 and 16 with low platelet count determination not to proceed with bone marrow at that time for possibility of myelodysplasia    Oncology Treatment Plan:   [No treatment plan]    Medications:  Continuous Infusions:   Scheduled Meds:   albuterol-ipratropium 2.5mg-0.5mg/3mL  3 mL Nebulization Q6H WAKE    arformoterol  15 mcg Nebulization BID    aspirin  81 mg Oral Daily    ciprofloxacin (CIPRO)400mg/200ml D5W IVPB  400 mg Intravenous Q12H    levetiracetam  750 mg Oral BID    losartan  12.5 mg Oral Nightly    methylPREDNISolone sodium succinate  80 mg Intravenous Q6H    metoprolol tartrate  25 mg Oral BID    pantoprazole  40 mg Oral Daily    piperacillin-tazobactam (ZOSYN) IVPB  4.5 g Intravenous Q8H    roflumilast  500 mcg Oral Daily    simvastatin  40 mg Oral QHS    torsemide  20 mg Oral Daily    [START ON 9/21/2017] vancomycin (VANCOCIN) IVPB  1,250 mg Intravenous Q24H     PRN Meds:acetaminophen, aluminum-magnesium hydroxide-simethicone, hydrALAZINE, influenza, ondansetron     Review of patient's allergies indicates:   Allergen Reactions    Doxycycline Nausea Only and Other (See Comments)     Dizziness     Pneumococcal vaccine         Past Medical History:   Diagnosis Date    Acute on chronic systolic CHF (congestive heart failure), NYHA class 4 11/18/2014    Arthritis     Asthma     Cancer      Cardiomyopathy 11/25/2014    COPD (chronic obstructive pulmonary disease)     Coronary artery disease     CABG x 3 1997    Hypercholesterolemia 11/4/2016    Mitral stenosis 11/25/2014    Pulmonary HTN 11/25/2014    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    Seizures      Past Surgical History:   Procedure Laterality Date    AORTIC VALVE REPLACEMENT      CARDIAC CATHETERIZATION      CARDIAC SURGERY      CARDIAC VALVE SURGERY      CATARACT EXTRACTION W/  INTRAOCULAR LENS IMPLANT  OD 3/18/09    CATARACT EXTRACTION W/  INTRAOCULAR LENS IMPLANT  OS 4/1/09    CORONARY ARTERY BYPASS GRAFT  1997    CABG x 3    SKIN BIOPSY      TONSILLECTOMY       Family History     Problem Relation (Age of Onset)    Cancer Paternal Grandfather    Cataracts Mother, Maternal Grandmother, Paternal Grandmother    Heart disease Brother    No Known Problems Father, Maternal Grandfather, Sister, Maternal Aunt, Maternal Uncle, Paternal Aunt, Paternal Uncle        Social History Main Topics    Smoking status: Former Smoker     Packs/day: 1.00     Years: 20.00     Types: Cigarettes     Quit date: 9/12/1997    Smokeless tobacco: Never Used    Alcohol use No      Comment: Occasionally     Drug use: No    Sexual activity: Yes     Partners: Female       Review of Systems   Constitutional: Positive for activity change and fatigue. Negative for appetite change, chills, diaphoresis, fever and unexpected weight change.   HENT: Negative for congestion, dental problem, drooling, ear discharge, ear pain, facial swelling, hearing loss, mouth sores, nosebleeds, postnasal drip, rhinorrhea, sinus pressure, sneezing, sore throat, tinnitus, trouble swallowing and voice change.    Eyes: Negative for photophobia, pain, discharge, redness, itching and visual disturbance.   Respiratory: Negative for apnea, cough, choking, chest tightness, shortness of breath, wheezing and stridor.    Cardiovascular: Negative for chest pain,  palpitations and leg swelling.   Gastrointestinal: Negative for abdominal distention, abdominal pain, anal bleeding, blood in stool, constipation, diarrhea, nausea, rectal pain and vomiting.   Endocrine: Negative for cold intolerance, heat intolerance, polydipsia, polyphagia and polyuria.   Genitourinary: Negative for decreased urine volume, difficulty urinating, discharge, dysuria, enuresis, flank pain, frequency, genital sores, hematuria, penile pain, penile swelling, scrotal swelling, testicular pain and urgency.        Tapia catheter in place   Musculoskeletal: Negative for arthralgias, back pain, gait problem, joint swelling, myalgias, neck pain and neck stiffness.   Skin: Negative for color change, pallor, rash and wound.   Allergic/Immunologic: Negative for environmental allergies, food allergies and immunocompromised state.   Neurological: Positive for weakness. Negative for dizziness, tremors, seizures, syncope, facial asymmetry, speech difficulty, light-headedness, numbness and headaches.   Hematological: Negative for adenopathy. Does not bruise/bleed easily.   Psychiatric/Behavioral: Positive for dysphoric mood. Negative for agitation, behavioral problems, confusion, decreased concentration, hallucinations, self-injury, sleep disturbance and suicidal ideas. The patient is nervous/anxious. The patient is not hyperactive.      Objective:     Vital Signs (Most Recent):  Temp: 97.7 °F (36.5 °C) (09/20/17 1611)  Pulse: 96 (09/20/17 1611)  Resp: 20 (09/20/17 1611)  BP: (!) 98/56 (09/20/17 1611)  SpO2: 95 % (09/20/17 1611) Vital Signs (24h Range):  Temp:  [97.7 °F (36.5 °C)-98.8 °F (37.1 °C)] 97.7 °F (36.5 °C)  Pulse:  [] 96  Resp:  [19-33] 20  SpO2:  [76 %-100 %] 95 %  BP: ()/(37-83) 98/56     Weight: 86 kg (189 lb 9.5 oz)  Body mass index is 26.44 kg/m².  Body surface area is 2.08 meters squared.      Intake/Output Summary (Last 24 hours) at 09/20/17 1702  Last data filed at 09/20/17 1400   Gross  per 24 hour   Intake             1090 ml   Output             1465 ml   Net             -375 ml       Physical Exam   Constitutional: He is oriented to person, place, and time. He appears well-developed and well-nourished. He appears cachectic. He has a sickly appearance. He appears ill. He appears distressed.   HENT:   Head: Normocephalic.   Right Ear: External ear normal.   Left Ear: External ear normal.   Nose: Nose normal. Right sinus exhibits no maxillary sinus tenderness and no frontal sinus tenderness. Left sinus exhibits no maxillary sinus tenderness and no frontal sinus tenderness.   Mouth/Throat: Oropharynx is clear and moist. No oropharyngeal exudate.   Eyes: EOM and lids are normal. Pupils are equal, round, and reactive to light. Right eye exhibits no discharge. Left eye exhibits no discharge. Right conjunctiva is not injected. Right conjunctiva has no hemorrhage. Left conjunctiva is not injected. Left conjunctiva has no hemorrhage. No scleral icterus. Right eye exhibits normal extraocular motion. Left eye exhibits normal extraocular motion.   Neck: Normal range of motion. Neck supple. No JVD present. No tracheal deviation present. No thyromegaly present.   Cardiovascular: Normal rate, regular rhythm and normal heart sounds.    Pulmonary/Chest: Effort normal and breath sounds normal. No stridor. No respiratory distress.   Abdominal: Soft. Bowel sounds are normal. He exhibits no mass. There is no hepatosplenomegaly, splenomegaly or hepatomegaly. There is no tenderness.   Musculoskeletal: Normal range of motion. He exhibits no edema or tenderness.   Lymphadenopathy:        Head (right side): No posterior auricular and no occipital adenopathy present.        Head (left side): No posterior auricular and no occipital adenopathy present.     He has no cervical adenopathy.        Right cervical: No superficial cervical, no deep cervical and no posterior cervical adenopathy present.       Left cervical: No  superficial cervical, no deep cervical and no posterior cervical adenopathy present.     He has no axillary adenopathy.        Right: No supraclavicular adenopathy present.        Left: No supraclavicular adenopathy present.   Neurological: He is alert and oriented to person, place, and time. He has normal strength. No cranial nerve deficit. Coordination normal.   Skin: Skin is dry. No rash noted. He is not diaphoretic. No cyanosis or erythema. Nails show no clubbing.   Psychiatric: His behavior is normal. Judgment and thought content normal. His mood appears anxious. Cognition and memory are normal. He exhibits a depressed mood.   Vitals reviewed.      Significant Labs:   BMP:   Recent Labs  Lab 09/18/17 1920 09/19/17 0447 09/20/17  0414    96 172*    143 142   K 4.8 3.8 3.8    111* 112*   CO2 15* 18* 18*   BUN 69* 65* 57*   CREATININE 1.9* 1.8* 1.5*   CALCIUM 9.9 9.2 8.8   MG 2.6  --   --    , CBC:   Recent Labs  Lab 09/18/17 1920 09/19/17 0447 09/20/17  0414   WBC 10.97 9.50 2.31*   HGB 13.0* 11.6* 11.3*   HCT 42.0 37.4* 36.2*   PLT 56* 40* 37*    and CMP:   Recent Labs  Lab 09/18/17 1920 09/19/17 0447 09/20/17  0414    143 142   K 4.8 3.8 3.8    111* 112*   CO2 15* 18* 18*    96 172*   BUN 69* 65* 57*   CREATININE 1.9* 1.8* 1.5*   CALCIUM 9.9 9.2 8.8   PROT 8.2 6.9 6.6   ALBUMIN 3.9 3.3* 3.0*   BILITOT 2.7* 2.7* 2.1*   ALKPHOS 96 71 62   AST 22 18 16   ALT 18 15 14   ANIONGAP 18* 14 12   EGFRNONAA 33* 35* 44*       Diagnostic Results:  I have reviewed and interpreted all pertinent imaging results/findings within the past 24 hours.    Assessment/Plan:     Thrombocytopenia, with anemia    I have reviewed notes going back to 2015 and2016 with Dr. Figueredo who had seen the patient at that time a decision was made not to proceed with a bone marrow aspirate and biopsy which I agree with at this point I believe his low platelet count is most likely exacerbated by his acute  medical problems.  The differential on his CBC is normal his hemoglobin is remained relatively stable he is not actively bleeding at this point and I would recommend that the patient I would observe the patient's platelet count to see if this been any dramatic change.  The wife and the patient do not wish to proceed with bone marrow aspirate and biopsy which revealed the next logical test to do            Thank you for your consult. I will follow-up with patient. Please contact us if you have any additional questions.    Timur Hamlin MD  Hematology/Oncology  [facility]

## 2017-09-21 PROBLEM — N18.30 CHRONIC KIDNEY DISEASE, STAGE III (MODERATE): Status: ACTIVE | Noted: 2017-01-01

## 2017-09-21 NOTE — SUBJECTIVE & OBJECTIVE
Interval History: patient has CPAP BiPAP machine on today.  MrBeckie by wife is very concerned about catheter with discoloration and urine answered questions about low platelet count told him the only way to be 100% certain of underlying myelodysplasia would be a bone marrow is clinical condition and the patient does not wish to pursue this    Oncology Treatment Plan:   [No treatment plan]    Medications:  Continuous Infusions:   Scheduled Meds:   albuterol-ipratropium 2.5mg-0.5mg/3mL  3 mL Nebulization Q6H WAKE    arformoterol  15 mcg Nebulization BID    aspirin  81 mg Oral Daily    ciprofloxacin (CIPRO)400mg/200ml D5W IVPB  400 mg Intravenous Q12H    levetiracetam  750 mg Oral BID    losartan  12.5 mg Oral Nightly    methylPREDNISolone sodium succinate  80 mg Intravenous Q6H    metoprolol tartrate  12.5 mg Oral BID    pantoprazole  40 mg Oral Daily    piperacillin-tazobactam (ZOSYN) IVPB  4.5 g Intravenous Q8H    roflumilast  500 mcg Oral Daily    simvastatin  40 mg Oral QHS    torsemide  20 mg Oral Daily    vancomycin (VANCOCIN) IVPB  1,250 mg Intravenous Q24H     PRN Meds:acetaminophen, aluminum-magnesium hydroxide-simethicone, hydrALAZINE, influenza, ondansetron     Review of Systems   Constitutional: Positive for activity change and fatigue. Negative for appetite change, chills, diaphoresis, fever and unexpected weight change.   HENT: Negative for congestion, dental problem, drooling, ear discharge, ear pain, facial swelling, hearing loss, mouth sores, nosebleeds, postnasal drip, rhinorrhea, sinus pressure, sneezing, sore throat, tinnitus, trouble swallowing and voice change.    Eyes: Negative for photophobia, pain, discharge, redness, itching and visual disturbance.   Respiratory: Positive for shortness of breath. Negative for apnea, cough, choking, chest tightness, wheezing and stridor.    Cardiovascular: Negative for chest pain, palpitations and leg swelling.   Gastrointestinal: Negative for  abdominal distention, abdominal pain, anal bleeding, blood in stool, constipation, diarrhea, nausea, rectal pain and vomiting.   Endocrine: Negative for cold intolerance, heat intolerance, polydipsia, polyphagia and polyuria.   Genitourinary: Positive for difficulty urinating. Negative for decreased urine volume, discharge, dysuria, enuresis, flank pain, frequency, genital sores, hematuria, penile pain, penile swelling, scrotal swelling, testicular pain and urgency.   Musculoskeletal: Negative for arthralgias, back pain, gait problem, joint swelling, myalgias, neck pain and neck stiffness.   Skin: Negative for color change, pallor, rash and wound.   Allergic/Immunologic: Negative for environmental allergies, food allergies and immunocompromised state.   Neurological: Positive for weakness. Negative for dizziness, tremors, seizures, syncope, facial asymmetry, speech difficulty, light-headedness, numbness and headaches.   Hematological: Negative for adenopathy. Does not bruise/bleed easily.   Psychiatric/Behavioral: Positive for dysphoric mood. Negative for agitation, behavioral problems, confusion, decreased concentration, hallucinations, self-injury, sleep disturbance and suicidal ideas. The patient is nervous/anxious. The patient is not hyperactive.      Objective:     Vital Signs (Most Recent):  Temp: 97.3 °F (36.3 °C) (09/21/17 0418)  Pulse: 83 (09/21/17 0418)  Resp: 20 (09/21/17 0418)  BP: 110/70 (09/21/17 0418)  SpO2: 99 % (09/21/17 0418) Vital Signs (24h Range):  Temp:  [97.3 °F (36.3 °C)-98.7 °F (37.1 °C)] 97.3 °F (36.3 °C)  Pulse:  [] 83  Resp:  [18-29] 20  SpO2:  [76 %-99 %] 99 %  BP: ()/(51-70) 110/70     Weight: 84.4 kg (186 lb)  Body mass index is 25.94 kg/m².  Body surface area is 2.06 meters squared.      Intake/Output Summary (Last 24 hours) at 09/21/17 0634  Last data filed at 09/21/17 0600   Gross per 24 hour   Intake              810 ml   Output             1435 ml   Net              -625 ml       Physical Exam   Constitutional: He is oriented to person, place, and time. He appears cachectic. He has a sickly appearance. He appears ill. He appears distressed.   HENT:   Head: Normocephalic.   Right Ear: External ear normal.   Left Ear: External ear normal.   Nose: Nose normal. Right sinus exhibits no maxillary sinus tenderness and no frontal sinus tenderness. Left sinus exhibits no maxillary sinus tenderness and no frontal sinus tenderness.   Mouth/Throat: Oropharynx is clear and moist. No oropharyngeal exudate.   Eyes: EOM and lids are normal. Pupils are equal, round, and reactive to light. Right eye exhibits no discharge. Left eye exhibits no discharge. Right conjunctiva is not injected. Right conjunctiva has no hemorrhage. Left conjunctiva is not injected. Left conjunctiva has no hemorrhage. No scleral icterus. Right eye exhibits normal extraocular motion. Left eye exhibits normal extraocular motion.   Neck: Normal range of motion. Neck supple. No JVD present. No tracheal deviation present. No thyromegaly present.   Cardiovascular: Normal rate, regular rhythm and normal heart sounds.    Pulmonary/Chest: Breath sounds normal. No stridor. He is in respiratory distress.   Abdominal: Soft. Bowel sounds are normal. He exhibits no mass. There is no hepatosplenomegaly, splenomegaly or hepatomegaly. There is no tenderness.   Musculoskeletal: Normal range of motion. He exhibits no edema or tenderness.   Lymphadenopathy:        Head (right side): No posterior auricular and no occipital adenopathy present.        Head (left side): No posterior auricular and no occipital adenopathy present.     He has no cervical adenopathy.        Right cervical: No superficial cervical, no deep cervical and no posterior cervical adenopathy present.       Left cervical: No superficial cervical, no deep cervical and no posterior cervical adenopathy present.     He has no axillary adenopathy.        Right: No  supraclavicular adenopathy present.        Left: No supraclavicular adenopathy present.   Neurological: He is alert and oriented to person, place, and time. He has normal strength. No cranial nerve deficit. Coordination normal.   Skin: Skin is dry. No rash noted. He is not diaphoretic. No cyanosis or erythema. Nails show no clubbing.   Psychiatric: His behavior is normal. Judgment and thought content normal. His mood appears anxious. Cognition and memory are normal. He exhibits a depressed mood.   Vitals reviewed.      Significant Labs:   BMP:   Recent Labs  Lab 09/20/17 0414   *      K 3.8   *   CO2 18*   BUN 57*   CREATININE 1.5*   CALCIUM 8.8   , CBC:   Recent Labs  Lab 09/20/17 0414   WBC 2.31*   HGB 11.3*   HCT 36.2*   PLT 37*    and CMP:   Recent Labs  Lab 09/20/17 0414      K 3.8   *   CO2 18*   *   BUN 57*   CREATININE 1.5*   CALCIUM 8.8   PROT 6.6   ALBUMIN 3.0*   BILITOT 2.1*   ALKPHOS 62   AST 16   ALT 14   ANIONGAP 12   EGFRNONAA 44*       Diagnostic Results:  I have reviewed and interpreted all pertinent imaging results/findings within the past 24 hours.

## 2017-09-21 NOTE — PROGRESS NOTES
[facility] Hospital Medicine  Progress Note    Patient Name: Orion Rinaldi  MRN: [id number]  Patient Class: IP- Inpatient   Admission Date: 9/18/2017  Length of Stay: 3 days  Attending Physician: Farshad Stephens, *  Primary Care Provider: Daisy Oconnor MD        Subjective:     Principal Problem:LLL pneumonia    HPI:  Orion Rinaldi is a 82 yo male with Chronic Respiratory Failure (uses 5 L NC along with Triology vent at home), COPD, Systolic Heart Failure, S/P TAVR and Pulmonary HTN who presents to the ED with sudden onset of severe SOB this afternoon. Associated symptoms include chills, worsened leg swelling and generalized weakness. Pt is reported to be cyanotic upon arrival to the ED.  Vital signs note tachycardia with resp rate 28 - 44. CBC finds thrombocytopenia, CMP finds a metabolic alkalosis, BUN ^ 62, serum creatinine 1.9 and BNP 1977. Lactic acidosis = 3.9. CXR notes a LLL infiltrate. Pt is admitted with respiratory failure, pneumonia and decompensated heart failure    Hospital Course:  9/19 - Reports ongoing dyspnea and cough.  9/20 Pt was seen and examined at bedside . He states feel better . Pt  Develop a PAF last night . Cardiology on board   9/21 Pt was seen and examined at bedside . He states cont  With  Sob but is improving .    No acute event overnight     Interval History: Pt was seen and examined at bedside . He is aao x 3  In mild respiratory distress which is seen Is his baseline .  No acute event overnight     Review of Systems   Constitutional: Positive for activity change. Negative for appetite change, diaphoresis, fatigue and fever.   HENT: Negative.    Eyes: Negative for pain and redness.   Respiratory: Positive for cough and shortness of breath. Negative for wheezing.    Cardiovascular: Positive for leg swelling. Negative for chest pain and palpitations.   Gastrointestinal: Negative for abdominal pain, diarrhea, nausea and vomiting.   Skin: Negative for  pallor, rash and wound.     Objective:     Vital Signs (Most Recent):  Temp: 98 °F (36.7 °C) (09/21/17 1223)  Pulse: (!) 131 (09/21/17 1223)  Resp: 20 (09/21/17 1223)  BP: 92/64 (09/21/17 1223)  SpO2: (!) 92 % (09/21/17 1223) Vital Signs (24h Range):  Temp:  [97.3 °F (36.3 °C)-98.1 °F (36.7 °C)] 98 °F (36.7 °C)  Pulse:  [] 131  Resp:  [18-22] 20  SpO2:  [92 %-99 %] 92 %  BP: ()/(51-70) 92/64     Weight: 84.4 kg (186 lb)  Body mass index is 25.94 kg/m².    Intake/Output Summary (Last 24 hours) at 09/21/17 1423  Last data filed at 09/21/17 1300   Gross per 24 hour   Intake              710 ml   Output             1375 ml   Net             -665 ml      Physical Exam   Constitutional: He is oriented to person, place, and time. He appears well-developed and well-nourished. He does not have a sickly appearance.   HENT:   Head: Normocephalic and atraumatic.   Eyes: Conjunctivae are normal. Pupils are equal, round, and reactive to light. No scleral icterus.   Neck: Normal range of motion. Neck supple.   Cardiovascular: Regular rhythm and normal heart sounds.  Tachycardia present.  Exam reveals no gallop and no friction rub.    No murmur heard.  Pulmonary/Chest: No tachypnea. No respiratory distress. He has rales in the right lower field and the left lower field.   Fine rales to LLL and diminished to RLL   Abdominal: Soft. Bowel sounds are normal.   Musculoskeletal: Normal range of motion. He exhibits edema. He exhibits no tenderness.        Right lower leg: He exhibits edema.        Legs:  Neurological: He is alert and oriented to person, place, and time.   Skin: Skin is warm and dry.   Dry skin   Psychiatric: He has a normal mood and affect. His behavior is normal.   Nursing note and vitals reviewed.      Significant Labs:   Blood Culture: No results for input(s): LABBLOO in the last 48 hours.  BMP:   Recent Labs  Lab 09/21/17  1059   *      K 3.8      CO2 19*   BUN 68*   CREATININE 1.9*    CALCIUM 9.5     CBC:   Recent Labs  Lab 09/20/17  0414 09/21/17  0732   WBC 2.31* 3.07*   HGB 11.3* 12.5*   HCT 36.2* 40.3   PLT 37* 45*     Lipid Panel: No results for input(s): CHOL, HDL, LDLCALC, TRIG, CHOLHDL in the last 48 hours.  TSH:   Recent Labs  Lab 09/01/17  1038   TSH 9.916*     Urine Culture: No results for input(s): LABURIN in the last 48 hours.  Urine Studies: No results for input(s): COLORU, APPEARANCEUA, PHUR, SPECGRAV, PROTEINUA, GLUCUA, KETONESU, BILIRUBINUA, OCCULTUA, NITRITE, UROBILINOGEN, LEUKOCYTESUR, RBCUA, WBCUA, BACTERIA, SQUAMEPITHEL, HYALINECASTS in the last 48 hours.    Invalid input(s): WRIGHTSUR    Significant Imaging: I have reviewed all pertinent imaging results/findings within the past 24 hours.    Assessment/Plan:      * LLL pneumonia    - CT chest suggestive of penumonia  - Continue  avelox           Chronic kidney disease, stage III (moderate)    - stable   - cont monitor  - expect to worse due to diuresis             PAF (paroxysmal atrial fibrillation)    - Platelet  Low  - hold oac for now  - cardiology on board  -  Increased  bb           Acute hypoxemic respiratory failure    -improving .  - On broad spectrum antibiotics.  - Continued BiPAP use.              Acute respiratory failure with hypoxia and hypercarbia              Sepsis    - Criteria include CXR indicating LLL pneumonia, tachycardia, tachypnea and lactic acidosis = 3.9  - improving           Coronary artery disease involving native coronary artery of native heart without angina pectoris    - Continue ASA, metoprolol, simvastatin  - s/p TAVR          Acute on chronic combined systolic and diastolic congestive heart failure     Cont cHF core measures                Thrombocytopenia, with anemia    - Chronic low   - No signs of active bleeding  - Pharmacological DVT prophylaxis held  - No indication for transfusion unless there is active signs of bleeding.   -  Possible  Worse due to sepsis  - Hem/onc  On board    -Pt  Refused BM Bx           Moderate COPD (chronic obstructive pulmonary disease)    - Supplemental oxygen  - DuoNebs  - Continue formoterol and BiPAP.hs             VTE Risk Mitigation         Ordered     Medium Risk of VTE  Once      09/18/17 8156              Farshad Stephens MD  Department of Hospital Medicine   [facility]

## 2017-09-21 NOTE — SUBJECTIVE & OBJECTIVE
Interval History: Pt was seen and examined at bedside . He is aao x 3  In mild respiratory distress which is seen Is his baseline .  No acute event overnight     Review of Systems   Constitutional: Positive for activity change. Negative for appetite change, diaphoresis, fatigue and fever.   HENT: Negative.    Eyes: Negative for pain and redness.   Respiratory: Positive for cough and shortness of breath. Negative for wheezing.    Cardiovascular: Positive for leg swelling. Negative for chest pain and palpitations.   Gastrointestinal: Negative for abdominal pain, diarrhea, nausea and vomiting.   Skin: Negative for pallor, rash and wound.     Objective:     Vital Signs (Most Recent):  Temp: 98 °F (36.7 °C) (09/21/17 1223)  Pulse: (!) 131 (09/21/17 1223)  Resp: 20 (09/21/17 1223)  BP: 92/64 (09/21/17 1223)  SpO2: (!) 92 % (09/21/17 1223) Vital Signs (24h Range):  Temp:  [97.3 °F (36.3 °C)-98.1 °F (36.7 °C)] 98 °F (36.7 °C)  Pulse:  [] 131  Resp:  [18-22] 20  SpO2:  [92 %-99 %] 92 %  BP: ()/(51-70) 92/64     Weight: 84.4 kg (186 lb)  Body mass index is 25.94 kg/m².    Intake/Output Summary (Last 24 hours) at 09/21/17 1423  Last data filed at 09/21/17 1300   Gross per 24 hour   Intake              710 ml   Output             1375 ml   Net             -665 ml      Physical Exam   Constitutional: He is oriented to person, place, and time. He appears well-developed and well-nourished. He does not have a sickly appearance.   HENT:   Head: Normocephalic and atraumatic.   Eyes: Conjunctivae are normal. Pupils are equal, round, and reactive to light. No scleral icterus.   Neck: Normal range of motion. Neck supple.   Cardiovascular: Regular rhythm and normal heart sounds.  Tachycardia present.  Exam reveals no gallop and no friction rub.    No murmur heard.  Pulmonary/Chest: No tachypnea. No respiratory distress. He has rales in the right lower field and the left lower field.   Fine rales to LLL and diminished to RLL    Abdominal: Soft. Bowel sounds are normal.   Musculoskeletal: Normal range of motion. He exhibits edema. He exhibits no tenderness.        Right lower leg: He exhibits edema.        Legs:  Neurological: He is alert and oriented to person, place, and time.   Skin: Skin is warm and dry.   Dry skin   Psychiatric: He has a normal mood and affect. His behavior is normal.   Nursing note and vitals reviewed.      Significant Labs:   Blood Culture: No results for input(s): LABBLOO in the last 48 hours.  BMP:   Recent Labs  Lab 09/21/17  1059   *      K 3.8      CO2 19*   BUN 68*   CREATININE 1.9*   CALCIUM 9.5     CBC:   Recent Labs  Lab 09/20/17  0414 09/21/17  0732   WBC 2.31* 3.07*   HGB 11.3* 12.5*   HCT 36.2* 40.3   PLT 37* 45*     Lipid Panel: No results for input(s): CHOL, HDL, LDLCALC, TRIG, CHOLHDL in the last 48 hours.  TSH:   Recent Labs  Lab 09/01/17  1038   TSH 9.916*     Urine Culture: No results for input(s): LABURIN in the last 48 hours.  Urine Studies: No results for input(s): COLORU, APPEARANCEUA, PHUR, SPECGRAV, PROTEINUA, GLUCUA, KETONESU, BILIRUBINUA, OCCULTUA, NITRITE, UROBILINOGEN, LEUKOCYTESUR, RBCUA, WBCUA, BACTERIA, SQUAMEPITHEL, HYALINECASTS in the last 48 hours.    Invalid input(s): WRIGHTJAYDEN    Significant Imaging: I have reviewed all pertinent imaging results/findings within the past 24 hours.

## 2017-09-21 NOTE — PLAN OF CARE
Problem: Patient Care Overview  Goal: Plan of Care Review  Outcome: Ongoing (interventions implemented as appropriate)  Pt alert and oriented. POC reviewed,antibiotics administered per orders. O2 @5L NC,Willie CDI,Pt remained free of falls during shift, Family at bedside, call light in reach, room free of clutter, side rails up x2, pt on telemetry monitor, will continue to monitor.

## 2017-09-21 NOTE — ASSESSMENT & PLAN NOTE
I have reviewed notes going back to 2015 and2016 with Dr. Figueredo who had seen the patient at that time a decision was made not to proceed with a bone marrow aspirate and biopsy which I agree with at this point I believe his low platelet count is most likely exacerbated by his acute medical problems.  The differential on his CBC is normal his hemoglobin is remained relatively stable he is not actively bleeding at this point and I would recommend that the patient I would observe the patient's platelet count to see if this been any dramatic change.  The wife and the patient do not wish to proceed with bone marrow aspirate and biopsy which revealed the next logical test to do.  Discussed with patient again this morning on 9/21/17 the procedure to do a bone marrow aspirate and biopsy to discover whether not he has myelodysplasia decline at the present time will repeat CBC today to see trend most likely clinical exacerbation of thrombocytopenia from stress situation from other medical conditions

## 2017-09-21 NOTE — PROGRESS NOTES
[facility] -   Hematology/Oncology  Progress Note    Patient Name: Orion Rinaldi  Admission Date: 9/18/2017  Hospital Length of Stay: 3 days  Code Status: Full Code     Subjective:     HPI:  81-year-old male history of recent admission to the hospital for pneumonia was found have a low platelet count previously been seen by hematology oncology service [facility] [place] in 2015 and 16 with low platelet count determination not to proceed with bone marrow at that time for possibility of myelodysplasia    Interval History: patient has CPAP BiPAP machine on today.  MrBeckie by wife is very concerned about catheter with discoloration and urine answered questions about low platelet count told him the only way to be 100% certain of underlying myelodysplasia would be a bone marrow is clinical condition and the patient does not wish to pursue this    Oncology Treatment Plan:   [No treatment plan]    Medications:  Continuous Infusions:   Scheduled Meds:   albuterol-ipratropium 2.5mg-0.5mg/3mL  3 mL Nebulization Q6H WAKE    arformoterol  15 mcg Nebulization BID    aspirin  81 mg Oral Daily    ciprofloxacin (CIPRO)400mg/200ml D5W IVPB  400 mg Intravenous Q12H    levetiracetam  750 mg Oral BID    losartan  12.5 mg Oral Nightly    methylPREDNISolone sodium succinate  80 mg Intravenous Q6H    metoprolol tartrate  12.5 mg Oral BID    pantoprazole  40 mg Oral Daily    piperacillin-tazobactam (ZOSYN) IVPB  4.5 g Intravenous Q8H    roflumilast  500 mcg Oral Daily    simvastatin  40 mg Oral QHS    torsemide  20 mg Oral Daily    vancomycin (VANCOCIN) IVPB  1,250 mg Intravenous Q24H     PRN Meds:acetaminophen, aluminum-magnesium hydroxide-simethicone, hydrALAZINE, influenza, ondansetron     Review of Systems   Constitutional: Positive for activity change and fatigue. Negative for appetite change, chills, diaphoresis, fever and unexpected weight change.   HENT: Negative for congestion, dental problem,  drooling, ear discharge, ear pain, facial swelling, hearing loss, mouth sores, nosebleeds, postnasal drip, rhinorrhea, sinus pressure, sneezing, sore throat, tinnitus, trouble swallowing and voice change.    Eyes: Negative for photophobia, pain, discharge, redness, itching and visual disturbance.   Respiratory: Positive for shortness of breath. Negative for apnea, cough, choking, chest tightness, wheezing and stridor.    Cardiovascular: Negative for chest pain, palpitations and leg swelling.   Gastrointestinal: Negative for abdominal distention, abdominal pain, anal bleeding, blood in stool, constipation, diarrhea, nausea, rectal pain and vomiting.   Endocrine: Negative for cold intolerance, heat intolerance, polydipsia, polyphagia and polyuria.   Genitourinary: Positive for difficulty urinating. Negative for decreased urine volume, discharge, dysuria, enuresis, flank pain, frequency, genital sores, hematuria, penile pain, penile swelling, scrotal swelling, testicular pain and urgency.   Musculoskeletal: Negative for arthralgias, back pain, gait problem, joint swelling, myalgias, neck pain and neck stiffness.   Skin: Negative for color change, pallor, rash and wound.   Allergic/Immunologic: Negative for environmental allergies, food allergies and immunocompromised state.   Neurological: Positive for weakness. Negative for dizziness, tremors, seizures, syncope, facial asymmetry, speech difficulty, light-headedness, numbness and headaches.   Hematological: Negative for adenopathy. Does not bruise/bleed easily.   Psychiatric/Behavioral: Positive for dysphoric mood. Negative for agitation, behavioral problems, confusion, decreased concentration, hallucinations, self-injury, sleep disturbance and suicidal ideas. The patient is nervous/anxious. The patient is not hyperactive.      Objective:     Vital Signs (Most Recent):  Temp: 97.3 °F (36.3 °C) (09/21/17 0418)  Pulse: 83 (09/21/17 0418)  Resp: 20 (09/21/17 0418)  BP:  110/70 (09/21/17 0418)  SpO2: 99 % (09/21/17 0418) Vital Signs (24h Range):  Temp:  [97.3 °F (36.3 °C)-98.7 °F (37.1 °C)] 97.3 °F (36.3 °C)  Pulse:  [] 83  Resp:  [18-29] 20  SpO2:  [76 %-99 %] 99 %  BP: ()/(51-70) 110/70     Weight: 84.4 kg (186 lb)  Body mass index is 25.94 kg/m².  Body surface area is 2.06 meters squared.      Intake/Output Summary (Last 24 hours) at 09/21/17 0634  Last data filed at 09/21/17 0600   Gross per 24 hour   Intake              810 ml   Output             1435 ml   Net             -625 ml       Physical Exam   Constitutional: He is oriented to person, place, and time. He appears cachectic. He has a sickly appearance. He appears ill. He appears distressed.   HENT:   Head: Normocephalic.   Right Ear: External ear normal.   Left Ear: External ear normal.   Nose: Nose normal. Right sinus exhibits no maxillary sinus tenderness and no frontal sinus tenderness. Left sinus exhibits no maxillary sinus tenderness and no frontal sinus tenderness.   Mouth/Throat: Oropharynx is clear and moist. No oropharyngeal exudate.   Eyes: EOM and lids are normal. Pupils are equal, round, and reactive to light. Right eye exhibits no discharge. Left eye exhibits no discharge. Right conjunctiva is not injected. Right conjunctiva has no hemorrhage. Left conjunctiva is not injected. Left conjunctiva has no hemorrhage. No scleral icterus. Right eye exhibits normal extraocular motion. Left eye exhibits normal extraocular motion.   Neck: Normal range of motion. Neck supple. No JVD present. No tracheal deviation present. No thyromegaly present.   Cardiovascular: Normal rate, regular rhythm and normal heart sounds.    Pulmonary/Chest: Breath sounds normal. No stridor. He is in respiratory distress.   Abdominal: Soft. Bowel sounds are normal. He exhibits no mass. There is no hepatosplenomegaly, splenomegaly or hepatomegaly. There is no tenderness.   Musculoskeletal: Normal range of motion. He exhibits no  edema or tenderness.   Lymphadenopathy:        Head (right side): No posterior auricular and no occipital adenopathy present.        Head (left side): No posterior auricular and no occipital adenopathy present.     He has no cervical adenopathy.        Right cervical: No superficial cervical, no deep cervical and no posterior cervical adenopathy present.       Left cervical: No superficial cervical, no deep cervical and no posterior cervical adenopathy present.     He has no axillary adenopathy.        Right: No supraclavicular adenopathy present.        Left: No supraclavicular adenopathy present.   Neurological: He is alert and oriented to person, place, and time. He has normal strength. No cranial nerve deficit. Coordination normal.   Skin: Skin is dry. No rash noted. He is not diaphoretic. No cyanosis or erythema. Nails show no clubbing.   Psychiatric: His behavior is normal. Judgment and thought content normal. His mood appears anxious. Cognition and memory are normal. He exhibits a depressed mood.   Vitals reviewed.      Significant Labs:   BMP:   Recent Labs  Lab 09/20/17 0414   *      K 3.8   *   CO2 18*   BUN 57*   CREATININE 1.5*   CALCIUM 8.8   , CBC:   Recent Labs  Lab 09/20/17 0414   WBC 2.31*   HGB 11.3*   HCT 36.2*   PLT 37*    and CMP:   Recent Labs  Lab 09/20/17 0414      K 3.8   *   CO2 18*   *   BUN 57*   CREATININE 1.5*   CALCIUM 8.8   PROT 6.6   ALBUMIN 3.0*   BILITOT 2.1*   ALKPHOS 62   AST 16   ALT 14   ANIONGAP 12   EGFRNONAA 44*       Diagnostic Results:  I have reviewed and interpreted all pertinent imaging results/findings within the past 24 hours.    Assessment/Plan:     Thrombocytopenia, with anemia    I have reviewed notes going back to 2015 [id number] with Dr. Figueredo who had seen the patient at that time a decision was made not to proceed with a bone marrow aspirate and biopsy which I agree with at this point I believe his low platelet count is  most likely exacerbated by his acute medical problems.  The differential on his CBC is normal his hemoglobin is remained relatively stable he is not actively bleeding at this point and I would recommend that the patient I would observe the patient's platelet count to see if this been any dramatic change.  The wife and the patient do not wish to proceed with bone marrow aspirate and biopsy which revealed the next logical test to do.  Discussed with patient again this morning on 9/21/17 the procedure to do a bone marrow aspirate and biopsy to discover whether not he has myelodysplasia decline at the present time will repeat CBC today to see trend most likely clinical exacerbation of thrombocytopenia from stress situation from other medical conditions            Thank you for your consult. I will follow-up with patient. Please contact us if you have any additional questions.     Timur Hamlin MD  Hematology/Oncology  [facility] -

## 2017-09-21 NOTE — PLAN OF CARE
Problem: Patient Care Overview  Goal: Plan of Care Review  Outcome: Ongoing (interventions implemented as appropriate)  Injury free this shift. Ambulating with assist. Voiding spontaneously. All meds administered as ordered. NSR-ST-Afib on the monitor with PVCs. NAD. O2 in place via NC 4L. POC reviewed with pt and spouse. Verbalize understanding. Fall precautions in place. VSS. Chart reviewed. Will monitor.

## 2017-09-21 NOTE — PROGRESS NOTES
Cardiology Progress Note        SUBJECTIVE:     History of Present Illness:  Patient is a 81 y.o. male who presents with pneumonia, CHF, thrombocytopenia and new onset atrial fibrillation. Patient seen and examined today, sitting up in bed. Feels slightly stronger, still SOB. No chest pain. Continues to go in and out of afib. Labs reviewed. Creatinine 1.9. Platelets 45,000. Hematology note reviewed.       OBJECTIVE:     Vital Signs (Most Recent)  Temp: 98 °F (36.7 °C) (09/21/17 1223)  Pulse: (!) 131 (09/21/17 1223)  Resp: 20 (09/21/17 1223)  BP: 92/64 (09/21/17 1223)  SpO2: (!) 92 % (09/21/17 1223)    Vital Signs Range (Last 24H):  Temp:  [97.3 °F (36.3 °C)-98 °F (36.7 °C)]   Pulse:  []   Resp:  [18-22]   BP: ()/(51-70)   SpO2:  [92 %-99 %]     Intake/Output last 3 shifts:  I/O last 3 completed shifts:  In: 1460 [P.O.:60; IV Piggyback:1400]  Out: 2240 [Urine:2240]    Intake/Output this shift:  I/O this shift:  In: 100 [IV Piggyback:100]  Out: 525 [Urine:525]    Review of patient's allergies indicates:   Allergen Reactions    Doxycycline Nausea Only and Other (See Comments)     Dizziness     Pneumococcal vaccine        Current Facility-Administered Medications   Medication    acetaminophen tablet 650 mg    albuterol-ipratropium 2.5mg-0.5mg/3mL nebulizer solution 3 mL    aluminum-magnesium hydroxide-simethicone 200-200-20 mg/5 mL suspension 30 mL    arformoterol nebulizer solution 15 mcg    aspirin chewable tablet 81 mg    hydrALAZINE injection 10 mg    influenza (FLUZONE,FLUARIX QUADRIVALENT) vaccine 0.5 mL    levetiracetam tablet 750 mg    losartan split tablet 12.5 mg    metoprolol tartrate (LOPRESSOR) tablet 25 mg    moxifloxacin tablet 400 mg    ondansetron injection 4 mg    pantoprazole EC tablet 40 mg    piperacillin-tazobactam 4.5 g in dextrose 5 % 100 mL IVPB (ready to mix system)    predniSONE tablet 20 mg    roflumilast tablet 500 mcg    simvastatin tablet 40 mg     No current  facility-administered medications on file prior to encounter.      Current Outpatient Prescriptions on File Prior to Encounter   Medication Sig    albuterol-ipratropium 2.5mg-0.5mg/3mL (DUO-NEB) 0.5 mg-3 mg(2.5 mg base)/3 mL nebulizer solution Take 3 mLs by nebulization every 8 (eight) hours as needed for Wheezing.    aspirin 81 MG Chew Take 81 mg by mouth once daily.    budesonide-formoterol 80-4.5 mcg (SYMBICORT) 80-4.5 mcg/actuation HFAA Inhale 2 puffs into the lungs 2 (two) times daily. Pharmacy to adjust to cheaper tier options    levetiracetam (KEPPRA) 750 MG Tab Take 1 tablet (750 mg total) by mouth 2 (two) times daily.    losartan (COZAAR) 25 MG tablet Take 0.5 tablets (12.5 mg total) by mouth nightly.    metoprolol tartrate (LOPRESSOR) 25 MG tablet Take 0.5 tablets (12.5 mg total) by mouth 2 (two) times daily.    OXYGEN-AIR DELIVERY SYSTEMS MISC by Misc.(Non-Drug; Combo Route) route.    simvastatin (ZOCOR) 40 MG tablet TAKE 1 TABLET EVERY EVENING.    spironolactone (ALDACTONE) 25 MG tablet Take 1 tablet (25 mg total) by mouth every evening.    tiotropium (SPIRIVA WITH HANDIHALER) 18 mcg inhalation capsule Inhale 1 capsule (18 mcg total) into the lungs once daily. Pharmacy to adjust to cheaper tier options    torsemide (DEMADEX) 20 MG Tab 20 mg once daily.     roflumilast 500 mcg Tab Take 1 tablet (500 mcg total) by mouth once daily.       Physical Exam:  General appearance: alert, appears stated age and cooperative  Head: Normocephalic, without obvious abnormality, atraumatic  Neck: no adenopathy, no carotid bruit, +JVD  Lungs:  Bibasilar rales  Chest wall: no tenderness  Heart: irregularly irregular rate and rhythm, tachycardic, S1, S2 normal, no murmur, click, rub or gallop  Abdomen: soft, non-tender  Extremities: extremities normal, atraumatic, 2-3+ pitting BLE edema  Skin: Skin color, texture, turgor normal. Scattered bruises BUE  Neurologic: Grossly normal, AAO x  3    Laboratory:  Chemistry:   Lab Results   Component Value Date     09/21/2017    K 3.8 09/21/2017     09/21/2017    CO2 19 (L) 09/21/2017    BUN 68 (H) 09/21/2017    CREATININE 1.9 (H) 09/21/2017    GLUCOSE 103 01/07/2010    CALCIUM 9.5 09/21/2017     Cardiac Markers:   Lab Results   Component Value Date    CKMB 2.4 01/08/2013    TROPONINI 0.040 (H) 09/18/2017    TROPONINI 0.07 01/08/2013     Cardiac Markers (Last 3):   Lab Results   Component Value Date    CKMB 2.4 01/08/2013    CKMB 2.6 01/08/2013    TROPONINI 0.040 (H) 09/18/2017    TROPONINI 0.040 (H) 07/04/2017    TROPONINI 0.018 07/03/2017    TROPONINI 0.07 01/08/2013    TROPONINI 0.09 (H) 01/08/2013     CBC:   Lab Results   Component Value Date    WBC 3.07 (L) 09/21/2017    HGB 12.5 (L) 09/21/2017    HCT 40.3 09/21/2017    HCT 30 (L) 06/20/2013    MCV 96 09/21/2017    PLT 45 (L) 09/21/2017     Lipids:   Lab Results   Component Value Date    CHOL 123 03/25/2013    TRIG 83 03/25/2013    HDL 40 03/25/2013     Coagulation:   Lab Results   Component Value Date    INR 1.5 (H) 09/18/2017    APTT 27.3 09/18/2017       Diagnostic Results:  ECG: Reviewed  X-Ray: Reviewed  US: Reviewed  CT: Reviewed  Echo: Reviewed      ASSESSMENT/PLAN:     Patient Active Problem List   Diagnosis    Aortic stenosis    Moderate COPD (chronic obstructive pulmonary disease)    Lens replaced by other means - Both Eyes    Dry eye - Both Eyes    Posterior vitreous detachment, both eyes - Both Eyes    Brow ptosis - Both Eyes    Seizures    Dizziness    Pulmonary heart disease    Cardiomyopathy, ischemic    H/O aortic valve replacement    DJD (degenerative joint disease) of lumbar spine    Impaired gait    Thrombocytopenia, with anemia    Transient synovitis of right knee    Effusion of right knee    Arthritis of right knee    Pulmonary emphysema    Respiratory failure with hypoxia    Mitral stenosis-moderate    Acute on chronic combined systolic and  diastolic congestive heart failure    Pseudophakia    Refractive error    Nonexudative senile macular degeneration of retina    Atherosclerosis of arteries of extremities    Hypercholesterolemia    Acute renal failure    Pulmonary hypertension    Elevation of cardiac enzymes    Hyperglycemia, unspecified    Respiratory failure, acute and chronic    Nocturnal hypoxemia due to emphysema    COPD exacerbation    Coronary artery disease involving native coronary artery of native heart without angina pectoris    LLL pneumonia    Sepsis    Acute respiratory failure with hypoxia and hypercarbia    Acute hypoxemic respiratory failure    PAF (paroxysmal atrial fibrillation)    Chronic kidney disease, stage III (moderate)      Patient who presents with SOB secondary to pneumonia, CHF, and underlying COPD. Still remains SOB, but slowly improving. New onset afib triggered by illness/respiratory status, continue BB. No anticoagulation given thrombocytopenia. Continue ASA. Hematology note reviewed, input appreciated. Patient declined BM biopsy.   Plan:   -Continue ASA, BB, ARB, torsemide, statin  -Agree with extra IV Lasix  -No OAC given thrombocytopenia  -Declined BM biopsy    Chart reviewed. Dr. Mario examined patient and agrees with plan as outlined above.

## 2017-09-21 NOTE — PROGRESS NOTES
[facility] -   Pulmonology  Progress Note    Patient Name: Orion Rinaldi  MRN: [id number]  Admission Date: 9/18/2017  Hospital Length of Stay: 3 days  Code Status: Full Code  Attending Provider: Farshad Stephens, *  Primary Care Provider: Daisy Oconnor MD   Principal Problem: LLL pneumonia    Subjective: Concerned about heart rate to 130 (intermittent Atrial Fibrillation )   80 y/o with longstanding history of Chronic Obstructive Pulmonary Disease, chronic respiratory failure on oxygen and trilogy nocturnal ventilation presents with 1 day history of shortness of breath, cough, chills and chest congestion. On presentation he was cyanotic, tachycardic and noted to have left lower lobe infiltrate on Chest X Ray with lactic acidosis   9/19/2017 Less dyspnea but still weak. Able to get out of chair     9/20 Transferred to floor, still dyspneic but improved     Interval History:  9/21 Still dyspneic. Slow improvement. Dyspneic and tachycardia with walking to bathroom. Tapia removed    Objective:     Vital Signs (Most Recent):  Temp: 97.6 °F (36.4 °C) (09/21/17 0757)  Pulse: 90 (09/21/17 0757)  Resp: 18 (09/21/17 0757)  BP: 109/67 (09/21/17 0757)  SpO2: 97 % (09/21/17 0757) Vital Signs (24h Range):  Temp:  [97.3 °F (36.3 °C)-98.1 °F (36.7 °C)] 97.6 °F (36.4 °C)  Pulse:  [] 90  Resp:  [18-22] 18  SpO2:  [92 %-99 %] 97 %  BP: ()/(51-70) 109/67     Weight: 84.4 kg (186 lb)  Body mass index is 25.94 kg/m².      Intake/Output Summary (Last 24 hours) at 09/21/17 1219  Last data filed at 09/21/17 1216   Gross per 24 hour   Intake              610 ml   Output             1565 ml   Net             -955 ml       Physical Exam   Constitutional: He is oriented to person, place, and time. He appears well-developed and well-nourished.   HENT:   Head: Normocephalic and atraumatic.   Mouth/Throat: Oropharyngeal exudate present.   Eyes: Conjunctivae are normal. Pupils are equal, round, and reactive to  light.   Neck: Neck supple. No JVD present. No tracheal deviation present. No thyromegaly present.   Cardiovascular: Normal rate, regular rhythm and normal heart sounds.    No murmur heard.  Pulmonary/Chest: Effort normal. He has decreased breath sounds. He has wheezes in the right lower field and the left lower field. He has no rhonchi. He has no rales.   Abdominal: Soft. Bowel sounds are normal.   Musculoskeletal: Normal range of motion. He exhibits no edema or tenderness.   Lymphadenopathy:     He has no cervical adenopathy.   Neurological: He is alert and oriented to person, place, and time.   Skin: Skin is warm and dry.   Nursing note and vitals reviewed.      Vents:  Oxygen Concentration (%): 36 (09/20/17 1355)    Lines/Drains/Airways     Peripheral Intravenous Line                 Peripheral IV - Single Lumen 09/18/17 1919 Right Antecubital 2 days         Peripheral IV - Single Lumen 09/18/17 2025 Left Forearm 2 days                Significant Labs:    CBC/Anemia Profile:    Recent Labs  Lab 09/20/17 0414 09/21/17  0732   WBC 2.31* 3.07*   HGB 11.3* 12.5*   HCT 36.2* 40.3   PLT 37* 45*   MCV 94 96   RDW 19.8* 19.7*        Chemistries:    Recent Labs  Lab 09/20/17  0414 09/21/17  1059    142   K 3.8 3.8   * 108   CO2 18* 19*   BUN 57* 68*   CREATININE 1.5* 1.9*   CALCIUM 8.8 9.5   ALBUMIN 3.0* 3.4*   PROT 6.6 7.3   BILITOT 2.1* 2.1*   ALKPHOS 62 65   ALT 14 17   AST 16 16       Blood Culture: No results for input(s): LABBLOO in the last 48 hours.  BMP:   Recent Labs  Lab 09/21/17  1059   *      K 3.8      CO2 19*   BUN 68*   CREATININE 1.9*   CALCIUM 9.5     CMP:   Recent Labs  Lab 09/20/17  0414 09/21/17  1059    142   K 3.8 3.8   * 108   CO2 18* 19*   * 180*   BUN 57* 68*   CREATININE 1.5* 1.9*   CALCIUM 8.8 9.5   PROT 6.6 7.3   ALBUMIN 3.0* 3.4*   BILITOT 2.1* 2.1*   ALKPHOS 62 65   AST 16 16   ALT 14 17   ANIONGAP 12 15   EGFRNONAA 44* 33*     Respiratory  Culture: No results for input(s): GSRESP, RESPIRATORYC in the last 48 hours.  All pertinent labs within the past 24 hours have been reviewed.    Significant Imaging:  I have reviewed all pertinent imaging results/findings within the past 24 hours.    Assessment/Plan:     Active Diagnoses:    Diagnosis Date Noted POA    PRINCIPAL PROBLEM:  LLL pneumonia [J18.1] 09/18/2017 Yes    Acute hypoxemic respiratory failure [J96.01] 09/20/2017 Yes    PAF (paroxysmal atrial fibrillation) [I48.0] 09/20/2017 Unknown    Sepsis [A41.9] 09/18/2017 Yes    Coronary artery disease involving native coronary artery of native heart without angina pectoris [I25.10] 07/03/2017 Yes     Chronic    Acute on chronic combined systolic and diastolic congestive heart failure [I50.43] 01/08/2016 Yes    Thrombocytopenia, with anemia [D69.6] 08/10/2015 Yes     Chronic    Moderate COPD (chronic obstructive pulmonary disease) [J44.9] 03/12/2013 Yes      Problems Resolved During this Admission:    Diagnosis Date Noted Date Resolved POA       PLAN: physical therapy  Probably CAP - de-escalate antibiotics, switch to po  Reduce steroids  Switch to oral prednisone tomorrow  Discharge planning - may need LTAC  Very weak - needs help activities of daily living       Anthony Hinton MD  Pulmonology  [facility]

## 2017-09-21 NOTE — ASSESSMENT & PLAN NOTE
- Chronic low   - No signs of active bleeding  - Pharmacological DVT prophylaxis held  - No indication for transfusion unless there is active signs of bleeding.   -  Possible  Worse due to sepsis  - Hem/onc  On board   -Pt  Refused BM Bx

## 2017-09-22 PROBLEM — N39.0 E. COLI UTI (URINARY TRACT INFECTION): Status: ACTIVE | Noted: 2017-01-01

## 2017-09-22 PROBLEM — B96.20 E. COLI UTI (URINARY TRACT INFECTION): Status: ACTIVE | Noted: 2017-01-01

## 2017-09-22 NOTE — SUBJECTIVE & OBJECTIVE
Interval History: Pt was seen and examined at bedside .  He is continue with  SOB on exertion .     Review of Systems   Constitutional: Positive for activity change. Negative for appetite change, diaphoresis, fatigue and fever.   HENT: Negative.    Eyes: Negative for pain and redness.   Respiratory: Positive for cough and shortness of breath. Negative for wheezing.    Cardiovascular: Positive for leg swelling. Negative for chest pain and palpitations.   Gastrointestinal: Negative for abdominal pain, diarrhea, nausea and vomiting.   Skin: Negative for pallor, rash and wound.     Objective:     Vital Signs (Most Recent):  Temp: 98 °F (36.7 °C) (09/22/17 1148)  Pulse: 75 (09/22/17 1148)  Resp: 20 (09/22/17 1148)  BP: (!) 107/56 (09/22/17 1148)  SpO2: 97 % (09/22/17 1148) Vital Signs (24h Range):  Temp:  [97.3 °F (36.3 °C)-98.9 °F (37.2 °C)] 98 °F (36.7 °C)  Pulse:  [] 75  Resp:  [18-22] 20  SpO2:  [90 %-100 %] 97 %  BP: ()/(52-66) 107/56     Weight: 84.1 kg (185 lb 8 oz)  Body mass index is 25.87 kg/m².    Intake/Output Summary (Last 24 hours) at 09/22/17 1316  Last data filed at 09/22/17 1200   Gross per 24 hour   Intake              570 ml   Output             1575 ml   Net            -1005 ml      Physical Exam   Constitutional: He is oriented to person, place, and time. He appears well-developed and well-nourished.   HENT:   Head: Normocephalic and atraumatic.   Nose: Nose normal.   Mouth/Throat: Oropharynx is clear and moist.   Eyes: Conjunctivae and EOM are normal. Pupils are equal, round, and reactive to light.   Neck: Normal range of motion. Neck supple. No JVD present.   Cardiovascular: Normal rate, regular rhythm and normal heart sounds.    No murmur heard.  Pulmonary/Chest: Effort normal. He has decreased breath sounds. He has no wheezes. He has no rhonchi.   Abdominal: Soft. Bowel sounds are normal.   Musculoskeletal: Normal range of motion. He exhibits edema.   Neurological: He is alert and  oriented to person, place, and time.   Skin: Skin is warm and dry.   Nursing note and vitals reviewed.      Significant Labs:   BMP:   Recent Labs  Lab 09/21/17  1059   *      K 3.8      CO2 19*   BUN 68*   CREATININE 1.9*   CALCIUM 9.5     CBC:   Recent Labs  Lab 09/21/17  0732   WBC 3.07*   HGB 12.5*   HCT 40.3   PLT 45*     TSH:   Recent Labs  Lab 09/01/17  1038   TSH 9.916*       Significant Imaging: I have reviewed all pertinent imaging results/findings within the past 24 hours.

## 2017-09-22 NOTE — PLAN OF CARE
Problem: Patient Care Overview  Goal: Plan of Care Review  Outcome: Ongoing (interventions implemented as appropriate)  Pt remained free of injury during shift, stable condition, nonverbal indicators of pain  absent, no acute distress, receiving antibiotics, on cardiac monitoring (Afib to SR with PVCs, HR 80-90s), and will continue to monitor. 24hr chart review performed.

## 2017-09-22 NOTE — PROGRESS NOTES
[facility] Hospital Medicine  Progress Note    Patient Name: Orion Rinaldi  MRN: [id number]  Patient Class: IP- Inpatient   Admission Date: 9/18/2017  Length of Stay: 4 days  Attending Physician: Farshad Stephens, *  Primary Care Provider: Daisy Oconnor MD        Subjective:     Principal Problem:LLL pneumonia    HPI:  Orion Rinaldi is a 82 yo male with Chronic Respiratory Failure (uses 5 L NC along with Triology vent at home), COPD, Systolic Heart Failure, S/P TAVR and Pulmonary HTN who presents to the ED with sudden onset of severe SOB this afternoon. Associated symptoms include chills, worsened leg swelling and generalized weakness. Pt is reported to be cyanotic upon arrival to the ED.  Vital signs note tachycardia with resp rate 28 - 44. CBC finds thrombocytopenia, CMP finds a metabolic alkalosis, BUN ^ 62, serum creatinine 1.9 and BNP 1977. Lactic acidosis = 3.9. CXR notes a LLL infiltrate. Pt is admitted with respiratory failure, pneumonia and decompensated heart failure    Hospital Course:  9/19 - Reports ongoing dyspnea and cough.  9/20 Pt was seen and examined at bedside . He states feel better . Pt  Develop a PAF last night . Cardiology on board   9/21 Pt was seen and examined at bedside . He states cont  With  Sob but is improving .    No acute event overnight   9/22 Pt states  Cont  With sob on exertion . No acute event overnight .    Interval History: Pt was seen and examined at bedside .  He is continue with  SOB on exertion .     Review of Systems   Constitutional: Positive for activity change. Negative for appetite change, diaphoresis, fatigue and fever.   HENT: Negative.    Eyes: Negative for pain and redness.   Respiratory: Positive for cough and shortness of breath. Negative for wheezing.    Cardiovascular: Positive for leg swelling. Negative for chest pain and palpitations.   Gastrointestinal: Negative for abdominal pain, diarrhea, nausea and vomiting.   Skin:  Negative for pallor, rash and wound.     Objective:     Vital Signs (Most Recent):  Temp: 98 °F (36.7 °C) (09/22/17 1148)  Pulse: 75 (09/22/17 1148)  Resp: 20 (09/22/17 1148)  BP: (!) 107/56 (09/22/17 1148)  SpO2: 97 % (09/22/17 1148) Vital Signs (24h Range):  Temp:  [97.3 °F (36.3 °C)-98.9 °F (37.2 °C)] 98 °F (36.7 °C)  Pulse:  [] 75  Resp:  [18-22] 20  SpO2:  [90 %-100 %] 97 %  BP: ()/(52-66) 107/56     Weight: 84.1 kg (185 lb 8 oz)  Body mass index is 25.87 kg/m².    Intake/Output Summary (Last 24 hours) at 09/22/17 1316  Last data filed at 09/22/17 1200   Gross per 24 hour   Intake              570 ml   Output             1575 ml   Net            -1005 ml      Physical Exam   Constitutional: He is oriented to person, place, and time. He appears well-developed and well-nourished.   HENT:   Head: Normocephalic and atraumatic.   Nose: Nose normal.   Mouth/Throat: Oropharynx is clear and moist.   Eyes: Conjunctivae and EOM are normal. Pupils are equal, round, and reactive to light.   Neck: Normal range of motion. Neck supple. No JVD present.   Cardiovascular: Normal rate, regular rhythm and normal heart sounds.    No murmur heard.  Pulmonary/Chest: Effort normal. He has decreased breath sounds. He has no wheezes. He has no rhonchi.   Abdominal: Soft. Bowel sounds are normal.   Musculoskeletal: Normal range of motion. He exhibits edema.   Neurological: He is alert and oriented to person, place, and time.   Skin: Skin is warm and dry.   Nursing note and vitals reviewed.      Significant Labs:   BMP:   Recent Labs  Lab 09/21/17  1059   *      K 3.8      CO2 19*   BUN 68*   CREATININE 1.9*   CALCIUM 9.5     CBC:   Recent Labs  Lab 09/21/17  0732   WBC 3.07*   HGB 12.5*   HCT 40.3   PLT 45*     TSH:   Recent Labs  Lab 09/01/17  1038   TSH 9.916*       Significant Imaging: I have reviewed all pertinent imaging results/findings within the past 24 hours.    Assessment/Plan:      * TASHL  pneumonia    - CT chest suggestive of penumonia  - Continue  avelox           Chronic kidney disease, stage III (moderate)    - stable   - cont monitor  - expect to worse due to diuresis             PAF (paroxysmal atrial fibrillation)    - Platelet  Low  - hold oac for now  - cardiology on board  -  Increased  bb           Acute hypoxemic respiratory failure    -improving .  - On broad spectrum antibiotics.  - Continued BiPAP use.              Acute respiratory failure with hypoxia and hypercarbia              E. coli UTI (urinary tract infection)    -CX from this admission neg           Coronary artery disease involving native coronary artery of native heart without angina pectoris    - Continue ASA, metoprolol, simvastatin  - s/p TAVR          Acute on chronic combined systolic and diastolic congestive heart failure     Cont cHF core measures                Thrombocytopenia, with anemia    - Chronic low   - No signs of active bleeding  - Pharmacological DVT prophylaxis held  - No indication for transfusion unless there is active signs of bleeding.   -  Possible  Worse due to sepsis  - Hem/onc  On board   -Pt  Refused BM Bx           Moderate COPD (chronic obstructive pulmonary disease)    - Supplemental oxygen  - DuoNebs  - Continue formoterol and BiPAP.hs             VTE Risk Mitigation         Ordered     Medium Risk of VTE  Once      09/18/17 3984         Plan:    Cont current tx   Possible D/C home in 24 to 48 hrs        Farshad Stephens MD  Department of Hospital Medicine   [facility]

## 2017-09-22 NOTE — SUBJECTIVE & OBJECTIVE
Interval History:     Tolerated interventions well    Objective:     Vital Signs (Most Recent):  Temp: 98 °F (36.7 °C) (09/22/17 1148)  Pulse: 75 (09/22/17 1148)  Resp: 20 (09/22/17 1148)  BP: (!) 107/56 (09/22/17 1148)  SpO2: 97 % (09/22/17 1148) Vital Signs (24h Range):  Temp:  [97.3 °F (36.3 °C)-98.9 °F (37.2 °C)] 98 °F (36.7 °C)  Pulse:  [] 75  Resp:  [18-22] 20  SpO2:  [90 %-100 %] 97 %  BP: ()/(52-66) 107/56     Weight: 84.1 kg (185 lb 8 oz)  Body mass index is 25.87 kg/m².      Intake/Output Summary (Last 24 hours) at 09/22/17 1152  Last data filed at 09/22/17 0437   Gross per 24 hour   Intake              430 ml   Output             2100 ml   Net            -1670 ml       Physical Exam   Constitutional: He is oriented to person, place, and time. He appears well-developed and well-nourished.   HENT:   Head: Normocephalic and atraumatic.   Nose: Nose normal.   Mouth/Throat: Oropharynx is clear and moist.   Eyes: Conjunctivae and EOM are normal. Pupils are equal, round, and reactive to light.   Neck: Normal range of motion. Neck supple. No JVD present.   Cardiovascular: Normal rate, regular rhythm and normal heart sounds.    No murmur heard.  Pulmonary/Chest: Effort normal. He has decreased breath sounds. He has no wheezes. He has no rhonchi.   Abdominal: Soft. Bowel sounds are normal.   Musculoskeletal: Normal range of motion. He exhibits edema.   Neurological: He is alert and oriented to person, place, and time.   Skin: Skin is warm and dry.   Nursing note and vitals reviewed.      Vents:  Oxygen Concentration (%): 36 (09/21/17 2031)    Lines/Drains/Airways     Peripheral Intravenous Line                 Peripheral IV - Single Lumen 09/21/17 1930 Right Hand less than 1 day                Significant Labs:    CBC/Anemia Profile:    Recent Labs  Lab 09/21/17  0732   WBC 3.07*   HGB 12.5*   HCT 40.3   PLT 45*   MCV 96   RDW 19.7*        Chemistries:    Recent Labs  Lab 09/21/17  1059      K 3.8       CO2 19*   BUN 68*   CREATININE 1.9*   CALCIUM 9.5   ALBUMIN 3.4*   PROT 7.3   BILITOT 2.1*   ALKPHOS 65   ALT 17   AST 16       Blood Culture: No results for input(s): LABBLOO in the last 48 hours.  POCT Glucose: No results for input(s): POCTGLUCOSE in the last 48 hours.  Respiratory Culture: No results for input(s): GSRESP, RESPIRATORYC in the last 48 hours.  Urine Culture: No results for input(s): LABURIN in the last 48 hours.  All pertinent labs within the past 24 hours have been reviewed.    Significant Imaging:  I have reviewed and interpreted all pertinent imaging results/findings within the past 24 hours.

## 2017-09-22 NOTE — HPI
82 y/o with longstanding history of Chronic Obstructive Pulmonary Disease, chronic respiratory failure on oxygen and trilogy nocturnal ventilation presents with 1 day history of shortness of breath, cough, chills and chest congestion. On presentation he was cyanotic, tachycardic and noted to have left lower lobe infiltrate on Chest X Ray with lactic acidosis    Last seen July 2017  Orion Rinaldi is a 81 y.o. male with a PMH of Seizure disorder,  SP TAVR, CABG, CAD, Pulm HTN secondary to Chronic lung disease and Valvular disease, HLD, COPD, Systolic Heart Failure CHF NYHA class 4 and Chronic Resp Failure home O2 dependent.  HAS TRILOGY     Seen in clinic: home O2 dependent at 4 lpm on 24/7, Symbicort and Spiriva.    Frequent hospitalizations for  with Acute on Chronic Resp Failure, Acute on Chronic heart failure and COPD Exacerbation.

## 2017-09-22 NOTE — ASSESSMENT & PLAN NOTE
I have reviewed notes going back to 2015 and2016 with Dr. Figueredo who had seen the patient at that time a decision was made not to proceed with a bone marrow aspirate and biopsy which I agree with at this point I believe his low platelet count is most likely exacerbated by his acute medical problems.  The differential on his CBC is normal his hemoglobin is remained relatively stable he is not actively bleeding at this point and I would recommend that the patient I would observe the patient's platelet count to see if this been any dramatic change.  The wife and the patient do not wish to proceed with bone marrow aspirate and biopsy which revealed the next logical test to do.  Discussed with patient again this morning on 9/21/17 the procedure to do a bone marrow aspirate and biopsy to discover whether not he has myelodysplasia decline at the present time will repeat CBC today to see trend most likely clinical exacerbation of thrombocytopenia from stress situation from other medical conditions.  9/22/17 platelet count 47,000 yesterday patient is to be followed to see a baseline returns in the 80-90,000 range

## 2017-09-22 NOTE — PT/OT/SLP EVAL
Physical Therapy  Evaluation    Orion Rinaldi   MRN: [id number]   Admitting Diagnosis: LLL pneumonia    PT Received On: 09/22/17  PT Start Time: 0840     PT Stop Time: 0905    PT Total Time (min): 25 min       Billable Minutes:  Evaluation 15 and Gait [id number]    Diagnosis: LLL pneumonia      Past Medical History:   Diagnosis Date    Acute on chronic systolic CHF (congestive heart failure), NYHA class 4 11/18/2014    Arthritis     Asthma     Cancer     Cardiomyopathy 11/25/2014    COPD (chronic obstructive pulmonary disease)     Coronary artery disease     CABG x 3 1997    Hypercholesterolemia 11/4/2016    Mitral stenosis 11/25/2014    PAF (paroxysmal atrial fibrillation) 9/20/2017    Pulmonary HTN 11/25/2014    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    Seizures       Past Surgical History:   Procedure Laterality Date    AORTIC VALVE REPLACEMENT      CARDIAC CATHETERIZATION      CARDIAC SURGERY      CARDIAC VALVE SURGERY      CATARACT EXTRACTION W/  INTRAOCULAR LENS IMPLANT  OD 3/18/09    CATARACT EXTRACTION W/  INTRAOCULAR LENS IMPLANT  OS 4/1/09    CORONARY ARTERY BYPASS GRAFT  1997    CABG x 3    SKIN BIOPSY      TONSILLECTOMY         Referring physician: WAYNE  Date referred to PT: 9/22/2017      General Precautions: Standard, fall  Orthopedic Precautions:     Braces:              Patient History:  Lives With: spouse  Living Arrangements: house  Home Layout: Able to live on 1st floor  Stair Railings at Home: none  Transportation Available: family or friend will provide  Living Environment Comment: PT LIVES  WITH WIFE AND WAS IND AT HOME AND O2 DEPENDENT.   Equipment Currently Used at Home: oxygen  DME owned (not currently used): none    Previous Level of Function:  Ambulation Skills: independent  Transfer Skills: independent  ADL Skills: independent    Subjective:  Communicated with NURSE AND EPIC CHART REVIEW prior to session.   PT AGREED TO EVAL AND REFUSED TX.    Chief Complaint: SOB  Patient goals: NONE     Pain/Comfort  Pain Rating 1: 0/10  Pain Rating Post-Intervention 1: 0/10      Objective:   Patient found with: peripheral IV, oxygen     Cognitive Exam:  Oriented to: Person, Place, Time and Situation    Follows Commands/attention: Follows multistep  commands  Communication: clear/fluent  Safety awareness/insight to disability: intact    Physical Exam:  Postural examination/scapula alignment: Rounded shoulder and Head forward    Skin integrity: Visible skin intact  Edema: None noted     Sensation:   Intact    Lower Extremity Range of Motion:  Right Lower Extremity: WFL  Left Lower Extremity: WFL    Lower Extremity Strength:  Right Lower Extremity: WFL  Left Lower Extremity: WFL    Functional Mobility:  Bed Mobility:  Rolling/Turning Right: Independent  Supine to Sit: Independent    Transfers:  Sit <> Stand Assistance: Independent  Sit <> Stand Assistive Device: No Assistive Device  Bed <> Chair Technique: Stand Pivot  Bed <> Chair Assistance: Independent  Bed <> Chair Assistive Device: No Assistive Device    Gait:   Gait Distance: GT X 5' WITH NO AD AND O2 IN TOW.   Assistance 1: Independent    Therapeutic Activities and Exercises:   PT SEATED IN CHAIR MMT COMPLETED. PT STRENGTH 5/5. PT EDUCATED ON ROLE OF P.T. AND PT REPORTED HE DIDN'T WANT IT AND IT DIDN'T HELP HIM BEFORE. PT AGREED TO TRY MOBILITY PROGRAM AND WILL BE D/C FROM P.T.     AM-PAC 6 CLICK MOBILITY  How much help from another person does this patient currently need?   1 = Unable, Total/Dependent Assistance  2 = A lot, Maximum/Moderate Assistance  3 = A little, Minimum/Contact Guard/Supervision  4 = None, Modified [place]/Independent    Turning over in bed (including adjusting bedclothes, sheets and blankets)?: 4  Sitting down on and standing up from a chair with arms (e.g., wheelchair, bedside commode, etc.): 4  Moving from lying on back to sitting on the side of the bed?: 4  Moving to and from a bed  to a chair (including a wheelchair)?: 4  Need to walk in hospital room?: 4  Climbing 3-5 steps with a railing?: 1  Total Score: 21     AM-PAC Raw Score CMS G-Code Modifier Level of Impairment Assistance   6 % Total / Unable   7 - 9 CM 80 - 100% Maximal Assist   10 - 14 CL 60 - 80% Moderate Assist   15 - 19 CK 40 - 60% Moderate Assist   20 - 22 CJ 20 - 40% Minimal Assist   23 CI 1-20% SBA / CGA   24 CH 0% Independent/ Mod I     Patient left up in chair with call button in reach and WIFE present.    Assessment:   Orion Rinaldi is a 81 y.o. male with a medical diagnosis of LLL pneumonia and presents with POOR ENDURANCE AND WILL BE D/C TO MOBILITY PROGRAM FOR MAINTENANCE. .    Rehab identified problem list/impairments: Rehab identified problem list/impairments: impaired endurance    Discharge recommendations: Discharge Facility/Level Of Care Needs: home     Barriers to discharge: Barriers to Discharge: None    Equipment recommendations: Equipment Needed After Discharge: none   PLAN:    D/C TO MOBILITY PROGRAM  Plan of Care reviewed with: patient, spouse    Functional Assessment Tool Used: [facility] PAC  Score: CI  Functional Limitation: Mobility: Walking and moving around  Mobility: Walking and Moving Around Current Status (): CI  Mobility: Walking and Moving Around Goal Status (): CI  Mobility: Walking and Moving Around Discharge Status (): CI     Stella Magana, PT  09/22/2017

## 2017-09-22 NOTE — PROGRESS NOTES
Patient observed in bed, left side lying position, alert upon verbal stimulation. No c/o pain or discomfort. Currently using home Trilogy vent. RT assisted with placement. Wife at bedside. SATs 93%    Handoff given to Jelena DRIVER .

## 2017-09-22 NOTE — SUBJECTIVE & OBJECTIVE
Interval History: Platelet count yesterday 47,000 wife more comfortable the fact that not is much discoloration urine    Oncology Treatment Plan:   [No treatment plan]    Medications:  Continuous Infusions:   Scheduled Meds:   arformoterol  15 mcg Nebulization BID    aspirin  81 mg Oral Daily    levalbuterol  0.63 mg Nebulization Q8H    levetiracetam  750 mg Oral BID    losartan  12.5 mg Oral Nightly    metoprolol tartrate  25 mg Oral BID    moxifloxacin  400 mg Oral Daily    pantoprazole  40 mg Oral Daily    piperacillin-tazobactam (ZOSYN) IVPB  4.5 g Intravenous Q8H    predniSONE  20 mg Oral Daily    roflumilast  500 mcg Oral Daily    simvastatin  40 mg Oral QHS     PRN Meds:acetaminophen, albuterol-ipratropium 2.5mg-0.5mg/3mL, aluminum-magnesium hydroxide-simethicone, hydrALAZINE, influenza, ondansetron     Review of Systems   Constitutional: Positive for activity change.   Neurological: Positive for weakness.   Psychiatric/Behavioral: The patient is nervous/anxious.      Objective:     Vital Signs (Most Recent):  Temp: 97.4 °F (36.3 °C) (09/22/17 0354)  Pulse: 78 (09/22/17 0354)  Resp: 18 (09/22/17 0354)  BP: 107/65 (09/22/17 0354)  SpO2: 100 % (09/22/17 0354) Vital Signs (24h Range):  Temp:  [97.3 °F (36.3 °C)-98 °F (36.7 °C)] 97.4 °F (36.3 °C)  Pulse:  [] 78  Resp:  [18-22] 18  SpO2:  [90 %-100 %] 100 %  BP: ()/(56-67) 107/65     Weight: 84.1 kg (185 lb 8 oz)  Body mass index is 25.87 kg/m².  Body surface area is 2.05 meters squared.      Intake/Output Summary (Last 24 hours) at 09/22/17 0644  Last data filed at 09/22/17 0437   Gross per 24 hour   Intake              430 ml   Output             2100 ml   Net            -1670 ml       Physical Exam   Vitals reviewed.      Significant Labs:   BMP:   Recent Labs  Lab 09/21/17  1059   *      K 3.8      CO2 19*   BUN 68*   CREATININE 1.9*   CALCIUM 9.5   , CBC:   Recent Labs  Lab 09/21/17  0732   WBC 3.07*   HGB 12.5*    HCT 40.3   PLT 45*    and CMP:   Recent Labs  Lab 09/21/17  1059      K 3.8      CO2 19*   *   BUN 68*   CREATININE 1.9*   CALCIUM 9.5   PROT 7.3   ALBUMIN 3.4*   BILITOT 2.1*   ALKPHOS 65   AST 16   ALT 17   ANIONGAP 15   EGFRNONAA 33*       Diagnostic Results:  I have reviewed and interpreted all pertinent imaging results/findings within the past 24 hours.

## 2017-09-22 NOTE — PROGRESS NOTES
Pt on Home Trilogy vent with 4.5lpm O2 bled in, wife at bedside, Sat 91%, will check throughout nite.

## 2017-09-22 NOTE — PROGRESS NOTES
[facility] -   Critical Care Medicine  Progress Note    Patient Name: Orion Rinaldi  MRN: [id number]  Admission Date: 9/18/2017  Hospital Length of Stay: 4 days  Code Status: Full Code  Attending Provider: Farshad Stephens, *  Primary Care Provider: Daisy Oconnor MD   Principal Problem: LLL pneumonia    Subjective:     HPI:  80 y/o with longstanding history of Chronic Obstructive Pulmonary Disease, chronic respiratory failure on oxygen and trilogy nocturnal ventilation presents with 1 day history of shortness of breath, cough, chills and chest congestion. On presentation he was cyanotic, tachycardic and noted to have left lower lobe infiltrate on Chest X Ray with lactic acidosis    Last seen July 2017  Orion Rinaldi is a 81 y.o. male with a PMH of Seizure disorder,  SP TAVR, CABG, CAD, Pulm HTN secondary to Chronic lung disease and Valvular disease, HLD, COPD, Systolic Heart Failure CHF NYHA class 4 and Chronic Resp Failure home O2 dependent.  HAS TRILOGY     Seen in clinic: home O2 dependent at 4 lpm on 24/7, Symbicort and Spiriva.    Frequent hospitalizations for  with Acute on Chronic Resp Failure, Acute on Chronic heart failure and COPD Exacerbation.            Hospital/ICU Course:  HERNANDEZ has improved  Tapia out  Still leg edema      Interval History:     Tolerated interventions well    Objective:     Vital Signs (Most Recent):  Temp: 98 °F (36.7 °C) (09/22/17 1148)  Pulse: 75 (09/22/17 1148)  Resp: 20 (09/22/17 1148)  BP: (!) 107/56 (09/22/17 1148)  SpO2: 97 % (09/22/17 1148) Vital Signs (24h Range):  Temp:  [97.3 °F (36.3 °C)-98.9 °F (37.2 °C)] 98 °F (36.7 °C)  Pulse:  [] 75  Resp:  [18-22] 20  SpO2:  [90 %-100 %] 97 %  BP: ()/(52-66) 107/56     Weight: 84.1 kg (185 lb 8 oz)  Body mass index is 25.87 kg/m².      Intake/Output Summary (Last 24 hours) at 09/22/17 1152  Last data filed at 09/22/17 0437   Gross per 24 hour   Intake              430 ml   Output             2100  ml   Net            -1670 ml       Physical Exam   Constitutional: He is oriented to person, place, and time. He appears well-developed and well-nourished.   HENT:   Head: Normocephalic and atraumatic.   Nose: Nose normal.   Mouth/Throat: Oropharynx is clear and moist.   Eyes: Conjunctivae and EOM are normal. Pupils are equal, round, and reactive to light.   Neck: Normal range of motion. Neck supple. No JVD present.   Cardiovascular: Normal rate, regular rhythm and normal heart sounds.    No murmur heard.  Pulmonary/Chest: Effort normal. He has decreased breath sounds. He has no wheezes. He has no rhonchi.   Abdominal: Soft. Bowel sounds are normal.   Musculoskeletal: Normal range of motion. He exhibits edema.   Neurological: He is alert and oriented to person, place, and time.   Skin: Skin is warm and dry.   Nursing note and vitals reviewed.      Vents:  Oxygen Concentration (%): 36 (09/21/17 2031)    Lines/Drains/Airways     Peripheral Intravenous Line                 Peripheral IV - Single Lumen 09/21/17 1930 Right Hand less than 1 day                Significant Labs:    CBC/Anemia Profile:    Recent Labs  Lab 09/21/17  0732   WBC 3.07*   HGB 12.5*   HCT 40.3   PLT 45*   MCV 96   RDW 19.7*        Chemistries:    Recent Labs  Lab 09/21/17  1059      K 3.8      CO2 19*   BUN 68*   CREATININE 1.9*   CALCIUM 9.5   ALBUMIN 3.4*   PROT 7.3   BILITOT 2.1*   ALKPHOS 65   ALT 17   AST 16       Blood Culture: No results for input(s): LABBLOO in the last 48 hours.  POCT Glucose: No results for input(s): POCTGLUCOSE in the last 48 hours.  Respiratory Culture: No results for input(s): GSRESP, RESPIRATORYC in the last 48 hours.  Urine Culture: No results for input(s): LABURIN in the last 48 hours.  All pertinent labs within the past 24 hours have been reviewed.    Significant Imaging:  I have reviewed and interpreted all pertinent imaging results/findings within the past 24 hours.         Assessment/Plan:  "    Pulmonary   * LLL pneumonia    Currently on Avelox and Zosyn  Deescalate Before discharge        Acute hypoxemic respiratory failure    Continue home Trilogy        Moderate COPD (chronic obstructive pulmonary disease)    Bronchodilators: Duoneb, performist and    PO prednisone  Xopenex  Daliresp          Cardiac/Vascular   Acute on chronic combined systolic and diastolic congestive heart failure    Daily lasix        Renal/   E. coli UTI (urinary tract infection)    LAst admission grew e cloi  Current culture negative    Urine Culture, Routine ESCHERICHIA COLI> 100,000 cfu/ml    Resulting Agency [facility]   Susceptibility      Escherichia coli     CULTURE, URINE     Amox/K Clav'ate 16/8  Intermediate     Amp/Sulbactam >16/8  Resistant     Ampicillin >16  Resistant     Cefazolin 16  Intermediate     Cefepime <=8 "><=8  Sensitive     Ceftriaxone <=8 "><=8  Sensitive     Ciprofloxacin >2  Resistant     Ertapenem <=2 "><=2  Sensitive     Gentamicin <=4 "><=4  Sensitive     Meropenem <=4 "><=4  Sensitive     Nitrofurantoin <=32 "><=32  Sensitive     Piperacillin/Tazo <=16 "><=16  Sensitive     Tetracycline <=4 "><=4  Sensitive     Tobramycin <=4 "><=4  Sensitive     Trimeth/Sulfa <=2/38 "><=2/38  Sensitive                 Hematology   Thrombocytopenia, with anemia    Declines Bone marrow          DW team     Francis Downing MD  Critical Care Medicine  [facility]  "

## 2017-09-22 NOTE — PLAN OF CARE
Problem: Patient Care Overview  Goal: Plan of Care Review  Outcome: Ongoing (interventions implemented as appropriate)  Patient 's spo2 and breathing are better

## 2017-09-22 NOTE — PROGRESS NOTES
[facility] -   Hematology/Oncology  Progress Note    Patient Name: Orion Rinaldi  Admission Date: 9/18/2017  Hospital Length of Stay: 4 days  Code Status: Full Code     Subjective:     HPI:  81-year-old male history of recent admission to the hospital for pneumonia was found have a low platelet count previously been seen by hematology oncology service [facility] [place] in 2015 and 16 with low platelet count determination not to proceed with bone marrow at that time for possibility of myelodysplasia    Interval History: Platelet count yesterday 47,000 wife more comfortable the fact that not is much discoloration urine    Oncology Treatment Plan:   [No treatment plan]    Medications:  Continuous Infusions:   Scheduled Meds:   arformoterol  15 mcg Nebulization BID    aspirin  81 mg Oral Daily    levalbuterol  0.63 mg Nebulization Q8H    levetiracetam  750 mg Oral BID    losartan  12.5 mg Oral Nightly    metoprolol tartrate  25 mg Oral BID    moxifloxacin  400 mg Oral Daily    pantoprazole  40 mg Oral Daily    piperacillin-tazobactam (ZOSYN) IVPB  4.5 g Intravenous Q8H    predniSONE  20 mg Oral Daily    roflumilast  500 mcg Oral Daily    simvastatin  40 mg Oral QHS     PRN Meds:acetaminophen, albuterol-ipratropium 2.5mg-0.5mg/3mL, aluminum-magnesium hydroxide-simethicone, hydrALAZINE, influenza, ondansetron     Review of Systems   Constitutional: Positive for activity change.   Neurological: Positive for weakness.   Psychiatric/Behavioral: The patient is nervous/anxious.      Objective:     Vital Signs (Most Recent):  Temp: 97.4 °F (36.3 °C) (09/22/17 0354)  Pulse: 78 (09/22/17 0354)  Resp: 18 (09/22/17 0354)  BP: 107/65 (09/22/17 0354)  SpO2: 100 % (09/22/17 0354) Vital Signs (24h Range):  Temp:  [97.3 °F (36.3 °C)-98 °F (36.7 °C)] 97.4 °F (36.3 °C)  Pulse:  [] 78  Resp:  [18-22] 18  SpO2:  [90 %-100 %] 100 %  BP: ()/(56-67) 107/65     Weight: 84.1 kg (185 lb 8  oz)  Body mass index is 25.87 kg/m².  Body surface area is 2.05 meters squared.      Intake/Output Summary (Last 24 hours) at 09/22/17 0644  Last data filed at 09/22/17 0437   Gross per 24 hour   Intake              430 ml   Output             2100 ml   Net            -1670 ml       Physical Exam   Vitals reviewed.      Significant Labs:   BMP:   Recent Labs  Lab 09/21/17  1059   *      K 3.8      CO2 19*   BUN 68*   CREATININE 1.9*   CALCIUM 9.5   , CBC:   Recent Labs  Lab 09/21/17  0732   WBC 3.07*   HGB 12.5*   HCT 40.3   PLT 45*    and CMP:   Recent Labs  Lab 09/21/17  1059      K 3.8      CO2 19*   *   BUN 68*   CREATININE 1.9*   CALCIUM 9.5   PROT 7.3   ALBUMIN 3.4*   BILITOT 2.1*   ALKPHOS 65   AST 16   ALT 17   ANIONGAP 15   EGFRNONAA 33*       Diagnostic Results:  I have reviewed and interpreted all pertinent imaging results/findings within the past 24 hours.    Assessment/Plan:     Thrombocytopenia, with anemia    I have reviewed notes going back to 2015 and2016 with Dr. Figueredo who had seen the patient at that time a decision was made not to proceed with a bone marrow aspirate and biopsy which I agree with at this point I believe his low platelet count is most likely exacerbated by his acute medical problems.  The differential on his CBC is normal his hemoglobin is remained relatively stable he is not actively bleeding at this point and I would recommend that the patient I would observe the patient's platelet count to see if this been any dramatic change.  The wife and the patient do not wish to proceed with bone marrow aspirate and biopsy which revealed the next logical test to do.  Discussed with patient again this morning on 9/21/17 the procedure to do a bone marrow aspirate and biopsy to discover whether not he has myelodysplasia decline at the present time will repeat CBC today to see trend most likely clinical exacerbation of thrombocytopenia from stress  situation from other medical conditions.  9/22/17 platelet count 47,000 yesterday patient is to be followed to see a baseline returns in the 80-90,000 range            Thank you for your consult. I will follow-up with patient. Please contact us if you have any additional questions.     Timur Hamlin MD  Hematology/Oncology  [facility]

## 2017-09-22 NOTE — ASSESSMENT & PLAN NOTE
"LAst admission grew e cloi  Current culture negative    Urine Culture, Routine ESCHERICHIA COLI> 100,000 cfu/ml    Resulting Agency [facility]   Susceptibility      Escherichia coli     CULTURE, URINE     Amox/K Clav'ate 16/8  Intermediate     Amp/Sulbactam >16/8  Resistant     Ampicillin >16  Resistant     Cefazolin 16  Intermediate     Cefepime <=8 "><=8  Sensitive     Ceftriaxone <=8 "><=8  Sensitive     Ciprofloxacin >2  Resistant     Ertapenem <=2 "><=2  Sensitive     Gentamicin <=4 "><=4  Sensitive     Meropenem <=4 "><=4  Sensitive     Nitrofurantoin <=32 "><=32  Sensitive     Piperacillin/Tazo <=16 "><=16  Sensitive     Tetracycline <=4 "><=4  Sensitive     Tobramycin <=4 "><=4  Sensitive     Trimeth/Sulfa <=2/38 "><=2/38  Sensitive           "

## 2017-09-22 NOTE — PLAN OF CARE
IMM signed.       09/22/17 1333   Medicare Message   Important Message from Medicare regarding Discharge Appeal Rights Given to patient/caregiver;Explained to patient/caregiver;Signed/date by patient/caregiver   Date IMM was signed 09/22/17   Time IMM was signed 1149

## 2017-09-23 PROBLEM — N39.0 E. COLI UTI (URINARY TRACT INFECTION): Status: RESOLVED | Noted: 2017-01-01 | Resolved: 2017-01-01

## 2017-09-23 PROBLEM — J18.9 LLL PNEUMONIA: Status: RESOLVED | Noted: 2017-01-01 | Resolved: 2017-01-01

## 2017-09-23 PROBLEM — B96.20 E. COLI UTI (URINARY TRACT INFECTION): Status: RESOLVED | Noted: 2017-01-01 | Resolved: 2017-01-01

## 2017-09-23 NOTE — PLAN OF CARE
Problem: Patient Care Overview  Goal: Plan of Care Review  PT IS IND>MOD I WITH ALL GROSS FUNC MOBILITY / GT SHORT DISTANCES. PT REFUSED P.T. TX. PT WILL BE D/C TO MOBILITY PROGRAM     Outcome: Ongoing (interventions implemented as appropriate)  Patient 's spo2 and breathing are stable at his baseline.

## 2017-09-23 NOTE — PROGRESS NOTES
[facility] -   Hematology/Oncology  Progress Note    Patient Name: Orion Rinaldi  Admission Date: 9/18/2017  Hospital Length of Stay: 5 days  Code Status: Full Code     Subjective:     HPI:  81-year-old male history of recent admission to the hospital for pneumonia was found have a low platelet count previously been seen by hematology oncology service [facility] [place] in 2015 and 16 with low platelet count determination not to proceed with bone marrow at that time for possibility of myelodysplasia    Interval History: He denies any problems with excessive bleeding overnight. He is scheduled to be discharged home today.    Oncology Treatment Plan:   [No treatment plan]    Medications:  Continuous Infusions:   Scheduled Meds:   arformoterol  15 mcg Nebulization BID    aspirin  81 mg Oral Daily    levalbuterol  0.63 mg Nebulization Q8H    levetiracetam  750 mg Oral BID    losartan  12.5 mg Oral Nightly    metoprolol tartrate  25 mg Oral BID    moxifloxacin  400 mg Oral Daily    pantoprazole  40 mg Oral Daily    predniSONE  20 mg Oral Daily    roflumilast  500 mcg Oral Daily    simvastatin  40 mg Oral QHS    torsemide  20 mg Oral Daily     PRN Meds:acetaminophen, albuterol-ipratropium 2.5mg-0.5mg/3mL, aluminum-magnesium hydroxide-simethicone, hydrALAZINE, influenza, ondansetron     Review of Systems   Constitutional: Positive for activity change and fatigue. Negative for appetite change, chills, diaphoresis, fever and unexpected weight change.   HENT: Negative for congestion, dental problem, ear pain, mouth sores, nosebleeds, postnasal drip, sinus pressure, sore throat, tinnitus, trouble swallowing and voice change.    Eyes: Negative for photophobia, pain, discharge, redness, itching and visual disturbance.   Respiratory: Positive for shortness of breath. Negative for cough, chest tightness, wheezing and stridor.    Cardiovascular: Negative for chest pain, palpitations and leg  swelling.   Gastrointestinal: Negative for abdominal distention, abdominal pain, anal bleeding, blood in stool, constipation, diarrhea, nausea and vomiting.   Endocrine: Negative for cold intolerance, heat intolerance, polydipsia, polyphagia and polyuria.   Genitourinary: Negative for decreased urine volume, difficulty urinating, dysuria, flank pain, frequency, hematuria and urgency.   Musculoskeletal: Positive for arthralgias. Negative for back pain, gait problem, joint swelling and myalgias.   Skin: Negative for pallor and rash.   Allergic/Immunologic: Negative for immunocompromised state.   Neurological: Positive for weakness. Negative for dizziness, syncope, light-headedness and headaches.   Hematological: Negative for adenopathy. Does not bruise/bleed easily.   Psychiatric/Behavioral: Negative for agitation and confusion. The patient is nervous/anxious.      Objective:     Vital Signs (Most Recent):  Temp: 97.5 °F (36.4 °C) (09/23/17 0736)  Pulse: 75 (09/23/17 0905)  Resp: 20 (09/23/17 0828)  BP: 115/62 (09/23/17 0736)  SpO2: 98 % (09/23/17 0823) Vital Signs (24h Range):  Temp:  [97.3 °F (36.3 °C)-97.8 °F (36.6 °C)] 97.5 °F (36.4 °C)  Pulse:  [] 75  Resp:  [18-20] 20  SpO2:  [95 %-100 %] 98 %  BP: (115-135)/(61-71) 115/62     Weight: 84.7 kg (186 lb 11.2 oz)  Body mass index is 26.04 kg/m².  Body surface area is 2.06 meters squared.      Intake/Output Summary (Last 24 hours) at 09/23/17 1204  Last data filed at 09/23/17 0500   Gross per 24 hour   Intake                0 ml   Output             1450 ml   Net            -1450 ml       Physical Exam   Constitutional: He is oriented to person, place, and time. He appears well-developed and well-nourished. No distress.   HENT:   Head: Normocephalic and atraumatic.   Nose: Nose normal.   Mouth/Throat: Oropharynx is clear and moist. No oropharyngeal exudate.   Eyes: EOM are normal. Pupils are equal, round, and reactive to light. No scleral icterus.   Neck:  Normal range of motion. Neck supple. No tracheal deviation present. No thyromegaly present.   Cardiovascular: Normal rate, regular rhythm, normal heart sounds and intact distal pulses.    No murmur heard.  Pulmonary/Chest: Effort normal. No stridor. No respiratory distress. He has no wheezes. He has rales. He exhibits no tenderness.   Abdominal: Soft. Bowel sounds are normal. He exhibits no distension and no mass. There is no tenderness. There is no rebound and no guarding.   Musculoskeletal: Normal range of motion. He exhibits no edema or tenderness.   Lymphadenopathy:     He has no cervical adenopathy.   Neurological: He is alert and oriented to person, place, and time. Coordination normal.   Skin: Skin is warm. No rash noted. He is not diaphoretic. No erythema.   Psychiatric: He has a normal mood and affect. His behavior is normal. Judgment and thought content normal.   Nursing note and vitals reviewed.    Significant Labs:   I have reviewed all of the patient's relevant lab work available in the medical record and have utilized this in my evaluation and management recommendations today    Diagnostic Results:  I have reviewed all of the patient's diagnostic/imaging results available in the medical record and have utilized this in my evaluation and management recommendations today.    Assessment/Plan:     Thrombocytopenia, with anemia    I had a detailed discussion with the patient and his wife as well as son who were present in the hospital room today with regard to his current clinical situation.  Platelet count is stable overnight. He will need close outpatient follow up with Dr. Hamlin. Ultrasound of abdomen should be considered as an outpatient to evaluate for hepatosplenomegaly as well. Concerns with regard to possible MDS also reviewed.  Dr. Hamlin's office will contact patient to schedule follow up in the next 1-2 weeks. Please call with any questions.            Thank you for your consult.      Grupo  MD Hayley  Hematology/Oncology  [facility]

## 2017-09-23 NOTE — SUBJECTIVE & OBJECTIVE
Interval History: He denies any problems with excessive bleeding overnight. He is scheduled to be discharged home today.    Oncology Treatment Plan:   [No treatment plan]    Medications:  Continuous Infusions:   Scheduled Meds:   arformoterol  15 mcg Nebulization BID    aspirin  81 mg Oral Daily    levalbuterol  0.63 mg Nebulization Q8H    levetiracetam  750 mg Oral BID    losartan  12.5 mg Oral Nightly    metoprolol tartrate  25 mg Oral BID    moxifloxacin  400 mg Oral Daily    pantoprazole  40 mg Oral Daily    predniSONE  20 mg Oral Daily    roflumilast  500 mcg Oral Daily    simvastatin  40 mg Oral QHS    torsemide  20 mg Oral Daily     PRN Meds:acetaminophen, albuterol-ipratropium 2.5mg-0.5mg/3mL, aluminum-magnesium hydroxide-simethicone, hydrALAZINE, influenza, ondansetron     Review of Systems   Constitutional: Positive for activity change and fatigue. Negative for appetite change, chills, diaphoresis, fever and unexpected weight change.   HENT: Negative for congestion, dental problem, ear pain, mouth sores, nosebleeds, postnasal drip, sinus pressure, sore throat, tinnitus, trouble swallowing and voice change.    Eyes: Negative for photophobia, pain, discharge, redness, itching and visual disturbance.   Respiratory: Positive for shortness of breath. Negative for cough, chest tightness, wheezing and stridor.    Cardiovascular: Negative for chest pain, palpitations and leg swelling.   Gastrointestinal: Negative for abdominal distention, abdominal pain, anal bleeding, blood in stool, constipation, diarrhea, nausea and vomiting.   Endocrine: Negative for cold intolerance, heat intolerance, polydipsia, polyphagia and polyuria.   Genitourinary: Negative for decreased urine volume, difficulty urinating, dysuria, flank pain, frequency, hematuria and urgency.   Musculoskeletal: Positive for arthralgias. Negative for back pain, gait problem, joint swelling and myalgias.   Skin: Negative for pallor and rash.    Allergic/Immunologic: Negative for immunocompromised state.   Neurological: Positive for weakness. Negative for dizziness, syncope, light-headedness and headaches.   Hematological: Negative for adenopathy. Does not bruise/bleed easily.   Psychiatric/Behavioral: Negative for agitation and confusion. The patient is nervous/anxious.      Objective:     Vital Signs (Most Recent):  Temp: 97.5 °F (36.4 °C) (09/23/17 0736)  Pulse: 75 (09/23/17 0905)  Resp: 20 (09/23/17 0828)  BP: 115/62 (09/23/17 0736)  SpO2: 98 % (09/23/17 0823) Vital Signs (24h Range):  Temp:  [97.3 °F (36.3 °C)-97.8 °F (36.6 °C)] 97.5 °F (36.4 °C)  Pulse:  [] 75  Resp:  [18-20] 20  SpO2:  [95 %-100 %] 98 %  BP: (115-135)/(61-71) 115/62     Weight: 84.7 kg (186 lb 11.2 oz)  Body mass index is 26.04 kg/m².  Body surface area is 2.06 meters squared.      Intake/Output Summary (Last 24 hours) at 09/23/17 1204  Last data filed at 09/23/17 0500   Gross per 24 hour   Intake                0 ml   Output             1450 ml   Net            -1450 ml       Physical Exam   Constitutional: He is oriented to person, place, and time. He appears well-developed and well-nourished. No distress.   HENT:   Head: Normocephalic and atraumatic.   Nose: Nose normal.   Mouth/Throat: Oropharynx is clear and moist. No oropharyngeal exudate.   Eyes: EOM are normal. Pupils are equal, round, and reactive to light. No scleral icterus.   Neck: Normal range of motion. Neck supple. No tracheal deviation present. No thyromegaly present.   Cardiovascular: Normal rate, regular rhythm, normal heart sounds and intact distal pulses.    No murmur heard.  Pulmonary/Chest: Effort normal. No stridor. No respiratory distress. He has no wheezes. He has rales. He exhibits no tenderness.   Abdominal: Soft. Bowel sounds are normal. He exhibits no distension and no mass. There is no tenderness. There is no rebound and no guarding.   Musculoskeletal: Normal range of motion. He exhibits no edema  or tenderness.   Lymphadenopathy:     He has no cervical adenopathy.   Neurological: He is alert and oriented to person, place, and time. Coordination normal.   Skin: Skin is warm. No rash noted. He is not diaphoretic. No erythema.   Psychiatric: He has a normal mood and affect. His behavior is normal. Judgment and thought content normal.   Nursing note and vitals reviewed.    Significant Labs:   I have reviewed all of the patient's relevant lab work available in the medical record and have utilized this in my evaluation and management recommendations today    Diagnostic Results:  I have reviewed all of the patient's diagnostic/imaging results available in the medical record and have utilized this in my evaluation and management recommendations today.

## 2017-09-23 NOTE — ASSESSMENT & PLAN NOTE
I had a detailed discussion with the patient and his wife as well as son who were present in the hospital room today with regard to his current clinical situation.  Platelet count is stable overnight. He will need close outpatient follow up with Dr. Hamlin. Ultrasound of abdomen should be considered as an outpatient to evaluate for hepatosplenomegaly as well. Concerns with regard to possible MDS also reviewed.  Dr. Hamlin's office will contact patient to schedule follow up in the next 1-2 weeks. Please call with any questions.

## 2017-09-23 NOTE — PROGRESS NOTES
Dr. Downing advised SW that patient needed replacement O2 tank because while patient was in ED, the valve was left open and portable tank is now empty.  SW contact [facility]'s on-call technician, Niall, to advise of need for as replacement portable O2 tank for an existing customer with [employer].  Per on-call technician, o.k. to pull one portable O2 tank from case management's DME closet.  SW pulled one portable tank Lot#[id number] and delivered to patient's room (#208).  Patient's wife signed delivery ticket.  Pt. will take empty tank to home [facility]  Naheed Fair LMSW, [facility], [facility]  09/23/2017

## 2017-09-23 NOTE — DISCHARGE SUMMARY
[facility] Hospital Medicine  Discharge Summary      Patient Name: Orion Rinaldi  MRN: [id number]  Admission Date: 9/18/2017  Hospital Length of Stay: 5 days  Discharge Date and Time:  09/23/2017 11:01 AM  Attending Physician: Lisa Chung MD   Discharging Provider: Lisa Chung MD  Primary Care Provider: Daisy Oconnor MD      HPI:   Orion Rinaldi is a 82 yo male with Chronic Respiratory Failure (uses 5 L NC along with Triology vent at home), COPD, Systolic Heart Failure, S/P TAVR and Pulmonary HTN who presents to the ED with sudden onset of severe SOB this afternoon. Associated symptoms include chills, worsened leg swelling and generalized weakness. Pt is reported to be cyanotic upon arrival to the ED.  Vital signs note tachycardia with resp rate 28 - 44. CBC finds thrombocytopenia, CMP finds a metabolic alkalosis, BUN ^ 62, serum creatinine 1.9 and BNP 1977. Lactic acidosis = 3.9. CXR notes a LLL infiltrate. Pt is admitted with respiratory failure, pneumonia and decompensated heart failure    * No surgery found *      Indwelling Lines/Drains at time of discharge:   Lines/Drains/Airways          No matching active lines, drains, or airways        Hospital Course:   Patient is a 81 y.o. male  with Chronic Respiratory Failure (uses 5 L NC along with Triology vent at home), ES COPD, Systolic Heart Failure, S/P TAVR and Pulmonary HTN, admitted with pneumonia, CHF, thrombocytopenia and new onset atrial fibrillation. Subsequently pt started on IV Lasix and IV Avelox and nebs and steroids. He responded to tx well and started improving. Cardiology and Pulmonary both were consulted and followed the pt closely. Pt developed new onset AF during the stay, likely triggered by illness/respiratory status, continue BB. No anticoagulation given due thrombocytopenia. Continue ASA. Hematology consulted because of ch thrombocytopenia who recommended BM biopsy. Patient declined BM biopsy. Pt remains in PAF on ASA  alone, rate controlled with BB. He continues to do better, SOB improved. He wishes to be discharged home today and has already been cleared by Pulm and Cardiology. An extra tank of O2 has been arranged for him as well. He was seen and examined today and deemed stable to be discharged home.      Consults:   Consults         Status Ordering Provider     Inpatient consult to Cardiology  Once     Provider:  (Not yet assigned)    Completed MARY LOU GATES     Inpatient consult to Hematology/Oncology  Once     Provider:  Timur Hamlin MD    Acknowledged PETER MARTIN     Inpatient consult to Pulmonology  Once     Provider:  Anthony Hinton MD    Completed VLADIMIR GREWAL     Inpatient consult to Social Work  Once     Provider:  (Not yet assigned)    Completed HARPREET BELLE     Pharmacy to dose Vancomycin consult  Once     Provider:  (Not yet assigned)    Acknowledged VLADIMIR GREWAL          Significant Diagnostic Studies: Labs:   BMP:   Recent Labs  Lab 09/23/17  0440   *      K 4.0      CO2 23   BUN 84*   CREATININE 1.9*   CALCIUM 9.0   MG 2.6   , CMP   Recent Labs  Lab 09/23/17  0440      K 4.0      CO2 23   *   BUN 84*   CREATININE 1.9*   CALCIUM 9.0   PROT 6.6   ALBUMIN 3.2*   BILITOT 1.7*   ALKPHOS 57   AST 20   ALT 21   ANIONGAP 11   ESTGFRAFRICA 38*   EGFRNONAA 33*   , CBC   Recent Labs  Lab 09/23/17  0440   WBC 5.23   HGB 11.6*   HCT 37.8*   PLT 42*    and All labs within the past 24 hours have been reviewed    Pending Diagnostic Studies:     None        Final Active Diagnoses:    Diagnosis Date Noted POA    Moderate COPD (chronic obstructive pulmonary disease) [J44.9] 03/12/2013 Yes    Acute on chronic combined systolic and diastolic congestive heart failure [I50.43] 01/08/2016 Yes    Coronary artery disease involving native coronary artery of native heart without angina pectoris [I25.10] 07/03/2017 Yes     Chronic    Chronic kidney disease, stage III  (moderate) [N18.3] 09/21/2017 Yes    Acute hypoxemic respiratory failure [J96.01] 09/20/2017 Yes    PAF (paroxysmal atrial fibrillation) [I48.0] 09/20/2017 Yes    Thrombocytopenia, with anemia [D69.6] 08/10/2015 Yes     Chronic      Problems Resolved During this Admission:    Diagnosis Date Noted Date Resolved POA    PRINCIPAL PROBLEM:  LLL pneumonia [J18.1] 09/18/2017 09/23/2017 Yes    E. coli UTI (urinary tract infection) [N39.0, B96.20] 09/18/2017 09/23/2017 Yes      No new Assessment & Plan notes have been filed under this hospital service since the last note was generated.  Service: Hospital Medicine      Discharged Condition: stable    Disposition: Home or Self Care    Follow Up:  Follow-up Information     Daisy Oconnor MD. Schedule an appointment as soon as possible for a visit in 3 days.    Specialty:  Internal Medicine  Contact information:  [address]  [facility] [address] [phone]                 Patient Instructions:     Diet general   Order Specific Question Answer Comments   Na restriction, if any: 2gNa    Fluid restriction: Fluid - 1500mL    Additional restrictions: Cardiac (Low Na/Chol)      Activity as tolerated       Medications:  Reconciled Home Medications:   Current Discharge Medication List      START taking these medications    Details   levoFLOXacin (LEVAQUIN) 250 MG tablet Take 1 tablet (250 mg total) by mouth once daily.  Qty: 7 tablet, Refills: 0      pantoprazole (PROTONIX) 40 MG tablet Take 1 tablet (40 mg total) by mouth once daily.  Qty: 30 tablet, Refills: 1      predniSONE (DELTASONE) 20 MG tablet Take 1 tablet (20 mg total) by mouth once daily. Take 1 tab daily after BF for 1 week then Half a tab daily  Qty: 10 tablet, Refills: 0         CONTINUE these medications which have NOT CHANGED    Details   albuterol-ipratropium 2.5mg-0.5mg/3mL (DUO-NEB) 0.5 mg-3 mg(2.5 mg base)/3 mL nebulizer solution Take 3 mLs by nebulization every 8 (eight) hours as needed for  Wheezing.  Qty: 90 vial, Refills: 3    Comments: Please call patient to pick prescription once it is ready.  Associated Diagnoses: Chronic obstructive pulmonary disease, unspecified COPD type      aspirin 81 MG Chew Take 81 mg by mouth once daily.      budesonide-formoterol 80-4.5 mcg (SYMBICORT) 80-4.5 mcg/actuation HFAA Inhale 2 puffs into the lungs 2 (two) times daily. Pharmacy to adjust to cheaper tier options  Qty: 1 Inhaler, Refills: 11    Comments: Please call patient to pick prescription once it is ready.  Associated Diagnoses: Chronic obstructive pulmonary disease, unspecified COPD type      levetiracetam (KEPPRA) 750 MG Tab Take 1 tablet (750 mg total) by mouth 2 (two) times daily.  Qty: 180 tablet, Refills: 3    Associated Diagnoses: Seizure disorder      losartan (COZAAR) 25 MG tablet Take 0.5 tablets (12.5 mg total) by mouth nightly.  Qty: 45 tablet, Refills: 3      metoprolol tartrate (LOPRESSOR) 25 MG tablet Take 0.5 tablets (12.5 mg total) by mouth 2 (two) times daily.  Qty: 30 tablet, Refills: 0      OXYGEN-AIR DELIVERY SYSTEMS MISC by Misc.(Non-Drug; Combo Route) route.      simvastatin (ZOCOR) 40 MG tablet TAKE 1 TABLET EVERY EVENING.  Qty: 90 tablet, Refills: 3    Associated Diagnoses: Hyperlipidemia; S/P AVR      spironolactone (ALDACTONE) 25 MG tablet Take 1 tablet (25 mg total) by mouth every evening.  Qty: 90 tablet, Refills: 3      tiotropium (SPIRIVA WITH HANDIHALER) 18 mcg inhalation capsule Inhale 1 capsule (18 mcg total) into the lungs once daily. Pharmacy to adjust to cheaper tier options  Qty: 90 capsule, Refills: 4    Comments: Please call patient to pick prescription once it is ready.  Associated Diagnoses: Chronic obstructive pulmonary disease, unspecified COPD type      torsemide (DEMADEX) 20 MG Tab 20 mg once daily.       roflumilast 500 mcg Tab Take 1 tablet (500 mcg total) by mouth once daily.  Qty: 30 tablet, Refills: 0           Time spent on the discharge of patient: 43  minutes      Lisa Chung MD  Department of Hospital Medicine  [facility]

## 2017-09-23 NOTE — SUBJECTIVE & OBJECTIVE
Interval History:     Tolerates interventions    Objective:     Vital Signs (Most Recent):  Temp: 97.5 °F (36.4 °C) (09/23/17 0736)  Pulse: 74 (09/23/17 0828)  Resp: 20 (09/23/17 0828)  BP: 115/62 (09/23/17 0736)  SpO2: 98 % (09/23/17 0823) Vital Signs (24h Range):  Temp:  [97.3 °F (36.3 °C)-98 °F (36.7 °C)] 97.5 °F (36.4 °C)  Pulse:  [] 74  Resp:  [18-20] 20  SpO2:  [95 %-100 %] 98 %  BP: (107-135)/(56-71) 115/62     Weight: 84.7 kg (186 lb 11.2 oz)  Body mass index is 26.04 kg/m².      Intake/Output Summary (Last 24 hours) at 09/23/17 1023  Last data filed at 09/23/17 0500   Gross per 24 hour   Intake              120 ml   Output             1750 ml   Net            -1630 ml       Physical Exam   Constitutional: He is oriented to person, place, and time. He appears well-developed and well-nourished. He has a sickly appearance.   HENT:   Head: Normocephalic.   Nose: Nose normal.   Mouth/Throat: Oropharynx is clear and moist.   Eyes: Conjunctivae and EOM are normal. Pupils are equal, round, and reactive to light.   Neck: Normal range of motion. Neck supple.   Cardiovascular: Normal rate, regular rhythm and normal heart sounds.  Exam reveals no friction rub.    No murmur heard.  Pulmonary/Chest: Effort normal and breath sounds normal.   Abdominal: Soft. Bowel sounds are normal.   Musculoskeletal: Normal range of motion. He exhibits no edema.   Neurological: He is alert and oriented to person, place, and time.   Skin: Skin is warm and dry. Capillary refill takes 2 to 3 seconds.   Psychiatric: He has a normal mood and affect.   Nursing note and vitals reviewed.      Vents:  Oxygen Concentration (%): 36 (09/21/17 2031)    Lines/Drains/Airways     Peripheral Intravenous Line                 Peripheral IV - Single Lumen 09/21/17 1930 Right Hand 1 day                Significant Labs:    CBC/Anemia Profile:    Recent Labs  Lab 09/23/17  0440   WBC 5.23   HGB 11.6*   HCT 37.8*   PLT 42*   MCV 96   RDW 19.7*         Chemistries:    Recent Labs  Lab 09/21/17  1059 09/23/17  0440    143   K 3.8 4.0    109   CO2 19* 23   BUN 68* 84*   CREATININE 1.9* 1.9*   CALCIUM 9.5 9.0   ALBUMIN 3.4* 3.2*   PROT 7.3 6.6   BILITOT 2.1* 1.7*   ALKPHOS 65 57   ALT 17 21   AST 16 20   MG  --  2.6       All pertinent labs within the past 24 hours have been reviewed.    Significant Imaging:  I have reviewed and interpreted all pertinent imaging results/findings within the past 24 hours.

## 2017-09-23 NOTE — NURSING
Discharged orders received and reviewed with family and pt. Pt instructed when to take each medication next dose. IV removed, telemetry removed. Pt assisted with dressing by staff. Pt transported to [facility] via w/c by staff for family to transport home. Oxygen in use with extra tank as well.

## 2017-09-23 NOTE — PROGRESS NOTES
[facility]   Critical Care Medicine  Progress Note    Patient Name: Orion Rinaldi  MRN: [id number]  Admission Date: 9/18/2017  Hospital Length of Stay: 5 days  Code Status: Full Code  Attending Provider: Lisa Chung MD  Primary Care Provider: Daisy Oconnor MD   Principal Problem: LLL pneumonia    Subjective:     HPI:  82 y/o with longstanding history of Chronic Obstructive Pulmonary Disease, chronic respiratory failure on oxygen and trilogy nocturnal ventilation presents with 1 day history of shortness of breath, cough, chills and chest congestion. On presentation he was cyanotic, tachycardic and noted to have left lower lobe infiltrate on Chest X Ray with lactic acidosis    Last seen July 2017  Orion Rinaldi is a 81 y.o. male with a PMH of Seizure disorder,  SP TAVR, CABG, CAD, Pulm HTN secondary to Chronic lung disease and Valvular disease, HLD, COPD, Systolic Heart Failure CHF NYHA class 4 and Chronic Resp Failure home O2 dependent.  HAS TRILOGY     Seen in clinic: home O2 dependent at 4 lpm on 24/7, Symbicort and Spiriva.    Frequent hospitalizations for  with Acute on Chronic Resp Failure, Acute on Chronic heart failure and COPD Exacerbation.            Hospital/ICU Course:  09/22:  HERNANDEZ has improved  Tapia out  Still leg edema    09/23: Feels better      Interval History:     Tolerates interventions    Objective:     Vital Signs (Most Recent):  Temp: 97.5 °F (36.4 °C) (09/23/17 0736)  Pulse: 74 (09/23/17 0828)  Resp: 20 (09/23/17 0828)  BP: 115/62 (09/23/17 0736)  SpO2: 98 % (09/23/17 0823) Vital Signs (24h Range):  Temp:  [97.3 °F (36.3 °C)-98 °F (36.7 °C)] 97.5 °F (36.4 °C)  Pulse:  [] 74  Resp:  [18-20] 20  SpO2:  [95 %-100 %] 98 %  BP: (107-135)/(56-71) 115/62     Weight: 84.7 kg (186 lb 11.2 oz)  Body mass index is 26.04 kg/m².      Intake/Output Summary (Last 24 hours) at 09/23/17 1023  Last data filed at 09/23/17 0500   Gross per 24 hour   Intake              120 ml   Output              1750 ml   Net            -1630 ml       Physical Exam   Constitutional: He is oriented to person, place, and time. He appears well-developed and well-nourished. He has a sickly appearance.   HENT:   Head: Normocephalic.   Nose: Nose normal.   Mouth/Throat: Oropharynx is clear and moist.   Eyes: Conjunctivae and EOM are normal. Pupils are equal, round, and reactive to light.   Neck: Normal range of motion. Neck supple.   Cardiovascular: Normal rate, regular rhythm and normal heart sounds.  Exam reveals no friction rub.    No murmur heard.  Pulmonary/Chest: Effort normal and breath sounds normal.   Abdominal: Soft. Bowel sounds are normal.   Musculoskeletal: Normal range of motion. He exhibits no edema.   Neurological: He is alert and oriented to person, place, and time.   Skin: Skin is warm and dry. Capillary refill takes 2 to 3 seconds.   Psychiatric: He has a normal mood and affect.   Nursing note and vitals reviewed.      Vents:  Oxygen Concentration (%): 36 (09/21/17 2031)    Lines/Drains/Airways     Peripheral Intravenous Line                 Peripheral IV - Single Lumen 09/21/17 1930 Right Hand 1 day                Significant Labs:    CBC/Anemia Profile:    Recent Labs  Lab 09/23/17  0440   WBC 5.23   HGB 11.6*   HCT 37.8*   PLT 42*   MCV 96   RDW 19.7*        Chemistries:    Recent Labs  Lab 09/21/17  1059 09/23/17  0440    143   K 3.8 4.0    109   CO2 19* 23   BUN 68* 84*   CREATININE 1.9* 1.9*   CALCIUM 9.5 9.0   ALBUMIN 3.4* 3.2*   PROT 7.3 6.6   BILITOT 2.1* 1.7*   ALKPHOS 65 57   ALT 17 21   AST 16 20   MG  --  2.6       All pertinent labs within the past 24 hours have been reviewed.    Significant Imaging:  I have reviewed and interpreted all pertinent imaging results/findings within the past 24 hours.    Assessment/Plan:     Pulmonary   * LLL pneumonia    Currently on Avelox          Acute hypoxemic respiratory failure    Continue home Trilogy        Moderate COPD (chronic  "obstructive pulmonary disease)    Bronchodilators: Duoneb, performist and    PO prednisone  Xopenex  Daliresp          Cardiac/Vascular   Acute on chronic combined systolic and diastolic congestive heart failure    Daily lasix        Renal/   E. coli UTI (urinary tract infection)    LAst admission grew e cloi  Current culture negative    Urine Culture, Routine ESCHERICHIA COLI> 100,000 cfu/ml    Resulting Agency [facility]   Susceptibility      Escherichia coli     CULTURE, URINE     Amox/K Clav'ate 16/8  Intermediate     Amp/Sulbactam >16/8  Resistant     Ampicillin >16  Resistant     Cefazolin 16  Intermediate     Cefepime <=8 "><=8  Sensitive     Ceftriaxone <=8 "><=8  Sensitive     Ciprofloxacin >2  Resistant     Ertapenem <=2 "><=2  Sensitive     Gentamicin <=4 "><=4  Sensitive     Meropenem <=4 "><=4  Sensitive     Nitrofurantoin <=32 "><=32  Sensitive     Piperacillin/Tazo <=16 "><=16  Sensitive     Tetracycline <=4 "><=4  Sensitive     Tobramycin <=4 "><=4  Sensitive     Trimeth/Sulfa <=2/38 "><=2/38  Sensitive                 Hematology   Thrombocytopenia, with anemia    Declines Bone marrow          DW team and   Okay for discharge  Will get Oxygen cylinder       Francis Downing MD  Critical Care Medicine  [facility]  "

## 2017-09-25 NOTE — TELEPHONE ENCOUNTER
Tried to contact patient concerning patient medications. Left message for patient to return my call.

## 2017-09-25 NOTE — PATIENT INSTRUCTIONS
When You Have Pneumonia  You have been diagnosed with pneumonia. This is a serious lung infection. Most cases of pneumonia are caused by bacteria. Pneumonia most often occurs in older adults, young children, and people with chronic health problems.  Home care  · Take your medicine exactly as directed. Dont skip doses. Continue taking your antibiotics as until they are all gone, even if you start to feel better. This will prevent the pneumonia from coming back.  · Drink at least 8 glasses of water daily, unless directed otherwise. This helps to loosen and thin secretions so that you can cough them up.  · Use a cool-mist humidifier in your bedroom. Be sure to clean the humidifier daily.  · Dont use medicines to suppress your cough unless your cough is dry, painful, or interferes with your sleep. Coughing up mucus is normal. You may use an expectorant if your healthcare provider says its okay.  · You can use warm compresses or a heating pad on the lowest setting to relieve chest discomfort. Use several times a day for 15-20 minutes at a time. To prevent injury to your skin, set the temperature to warm, not hot. Dont put the compress or pad directly on your skin. Make certain it has a cover or wrap it in a towel. This is to prevent skin burns.  · Get plenty of rest until your fever, shortness of breath, and chest pain go away.  · Plan to get a flu shot every year. The flu is a common cause of pneumonia. Getting a flu shot every year can help prevent both the flu and pneumonia.  Getting the pneumococcal vaccine  Talk with your healthcare provider about getting the pneumococcalvaccine. Pneumococcal pneumonia is caused by bacteria that spread from person to person. It can cause minor problems, such as ear infections. But it can also turn into life-threatening illnesses of the lungs (pneumonia), the covering of the brain and spinal cord (meningitis), and the blood (bacteremia).  Children under 2 years of age, adults  over age 65, people with certain health conditions, and smokers are at the highest risk of pneumococcal disease. This vaccine can help prevent pneumococcal disease in both adults and children. Some people should not have the vaccine. Make sure to ask your healthcare provider if you should have the vaccine.   Follow-up care  Make a follow-up appointment as directed by our staff.  When to call your healthcare provider  Call your healthcare provider if you have any of the following:  · Fever above 100.4°F (38°C), or as directed by your healthcare provider  · Mucus from the lungs (sputum) thats yellow, green, bloody, or smells bad  · A large amount of sputum  · Vomiting  · Symptoms that get worse  When to call 911  Call 911 right away if you have any of the following:  · Chest pain  · Trouble breathing  · Blue lips or fingernails   Date Last Reviewed: 11/1/2016  © 8327-8126 [employer]. [address] [place] [address], [place] [address]. All rights reserved. This information is not intended as a substitute for professional medical care. Always follow your healthcare professional's instructions.

## 2017-09-25 NOTE — PHYSICIAN QUERY
PT Name: Orion Rinaldi  MR #: [id number]    Physician Query Form - Cause and Effect Relationship Clarification      CDS/: Susanne Juarez RN              Contact information: [phone]    This form is a permanent document in the medical record.     Query Date: September 25, 2017    By submitting this query, we are merely seeking further clarification of documentation. Please utilize your independent clinical judgment when addressing the question(s) below.    The Medical record contains the following:  Supporting Clinical Findings   Location in record   Sepsis       Criteria include CXR indicating LLL pneumonia, tachycardia, tachypnea and lactic acidosis = 3.9                                                                                                                                                                                  9/21 Hosp med note   LLL penumonia    E. Coli UTI                                                                                                                                                                                        9/23 DC summary         Provider, please clarify if there is any correlation between E. Coli and Sepsis.           Are the conditions:     [xx  ] Due to or associated with each other     [  ] Unrelated to each other     [  ] Other (Please Specify): _________________________     [  ] Clinically Undetermined

## 2017-09-26 NOTE — TELEPHONE ENCOUNTER
----- Message from Timur Hamlin MD sent at 9/25/2017  8:10 PM CDT -----  See with cbc  ----- Message -----  From: Grupo Hardy MD  Sent: 9/25/2017   4:50 PM  To: Timur Hamlin MD, [facility] Staff    Patient was discharged home from the hospital.  Please schedule follow-up with Dr. Hamlin in the clinic sometime in the next 2-3 weeks.  Thank you.

## 2017-09-29 NOTE — TELEPHONE ENCOUNTER
----- Message from Shana Watkins sent at 9/29/2017  9:45 AM CDT -----  Contact: pt wife - Maribel   States she's calling rg pt being recently in the hospital for pneumonia and is still on meds and wants to know when he can come in for his flu shot and can eb reached at [phone]//[email or web address]

## 2017-10-20 PROBLEM — E87.20 LACTIC ACIDOSIS: Status: ACTIVE | Noted: 2017-01-01

## 2017-10-20 PROBLEM — J96.01 ACUTE RESPIRATORY FAILURE WITH HYPOXIA: Status: ACTIVE | Noted: 2017-01-01

## 2017-10-20 NOTE — CONSULTS
[facility] -   Critical Care - Medicine  Consult Note    Patient Name: Orion Rinaldi  MRN: [id number]  Admission Date: 10/20/2017  Hospital Length of Stay: 0 days  Code Status: Full Code  Attending Physician: Kip Bradford MD  Primary Care Provider: Daisy Oconnor MD   Principal Problem: Acute on chronic respiratory failure with hypoxia    Inpatient consult to Pulmonology  Consult performed by: ANGELA JONES  Consult ordered by: KIP BRADFROD        Subjective: worseing edema and SOB     HPI: 82 y/o with chronic hypoxic respiratory failure due to Chronic Obstructive Pulmonary Disease, EF of 45 % with diastolic dysfunction, Pulmonary Hypertension and seizures presents with a two day history of worsening lower extremity edema and shortness of breath with cyanosis and hypoxemia    Hospital/ICU Course: Seen in Emergency Room and therapy initiated, transferred to ICU    Past Medical History:   Diagnosis Date    Acute on chronic systolic CHF (congestive heart failure), NYHA class 4 11/18/2014    Arthritis     Asthma     Cancer     Cardiomyopathy 11/25/2014    COPD (chronic obstructive pulmonary disease)     Coronary artery disease     CABG x 3 1997    Hypercholesterolemia 11/4/2016    Mitral stenosis 11/25/2014    PAF (paroxysmal atrial fibrillation) 9/20/2017    Pulmonary HTN 11/25/2014    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    Seizures     Thrombocytopenia        Past Surgical History:   Procedure Laterality Date    AORTIC VALVE REPLACEMENT      CARDIAC CATHETERIZATION      CATARACT EXTRACTION W/  INTRAOCULAR LENS IMPLANT  OD 3/18/09    CATARACT EXTRACTION W/  INTRAOCULAR LENS IMPLANT  OS 4/1/09    CORONARY ARTERY BYPASS GRAFT  1997    CABG x 3    SKIN BIOPSY      TONSILLECTOMY         Review of patient's allergies indicates:   Allergen Reactions    Doxycycline Nausea Only and Other (See Comments)     Dizziness     Pneumococcal vaccine         Family History     Problem Relation (Age of Onset)    Cancer Paternal Grandfather    Cataracts Mother, Maternal Grandmother, Paternal Grandmother    Heart disease Brother    No Known Problems Father, Maternal Grandfather, Sister, Maternal Aunt, Maternal Uncle, Paternal Aunt, Paternal Uncle        Social History Main Topics    Smoking status: Former Smoker     Packs/day: 1.00     Years: 20.00     Types: Cigarettes     Quit date: 9/12/1997    Smokeless tobacco: Never Used    Alcohol use No      Comment: Occasionally     Drug use: No    Sexual activity: Yes     Partners: Female      Review of Systems   Constitutional: Positive for fatigue.   HENT: Positive for congestion, postnasal drip and rhinorrhea.    Respiratory: Positive for cough, chest tightness, shortness of breath, wheezing and stridor.    Cardiovascular: Positive for palpitations.   Gastrointestinal: Negative.    Genitourinary: Positive for decreased urine volume and urgency.   Neurological: Positive for seizures.     Objective:     Vital Signs (Most Recent):  Pulse: 100 (10/20/17 1854)  Resp: (!) 21 (10/20/17 1854)  BP: (!) 98/53 (10/20/17 1854)  SpO2: 96 % (10/20/17 1854) Vital Signs (24h Range):  Pulse:  [] 100  Resp:  [21-31] 21  SpO2:  [22 %-100 %] 96 %  BP: ()/(41-80) 98/53     Weight: 85.8 kg (189 lb 1.6 oz)  Body mass index is 26.37 kg/m².      Intake/Output Summary (Last 24 hours) at 10/20/17 1907  Last data filed at 10/20/17 1532   Gross per 24 hour   Intake             2574 ml   Output              320 ml   Net             2254 ml       Physical Exam   Constitutional: He is oriented to person, place, and time. He appears well-developed and well-nourished.   HENT:   Head: Normocephalic and atraumatic.   Mouth/Throat: Oropharyngeal exudate present.   Eyes: Conjunctivae are normal. Pupils are equal, round, and reactive to light.   Neck: Neck supple. No JVD present. No tracheal deviation present. No thyromegaly present.    Cardiovascular: Normal rate, regular rhythm and normal heart sounds.    No murmur heard.  Pulmonary/Chest: Effort normal. He has decreased breath sounds. He has wheezes in the right lower field and the left lower field. He has no rhonchi. He has no rales.   Abdominal: Soft. Bowel sounds are normal.   Musculoskeletal: Normal range of motion. He exhibits edema. He exhibits no tenderness.   Lymphadenopathy:     He has no cervical adenopathy.   Neurological: He is alert and oriented to person, place, and time.   Skin: Skin is warm and dry.   Nursing note and vitals reviewed.      Vents:  Oxygen Concentration (%): 35 (decreased 02 to 35%) (10/20/17 1321)    Lines/Drains/Airways     Peripheral Intravenous Line                 Peripheral IV - Single Lumen 10/20/17 1006 Right Forearm less than 1 day                Significant Labs:    CBC/Anemia Profile:    Recent Labs  Lab 10/20/17  1006   WBC 4.91   HGB 11.5*   HCT 36.7*   PLT 36*   MCV 97   RDW 21.0*        Chemistries:    Recent Labs  Lab 10/20/17  0854 10/20/17  1006    142   K 4.3 4.2   * 112*   CO2 17* 16*   BUN 65* 64*   CREATININE 1.7* 1.6*   CALCIUM 9.3 9.3   ALBUMIN  --  3.3*   PROT  --  7.4   BILITOT  --  2.7*   ALKPHOS  --  89   ALT  --  18   AST  --  16   MG  --  2.5   PHOS  --  3.4       ABGs:     Recent Labs  Lab 10/20/17  1059   PH 7.494*   PCO2 21.2*   HCO3 16.3*   POCSATURATED 100   BE -7     BMP:     Recent Labs  Lab 10/20/17  1006   *      K 4.2   *   CO2 16*   BUN 64*   CREATININE 1.6*   CALCIUM 9.3   MG 2.5     CMP:     Recent Labs  Lab 10/20/17  0854 10/20/17  1006    142   K 4.3 4.2   * 112*   CO2 17* 16*   * 127*   BUN 65* 64*   CREATININE 1.7* 1.6*   CALCIUM 9.3 9.3   PROT  --  7.4   ALBUMIN  --  3.3*   BILITOT  --  2.7*   ALKPHOS  --  89   AST  --  16   ALT  --  18   ANIONGAP 13 14   EGFRNONAA 38* 40*     All pertinent labs within the past 24 hours have been reviewed.    Significant Imaging:  CXR: I have reviewed all pertinent results/findings within the past 24 hours and my personal findings are:  pulmonary edema    Assessment/Plan:     Active Diagnoses:    Diagnosis Date Noted POA    PRINCIPAL PROBLEM:  Acute on chronic respiratory failure with hypoxia [J96.21] 07/03/2017 Yes    Acute respiratory failure with hypoxia [J96.01] 10/20/2017 Yes    Lactic acidosis [E87.2] 10/20/2017 Yes    PAF (paroxysmal atrial fibrillation) [I48.0] 09/20/2017 Yes    Coronary artery disease involving native coronary artery of native heart without angina pectoris [I25.10] 07/03/2017 Yes     Chronic    COPD exacerbation [J44.1] 05/21/2017 Yes    Acute on chronic combined systolic and diastolic congestive heart failure [I50.43] 01/08/2016 Yes    Thrombocytopenia, with anemia [D69.6] 08/10/2015 Yes     Chronic    Seizures [R56.9]  Yes     Chronic    Aortic stenosis s/p TAVR [I35.0] 03/12/2013 Yes      Problems Resolved During this Admission:    Diagnosis Date Noted Date Resolved POA     Problem   Acute Respiratory Failure With Hypoxia   Acute On Chronic Respiratory Failure With Hypoxia   Acute Pulmonary Edema              Critical Care Daily Checklist:    A: Awake: RASS Goal/Actual Goal:    Actual:     B: Spontaneous Breathing Trial Performed?     C: SAT & SBT Coordinated?  na                      D: Delirium: CAM-ICU     E: Early Mobility Performed? No   F: Feeding Goal:    Status:     Current Diet Order   Procedures    Diet Adult Regular Fluid - 1500mL; Low Sodium,2gm     Order Specific Question:   Fluid restriction:     Answer:   Fluid - 1500mL     Order Specific Question:   Additional restrictions:     Answer:   Low Sodium,2gm      AS: Analgesia/Sedation adequate   T: Thromboembolic Prophylaxis Y   H: HOB > 300 Yes   U: Stress Ulcer Prophylaxis (if needed) Y   G: Glucose Control adequate   B: Bowel Function     I: Indwelling Catheter (Lines & Tapia) Necessity needed   D: De-escalation of  Antimicrobials/Pharmacotherapies na    Plan for the day/ETD ICU care, diuresis  Steroids, jet nebs    Code Status:  Family/Goals of Care: Full Code  discussed     Critical Care Time: 45 minutes  Critical secondary to Patient has a condition that poses threat to life and bodily function: Severe Respiratory Distress     Critical care was time spent personally by me on the following activities: development of treatment plan with patient or surrogate and bedside caregivers, discussions with consultants, evaluation of patient's response to treatment, examination of patient, ordering and performing treatments and interventions, ordering and review of laboratory studies, ordering and review of radiographic studies, pulse oximetry, re-evaluation of patient's condition.  This critical care time did not overlap with that of any other provider or involve time for any procedures.    Thank you for your consult. I will follow-up with patient. Please contact us if you have any additional questions.     Anthony Hinton MD  Critical Care - Medicine  [facility]

## 2017-10-20 NOTE — ASSESSMENT & PLAN NOTE
No S/S infection-No Sepsis   Will obtain CT chest to rule out PNA  Lactic acidosis likely secondary to Acute/chronic cyanosis/hypoxemia   Monitor closely

## 2017-10-20 NOTE — H&P
"[facility] Hospital Medicine  History & Physical    Patient Name: Orion Rinaldi  MRN: [id number]  Admission Date: 10/20/2017  Attending Physician: Dr. Collins   Primary Care Provider: Daisy Oconnor MD         Patient information was obtained from patient and ER records.     Subjective:     Principal Problem:Acute on chronic respiratory failure with hypoxia    Chief Complaint:   Chief Complaint   Patient presents with    Shortness of Breath     Dr sent pt to ER        HPI: The pt is a 80 yo male with Seizure disorder, S/P TAVR, CAD s/p CABG, HLD, COPD, Systolic Heart Failure CHF NYHA class 4 EF 45%,  and Chronic Resp Failure on 5 liters home oxygen and Trilogy who presented to ED from Cardiology office with Respiratory distress. The pt and his spouse reported the pt walked from  of [facility] and through several hallways. When he arrived to Cardiology clinic, O2 sat was in the 70s. Pt was sent to ED. In the ED, pt was found to be in respiratory distress. Bipap applied. CXR showed mild interstitial edema,tiny left pleural effusion.   The pt reports he felt at baseline this am. He did have large amount of leg edema 3 days ago and took extra Torsemide with improvement. The pt's spouse reports pt does not usually exert himself and walk as far as he walked today. Denies fever/chills, +SOB, +cough -chronic-denies change in cough. +leg edema-improved. Spouse denies weight gain. Weight on discharge 9/23/17 was 186lb, today's weight 189lb. Spouse alsoe reports pt's BP "always runs low".     Past Medical History:   Diagnosis Date    Acute on chronic systolic CHF (congestive heart failure), NYHA class 4 11/18/2014    Arthritis     Asthma     Cancer     Cardiomyopathy 11/25/2014    COPD (chronic obstructive pulmonary disease)     Coronary artery disease     CABG x 3 1997    Hypercholesterolemia 11/4/2016    Mitral stenosis 11/25/2014    PAF (paroxysmal atrial fibrillation) " 9/20/2017    Pulmonary HTN 11/25/2014    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    Seizures     Thrombocytopenia        Past Surgical History:   Procedure Laterality Date    AORTIC VALVE REPLACEMENT      CARDIAC CATHETERIZATION      CATARACT EXTRACTION W/  INTRAOCULAR LENS IMPLANT  OD 3/18/09    CATARACT EXTRACTION W/  INTRAOCULAR LENS IMPLANT  OS 4/1/09    CORONARY ARTERY BYPASS GRAFT  1997    CABG x 3    SKIN BIOPSY      TONSILLECTOMY         Review of patient's allergies indicates:   Allergen Reactions    Doxycycline Nausea Only and Other (See Comments)     Dizziness     Pneumococcal vaccine        No current facility-administered medications on file prior to encounter.      Current Outpatient Prescriptions on File Prior to Encounter   Medication Sig    albuterol-ipratropium 2.5mg-0.5mg/3mL (DUO-NEB) 0.5 mg-3 mg(2.5 mg base)/3 mL nebulizer solution Take 3 mLs by nebulization every 8 (eight) hours as needed for Wheezing.    aspirin 81 MG Chew Take 81 mg by mouth once daily.    budesonide-formoterol 80-4.5 mcg (SYMBICORT) 80-4.5 mcg/actuation HFAA Inhale 2 puffs into the lungs 2 (two) times daily. Pharmacy to adjust to cheaper tier options    levetiracetam (KEPPRA) 750 MG Tab Take 1 tablet (750 mg total) by mouth 2 (two) times daily.    levoFLOXacin (LEVAQUIN) 250 MG tablet Take 1 tablet (250 mg total) by mouth once daily.    losartan (COZAAR) 25 MG tablet Take 0.5 tablets (12.5 mg total) by mouth nightly.    metoprolol tartrate (LOPRESSOR) 25 MG tablet Take 0.5 tablets (12.5 mg total) by mouth 2 (two) times daily.    OXYGEN-AIR DELIVERY SYSTEMS MISC by Misc.(Non-Drug; Combo Route) route.    pantoprazole (PROTONIX) 40 MG tablet Take 1 tablet (40 mg total) by mouth once daily.    roflumilast 500 mcg Tab Take 1 tablet (500 mcg total) by mouth once daily.    simvastatin (ZOCOR) 40 MG tablet TAKE 1 TABLET EVERY EVENING.    spironolactone (ALDACTONE) 25 MG tablet Take 1  tablet (25 mg total) by mouth every evening.    tiotropium (SPIRIVA WITH HANDIHALER) 18 mcg inhalation capsule Inhale 1 capsule (18 mcg total) into the lungs once daily. Pharmacy to adjust to cheaper tier options    torsemide (DEMADEX) 20 MG Tab 20 mg once daily.      Family History     Problem Relation (Age of Onset)    Cancer Paternal Grandfather    Cataracts Mother, Maternal Grandmother, Paternal Grandmother    Heart disease Brother    No Known Problems Father, Maternal Grandfather, Sister, Maternal Aunt, Maternal Uncle, Paternal Aunt, Paternal Uncle        Social History Main Topics    Smoking status: Former Smoker     Packs/day: 1.00     Years: 20.00     Types: Cigarettes     Quit date: 9/12/1997    Smokeless tobacco: Never Used    Alcohol use No      Comment: Occasionally     Drug use: No    Sexual activity: Yes     Partners: Female     Review of Systems   Constitutional: Negative for appetite change, chills, diaphoresis, fatigue and fever.   HENT: Negative for congestion, nosebleeds, sore throat and trouble swallowing.    Eyes: Negative for pain, discharge and visual disturbance.   Respiratory: Positive for shortness of breath. Negative for apnea, cough, chest tightness, wheezing and stridor.    Cardiovascular: Positive for leg swelling. Negative for chest pain and palpitations.   Gastrointestinal: Negative for abdominal distention, abdominal pain, blood in stool, constipation, diarrhea, nausea and vomiting.   Endocrine: Negative for cold intolerance and heat intolerance.   Genitourinary: Negative for difficulty urinating, dysuria, flank pain, frequency and urgency.   Musculoskeletal: Negative for arthralgias, back pain, joint swelling, myalgias, neck pain and neck stiffness.   Skin: Negative for rash and wound.   Allergic/Immunologic: Negative for food allergies and immunocompromised state.   Neurological: Negative for dizziness, seizures, syncope, facial asymmetry, weakness, light-headedness and  headaches.   Hematological: Negative for adenopathy.   Psychiatric/Behavioral: Negative for agitation, behavioral problems and confusion. The patient is not nervous/anxious.      Objective:     Vital Signs (Most Recent):  Pulse: 83 (10/20/17 1321)  Resp: (!) 22 (10/20/17 1321)  BP: 111/61 (10/20/17 1248)  SpO2: 100 % (10/20/17 1321) Vital Signs (24h Range):  Pulse:  [] 83  Resp:  [21-27] 22  SpO2:  [22 %-100 %] 100 %  BP: ()/(41-80) 111/61     Weight: 85.8 kg (189 lb 1.6 oz)  Body mass index is 26.37 kg/m².    Physical Exam   Constitutional: He is oriented to person, place, and time. He appears well-developed and well-nourished. No distress.   HENT:   Head: Normocephalic and atraumatic.   Nose: Nose normal.   Mouth/Throat: Oropharynx is clear and moist.   Eyes: Conjunctivae and EOM are normal. No scleral icterus.   Neck: Normal range of motion. Neck supple.   Cardiovascular: Normal rate, regular rhythm, normal heart sounds and intact distal pulses.  Exam reveals no gallop and no friction rub.    No murmur heard.  Pulmonary/Chest: Effort normal. No stridor. No respiratory distress. He has no wheezes. He has no rales. He exhibits no tenderness.   On Bipap  BS diminished bilaterally    Abdominal: Soft. Bowel sounds are normal. He exhibits no distension. There is no tenderness. There is no rebound and no guarding.   Musculoskeletal: Normal range of motion. He exhibits edema (+ 1 BLE edema ). He exhibits no tenderness or deformity.   Neurological: He is alert and oriented to person, place, and time. He has normal reflexes. No cranial nerve deficit. He exhibits normal muscle tone. Coordination normal.   Skin: Skin is warm and dry. Capillary refill takes more than 3 seconds. No rash noted. He is not diaphoretic. No erythema. No pallor.   Nail beds clubbed with cyanosis noted    Psychiatric: He has a normal mood and affect. His behavior is normal. Thought content normal.   Nursing note and vitals reviewed.        Significant Labs:   BMP:   Recent Labs  Lab 10/20/17  1006   *      K 4.2   *   CO2 16*   BUN 64*   CREATININE 1.6*   CALCIUM 9.3   MG 2.5     CBC:   Recent Labs  Lab 10/20/17  1006   WBC 4.91   HGB 11.5*   HCT 36.7*   PLT 36*     CMP:   Recent Labs  Lab 10/20/17  0854 10/20/17  1006    142   K 4.3 4.2   * 112*   CO2 17* 16*   * 127*   BUN 65* 64*   CREATININE 1.7* 1.6*   CALCIUM 9.3 9.3   PROT  --  7.4   ALBUMIN  --  3.3*   BILITOT  --  2.7*   ALKPHOS  --  89   AST  --  16   ALT  --  18   ANIONGAP 13 14   EGFRNONAA 38* 40*     All pertinent labs within the past 24 hours have been reviewed.    Significant Imaging:  Imaging Results          X-Ray Chest AP Portable (Final result)  Result time 10/20/17 10:27:31    Final result by ESPERANZA Khan Sr., MD (10/20/17 10:27:31)                 Impression:        1. There is a mild amount of interstitial and alveolar opacities seen in the medial aspect of both lungs. This is characteristic of pulmonary edema.  2. The left costophrenic angle is opacified. This is characteristic of a tiny left pleural effusion or pleural adhesions.  3. Surgical changes      Electronically signed by: ESPERANZA KHAN MD  Date:     10/20/17  Time:    10:27              Narrative:    One-view chest x-ray    Clinical History: Sepsis    Finding: Comparison was made to prior examination performed on 9/22/2017. There are multiple sternotomy wires in place. There are multiple surgical clips projected over the mediastinum. The size of the heart is normal. There is a mild amount of interstitial and alveolar opacities seen in the medial aspect of both lungs. There is no pneumothorax. The right costophrenic angle is sharp. The left costophrenic angle is opacified.                            Assessment/Plan:     * Acute on chronic respiratory failure with hypoxia    Likely secondary to decompensated CHF and COPD exacerbation   IV lasix, IV solumedrol, Neb txs, BIPAP   I/O    Daily weights          Acute on chronic combined systolic and diastolic congestive heart failure    IV Lasix  I/O   Daily weights           COPD exacerbation    IV Steroids  Neb txs           Lactic acidosis    No S/S infection-No Sepsis   Will obtain CT chest to rule out PNA  Lactic acidosis likely secondary to Acute/chronic cyanosis/hypoxemia   Monitor closely           PAF (paroxysmal atrial fibrillation)    BB  No Anticoagulation due to thrombocytopenia           Coronary artery disease involving native coronary artery of native heart without angina pectoris              Thrombocytopenia, with anemia    Likely secondary to MDS  Pt/spouse declined BM bx           Seizures    Cont keppra          Aortic stenosis s/p TAVR                VTE Risk Mitigation         Ordered     Medium Risk of VTE  Once      10/20/17 1320             Cecille West NP  Department of Hospital Medicine   [facility]

## 2017-10-20 NOTE — HPI
"The pt is a 80 yo male with Seizure disorder, S/P TAVR, CAD s/p CABG, HLD, COPD, Systolic Heart Failure CHF NYHA class 4 EF 45%,  and Chronic Resp Failure on 5 liters home oxygen and Trilogy who presented to ED from Cardiology office with Respiratory distress. The pt and his spouse reported the pt walked from  of [facility] and through several hallways. When he arrived to Cardiology clinic, O2 sat was in the 70s. Pt was sent to ED. In the ED, pt was found to be in respiratory distress. Bipap applied. CXR showed mild interstitial edema,tiny left pleural effusion.   The pt reports he felt at baseline this am. He did have large amount of leg edema 3 days ago and took extra Torsemide with improvement. The pt's spouse reports pt does not usually exert himself and walk as far as he walked today. Denies fever/chills, +SOB, +cough -chronic-denies change in cough. +leg edema-improved. Spouse denies weight gain. Weight on discharge 9/23/17 was 186lb, today's weight 189lb. Spouse alsoe reports pt's BP "always runs low".   "

## 2017-10-20 NOTE — SUBJECTIVE & OBJECTIVE
Past Medical History:   Diagnosis Date    Acute on chronic systolic CHF (congestive heart failure), NYHA class 4 11/18/2014    Arthritis     Asthma     Cancer     Cardiomyopathy 11/25/2014    COPD (chronic obstructive pulmonary disease)     Coronary artery disease     CABG x 3 1997    Hypercholesterolemia 11/4/2016    Mitral stenosis 11/25/2014    PAF (paroxysmal atrial fibrillation) 9/20/2017    Pulmonary HTN 11/25/2014    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    Seizures     Thrombocytopenia        Past Surgical History:   Procedure Laterality Date    AORTIC VALVE REPLACEMENT      CARDIAC CATHETERIZATION      CATARACT EXTRACTION W/  INTRAOCULAR LENS IMPLANT  OD 3/18/09    CATARACT EXTRACTION W/  INTRAOCULAR LENS IMPLANT  OS 4/1/09    CORONARY ARTERY BYPASS GRAFT  1997    CABG x 3    SKIN BIOPSY      TONSILLECTOMY         Review of patient's allergies indicates:   Allergen Reactions    Doxycycline Nausea Only and Other (See Comments)     Dizziness     Pneumococcal vaccine        No current facility-administered medications on file prior to encounter.      Current Outpatient Prescriptions on File Prior to Encounter   Medication Sig    albuterol-ipratropium 2.5mg-0.5mg/3mL (DUO-NEB) 0.5 mg-3 mg(2.5 mg base)/3 mL nebulizer solution Take 3 mLs by nebulization every 8 (eight) hours as needed for Wheezing.    aspirin 81 MG Chew Take 81 mg by mouth once daily.    budesonide-formoterol 80-4.5 mcg (SYMBICORT) 80-4.5 mcg/actuation HFAA Inhale 2 puffs into the lungs 2 (two) times daily. Pharmacy to adjust to cheaper tier options    levetiracetam (KEPPRA) 750 MG Tab Take 1 tablet (750 mg total) by mouth 2 (two) times daily.    levoFLOXacin (LEVAQUIN) 250 MG tablet Take 1 tablet (250 mg total) by mouth once daily.    losartan (COZAAR) 25 MG tablet Take 0.5 tablets (12.5 mg total) by mouth nightly.    metoprolol tartrate (LOPRESSOR) 25 MG tablet Take 0.5 tablets (12.5 mg  total) by mouth 2 (two) times daily.    OXYGEN-AIR DELIVERY SYSTEMS MISC by [facility].(Non-Drug; Combo Route) route.    pantoprazole (PROTONIX) 40 MG tablet Take 1 tablet (40 mg total) by mouth once daily.    roflumilast 500 mcg Tab Take 1 tablet (500 mcg total) by mouth once daily.    simvastatin (ZOCOR) 40 MG tablet TAKE 1 TABLET EVERY EVENING.    spironolactone (ALDACTONE) 25 MG tablet Take 1 tablet (25 mg total) by mouth every evening.    tiotropium (SPIRIVA WITH HANDIHALER) 18 mcg inhalation capsule Inhale 1 capsule (18 mcg total) into the lungs once daily. Pharmacy to adjust to cheaper tier options    torsemide (DEMADEX) 20 MG Tab 20 mg once daily.      Family History     Problem Relation (Age of Onset)    Cancer Paternal Grandfather    Cataracts Mother, Maternal Grandmother, Paternal Grandmother    Heart disease Brother    No Known Problems Father, Maternal Grandfather, Sister, Maternal Aunt, Maternal Uncle, Paternal Aunt, Paternal Uncle        Social History Main Topics    Smoking status: Former Smoker     Packs/day: 1.00     Years: 20.00     Types: Cigarettes     Quit date: 9/12/1997    Smokeless tobacco: Never Used    Alcohol use No      Comment: Occasionally     Drug use: No    Sexual activity: Yes     Partners: Female     Review of Systems   Constitutional: Negative for appetite change, chills, diaphoresis, fatigue and fever.   HENT: Negative for congestion, nosebleeds, sore throat and trouble swallowing.    Eyes: Negative for pain, discharge and visual disturbance.   Respiratory: Positive for shortness of breath. Negative for apnea, cough, chest tightness, wheezing and stridor.    Cardiovascular: Positive for leg swelling. Negative for chest pain and palpitations.   Gastrointestinal: Negative for abdominal distention, abdominal pain, blood in stool, constipation, diarrhea, nausea and vomiting.   Endocrine: Negative for cold intolerance and heat intolerance.   Genitourinary: Negative for  difficulty urinating, dysuria, flank pain, frequency and urgency.   Musculoskeletal: Negative for arthralgias, back pain, joint swelling, myalgias, neck pain and neck stiffness.   Skin: Negative for rash and wound.   Allergic/Immunologic: Negative for food allergies and immunocompromised state.   Neurological: Negative for dizziness, seizures, syncope, facial asymmetry, weakness, light-headedness and headaches.   Hematological: Negative for adenopathy.   Psychiatric/Behavioral: Negative for agitation, behavioral problems and confusion. The patient is not nervous/anxious.      Objective:     Vital Signs (Most Recent):  Pulse: 83 (10/20/17 1321)  Resp: (!) 22 (10/20/17 1321)  BP: 111/61 (10/20/17 1248)  SpO2: 100 % (10/20/17 1321) Vital Signs (24h Range):  Pulse:  [] 83  Resp:  [21-27] 22  SpO2:  [22 %-100 %] 100 %  BP: ()/(41-80) 111/61     Weight: 85.8 kg (189 lb 1.6 oz)  Body mass index is 26.37 kg/m².    Physical Exam   Constitutional: He is oriented to person, place, and time. He appears well-developed and well-nourished. No distress.   HENT:   Head: Normocephalic and atraumatic.   Nose: Nose normal.   Mouth/Throat: Oropharynx is clear and moist.   Eyes: Conjunctivae and EOM are normal. No scleral icterus.   Neck: Normal range of motion. Neck supple.   Cardiovascular: Normal rate, regular rhythm, normal heart sounds and intact distal pulses.  Exam reveals no gallop and no friction rub.    No murmur heard.  Pulmonary/Chest: Effort normal. No stridor. No respiratory distress. He has no wheezes. He has no rales. He exhibits no tenderness.   On Bipap  BS diminished bilaterally    Abdominal: Soft. Bowel sounds are normal. He exhibits no distension. There is no tenderness. There is no rebound and no guarding.   Musculoskeletal: Normal range of motion. He exhibits edema (+ 1 BLE edema ). He exhibits no tenderness or deformity.   Neurological: He is alert and oriented to person, place, and time. He has normal  reflexes. No cranial nerve deficit. He exhibits normal muscle tone. Coordination normal.   Skin: Skin is warm and dry. Capillary refill takes more than 3 seconds. No rash noted. He is not diaphoretic. No erythema. No pallor.   Nail beds clubbed with cyanosis noted    Psychiatric: He has a normal mood and affect. His behavior is normal. Thought content normal.   Nursing note and vitals reviewed.       Significant Labs:   BMP:   Recent Labs  Lab 10/20/17  1006   *      K 4.2   *   CO2 16*   BUN 64*   CREATININE 1.6*   CALCIUM 9.3   MG 2.5     CBC:   Recent Labs  Lab 10/20/17  1006   WBC 4.91   HGB 11.5*   HCT 36.7*   PLT 36*     CMP:   Recent Labs  Lab 10/20/17  0854 10/20/17  1006    142   K 4.3 4.2   * 112*   CO2 17* 16*   * 127*   BUN 65* 64*   CREATININE 1.7* 1.6*   CALCIUM 9.3 9.3   PROT  --  7.4   ALBUMIN  --  3.3*   BILITOT  --  2.7*   ALKPHOS  --  89   AST  --  16   ALT  --  18   ANIONGAP 13 14   EGFRNONAA 38* 40*     All pertinent labs within the past 24 hours have been reviewed.    Significant Imaging:  Imaging Results          X-Ray Chest AP Portable (Final result)  Result time 10/20/17 10:27:31    Final result by ESPERANZA Khan Sr., MD (10/20/17 10:27:31)                 Impression:        1. There is a mild amount of interstitial and alveolar opacities seen in the medial aspect of both lungs. This is characteristic of pulmonary edema.  2. The left costophrenic angle is opacified. This is characteristic of a tiny left pleural effusion or pleural adhesions.  3. Surgical changes      Electronically signed by: ESPERANZA KHAN MD  Date:     10/20/17  Time:    10:27              Narrative:    One-view chest x-ray    Clinical History: Sepsis    Finding: Comparison was made to prior examination performed on 9/22/2017. There are multiple sternotomy wires in place. There are multiple surgical clips projected over the mediastinum. The size of the heart is normal. There is a mild  amount of interstitial and alveolar opacities seen in the medial aspect of both lungs. There is no pneumothorax. The right costophrenic angle is sharp. The left costophrenic angle is opacified.

## 2017-10-20 NOTE — PROGRESS NOTES
HOSPITAL FOLLOW UP- [facility] [place] 9/.18 TO  9/23/17  FOR SEVERE COPD WITH ACUTE DECOMPENSATION/ PNEUMONIA    PRESENTS TODAY- VERY WEAK AND LETHARGIC.  NO CHEST PAIN. NO EXACERBATION OF PALPITATIONS  SEVERE PERIPHERAL CYANOSIS  BP 90/60    SEVERE HYPOXEMIA= O2 SAT LESS THAN 70 MMHG    PLAN-  SEND TO ED DPT, STAT.      DISCUSSED WITH  PT AND WIFE AND ED DOCTOR

## 2017-10-20 NOTE — ED PROVIDER NOTES
SCRIBE #1 NOTE: I, Scott Funez, am scribing for, and in the presence of, Daryl Bradford MD. I have scribed the entire note.      History      Chief Complaint   Patient presents with    Shortness of Breath     Dr sent pt to ER       Review of patient's allergies indicates:   Allergen Reactions    Doxycycline Nausea Only and Other (See Comments)     Dizziness     Pneumococcal vaccine         HPI   HPI    10/20/2017, 10:03 AM   History obtained from the patient      History of Present Illness: Orion Rinaldi is a 81 y.o. male patient who presents to the Emergency Department for an evaluation of SOB which onset gradually a few days ago. Pt was reportedly being evaluated by Dr. Senior (Cardiology) who advised pt to come directly to this ED for further evaluation of his sx.  Symptoms are constant and moderate in severity. Exacerbated by nothing and relieved by nothing. Pt reports associated leg swelling. Patient denies any recent travel, long car trips, fever, CP, cough, N/V, and all other sxs at this time. Pt reports hx of COPD and CHF No further complaints or concerns at this time.     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    Seizures        Past Surgical History:  Past Surgical History:   Procedure Laterality Date    AORTIC VALVE REPLACEMENT      CARDIAC CATHETERIZATION      CARDIAC SURGERY      CARDIAC VALVE SURGERY      CATARACT EXTRACTION W/  INTRAOCULAR  LENS IMPLANT  OD 3/18/09    CATARACT EXTRACTION W/  INTRAOCULAR LENS IMPLANT  OS 4/1/09    CORONARY ARTERY BYPASS GRAFT  1997    CABG x 3    SKIN BIOPSY      TONSILLECTOMY           Family History:  Family History   Problem Relation Age of Onset    Heart disease Brother     Cataracts Mother     No Known Problems Father     Cataracts Maternal Grandmother     No Known Problems Maternal Grandfather     Cataracts Paternal Grandmother     Cancer Paternal Grandfather     No Known Problems Sister     No Known Problems Maternal Aunt     No Known Problems Maternal Uncle     No Known Problems Paternal Aunt     No Known Problems Paternal Uncle     Anemia Neg Hx     Arrhythmia Neg Hx     Asthma Neg Hx     Clotting disorder Neg Hx     Fainting Neg Hx     Heart attack Neg Hx     Heart failure Neg Hx     Hyperlipidemia Neg Hx     Hypertension Neg Hx     Strabismus Neg Hx     Retinal detachment Neg Hx     Macular degeneration Neg Hx     Glaucoma Neg Hx     Amblyopia Neg Hx     Blindness Neg Hx     Diabetes Neg Hx     Stroke Neg Hx     Thyroid disease Neg Hx        Social History:  Social History     Social History Main Topics    Smoking status: Former Smoker     Packs/day: 1.00     Years: 20.00     Types: Cigarettes     Quit date: 9/12/1997    Smokeless tobacco: Never Used    Alcohol use No      Comment: Occasionally     Drug use: No    Sexual activity: Yes     Partners: Female       ROS   Review of Systems   Constitutional: Negative for chills, diaphoresis and fever.        (-) Recent travel   HENT: Negative for congestion and sore throat.    Respiratory: Positive for shortness of breath. Negative for cough and chest tightness.    Cardiovascular: Negative for chest pain and leg swelling.   Gastrointestinal: Negative for abdominal pain, nausea and vomiting.   Genitourinary: Negative for decreased urine volume, difficulty urinating, dysuria and hematuria.   Musculoskeletal: Negative for back  pain, neck pain and neck stiffness.   Skin: Negative for rash.   Neurological: Negative for dizziness, weakness, light-headedness, numbness and headaches.   Hematological: Does not bruise/bleed easily.     Physical Exam      Initial Vitals [10/20/17 0951]   BP Pulse Resp Temp SpO2   133/67 (!) 114 (!) 24 -- (!) 85 %      MAP       89          Physical Exam  Nursing Notes and Vital Signs Reviewed.  Constitutional: Patient is in moderate distress. Well-developed and well-nourished.  Head: Atraumatic. Normocephalic.  Eyes: PERRL. EOM intact. Conjunctivae are not pale. No scleral icterus.  ENT: Mucous membranes are moist. Oropharynx is clear and symmetric.    Neck: Supple. Full ROM. No lymphadenopathy.  Cardiovascular: Irregularly irregular rhythm.  Pulmonary/Chest: Moderate  Respiratory distress. Decreased breath sounds. Wheezing and rales at bilateral bases noted.   Abdominal: Soft and non-distended.  There is no tenderness.   Musculoskeletal: Moves all extremities. No obvious deformities. +3 bilateral lower extremity pitting edema. No calf tenderness.  Skin: Warm and dry. Peripheral cyanosis.  Neurological:  Alert, awake, and appropriate.  Normal speech.  No acute focal neurological deficits are appreciated.  Psychiatric: Normal affect. Good eye contact. Appropriate in content.    ED Course    Critical Care  Date/Time: 10/20/2017 11:30 AM  Performed by: KIP HYATT  Authorized by: KIP HYATT   Direct patient critical care time: 25 minutes  Additional history critical care time: 15 minutes  Ordering / reviewing critical care time: 10 minutes  Documentation critical care time: 10 minutes  Consult with family critical care time: 10 minutes  Total critical care time (exclusive of procedural time) : 70 minutes  Critical care was necessary to treat or prevent imminent or life-threatening deterioration of the following conditions: respiratory failure.  Critical care was time spent personally by me on the  "following activities: blood draw for specimens, development of treatment plan with patient or surrogate, discussions with consultants, interpretation of cardiac output measurements, evaluation of patient's response to treatment, examination of patient, obtaining history from patient or surrogate, ordering and performing treatments and interventions, ordering and review of laboratory studies, ordering and review of radiographic studies, pulse oximetry, re-evaluation of patient's condition and review of old charts.        ED Vital Signs:  Vitals:    10/20/17 0951 10/20/17 0959 10/20/17 1003 10/20/17 1009   BP: 133/67 (!) 89/50 (!) 90/52    Pulse: (!) 114      Resp: (!) 24      SpO2: (!) 85%  (!) 22%    Weight:    85.8 kg (189 lb 1.6 oz)   Height: 5' 11" (1.803 m)       10/20/17 1013 10/20/17 1018 10/20/17 1024 10/20/17 1029   BP: 105/62 (!) 98/51 (!) 99/41 (!) 97/53   Pulse: 105 (!) 111  101   Resp: (!) 22 (!) 22  (!) 22   SpO2:   97% (!) 88%   Weight:       Height:        10/20/17 1033 10/20/17 1039 10/20/17 1044 10/20/17 1104   BP: 101/80 (!) 105/51 99/62    Pulse: 105 103 109    Resp: (!) 26 (!) 27 (!) 21    SpO2: 99% (!) 93% 97% 99%   Weight:       Height:           Abnormal Lab Results:  Labs Reviewed   B-TYPE NATRIURETIC PEPTIDE - Abnormal; Notable for the following:        Result Value    BNP 1,556 (*)     All other components within normal limits   CBC W/ AUTO DIFFERENTIAL - Abnormal; Notable for the following:     RBC 3.77 (*)     Hemoglobin 11.5 (*)     Hematocrit 36.7 (*)     MCHC 31.3 (*)     RDW 21.0 (*)     Platelets 36 (*)     Lymph # 0.6 (*)     Lymph% 12.8 (*)     Mono% 16.3 (*)     Platelet Estimate Decreased (*)     All other components within normal limits    Narrative:     PLT   critical result(s) called and verbal readback obtained from   Georgiana Marin RN, 10/20/2017 10:30   COMPREHENSIVE METABOLIC PANEL - Abnormal; Notable for the following:     Chloride 112 (*)     CO2 16 (*)     Glucose 127 (*) "     BUN, Bld 64 (*)     Creatinine 1.6 (*)     Albumin 3.3 (*)     Total Bilirubin 2.7 (*)     eGFR if  47 (*)     eGFR if non  40 (*)     All other components within normal limits   LACTIC ACID, PLASMA - Abnormal; Notable for the following:     Lactate (Lactic Acid) 4.8 (*)     All other components within normal limits    Narrative:      LA  critical result(s) called and verbal readback obtained from   Leena Miller RN, 10/20/2017 10:50   PROTIME-INR - Abnormal; Notable for the following:     Prothrombin Time 14.7 (*)     INR 1.4 (*)     All other components within normal limits   TROPONIN I - Abnormal; Notable for the following:     Troponin I 0.033 (*)     All other components within normal limits   TSH - Abnormal; Notable for the following:     TSH 8.545 (*)     All other components within normal limits   ISTAT PROCEDURE - Abnormal; Notable for the following:     POC PH 7.494 (*)     POC PCO2 21.2 (*)     POC PO2 175 (*)     POC HCO3 16.3 (*)     All other components within normal limits   CULTURE, BLOOD   CULTURE, BLOOD   CULTURE, URINE   APTT   LIPASE   MAGNESIUM   PHOSPHORUS   LACTIC ACID, PLASMA   URINALYSIS   T4, FREE        All Lab Results:  Results for orders placed or performed during the hospital encounter of 10/20/17   APTT   Result Value Ref Range    aPTT 29.2 21.0 - 32.0 sec   Brain natriuretic peptide   Result Value Ref Range    BNP 1,556 (H) 0 - 99 pg/mL   CBC auto differential   Result Value Ref Range    WBC 4.91 3.90 - 12.70 K/uL    RBC 3.77 (L) 4.60 - 6.20 M/uL    Hemoglobin 11.5 (L) 14.0 - 18.0 g/dL    Hematocrit 36.7 (L) 40.0 - 54.0 %    MCV 97 82 - 98 fL    MCH 30.5 27.0 - 31.0 pg    MCHC 31.3 (L) 32.0 - 36.0 g/dL    RDW 21.0 (H) 11.5 - 14.5 %    Platelets 36 (LL) 150 - 350 K/uL    MPV SEE COMMENT 9.2 - 12.9 fL    Gran # 3.4 1.8 - 7.7 K/uL    Lymph # 0.6 (L) 1.0 - 4.8 K/uL    Mono # 0.8 0.3 - 1.0 K/uL    Eos # 0.0 0.0 - 0.5 K/uL    Baso # 0.04 0.00 - 0.20  K/uL    Gran% 71.3 38.0 - 73.0 %    Lymph% 12.8 (L) 18.0 - 48.0 %    Mono% 16.3 (H) 4.0 - 15.0 %    Eosinophil% 0.6 0.0 - 8.0 %    Basophil% 0.8 0.0 - 1.9 %    Platelet Estimate Decreased (A)     Differential Method Automated    Comprehensive metabolic panel   Result Value Ref Range    Sodium 142 136 - 145 mmol/L    Potassium 4.2 3.5 - 5.1 mmol/L    Chloride 112 (H) 95 - 110 mmol/L    CO2 16 (L) 23 - 29 mmol/L    Glucose 127 (H) 70 - 110 mg/dL    BUN, Bld 64 (H) 8 - 23 mg/dL    Creatinine 1.6 (H) 0.5 - 1.4 mg/dL    Calcium 9.3 8.7 - 10.5 mg/dL    Total Protein 7.4 6.0 - 8.4 g/dL    Albumin 3.3 (L) 3.5 - 5.2 g/dL    Total Bilirubin 2.7 (H) 0.1 - 1.0 mg/dL    Alkaline Phosphatase 89 55 - 135 U/L    AST 16 10 - 40 U/L    ALT 18 10 - 44 U/L    Anion Gap 14 8 - 16 mmol/L    eGFR if African American 47 (A) >60 mL/min/1.73 m^2    eGFR if non African American 40 (A) >60 mL/min/1.73 m^2   Lactic acid, plasma #1   Result Value Ref Range    Lactate (Lactic Acid) 4.8 (HH) 0.5 - 2.2 mmol/L   Lipase   Result Value Ref Range    Lipase 11 4 - 60 U/L   Magnesium   Result Value Ref Range    Magnesium 2.5 1.6 - 2.6 mg/dL   Phosphorus   Result Value Ref Range    Phosphorus 3.4 2.7 - 4.5 mg/dL   Protime-INR   Result Value Ref Range    Prothrombin Time 14.7 (H) 9.0 - 12.5 sec    INR 1.4 (H) 0.8 - 1.2   Troponin I   Result Value Ref Range    Troponin I 0.033 (H) 0.000 - 0.026 ng/mL   TSH   Result Value Ref Range    TSH 8.545 (H) 0.400 - 4.000 uIU/mL   ISTAT PROCEDURE   Result Value Ref Range    POC PH 7.494 (H) 7.35 - 7.45    POC PCO2 21.2 (LL) 35 - 45 mmHg    POC PO2 175 (H) 80 - 100 mmHg    POC HCO3 16.3 (L) 24 - 28 mmol/L    POC BE -7 -2 to 2 mmol/L    POC SATURATED O2 100 95 - 100 %    Rate 16     Sample ARTERIAL     Site RR     Allens Test Pass     DelSys CPAP/BiPAP     Mode BiPAP     FiO2 40     Spont Rate 10     Min Vol 32.5     IP 12     EP 6          Imaging Results:  Imaging Results          X-Ray Chest AP Portable (Final  result)  Result time 10/20/17 10:27:31    Final result by ESPERANZA Khan Sr., MD (10/20/17 10:27:31)                 Impression:        1. There is a mild amount of interstitial and alveolar opacities seen in the medial aspect of both lungs. This is characteristic of pulmonary edema.  2. The left costophrenic angle is opacified. This is characteristic of a tiny left pleural effusion or pleural adhesions.  3. Surgical changes      Electronically signed by: ESPERANZA KHAN MD  Date:     10/20/17  Time:    10:27              Narrative:    One-view chest x-ray    Clinical History: Sepsis    Finding: Comparison was made to prior examination performed on 9/22/2017. There are multiple sternotomy wires in place. There are multiple surgical clips projected over the mediastinum. The size of the heart is normal. There is a mild amount of interstitial and alveolar opacities seen in the medial aspect of both lungs. There is no pneumothorax. The right costophrenic angle is sharp. The left costophrenic angle is opacified.                               The EKG was ordered, reviewed, and independently interpreted by the ED provider.  Interpretation time: 09:58  Rate: 117 BPM  Rhythm: Atrial fibrillation with rapid ventricular response with premature ventricular or aberrantly conducted complexes  Interpretation: Left anterior fascicular block. Nonspecific ST abnormality. No STEMI.       The EKG was ordered, reviewed, and independently interpreted by the ED provider.  Interpretation time: 09:59  Rate: 116 BPM  Rhythm: Atrial fibrillation with rapid ventricular response with premature ventricular or aberrantly conducted complexes  Interpretation: Left axis deviation. No STEMI.  When compared to EKG performed 10/20/17, there are no significant changes.      The Emergency Provider reviewed the vital signs and test results, which are outlined above.    ED Discussion     11:26 AM: Discussed case with SAI Powell (Hospital Medicine). Sherry agrees  with current care and management of pt and accepts admission.   Admitting Service: Hospital medicine   Admitting Physician: Dr. Collins  Admit to: ICU    11:26 AM: Re-evaluated pt. I have discussed test results, shared treatment plan, and the need for admission with patient and family at bedside. Pt and family express understanding at this time and agree with all information. All questions answered. Pt and family have no further questions or concerns at this time. Pt is ready for admit.      ED Medication(s):  Medications   sodium chloride 0.9% bolus 2,574 mL (2,574 mLs Intravenous New Bag 10/20/17 1022)       New Prescriptions    No medications on file             Medical Decision Making    Medical Decision Making:   Clinical Tests:   Lab Tests: Ordered and Reviewed  Radiological Study: Ordered and Reviewed  Medical Tests: Ordered and Reviewed           Scribe Attestation:   Scribe #1: I performed the above scribed service and the documentation accurately describes the services I performed. I attest to the accuracy of the note.    Attending:   Physician Attestation Statement for Scribe #1: I, Daryl Bradford MD, personally performed the services described in this documentation, as scribed by Scott Funez, in my presence, and it is both accurate and complete.          Clinical Impression       ICD-10-CM ICD-9-CM   1. Acute respiratory failure with hypoxia J96.01 518.81   2. SOB (shortness of breath) R06.02 786.05   3. Acute pulmonary edema J81.0 518.4       Disposition:   Disposition: Admitted  Condition: Fair         Daryl Bradford MD  10/20/17 1330

## 2017-10-20 NOTE — PLAN OF CARE
SW met with patient and family in ED. Family reports patient having frequent headaches, pain to the back of his neck, and problems wit his feet.  No other anticipated needs at present. Case mgt to follow up with d/c needs.      10/20/17 1739   Discharge Assessment   Assessment Type Discharge Planning Assessment   Confirmed/corrected address and phone number on facesheet? Yes   Assessment information obtained from? Caregiver   Current cognitive status: Alert/Oriented   Current Functional Status: Independent   Lives With spouse;child(rima), adult   Able to Return to Prior Arrangements unable to determine at this time (comments)   Is patient able to care for self after discharge? Unable to determine at this time (comments)   Who are your caregiver(s) and their phone number(s)? alex fabio (spouse) [phone]   Patient's perception of discharge disposition home or selfcare   Patient currently receives any other outside agency services? No   Equipment Currently Used at Home oxygen;respiratory supplies   Do you have any problems affording any of your prescribed medications? TBD   Is the patient taking medications as prescribed? yes   Does the patient have transportation home? Yes   Transportation Available family or friend will provide   Does the patient receive services at the [facility]? No   Discharge Plan A Home with family
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
## 2017-10-20 NOTE — ASSESSMENT & PLAN NOTE
Likely secondary to decompensated CHF and COPD exacerbation   IV lasix, IV solumedrol, Neb txs, BIPAP   I/O   Daily weights

## 2017-10-20 NOTE — ED NOTES
Received call from Dr. Collins, administer lasix as ordered at this time.  Notified pt in CT, will administer up return.

## 2017-10-21 PROBLEM — I48.91 A-FIB: Status: ACTIVE | Noted: 2017-01-01

## 2017-10-21 PROBLEM — J96.01 ACUTE RESPIRATORY FAILURE WITH HYPOXIA: Status: RESOLVED | Noted: 2017-01-01 | Resolved: 2017-01-01

## 2017-10-21 PROBLEM — E87.20 METABOLIC ACIDOSIS: Status: ACTIVE | Noted: 2017-01-01

## 2017-10-21 PROBLEM — E87.0 HYPEROSMOLAR SYNDROME: Status: ACTIVE | Noted: 2017-01-01

## 2017-10-21 PROBLEM — E87.0 HYPEROSMOLAR SYNDROME: Status: RESOLVED | Noted: 2017-01-01 | Resolved: 2017-01-01

## 2017-10-21 PROBLEM — N17.9 AKI (ACUTE KIDNEY INJURY): Status: ACTIVE | Noted: 2017-01-01

## 2017-10-21 PROBLEM — R57.0 CARDIOGENIC SHOCK: Status: ACTIVE | Noted: 2017-01-01

## 2017-10-21 NOTE — ASSESSMENT & PLAN NOTE
Last troponin 0.044    IVF bolused  Norepinephrine keep MAP> 65  A Line  Serial CVP    Considered CTA chest to R/O PE  Patient too ill to travel to [place], Creatinine 1.9, and chr low platelets    Emperic abx:   Zosyn, Vancomycin and cipro  Cultures pending    Echo    US Legs    pO2 was 232 mmHG on FiO2 80%

## 2017-10-21 NOTE — ASSESSMENT & PLAN NOTE
Platelets have been low  Declined bone marrow in past  Cannot give LMWH or heparin products  Check fibrinogen, ptt,pt

## 2017-10-21 NOTE — HOSPITAL COURSE
Admitted initially to ICU on continuous BiPAP but weaned to nasal cannula in the ER.  He was then downgraded to telemetry and admitted to the floor.  CT chest had some small atelectatic changes in the left lung base but no evidence of consolidation or edema.  Urinalysis showed only small proteinuria.  He was stable most of the night but became tachycardic around 4 am and tachypneic with increased work of breathing.  The night physician evaluated him and called an intubation code and transferred him to the ICU.  He was stabilized in the ICU.  Starting Vancomycin and Zosyn empirically due to acute decompensation and shock.  No evidence of pneumonia or UTI by diagnostic studies.  No abdominal symptoms by history.  Decompensation probably due to heart failure.  Family changed his code status to DNR.10/23 - + fever to 100, ESBL in urine, now on meropenem.  meeting with family, son said mom is not returning and he is surrogate decision maker.  Three siblings present and concur, comfort measures and extubation are consistent with patients wishes.  Case discussed with neuro, pt had been on antiepileptic and responsive to family, thought not having seizures complicating neuro status.  Pt  at 19:10

## 2017-10-21 NOTE — EICU
Transferred to ICU after requiring intubation for worsening respiratory status.  Past PFTs with mild obstruction.  No CO2 retention on prior labs.  Worsening metabolic acidosis with tachycardia and increased distress overnight.  POCT arterial blood gas showed acute metabolic acidosis with PaCO2 24 and pH 7.26.  Initial lactic acidosis improved over preceding 24 hours.  He was given metoprolol prior to ICU transfer due to tachycardia consistent with atrial fibrillation (rapid ventricular rate).    He's now intubated and agitated.  HR remains 130s to 160s with marginal BP (presently 76/37).  Respiratory rate 40s and continued desaturation by pulse oximetry.    VAC mode with rate 16 / tidal volume 500 and FIO2 50%/PEEP 5.  CXR shows endotracheal tube in acceptable position and no obvious change in lung parenchyma.    I'm not convinced that this is COPD exacerbation but agree with plan to continue treating as such.  Given decompensation, we also need to consider cardiovascular causes and possible sepsis (although procalcitonin was okay yesterday).    · Cautious fluid administration -- 500 ml normal saline now  · Ventilator order set completed  · Fentanyl 50 mcg iv push and cautious titration of dexmedetomidine (Precedex) given hemodynamic instability.  · May need to consider loading with amiodarone if atrial fibrillation persists  · Restraints for patient safety are indicated.

## 2017-10-21 NOTE — ED NOTES
"Pt sitting up in bed. Awake, alert, and appropriate. Appears short of breath with tachypnea. Pt  elderly and frail. On 5L nc with 02 between 89-92. MD aware of respiratory status. Pt breath sounds course, diminished. Sitting up with HOB 90 degrees.  Pt tips of ears, nose and finger nails cyanotic. Cap refill more than 3. Pt reports " they are always like that" skin warm and dry. On C[place] in controlled AFIB. Denies cp, pt has 600 ML concentrated urine at bs in urinal. Pt family member (wife) at bs. Reports she does not want pt going to ICU because she would like to spend the night with him.  Discussed poc and awaiting bed assignment.  Pt + for pitting edema to BLE +3.  Becomes dyspneic upon any movement. Encouraged use of BIPAP for SOB. Pt reports " I dont want to be on that right now" MD aware. Pt states " Ill wear mine from home when it gets here because its not as loud" attempted to educate on BIPAP. No evidence of learning noted. Will continue to monitor pt until bed is ready on TELE.   "

## 2017-10-21 NOTE — PROGRESS NOTES
Vitals:    10/21/17 1324 10/21/17 1326 10/21/17 1336 10/21/17 1505   BP:    (!) 123/53   BP Location:    Right arm   Patient Position:    Lying   Pulse: 81 81 80 88   Resp: (!) 24 (!) 40 (!) 26 (!) 21   Temp:    99 °F (37.2 °C)   TempSrc:    Oral   SpO2: 100% 100% 99% (!) 87%   Weight:       Height:           Low serum bicarb, Low Ca  Low fibrinogen  Features of DIC  Elevated troponin    Echo Show Biventricular failure    Spoke with family    Extremely poor prognosis  CPR would be futile  Dr Saab present    CCt 30 mins    rec consider Comfort care  Family will let me know    Francis Downing MD

## 2017-10-21 NOTE — SUBJECTIVE & OBJECTIVE
Interval History:  Acute respiratory distress about 4 am.  Intubated and transferred to the ICU.    Review of Systems   Unable to perform ROS: Intubated     Objective:     Vital Signs (Most Recent):  Temp: 99 °F (37.2 °C) (10/21/17 1505)  Pulse: 65 (10/21/17 1750)  Resp: (!) 21 (10/21/17 1750)  BP: 101/61 (10/21/17 1705)  SpO2: (!) 93 % (10/21/17 1750) Vital Signs (24h Range):  Temp:  [97 °F (36.1 °C)-99.1 °F (37.3 °C)] 99 °F (37.2 °C)  Pulse:  [] 65  Resp:  [17-40] 21  SpO2:  [63 %-100 %] 93 %  BP: ()/(27-82) 101/61     Weight: 85.7 kg (188 lb 15 oz)  Body mass index is 26.35 kg/m².    Intake/Output Summary (Last 24 hours) at 10/21/17 1830  Last data filed at 10/21/17 1805   Gross per 24 hour   Intake          4404.65 ml   Output             1238 ml   Net          3166.65 ml      Physical Exam   Constitutional: He appears well-developed and well-nourished. No distress.   HENT:   Head: Normocephalic and atraumatic.   Mouth/Throat: Oropharynx is clear and moist.   Eyes: Conjunctivae and EOM are normal. Pupils are equal, round, and reactive to light.   Neck: No JVD present. No thyromegaly present.   Cardiovascular: Normal rate, regular rhythm and normal heart sounds.  Exam reveals no gallop and no friction rub.    No murmur heard.  Digital and acral cyanosis.   Pulmonary/Chest: Effort normal and breath sounds normal. No respiratory distress. He has no wheezes. He has no rales.   Abdominal: Soft. Bowel sounds are normal. He exhibits no distension. There is no tenderness. There is no rebound and no guarding.   Musculoskeletal: Normal range of motion. He exhibits edema. He exhibits no tenderness.   2+ pitting pre-tibial edema bilaterally.   Lymphadenopathy:     He has no cervical adenopathy.   Neurological: He has normal reflexes. He displays normal reflexes. No cranial nerve deficit.   Intubated and sedated.  Spontaneously moving all four extremities but not following commands.   Skin: Skin is warm and dry.  No rash noted. He is not diaphoretic. No erythema.   Scattered varicosities in the lower extremities.  Streak-like erythema on the dorsal surfaces of both feet consistent with micro skin tears from sudden swelling as the patient described.       Significant Labs:   ABGs:   Recent Labs  Lab 10/21/17  1334   PH 7.228*   PCO2 24.9*   HCO3 10.4*   POCSATURATED 100   BE -17     CBC:   Recent Labs  Lab 10/20/17  1006 10/21/17  0433 10/21/17  0936   WBC 4.91 3.86* 13.18*   HGB 11.5* 10.8* 10.3*   HCT 36.7* 35.5* 34.9*   PLT 36* 33* 40*     CMP:   Recent Labs  Lab 10/20/17  1006 10/21/17  0433 10/21/17  0936 10/21/17  1443    142 142 139   K 4.2 4.2 5.4* 4.6   * 114* 116* 120*   CO2 16* 17* 13* 8*   * 164* 165* 176*   BUN 64* 63* 60* 52*   CREATININE 1.6* 1.7* 1.9* 1.5*   CALCIUM 9.3 9.0 8.0* 6.3*   PROT 7.4  --  6.2  --    ALBUMIN 3.3*  --  2.8*  --    BILITOT 2.7*  --  2.5*  --    ALKPHOS 89  --  80  --    AST 16  --  26  --    ALT 18  --  19  --    ANIONGAP 14 11 13 11   EGFRNONAA 40* 38* 33* 44*       Significant Imaging: I have reviewed all pertinent imaging results/findings within the past 24 hours.

## 2017-10-21 NOTE — PROGRESS NOTES
Family conference held with Dr. Downing.  Updated on POC and results from tests.  End of life topics discussed.  Family later decided after long discussion with each other to make patient DNR.  Will continue to monitor patient.

## 2017-10-21 NOTE — NURSING
Patient had been in afib since arriving to [facility] [place] with HR ranging from around  until going into respiratory distress this am.

## 2017-10-21 NOTE — PROGRESS NOTES
Events of the morning:    Received report from VILLA Hunter.  Assessment completed as charted.  Patient hypotensive, tachycardic, recently intubated prior to shift change.  Dr. Downing at the bedside for central line placement.  Fentanyl IVP given for sedation; Precedex gtt started.  Patient does not open eyes or follow commands, but frequently sitting upright in bed.  Restraints applied.  Multiple fluid boluses given, see eMAR for administration of medication times.  Levophed started to maintain MAP 65.  Patient continues to be guppy breathing and tachypneic on ventilator, attempting to adjust sedation as tolerated, per Dr. Downing's order.  ABG's obtained, bicarb gtt started.  Amiodarone gtt started with good result--remains in Afib in 70-80.  Family updated on events and POC.  All questions answered.

## 2017-10-21 NOTE — HPI
"Mr Gentry Sears  is a 80 yo male with Seizure disorder, S/P TAVR, CAD s/p CABG, HLD, COPD, Systolic Heart Failure CHF NYHA class 4 EF 45%,  and Chronic Resp Failure on 5 liters home oxygen and Trilogy who presented to ED from Cardiology office with Respiratory distress. The pt and his spouse reported the pt walked from  of [facility] and through several hallways. When he arrived to Cardiology clinic, O2 sat was in the 70s. Pt was sent to ED. In the ED, pt was found to be in respiratory distress. Bipap applied. CXR showed mild interstitial edema,tiny left pleural effusion.   The pt reports he felt at baseline this am. He did have large amount of leg edema 3 days ago and took extra Torsemide with improvement. The pt's spouse reports pt does not usually exert himself and walk as far as he walked today. Denies fever/chills, +SOB, +cough -chronic-denies change in cough. +leg edema-improved. Spouse denies weight gain. Weight on discharge 9/23/17 was 186lb, today's weight 189lb. Spouse alsoe reports pt's BP "always runs low".        Last seen July 2017  Orion Rinaldi is a 81 y.o. male with a PMH of Seizure disorder,  SP TAVR, CABG, CAD, Pulm HTN secondary to Chronic lung disease and Valvular disease, HLD, COPD, Systolic Heart Failure CHF NYHA class 4 and Chronic Resp Failure home O2 dependent.  HAS TRILOGY     Seen in clinic: home O2 dependent at 4 lpm on 24/7, Symbicort and Spiriva.    Frequent hospitalizations for  with Acute on Chronic Resp Failure, Acute on Chronic heart failure and COPD Exacerbation  "

## 2017-10-21 NOTE — SIGNIFICANT EVENT
Called to see patient in respiratory distress on his home CPAP immediately placed on BiPAP and had been in Afib with RVR all night. As per RN -120's.   O2 Sat not reading.    On exam, In moderate respiratory distress, tachypneic RR 30/min, /min,    CNS: CxAxOx3  PULM: Use of accessory muscles of respiration. Diminished air entry in both lungs  No crackles  CVS: Irregularly irregular tachycardia, review of event in telemonitor showed HR in the 150's for a good part of the time before the distress    A/P  1. Acute Respiratory failure with decompensation . Stat ABG at bedside shows significant metabolic acidosis. Will intubate and transfer the patient to the ICU. Stat nebulizer.   2. Metabolic acidosis probably from continued accessory muscle use. Will definitely deteriorate if not intubated immediately and mucles allowed to rest. Wife informed.   3. Afib with RVR. Rate too high. Will give IV metoprolol stat 5mg slowly over 5 minutes.  4. COPD exacerbation. Stat duoneb.   5. Patient successfully intubated.  Total critical care time spent on the patient excluding any separately billable procedure is 50 minutes.

## 2017-10-21 NOTE — PLAN OF CARE
Recommendations     Recommendation/Intervention:   1. Patient not appropriate for TF at this time     -will f/u with pressors, patient status on 10/23     - consult if desire assistance prior to FU date   2. RD to f/u with progress     Goals: Initiate nutrition within 72 hrs  Nutrition Goal Status: new  Communication of RD Recs:  (plan of care)     Continuum of Care Plan     Referral to Outpatient Services:  (D/C planning: Too soon to determine)

## 2017-10-21 NOTE — CONSULTS
"    [facility] -   Adult Nutrition  Consult Note    SUMMARY     Recommendations    Recommendation/Intervention:   1. Patient not appropriate for TF at this time     -will f/u with pressors, patient status on 10/23     - consult if desire assistance prior to FU date   2. RD to f/u with progress    Goals: Initiate nutrition within 72 hrs  Nutrition Goal Status: new  Communication of RD Recs:  (plan of care)    Continuum of Care Plan    Referral to Outpatient Services:  (D/C planning: Too soon to determine)    Reason for Assessment    Reason for Assessment: physician consult  Diagnosis:  (Respiratory Failure)  Relevent Medical History: COPD, CHF, CAD   Interdisciplinary Rounds: did not attend     General Information Comments: Cardiogenic shock; MSOF; ARF. Patient on high rate pressor, bicarb, intubated, with amio drip. Patient made DNR. Team to discuss comfort care/withdrawal of care with family.      Nutrition Prescription Ordered    Current Diet Order: NPO      Evaluation of Received Nutrients/Fluid Intake     Other Calories (kcal): 758 (dextrose via meds)   Energy Calories Required: not meeting needs   Protein Required: not meeting needs   I/O: 3122/498 (decreasing UOP)      Fluid Required: meeting needs (aggressive hydration for cardiogenic shock)      Nutrition Risk Screen     Nutrition Risk Screen: no indicators present    Nutrition/Diet History     Food Preferences: ROSE MARIE   Factors Affecting Nutritional Intake: NPO, on mechanical ventilation    Labs/Tests/Procedures/Meds     Pertinent Labs Reviewed: reviewed  Pertinent Labs Comments: Cr 1.5; GFR 44  Pertinent Medications Reviewed: reviewed  Pertinent Medications Comments: precedex, furosemide, methlprednisone, IVF bolus, amio, norepi, bicarb    Physical Findings    Overall Physical Appearance:  (ROSE MARIE)  Tubes: orogastric tube  Oral/Mouth Cavity: tooth/teeth missing  Skin:  (skin tear x 2)    Anthropometrics    Temp: 99 °F (37.2 °C)     Height: 5' 11" " (180.3 cm)  Weight Method: Bed Scale  Weight: 85.7 kg (188 lb 15 oz)  Ideal Body Weight (IBW), Male: 172 lb     % Ideal Body Weight, Male (lb): 109.85 lb     BMI (Calculated): 26.4  BMI Grade: 25 - 29.9 - overweight     Estimated/Assessed Needs    Weight Used For Calorie Calculations: 85.7 kg (188 lb 15 oz)      Energy Calorie Requirements (kcal): 1712 kcal  Energy Need Method: [place] State     RMR (Tebbetts-St. Jeor Equation): 1594.12  RMR (Brunson State Equation): 1712.92  RMR (Modified Brunson State Equation): 1733.84  Weight Used For Protein Calculations: 85.7 kg (188 lb 15 oz)  Protein Requirements:  g (JESSENIA; not on HD at this time)     Fluid Need Method: RDA Method (aggressive fluid hydration for shock)      RDA Method (mL): 1712       CHO Requirement: n/a     Assessment and Plan    Nutrition Dx: Inadequate Energy Intake r/t mechanical ventilation as evidenced by: NPO status  Nutrition Dx: new      Monitor and Evaluation    Food and Nutrient Intake: energy intake, enteral nutrition intake  Food and Nutrient Adminstration: diet order, enteral and parenteral nutrition administration  Knowledge/Beliefs/Attitudes: food and nutrition knowledge/skill  Physical Activity and Function: nutrition-related ADLs and IADLs  Anthropometric Measurements: weight, weight change  Biochemical Data, Medical Tests and Procedures: electrolyte and renal panel  Nutrition-Focused Physical Findings: overall appearance    Nutrition Risk    Level of Risk:  (2 x week)    Nutrition Follow-Up    RD Follow-up?: Yes

## 2017-10-21 NOTE — PROGRESS NOTES
"[facility] Hospital Medicine  Progress Note    Patient Name: Orion Rinaldi  MRN: [id number]  Patient Class: IP- Inpatient   Admission Date: 10/20/2017  Length of Stay: 1 days  Attending Physician: Eliot Collins MD  Primary Care Provider: Daisy Oconnor MD        Subjective:     Principal Problem:Acute on chronic respiratory failure with hypoxia    HPI:  The pt is a 80 yo male with Seizure disorder, S/P TAVR, CAD s/p CABG, HLD, COPD, Systolic Heart Failure CHF NYHA class 4 EF 45%,  and Chronic Resp Failure on 5 liters home oxygen and Trilogy who presented to ED from Cardiology office with Respiratory distress. The pt and his spouse reported the pt walked from  of [facility] and through several hallways. When he arrived to Cardiology clinic, O2 sat was in the 70s. Pt was sent to ED. In the ED, pt was found to be in respiratory distress. Bipap applied. CXR showed mild interstitial edema,tiny left pleural effusion.   The pt reports he felt at baseline this am. He did have large amount of leg edema 3 days ago and took extra Torsemide with improvement. The pt's spouse reports pt does not usually exert himself and walk as far as he walked today. Denies fever/chills, +SOB, +cough -chronic-denies change in cough. +leg edema-improved. Spouse denies weight gain. Weight on discharge 9/23/17 was 186lb, today's weight 189lb. Spouse alsoe reports pt's BP "always runs low".     Hospital Course:  Admitted initially to ICU on continuous BiPAP but weaned to nasal cannula in the ER.  He was then downgraded to telemetry and admitted to the floor.  CT chest had some small atelectatic changes in the left lung base but no evidence of consolidation or edema.  Urinalysis showed only small proteinuria.  He was stable most of the night but became tachycardic around 4 am and tachypneic with increased work of breathing.  The night physician evaluated him and called an intubation code and transferred him " to the ICU.  He was stabilized in the ICU.  Family changed his code status to DNR.    Interval History:  Acute respiratory distress about 4 am.  Intubated and transferred to the ICU.    Review of Systems   Unable to perform ROS: Intubated     Objective:     Vital Signs (Most Recent):  Temp: 99 °F (37.2 °C) (10/21/17 1505)  Pulse: 65 (10/21/17 1750)  Resp: (!) 21 (10/21/17 1750)  BP: 101/61 (10/21/17 1705)  SpO2: (!) 93 % (10/21/17 1750) Vital Signs (24h Range):  Temp:  [97 °F (36.1 °C)-99.1 °F (37.3 °C)] 99 °F (37.2 °C)  Pulse:  [] 65  Resp:  [17-40] 21  SpO2:  [63 %-100 %] 93 %  BP: ()/(27-82) 101/61     Weight: 85.7 kg (188 lb 15 oz)  Body mass index is 26.35 kg/m².    Intake/Output Summary (Last 24 hours) at 10/21/17 1830  Last data filed at 10/21/17 1805   Gross per 24 hour   Intake          4404.65 ml   Output             1238 ml   Net          3166.65 ml      Physical Exam   Constitutional: He appears well-developed and well-nourished. No distress.   HENT:   Head: Normocephalic and atraumatic.   Mouth/Throat: Oropharynx is clear and moist.   Eyes: Conjunctivae and EOM are normal. Pupils are equal, round, and reactive to light.   Neck: No JVD present. No thyromegaly present.   Cardiovascular: Normal rate, regular rhythm and normal heart sounds.  Exam reveals no gallop and no friction rub.    No murmur heard.  Digital and acral cyanosis.   Pulmonary/Chest: Effort normal and breath sounds normal. No respiratory distress. He has no wheezes. He has no rales.   Abdominal: Soft. Bowel sounds are normal. He exhibits no distension. There is no tenderness. There is no rebound and no guarding.   Musculoskeletal: Normal range of motion. He exhibits edema. He exhibits no tenderness.   2+ pitting pre-tibial edema bilaterally.   Lymphadenopathy:     He has no cervical adenopathy.   Neurological: He has normal reflexes. He displays normal reflexes. No cranial nerve deficit.   Intubated and sedated.  Spontaneously  moving all four extremities but not following commands.   Skin: Skin is warm and dry. No rash noted. He is not diaphoretic. No erythema.   Scattered varicosities in the lower extremities.  Streak-like erythema on the dorsal surfaces of both feet consistent with micro skin tears from sudden swelling as the patient described.       Significant Labs:   ABGs:   Recent Labs  Lab 10/21/17  1334   PH 7.228*   PCO2 24.9*   HCO3 10.4*   POCSATURATED 100   BE -17     CBC:   Recent Labs  Lab 10/20/17  1006 10/21/17  0433 10/21/17  0936   WBC 4.91 3.86* 13.18*   HGB 11.5* 10.8* 10.3*   HCT 36.7* 35.5* 34.9*   PLT 36* 33* 40*     CMP:   Recent Labs  Lab 10/20/17  1006 10/21/17  0433 10/21/17  0936 10/21/17  1443    142 142 139   K 4.2 4.2 5.4* 4.6   * 114* 116* 120*   CO2 16* 17* 13* 8*   * 164* 165* 176*   BUN 64* 63* 60* 52*   CREATININE 1.6* 1.7* 1.9* 1.5*   CALCIUM 9.3 9.0 8.0* 6.3*   PROT 7.4  --  6.2  --    ALBUMIN 3.3*  --  2.8*  --    BILITOT 2.7*  --  2.5*  --    ALKPHOS 89  --  80  --    AST 16  --  26  --    ALT 18  --  19  --    ANIONGAP 14 11 13 11   EGFRNONAA 40* 38* 33* 44*       Significant Imaging: I have reviewed all pertinent imaging results/findings within the past 24 hours.    Assessment/Plan:      * Acute on chronic respiratory failure with hypoxia    Likely secondary to decompensated CHF and COPD exacerbation.  IV lasix, IV solumedrol, Neb txs, I/O,  Daily weights.  Intubated for ventilatory support.  Starting Vancomycin and Zosyn empirically due to acute decompensation and shock.  No evidence of pneumonia or UTI by diagnostic studies.  No abdominal symptoms by history.  Decompensation probably due to heart failure.        Lactic acid acidosis    No S/S infection-No Sepsis   Will obtain CT chest to rule out PNA  Lactic acidosis likely secondary to Acute/chronic cyanosis/hypoxemia   Monitor closely         PAF (paroxysmal atrial fibrillation)    BB  No Anticoagulation due to  thrombocytopenia           Coronary artery disease involving native coronary artery of native heart without angina pectoris              COPD exacerbation    IV Steroids  Neb txs           Acute on chronic combined systolic and diastolic congestive heart failure    IV Lasix  I/O   Daily weights         Thrombocytopenia, with anemia    Likely secondary to MDS  Pt/spouse declined BM bx         Seizures    Cont keppra        Aortic stenosis s/p TAVR                VTE Risk Mitigation         Ordered     Place sequential compression device  Until discontinued      10/21/17 1351     Place sequential compression device  Until discontinued      10/20/17 1354     Medium Risk of VTE  Once      10/20/17 1320          Critical care time spent on the evaluation and treatment of severe organ dysfunction, review of pertinent labs and imaging studies, discussions with consulting providers and discussions with patient/family: 30 minutes.    Eliot Collins MD  Department of Hospital Medicine   [facility]

## 2017-10-21 NOTE — PROGRESS NOTES
Received patient from telemetry due to respiratory distress. Intubated patient post-induction, using #4 MAC. ETT 8.0 secured at 24 cm at the lips. Patient tolerated procedure well. Dr. Lin at bedside.

## 2017-10-21 NOTE — PROCEDURES
"Orion Rinaldi is a 81 y.o. male patient.    Temp: 97.7 °F (36.5 °C) (10/21/17 0429)  Pulse: 88 (10/21/17 0715)  Resp: (!) 38 (10/21/17 0715)  BP: (!) 109/55 (10/21/17 0429)  SpO2: (!) 76 % (sat not picking up ) (10/21/17 0715)  Weight: 85.7 kg (188 lb 15 oz) (10/20/17 2013)  Height: 5' 11" (180.3 cm) (10/20/17 2013)       Central Line  Date/Time: 10/21/2017 7:00 AM  Performed by: FRANCIS BOURNE  Pre-operative Diagnosis: respiratory failure  Post-operative diagnosis: respiratory failure  Consent Done: Emergent Situation  Time out: Immediately prior to procedure a "time out" was called to verify the correct patient, procedure, equipment, support staff and site/side marked as required.  Indications: med administration, hemodynamic monitoring and vascular access  Preparation: skin prepped with ChloraPrep  Skin prep agent dried: skin prep agent completely dried prior to procedure  Sterile barriers: all five maximum sterile barriers used - cap, mask, sterile gown, sterile gloves, and large sterile sheet  Hand hygiene: hand hygiene performed prior to central venous catheter insertion  Location details: left internal jugular  Catheter type: triple lumen  Catheter size: 8 Fr  Catheter Length: 14cm    Ultrasound guidance: yes  Vessel Caliber: largeSterile sheath used.  Manometry: No   Number of attempts: 1  Assessment: placement verified by x-ray and no pneumothorax on x-ray  Complications: none  Specimens: No  Implants: No  Post-procedure: line sutured,  chlorhexidine patch,  sterile dressing applied and blood return through all ports  Complications: No          Francis Bourne  10/21/2017  "

## 2017-10-21 NOTE — ASSESSMENT & PLAN NOTE
Likely secondary to decompensated CHF and COPD exacerbation.  IV lasix, IV solumedrol, Neb txs, I/O,  Daily weights.  Intubated for ventilatory support.  Starting Vancomycin and Zosyn empirically due to acute decompensation and shock.  No evidence of pneumonia or UTI by diagnostic studies.  No abdominal symptoms by history.  Decompensation probably due to heart failure.

## 2017-10-21 NOTE — HOSPITAL COURSE
Developed A fib with RVR  Respiratory distress and failure required intubation  In MICU: Central line placed, BP marginal, Breathing over vent  PE considered: creatinine 1.7, platelet 33, Troponin 0.04, lactate  6.2  Too unstable to get CTA chest and elevated creatinine and low platelets  emperic abx added  Central line placed  Unable to thread A line    10/22 - Still on low dose Levophed infusion and full mech vent support.  104.5 temp early this AM    10/23 - Worsening renal failure and now Liver failure.  Severe resp distress with SAT/SBT

## 2017-10-21 NOTE — SUBJECTIVE & OBJECTIVE
Interval History:  A fib RVR    Objective:     Vital Signs (Most Recent):  Temp: 97.7 °F (36.5 °C) (10/21/17 0429)  Pulse: 71 (10/21/17 0854)  Resp: (!) 35 (10/21/17 0854)  BP: (!) 67/42 (10/21/17 0854)  SpO2: (!) 82 % (10/21/17 0854) Vital Signs (24h Range):  Temp:  [97 °F (36.1 °C)-98.1 °F (36.7 °C)] 97.7 °F (36.5 °C)  Pulse:  [] 71  Resp:  [17-38] 35  SpO2:  [22 %-100 %] 82 %  BP: ()/(41-80) 67/42     Weight: 85.7 kg (188 lb 15 oz)  Body mass index is 26.35 kg/m².      Intake/Output Summary (Last 24 hours) at 10/21/17 0920  Last data filed at 10/21/17 0805   Gross per 24 hour   Intake          3077.94 ml   Output             1270 ml   Net          1807.94 ml       Physical Exam   Constitutional: He appears lethargic. He appears toxic. He has a sickly appearance. He appears ill. He appears distressed. He is sedated and intubated.   HENT:   Head: Normocephalic and atraumatic.   Nose: Nose normal.   Mouth/Throat: Oropharynx is clear and moist. No oropharyngeal exudate.   Eyes: Conjunctivae and EOM are normal. Pupils are equal, round, and reactive to light.   Neck: Normal range of motion. Neck supple. No JVD present. No tracheal deviation present. No thyromegaly present.   Left IJ TLC   Cardiovascular: Normal rate, regular rhythm and normal heart sounds.    Pulmonary/Chest: He is intubated. He is in respiratory distress. He has decreased breath sounds. He has no wheezes. He has no rhonchi. He has no rales.   Abdominal: Soft. Bowel sounds are normal.   Genitourinary:   Genitourinary Comments: richey   Musculoskeletal: Normal range of motion. He exhibits no deformity.   Neurological: He appears lethargic.   Skin: Skin is warm. Capillary refill takes more than 3 seconds. Ecchymosis and petechiae noted. There is erythema.   Nursing note and vitals reviewed.      Vents:  Vent Mode: A/C (10/21/17 0854)  Ventilator Initiated: Yes (10/21/17 0637)  Set Rate: 30 bmp (10/21/17 0854)  Vt Set: (S) 500 mL (10/21/17  0854)  Pressure Support: 0 cmH20 (10/21/17 0854)  PEEP/CPAP: 10 cmH20 (10/21/17 0854)  Oxygen Concentration (%): 80 (10/21/17 0854)  Peak Airway Pressure: 20 cmH2O (10/21/17 0854)  Plateau Pressure: 0 cmH20 (10/21/17 0854)  Total Ve: 17.2 mL (10/21/17 0854)  F/VT Ratio<105 (RSBI): (!) 58.53 (10/21/17 0854)    Lines/Drains/Airways     Drain                 NG/OG Tube 10/21/17 0610 [facility] mouth less than 1 day          Airway                 Airway - Non-Surgical 10/21/17 0610 Endotracheal Tube less than 1 day          Peripheral Intravenous Line                 Peripheral IV - Single Lumen 10/20/17 1006 Right Forearm less than 1 day         Peripheral IV - Single Lumen 10/21/17 0625 Left Antecubital less than 1 day                Significant Labs:    CBC/Anemia Profile:    Recent Labs  Lab 10/20/17  1006 10/21/17  0433   WBC 4.91 3.86*   HGB 11.5* 10.8*   HCT 36.7* 35.5*   PLT 36* 33*   MCV 97 98   RDW 21.0* 21.2*        Chemistries:    Recent Labs  Lab 10/20/17  0854 10/20/17  1006 10/21/17  0433    142 142   K 4.3 4.2 4.2   * 112* 114*   CO2 17* 16* 17*   BUN 65* 64* 63*   CREATININE 1.7* 1.6* 1.7*   CALCIUM 9.3 9.3 9.0   ALBUMIN  --  3.3*  --    PROT  --  7.4  --    BILITOT  --  2.7*  --    ALKPHOS  --  89  --    ALT  --  18  --    AST  --  16  --    MG  --  2.5  --    PHOS  --  3.4  --        ABGs:   Recent Labs  Lab 10/21/17  0555   PH 7.256*   PCO2 23.5*   HCO3 10.5*   POCSATURATED 98   BE -17     Cardiac Markers: No results for input(s): CKMB, TROPONINT, MYOGLOBIN in the last 48 hours.  Lactic Acid:   Recent Labs  Lab 10/20/17  1440 10/20/17  1910 10/21/17  0809   LACTATE 2.4* 2.1 6.2*     POCT Glucose: No results for input(s): POCTGLUCOSE in the last 48 hours.  Troponin:   Recent Labs  Lab 10/20/17  1006 10/21/17  0809   TROPONINI 0.033* 0.044*     All pertinent labs within the past 24 hours have been reviewed.    Significant Imaging:  CXR: I have reviewed all pertinent results/findings  within the past 24 hours and my personal findings are:      Comparisons are made to a study performed one hour earlier. Interval placement of a left jugular central line with the tip in the very proximal SVC.  Lower lung findings on the current study, with persistent interstitial changes throughout the lungs, and blunting of the left lateral costophrenic angle.  Stable endotracheal tube and nasogastric tube. Hilar and mediastinal contours and osseous structures are unchanged.    CHEST CT reviewed  Finding: Comparison is made to a prior examination performed on 9/18/2017. There are multiple sternotomy wires in place. There is a moderate amount of atherosclerosis. There are mild dependent atelectatic changes in the left lung. The right lung is clear. There is no pneumothorax or pleural effusion. There are multiple sternotomy wires in place. The spleen is enlarged. It measures at least 13.6 cm in length. There is a mild amount of dextroconvex curvature of the thoracic spine. There are mild degenerative changes in the thoracic spine.

## 2017-10-21 NOTE — CONSULTS
Clinical Pharmacy Progress Note    Pharmacy consulted to dose vancomycin for empiric treatment of septic shock    81 y.o. male with history of H/O aortic valve replacement (ChroniPulmonary heart disease (Chronic)/Seizures (Chronic)/COPD/AFIB/admitted for COPD exacerbation/Suspected Septic Shock    The patient has the following labs:     Date Creatinine (mg/dl)    BUN WBC Count   10/21/2017 Estimated Creatinine Clearance: 33.6 mL/min (based on SCr of 1.9 mg/dL (H)). Lab Results   Component Value Date    BUN 60 (H) 10/21/2017     Lab Results   Component Value Date    WBC 13.18 (H) 10/21/2017        Ideal BW: 75.3 kg  Actual BW:   Adjusted (Dosing) BW: 85.7 kg    Tmax/24h =99 F  Cultures: Blood pending      Vancomycin (day 1 of therapy)     Other anti-infectives: zosyn 4.5 gm iv q8hrs (day 2 of therapy)    Based on Estimated Creatinine Clearance: 33.6 mL/min (based on SCr of 1.9 mg/dL (H)). and weight of 85.7 kg, pharmacy will initiate pulse dosing and use vancomycin 1250 mg iv  every 48 hours as a place benitez for dosing dependent on renal function fluctuations.  Note: a vancomycin 1x dose of 1500 mg was given as a load prior to further consultation.   A random level is ordered for 10/22 @ 0900.  Pharmacy will continue to follow patients clinical status, renal function, C&S, and adjust dose as necessary to maintain trough levels between 15 and 20.     Thank you for allowing us to participate in this patient's care.     Emmy Ling [facility] [place]  10/21/2017 11:29 AM

## 2017-10-21 NOTE — PLAN OF CARE
Problem: Patient Care Overview  Goal: Plan of Care Review  Outcome: Ongoing (interventions implemented as appropriate)  Patient arrived in ICU at 0620 with respiratory distress and -180.  Orders placed, 5 mg Lopressor administered and 500 cc NS bolus ordered, richey initiated,restraints ordered, and Precedex gtt added. Patient's HR has improved.  POC explained to pt, not evidence of learning, need reinforcement. Will continue to monitor pt.

## 2017-10-21 NOTE — PROGRESS NOTES
"[facility] -   Critical Care Medicine  Progress Note    Patient Name: Orion Rinaldi  MRN: [id number]  Admission Date: 10/20/2017  Hospital Length of Stay: 1 days  Code Status: Full Code  Attending Provider: Eliot Collins MD  Primary Care Provider: Daisy Oconnor MD   Principal Problem: Acute on chronic respiratory failure with hypoxia    Subjective:     HPI:  Mr Gentry Sears  is a 80 yo male with Seizure disorder, S/P TAVR, CAD s/p CABG, HLD, COPD, Systolic Heart Failure CHF NYHA class 4 EF 45%,  and Chronic Resp Failure on 5 liters home oxygen and Trilogy who presented to ED from Cardiology office with Respiratory distress. The pt and his spouse reported the pt walked from  of [facility] and through several hallways. When he arrived to Cardiology clinic, O2 sat was in the 70s. Pt was sent to ED. In the ED, pt was found to be in respiratory distress. Bipap applied. CXR showed mild interstitial edema,tiny left pleural effusion.   The pt reports he felt at baseline this am. He did have large amount of leg edema 3 days ago and took extra Torsemide with improvement. The pt's spouse reports pt does not usually exert himself and walk as far as he walked today. Denies fever/chills, +SOB, +cough -chronic-denies change in cough. +leg edema-improved. Spouse denies weight gain. Weight on discharge 9/23/17 was 186lb, today's weight 189lb. Spouse alsoe reports pt's BP "always runs low".        Last seen July 2017  Orion Rinaldi is a 81 y.o. male with a PMH of Seizure disorder,  SP TAVR, CABG, CAD, Pulm HTN secondary to Chronic lung disease and Valvular disease, HLD, COPD, Systolic Heart Failure CHF NYHA class 4 and Chronic Resp Failure home O2 dependent.  HAS TRILOGY     Seen in clinic: home O2 dependent at 4 lpm on 24/7, Symbicort and Spiriva.    Frequent hospitalizations for  with Acute on Chronic Resp Failure, Acute on Chronic heart failure and COPD Exacerbation    Hospital/ICU " Course:  Developed A fib with RVR  Respiratory distress and failure required intubation  In MICU: Central line placed, BP marginal, Breathing over vent  PE considered: creatinine 1.7, platelet 33, Troponin 0.04, lactate  6.2  Too unstable to get CTA chest and elevated creatinine and low platelets  emperic abx added  Central line placed  Unable to thread A line    Interval History:  A fib RVR    Objective:     Vital Signs (Most Recent):  Temp: 97.7 °F (36.5 °C) (10/21/17 0429)  Pulse: 71 (10/21/17 0854)  Resp: (!) 35 (10/21/17 0854)  BP: (!) 67/42 (10/21/17 0854)  SpO2: (!) 82 % (10/21/17 0854) Vital Signs (24h Range):  Temp:  [97 °F (36.1 °C)-98.1 °F (36.7 °C)] 97.7 °F (36.5 °C)  Pulse:  [] 71  Resp:  [17-38] 35  SpO2:  [22 %-100 %] 82 %  BP: ()/(41-80) 67/42     Weight: 85.7 kg (188 lb 15 oz)  Body mass index is 26.35 kg/m².      Intake/Output Summary (Last 24 hours) at 10/21/17 0920  Last data filed at 10/21/17 0805   Gross per 24 hour   Intake          3077.94 ml   Output             1270 ml   Net          1807.94 ml       Physical Exam   Constitutional: He appears lethargic. He appears toxic. He has a sickly appearance. He appears ill. He appears distressed. He is sedated and intubated.   HENT:   Head: Normocephalic and atraumatic.   Nose: Nose normal.   Mouth/Throat: Oropharynx is clear and moist. No oropharyngeal exudate.   Eyes: Conjunctivae and EOM are normal. Pupils are equal, round, and reactive to light.   Neck: Normal range of motion. Neck supple. No JVD present. No tracheal deviation present. No thyromegaly present.   Left IJ TLC   Cardiovascular: Normal rate, regular rhythm and normal heart sounds.    Pulmonary/Chest: He is intubated. He is in respiratory distress. He has decreased breath sounds. He has no wheezes. He has no rhonchi. He has no rales.   Abdominal: Soft. Bowel sounds are normal.   Genitourinary:   Genitourinary Comments: richey   Musculoskeletal: Normal range of motion. He  exhibits no deformity.   Neurological: He appears lethargic.   Skin: Skin is warm. Capillary refill takes more than 3 seconds. Ecchymosis and petechiae noted. There is erythema.   Nursing note and vitals reviewed.      Vents:  Vent Mode: A/C (10/21/17 0854)  Ventilator Initiated: Yes (10/21/17 0637)  Set Rate: 30 bmp (10/21/17 0854)  Vt Set: (S) 500 mL (10/21/17 0854)  Pressure Support: 0 cmH20 (10/21/17 0854)  PEEP/CPAP: 10 cmH20 (10/21/17 0854)  Oxygen Concentration (%): 80 (10/21/17 0854)  Peak Airway Pressure: 20 cmH2O (10/21/17 0854)  Plateau Pressure: 0 cmH20 (10/21/17 0854)  Total Ve: 17.2 mL (10/21/17 0854)  F/VT Ratio<105 (RSBI): (!) 58.53 (10/21/17 0854)    Lines/Drains/Airways     Drain                 NG/OG Tube 10/21/17 0610 [facility] mouth less than 1 day          Airway                 Airway - Non-Surgical 10/21/17 0610 Endotracheal Tube less than 1 day          Peripheral Intravenous Line                 Peripheral IV - Single Lumen 10/20/17 1006 Right Forearm less than 1 day         Peripheral IV - Single Lumen 10/21/17 0625 Left Antecubital less than 1 day                Significant Labs:    CBC/Anemia Profile:    Recent Labs  Lab 10/20/17  1006 10/21/17  0433   WBC 4.91 3.86*   HGB 11.5* 10.8*   HCT 36.7* 35.5*   PLT 36* 33*   MCV 97 98   RDW 21.0* 21.2*        Chemistries:    Recent Labs  Lab 10/20/17  0854 10/20/17  1006 10/21/17  0433    142 142   K 4.3 4.2 4.2   * 112* 114*   CO2 17* 16* 17*   BUN 65* 64* 63*   CREATININE 1.7* 1.6* 1.7*   CALCIUM 9.3 9.3 9.0   ALBUMIN  --  3.3*  --    PROT  --  7.4  --    BILITOT  --  2.7*  --    ALKPHOS  --  89  --    ALT  --  18  --    AST  --  16  --    MG  --  2.5  --    PHOS  --  3.4  --        ABGs:   Recent Labs  Lab 10/21/17  0555   PH 7.256*   PCO2 23.5*   HCO3 10.5*   POCSATURATED 98   BE -17     Cardiac Markers: No results for input(s): CKMB, TROPONINT, MYOGLOBIN in the last 48 hours.  Lactic Acid:   Recent Labs  Lab  10/20/17  1440 10/20/17  1910 10/21/17  0809   LACTATE 2.4* 2.1 6.2*     POCT Glucose: No results for input(s): POCTGLUCOSE in the last 48 hours.  Troponin:   Recent Labs  Lab 10/20/17  1006 10/21/17  0809   TROPONINI 0.033* 0.044*     All pertinent labs within the past 24 hours have been reviewed.    Significant Imaging:  CXR: I have reviewed all pertinent results/findings within the past 24 hours and my personal findings are:      Comparisons are made to a study performed one hour earlier. Interval placement of a left jugular central line with the tip in the very proximal SVC.  Lower lung findings on the current study, with persistent interstitial changes throughout the lungs, and blunting of the left lateral costophrenic angle.  Stable endotracheal tube and nasogastric tube. Hilar and mediastinal contours and osseous structures are unchanged.    CHEST CT reviewed  Finding: Comparison is made to a prior examination performed on 9/18/2017. There are multiple sternotomy wires in place. There is a moderate amount of atherosclerosis. There are mild dependent atelectatic changes in the left lung. The right lung is clear. There is no pneumothorax or pleural effusion. There are multiple sternotomy wires in place. The spleen is enlarged. It measures at least 13.6 cm in length. There is a mild amount of dextroconvex curvature of the thoracic spine. There are mild degenerative changes in the thoracic spine.    Assessment/Plan:     Neuro   Seizures    Changed to IV Keppra        Pulmonary   * Acute on chronic respiratory failure with hypoxia    Vent Mode: A/C  Oxygen Concentration (%):  [40-80] 65  Resp Rate Total:  [28 br/min-43 br/min] 28 br/min  Vt Set:  [0 mL-500 mL] 0 mL  PEEP/CPAP:  [5 cmH20-10 cmH20] 8 cmH20  Pressure Support:  [0 cmH20] 0 cmH20  Mean Airway Pressure:  [1 ynJ70-23 cmH20] 12 cmH20     Reduced RR , peep 8, Fio2 65%  Bronchodilators: Arformoterol, DuONEBS, Budesonide  Chlorhexidine  Precedex and fentanyl  GTT RASS-2  IV Pantoprazole  Steroids: Solumedrol        Cardiac/Vascular   Coronary artery disease involving native coronary artery of native heart without angina pectoris    PO Aspirin  Simvastatin        Cardiogenic shock    Last troponin 0.044    IVF bolused  Norepinephrine keep MAP> 65  A Line  Serial CVP    Considered CTA chest to R/O PE  Patient too ill to travel to CT, Creatinine 1.9, and chr low platelets    Emperic abx:   Zosyn, Vancomycin and cipro  Cultures pending    Echo    US Legs    pO2 was 232 mmHG on FiO2 80%        A-fib    Amiodarone Loading  Ideally should be anticoagulated: low platelets concern        Acute on chronic combined systolic and diastolic congestive heart failure    EF 40% and PAH not ammeanable to vasodilator therapy  Consult Cardiology  DC Diuretic for Now        Renal/   JESSENIA (acute kidney injury)    Aggressive hydration  Keep MAP 65-70        Metabolic acidosis    Added bicarb drip        Lactic acid acidosis    Related to shock        Hematology   Thrombocytopenia, with anemia    Platelets have been low  Declined bone marrow in past  Cannot give LMWH or heparin products  Check fibrinogen, ptt,pt            Overall Prognosis is poor, multiple organ failure   Spoke with Wife and recommend consider DNR     Critical Care Daily Checklist:    A: Awake: RASS Goal/Actual Goal: RASS Goal: 2-->agitated  Actual:     B: Spontaneous Breathing Trial Performed?     C: SAT & SBT Coordinated?  Not applicable                      D: Delirium: CAM-ICU     E: Early Mobility Performed? No   F: Feeding Goal:    Status:     Current Diet Order   Procedures    Diet NPO      AS: Analgesia/Sedation Precedex    T: Thromboembolic Prophylaxis SCD   H: HOB > 300 Yes   U: Stress Ulcer Prophylaxis (if needed) PPI   G: Glucose Control SSI   B: Bowel Function     I: Indwelling Catheter (Lines & Tapia) Necessity Tapia, CVP, A line   D: De-escalation of Antimicrobials/Pharmacotherapies Vancomycin, Ciprofloxacin,  Remi    Plan for the day/ETD Improved UO    Code Status:  Family/Goals of Care: Full Code  Home     Critical Care Time: 90 minutes  Critical secondary to Patient has a condition that poses threat to life and bodily function: Severe Respiratory Distress      Critical care was time spent personally by me on the following activities: development of treatment plan with patient or surrogate and bedside caregivers, discussions with consultants, evaluation of patient's response to treatment, examination of patient, ordering and performing treatments and interventions, ordering and review of laboratory studies, ordering and review of radiographic studies, pulse oximetry, re-evaluation of patient's condition. This critical care time did not overlap with that of any other provider or involve time for any procedures.     Francis Downing MD  Critical Care Medicine  [facility]

## 2017-10-21 NOTE — PROCEDURES
"Orion Rinaldi is a 81 y.o. male patient.    Temp: 99 °F (37.2 °C) (10/21/17 0705)  Pulse: 84 (10/21/17 1050)  Resp: (!) 29 (10/21/17 1050)  BP: (!) 103/45 (10/21/17 1050)  SpO2: (!) 77 % (10/21/17 1016)  Weight: 85.7 kg (188 lb 15 oz) (10/20/17 2013)  Height: 5' 11" (180.3 cm) (10/20/17 2013)       Arterial Line  Date/Time: 10/21/2017 1:11 PM  Performed by: FRANCIS BOURNE  Authorized by: FRANCIS BOURNE   Pre-op Diagnosis: Shock  Post-operative diagnosis: Shock  Consent Done: Not Needed  Preparation: Patient was prepped and draped in the usual sterile fashion.  Indications: multiple ABGs, respiratory failure and hemodynamic monitoring  Location: right radial  Anesthesia: see MAR for details  Patient sedated: yes  Sedatives: fentanyl  Cholo's test normal: yes  Needle gauge: 18  Seldinger technique: Seldinger technique used  Number of attempts: 4  Complications: No  Specimens: No  Implants: No  Post-procedure: line sutured and dressing applied  Post-procedure CMS: normal  Patient tolerance: Patient tolerated the procedure well with no immediate complications  Comments: A line attempted on left radial x 2             Francis Bourne  10/21/2017  "

## 2017-10-21 NOTE — NURSING
When making 0500 rounds, this RN entered room to find patient SOB on 5L O2 and cyanotic. Patient stated he was cold. Called respiratory to administer a breathing treatment. Unable to obtain pulse ox in any fashion. Patient not relieved with one breathing treatment. Dr. Carrasco ordered and additional treatment and ABGs. Respiratory put BiPap on patient and patient still showed no signs of improvement. Dr. Carrasco ordered to transfer patient to ICU.

## 2017-10-21 NOTE — PROVIDER PROGRESS NOTES - EMERGENCY DEPT.
Encounter Date: 10/20/2017    ED Physician Progress Notes       SCRIBE NOTE: IAlvarado, am scribing for, and in the presence of,  Oksana Lin MD.  Physician Statement: IOksana MD, personally performed the services described in this documentation as scribed by Alvarado Davis in my presence, and it is both accurate and complete.     Physician Note:   6:00 AM: Dr. Lin called to ICU to intubate pt. Pt is was admitted to hospital last night for acute respiratory failure. Pt has been on BiPAP. Pt has history of COPD, CHF, CABG.    6:10 AM: Pt is awake, alert and oriented in room. Pt with 3 +BLE pitting edema. Pt's skin peripheraly cyanotic.    6:13 AM: Pt given etomidate.     6:14 AM: Pt given succinylcholine.      Intubation  Date/Time: 10/21/2017 6:16 AM  Performed by: OKSANA LIN  Authorized by: OKSANA LIN   Consent Done: Emergent Situation  Indications: respiratory distress,  respiratory failure and  airway protection  Intubation method: direct  Patient status: paralyzed (RSI)  Preoxygenation: mask  Sedatives: etomidate  Paralytic: succinylcholine  Tube size: 8.0 mm  Tube type: cuffed  Number of attempts: 1  Post-procedure assessment: chest rise,  ETCO2 monitor and CO2 detector  ETT to lip: 24 cm  Tube secured with: ties  Chest x-ray findings: endotracheal tube in appropriate position  Patient tolerance: Patient tolerated the procedure well with no immediate complications  Comments: I was called to the ICU for patient requiring endotracheal intubation.  Performed RSI and the respiratory therapist Bakari actually intubated the patient while I assisted.

## 2017-10-21 NOTE — ASSESSMENT & PLAN NOTE
Vent Mode: A/C  Oxygen Concentration (%):  [40-80] 65  Resp Rate Total:  [28 br/min-43 br/min] 28 br/min  Vt Set:  [0 mL-500 mL] 0 mL  PEEP/CPAP:  [5 cmH20-10 cmH20] 8 cmH20  Pressure Support:  [0 cmH20] 0 cmH20  Mean Airway Pressure:  [1 zoY74-28 cmH20] 12 cmH20     Reduced RR , peep 8, Fio2 65%  Bronchodilators: Arformoterol, DuONEBS, Budesonide  Chlorhexidine  Precedex and fentanyl GTT RASS-2  IV Pantoprazole  Steroids: Solumedrol

## 2017-10-21 NOTE — PLAN OF CARE
Problem: Patient Care Overview  Goal: Plan of Care Review  Outcome: Ongoing (interventions implemented as appropriate)  Patient doing well since arriving to unit. Vital signs stable. No complaints of pain. Patient's family brought BiPap from home for patient to wear at night. A-fib on the monitor. No s/s of distress. Will continue to monitor.

## 2017-10-21 NOTE — CONSULTS
[facility] -   Cardiology  Consult Note    Patient Name: Orion Rinaldi  MRN: [id number]  Admission Date: 10/20/2017  Hospital Length of Stay: 1 days  Code Status: Full Code   Attending Provider: Eliot Collins MD   Consulting Provider: Klaudia Saab MD  Primary Care Physician: Daisy Oconnor MD  Principal Problem:Acute on chronic respiratory failure with hypoxia    Patient information was obtained from patient and ER records.     Inpatient consult to Cardiology  Consult performed by: KLAUDIA SAAB  Consult ordered by: ROMINA DOWNING        Subjective:     Chief Complaint:  CHF     HPI: 80 yo, male, PMH CAD s/p cabg, s/p TAVR in 2013, CHFpEF, 45% baseline, COPD, PAF, thrombocytopenia, HLD.  Was admitted to [facility] ICU from Dr. ROONEY's office.   Now intubated, on pressor and amiodarone gtt. Elevated INR and Cr with decreased urine output.  ECHO showed decreased EF to 25 to 30%, moderate to severe AI, severely decreased RV systolic function.  Dr. Downing had conversion with family and advise withdrawal of life support.    Past Medical History:   Diagnosis Date    Acute on chronic systolic CHF (congestive heart failure), NYHA class 4 11/18/2014    Arthritis     Asthma     Cancer     Cardiomyopathy 11/25/2014    COPD (chronic obstructive pulmonary disease)     Coronary artery disease     CABG x 3 1997    Hypercholesterolemia 11/4/2016    Mitral stenosis 11/25/2014    PAF (paroxysmal atrial fibrillation) 9/20/2017    Pulmonary HTN 11/25/2014    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    Seizures     Thrombocytopenia        Past Surgical History:   Procedure Laterality Date    AORTIC VALVE REPLACEMENT      CARDIAC CATHETERIZATION      CATARACT EXTRACTION W/  INTRAOCULAR LENS IMPLANT  OD 3/18/09    CATARACT EXTRACTION W/  INTRAOCULAR LENS IMPLANT  OS 4/1/09    CORONARY ARTERY BYPASS GRAFT  1997    CABG x 3    SKIN BIOPSY      TONSILLECTOMY         Review of  patient's allergies indicates:   Allergen Reactions    Doxycycline Nausea Only and Other (See Comments)     Dizziness     Pneumococcal vaccine        No current facility-administered medications on file prior to encounter.      Current Outpatient Prescriptions on File Prior to Encounter   Medication Sig    albuterol-ipratropium 2.5mg-0.5mg/3mL (DUO-NEB) 0.5 mg-3 mg(2.5 mg base)/3 mL nebulizer solution Take 3 mLs by nebulization every 8 (eight) hours as needed for Wheezing.    aspirin 81 MG Chew Take 81 mg by mouth once daily.    budesonide-formoterol 80-4.5 mcg (SYMBICORT) 80-4.5 mcg/actuation HFAA Inhale 2 puffs into the lungs 2 (two) times daily. Pharmacy to adjust to cheaper tier options    levetiracetam (KEPPRA) 750 MG Tab Take 1 tablet (750 mg total) by mouth 2 (two) times daily.    levoFLOXacin (LEVAQUIN) 250 MG tablet Take 1 tablet (250 mg total) by mouth once daily.    losartan (COZAAR) 25 MG tablet Take 0.5 tablets (12.5 mg total) by mouth nightly.    metoprolol tartrate (LOPRESSOR) 25 MG tablet Take 0.5 tablets (12.5 mg total) by mouth 2 (two) times daily.    OXYGEN-AIR DELIVERY SYSTEMS MISC [place] [facility] [place]Non-Drug; Combo Route) route.    pantoprazole (PROTONIX) 40 MG tablet Take 1 tablet (40 mg total) by mouth once daily.    roflumilast 500 mcg Tab Take 1 tablet (500 mcg total) by mouth once daily.    simvastatin (ZOCOR) 40 MG tablet TAKE 1 TABLET EVERY EVENING.    spironolactone (ALDACTONE) 25 MG tablet Take 1 tablet (25 mg total) by mouth every evening.    tiotropium (SPIRIVA WITH HANDIHALER) 18 mcg inhalation capsule Inhale 1 capsule (18 mcg total) into the lungs once daily. Pharmacy to adjust to cheaper tier options    torsemide (DEMADEX) 20 MG Tab 20 mg once daily.      Family History     Problem Relation (Age of Onset)    Cancer Paternal Grandfather    Cataracts Mother, Maternal Grandmother, Paternal Grandmother    Heart disease Brother    No Known Problems Father, Maternal Grandfather,  Sister, Maternal Aunt, Maternal Uncle, Paternal Aunt, Paternal Uncle        Social History Main Topics    Smoking status: Former Smoker     Packs/day: 1.00     Years: 20.00     Types: Cigarettes     Quit date: 9/12/1997    Smokeless tobacco: Never Used    Alcohol use No      Comment: Occasionally     Drug use: No    Sexual activity: Yes     Partners: Female     Review of Systems   Unable to perform ROS: intubated     Objective:     Vital Signs (Most Recent):  Temp: 99 °F (37.2 °C) (10/21/17 1505)  Pulse: 88 (10/21/17 1505)  Resp: (!) 21 (10/21/17 1505)  BP: (!) 123/53 (10/21/17 1505)  SpO2: (!) 87 % (10/21/17 1505) Vital Signs (24h Range):  Temp:  [97 °F (36.1 °C)-99.1 °F (37.3 °C)] 99 °F (37.2 °C)  Pulse:  [] 88  Resp:  [17-40] 21  SpO2:  [63 %-100 %] 87 %  BP: ()/(37-82) 123/53     Weight: 85.7 kg (188 lb 15 oz)  Body mass index is 26.35 kg/m².    SpO2: (!) 87 %  O2 Device (Oxygen Therapy): ventilator      Intake/Output Summary (Last 24 hours) at 10/21/17 1626  Last data filed at 10/21/17 1600   Gross per 24 hour   Intake          3553.94 ml   Output             1168 ml   Net          2385.94 ml       Lines/Drains/Airways     Central Venous Catheter Line                 Percutaneous Central Line Insertion/Assessment - triple lumen  10/21/17 0745 left internal jugular less than 1 day          Drain                 NG/OG Tube 10/21/17 0610 [place] sump Center mouth less than 1 day         Urethral Catheter 10/21/17 0630 less than 1 day          Airway                 Airway - Non-Surgical 10/21/17 0610 Endotracheal Tube less than 1 day          Arterial Line                 Arterial Line 10/21/17 1300 Right Radial less than 1 day          Peripheral Intravenous Line                 Peripheral IV - Single Lumen 10/21/17 0625 Left Antecubital less than 1 day         Peripheral IV - Single Lumen 10/21/17 0700 Right Hand less than 1 day                Physical Exam   Constitutional:   intubated   Neck:  Normal carotid pulses and no JVD present. Carotid bruit is not present. No thyromegaly present.   Cardiovascular: Normal rate and normal pulses.  An irregular rhythm present.  No extrasystoles are present. PMI is not displaced.  Exam reveals no gallop and no S3.    No murmur heard.  Pulmonary/Chest: Breath sounds normal. No stridor. No respiratory distress.   B/l rales   Abdominal: Soft. Bowel sounds are normal. There is no tenderness. There is no rebound.   Skin: Skin is intact. No rash noted.   Psychiatric: His behavior is normal.       Significant Labs:   ABG:   Recent Labs  Lab 10/21/17  0555 10/21/17  1012 10/21/17  1334   PH 7.256* 7.269* 7.228*   PCO2 23.5* 24.1* 24.9*   HCO3 10.5* 11.0* 10.4*   POCSATURATED 98 95 100   BE -17 -16 -17   , Blood Culture:   Recent Labs  Lab 10/20/17  1040 10/20/17  1140   LABBLOO No Growth to date No Growth to date   , BMP:   Recent Labs  Lab 10/20/17  1006 10/21/17  0433 10/21/17  0936 10/21/17  1443   * 164* 165* 176*    142 142 139   K 4.2 4.2 5.4* 4.6   * 114* 116* 120*   CO2 16* 17* 13* 8*   BUN 64* 63* 60* 52*   CREATININE 1.6* 1.7* 1.9* 1.5*   CALCIUM 9.3 9.0 8.0* 6.3*   MG 2.5  --   --  1.9   , CMP   Recent Labs  Lab 10/20/17  1006 10/21/17  0433 10/21/17  0936 10/21/17  1443    142 142 139   K 4.2 4.2 5.4* 4.6   * 114* 116* 120*   CO2 16* 17* 13* 8*   * 164* 165* 176*   BUN 64* 63* 60* 52*   CREATININE 1.6* 1.7* 1.9* 1.5*   CALCIUM 9.3 9.0 8.0* 6.3*   PROT 7.4  --  6.2  --    ALBUMIN 3.3*  --  2.8*  --    BILITOT 2.7*  --  2.5*  --    ALKPHOS 89  --  80  --    AST 16  --  26  --    ALT 18  --  19  --    ANIONGAP 14 11 13 11   ESTGFRAFRICA 47* 43* 38* 50*   EGFRNONAA 40* 38* 33* 44*   , CBC   Recent Labs  Lab 10/20/17  1006 10/21/17  0433 10/21/17  0936   WBC 4.91 3.86* 13.18*   HGB 11.5* 10.8* 10.3*   HCT 36.7* 35.5* 34.9*   PLT 36* 33* 40*   , INR   Recent Labs  Lab 10/20/17  1006 10/21/17  1443   INR 1.4* 2.5*   , Lipid Panel No  results for input(s): CHOL, HDL, LDLCALC, TRIG, CHOLHDL in the last 48 hours. and Troponin   Recent Labs  Lab 10/20/17  1006 10/21/17  0809 10/21/17  1443   TROPONINI 0.033* 0.044* 2.790*       Significant Imaging: X-Ray: CXR: X-Ray Chest 1 View (CXR):   Results for orders placed or performed during the hospital encounter of 10/20/17   X-Ray Chest 1 View    Narrative    Portable chest x-ray, 1 view    Clinical Indication:   Respiratory failure requiring intubation.       Findings:     Comparisons are made to 10/20/2017. Interval placement of an endotracheal tube and nasogastric tube in good position.  There is a similar degree of interstitial changes throughout the lungs.  Negative for new pulmonary opacity. Hilar and mediastinal contours and osseous structures are unchanged.  Stable convex-right curvature in the thoracic spine.  Stable median sternotomy wires and coronary artery bypass graft changes along with a prosthetic valve device in place. There are degenerative changes of spine and both shoulder girdles.    Impression           1. Interval placement of endotracheal tube and nasogastric tube in good position.  2. Stable appearance to the chest otherwise.  3.  Follow-up radiographs recommended.      Electronically signed by: DULCE CLEANING MD  Date:     10/21/17  Time:    08:07      Assessment and Plan:       Severely BIV systolic dysfunction.  Cardiogenic shock  S/p TAVR with moderate to severe AI  Afib with RVR  Elevated troponin NSTEMI vs demand  coagulopathy  ARF  Reps. Failure    Plan:  Multi-organ failure and poor prognosis  Agree to recommend the withdrawal of life support.            Active Diagnoses:    Diagnosis Date Noted POA    PRINCIPAL PROBLEM:  Acute on chronic respiratory failure with hypoxia [J96.21] 07/03/2017 Yes    A-fib [I48.91] 10/21/2017 Yes    Metabolic acidosis [E87.2] 10/21/2017 Yes    Cardiogenic shock [R57.0] 10/21/2017 Yes    JESSENIA (acute kidney injury) [N17.9] 10/21/2017 Yes     Lactic acid acidosis [E87.2] 10/20/2017 Yes    PAF (paroxysmal atrial fibrillation) [I48.0] 09/20/2017 Yes    Coronary artery disease involving native coronary artery of native heart without angina pectoris [I25.10] 07/03/2017 Yes     Chronic    COPD exacerbation [J44.1] 05/21/2017 Yes    Acute on chronic combined systolic and diastolic congestive heart failure [I50.43] 01/08/2016 Yes    Thrombocytopenia, with anemia [D69.6] 08/10/2015 Yes     Chronic    Seizures [R56.9]  Yes     Chronic    Aortic stenosis s/p TAVR [I35.0] 03/12/2013 Yes      Problems Resolved During this Admission:    Diagnosis Date Noted Date Resolved POA    Hyperosmolar syndrome [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 I will sign off. Please contact us if you have any additional questions.    Priyank Saab MD  Cardiology   [facility] -

## 2017-10-22 NOTE — SUBJECTIVE & OBJECTIVE
Review of Systems   Unable to perform ROS: Intubated       Objective:     Vital Signs (Most Recent):  Temp: 97.3 °F (36.3 °C) (10/22/17 0705)  Pulse: 66 (10/22/17 0731)  Resp: (!) 26 (10/22/17 0731)  BP: (!) 105/59 (10/22/17 0705)  SpO2: 100 % (10/22/17 0731) Vital Signs (24h Range):  Temp:  [97.3 °F (36.3 °C)-104.5 °F (40.3 °C)] 97.3 °F (36.3 °C)  Pulse:  [] 66  Resp:  [18-40] 26  SpO2:  [83 %-100 %] 100 %  BP: ()/(27-85) 105/59     Weight: 91 kg (200 lb 9.9 oz)  Body mass index is 27.98 kg/m².      Intake/Output Summary (Last 24 hours) at 10/22/17 1016  Last data filed at 10/22/17 1005   Gross per 24 hour   Intake          6248.92 ml   Output              960 ml   Net          5288.92 ml       Physical Exam   Constitutional: He appears well-developed. He appears lethargic. He appears toxic. He has a sickly appearance. He appears ill. He appears distressed. He is sedated and intubated.   HENT:   Head: Normocephalic and atraumatic.   Nose: Nose normal.   Mouth/Throat: Oropharynx is clear and moist. No oropharyngeal exudate.   Eyes: Pupils are equal, round, and reactive to light.   Neck: Neck supple. No tracheal deviation present. No thyromegaly present.       Left IJ TLC   Cardiovascular: Normal rate, regular rhythm and normal heart sounds.    Pulses:       Radial pulses are 1+ on the right side, and 1+ on the left side.        Dorsalis pedis pulses are Detected w/ doppler on the right side, and Detected w/ doppler on the left side.   Pulmonary/Chest: He is intubated. No respiratory distress (on vent and sedated). He has decreased breath sounds in the right lower field and the left lower field. He has no wheezes. He has no rhonchi. He has no rales.   Abdominal: Soft. He exhibits no distension. Bowel sounds are decreased. There is no tenderness.   Genitourinary: Penis normal.   Genitourinary Comments: richey   Musculoskeletal: Normal range of motion. He exhibits no deformity.   +1 bilat pedal edema    Lymphadenopathy:     He has no cervical adenopathy.   Neurological: He appears lethargic.   sedated   Skin: Skin is warm. Capillary refill takes more than 3 seconds. Ecchymosis and petechiae noted. There is cyanosis and erythema.   Nursing note and vitals reviewed.      Vents:  Vent Mode: A/C (10/22/17 0721)  Ventilator Initiated: Yes (10/21/17 0637)  Set Rate: 22 bmp (10/22/17 0721)  Vt Set: 0 mL (10/22/17 0721)  Pressure Support: 0 cmH20 (10/22/17 0721)  PEEP/CPAP: 8 cmH20 (10/22/17 0721)  Oxygen Concentration (%): 45 (10/22/17 0721)  Peak Airway Pressure: 18 cmH2O (10/22/17 0721)  Plateau Pressure: 0 cmH20 (10/22/17 0721)  Total Ve: 10.8 mL (10/22/17 0721)  F/VT Ratio<105 (RSBI): (!) 27.03 (10/22/17 0322)    Lines/Drains/Airways     Central Venous Catheter Line                 Percutaneous Central Line Insertion/Assessment - triple lumen  10/21/17 0745 left internal jugular 1 day          Drain                 NG/OG Tube 10/21/17 0610 [place] sump Center mouth 1 day         Urethral Catheter 10/21/17 0630 1 day          Airway                 Airway - Non-Surgical 10/21/17 0610 Endotracheal Tube 1 day          Arterial Line                 Arterial Line 10/21/17 1300 Right Radial less than 1 day          Peripheral Intravenous Line                 Peripheral IV - Single Lumen 10/21/17 0625 Left Antecubital 1 day         Peripheral IV - Single Lumen 10/21/17 0700 Right Hand 1 day                Significant Labs:    CBC/Anemia Profile:    Recent Labs  Lab 10/21/17  0433 10/21/17  0936 10/22/17  0612   WBC 3.86* 13.18* 5.63   HGB 10.8* 10.3* 10.9*   HCT 35.5* 34.9* 35.3*   PLT 33* 40* 17*   MCV 98 100* 98   RDW 21.2* 21.0* 20.7*        Chemistries:    Recent Labs  Lab 10/21/17  0936  10/21/17  1443 10/21/17  1751 10/22/17  0009 10/22/17  0612     --  139 136 136  --    K 5.4*  --  4.6 5.1 5.6*  --    *  --  120* 112* 110  --    CO2 13*  --  8* 11* 8*  --    BUN 60*  --  52* 63* 62*  --    CREATININE 1.9*   --  1.5* 1.9* 1.9*  --    CALCIUM 8.0*  --  6.3* 7.9* 8.1*  --    ALBUMIN 2.8*  --   --   --   --   --    PROT 6.2  --   --   --   --   --    BILITOT 2.5*  --   --   --   --   --    ALKPHOS 80  --   --   --   --   --    ALT 19  --   --   --   --   --    AST 26  --   --   --   --   --    MG  --   < > 1.9 2.2 2.5 2.2   < > = values in this interval not displayed.    ABGs:   Recent Labs  Lab 10/22/17  0439   PH 7.317*   PCO2 31.6*   HCO3 16.2*   POCSATURATED 98   BE -10     Coagulation:   Recent Labs  Lab 10/21/17  1443   INR 2.5*   APTT 50.6*     All pertinent labs within the past 24 hours have been reviewed.    Significant Imaging:  CXR: I have reviewed all pertinent results/findings within the past 24 hours and my personal findings are:  no acute process noted  Echo: I have reviewed all pertinent results/findings within the past 24 hours and my personal findings are:  Cardiac:  EF 30% with Mod AR and PAP 50  U/S: I have reviewed all pertinent results/findings within the past 24 hours and my personal findings are:  LE Doppler neg for DVT

## 2017-10-22 NOTE — PROGRESS NOTES
"[facility] -   Critical Care Medicine  Progress Note    Patient Name: Orion Rinaldi  MRN: [id number]  Admission Date: 10/20/2017  Hospital Length of Stay: 2 days  Code Status: DNR  Attending Provider: Eliot Collins MD  Primary Care Provider: Daisy Oconnor MD   Principal Problem: Acute on chronic respiratory failure with hypoxia    Subjective:     HPI:  Mr Gentry Sears  is a 82 yo male with Seizure disorder, S/P TAVR, CAD s/p CABG, HLD, COPD, Systolic Heart Failure CHF NYHA class 4 EF 45%,  and Chronic Resp Failure on 5 liters home oxygen and Trilogy who presented to ED from Cardiology office with Respiratory distress. The pt and his spouse reported the pt walked from  of [facility] and through several hallways. When he arrived to Cardiology clinic, O2 sat was in the 70s. Pt was sent to ED. In the ED, pt was found to be in respiratory distress. Bipap applied. CXR showed mild interstitial edema,tiny left pleural effusion.   The pt reports he felt at baseline this am. He did have large amount of leg edema 3 days ago and took extra Torsemide with improvement. The pt's spouse reports pt does not usually exert himself and walk as far as he walked today. Denies fever/chills, +SOB, +cough -chronic-denies change in cough. +leg edema-improved. Spouse denies weight gain. Weight on discharge 9/23/17 was 186lb, today's weight 189lb. Spouse alsoe reports pt's BP "always runs low".        Last seen July 2017  Orion Rinaldi is a 81 y.o. male with a PMH of Seizure disorder,  SP TAVR, CABG, CAD, Pulm HTN secondary to Chronic lung disease and Valvular disease, HLD, COPD, Systolic Heart Failure CHF NYHA class 4 and Chronic Resp Failure home O2 dependent.  HAS TRILOGY     Seen in clinic: home O2 dependent at 4 lpm on 24/7, Symbicort and Spiriva.    Frequent hospitalizations for  with Acute on Chronic Resp Failure, Acute on Chronic heart failure and COPD Exacerbation    Hospital/ICU " Course:  Developed A fib with RVR  Respiratory distress and failure required intubation  In MICU: Central line placed, BP marginal, Breathing over vent  PE considered: creatinine 1.7, platelet 33, Troponin 0.04, lactate  6.2  Too unstable to get CTA chest and elevated creatinine and low platelets  emperic abx added  Central line placed  Unable to thread A line    10/22 - Still on low dose Levophed infusion and full mech vent support.  104.5 temp early this AM    Review of Systems   Unable to perform ROS: Intubated       Objective:     Vital Signs (Most Recent):  Temp: 97.3 °F (36.3 °C) (10/22/17 0705)  Pulse: 66 (10/22/17 0731)  Resp: (!) 26 (10/22/17 0731)  BP: (!) 105/59 (10/22/17 0705)  SpO2: 100 % (10/22/17 0731) Vital Signs (24h Range):  Temp:  [97.3 °F (36.3 °C)-104.5 °F (40.3 °C)] 97.3 °F (36.3 °C)  Pulse:  [] 66  Resp:  [18-40] 26  SpO2:  [83 %-100 %] 100 %  BP: ()/(27-85) 105/59     Weight: 91 kg (200 lb 9.9 oz)  Body mass index is 27.98 kg/m².      Intake/Output Summary (Last 24 hours) at 10/22/17 1016  Last data filed at 10/22/17 1005   Gross per 24 hour   Intake          6248.92 ml   Output              960 ml   Net          5288.92 ml       Physical Exam   Constitutional: He appears well-developed. He appears lethargic. He appears toxic. He has a sickly appearance. He appears ill. He appears distressed. He is sedated and intubated.   HENT:   Head: Normocephalic and atraumatic.   Nose: Nose normal.   Mouth/Throat: Oropharynx is clear and moist. No oropharyngeal exudate.   Eyes: Pupils are equal, round, and reactive to light.   Neck: Neck supple. No tracheal deviation present. No thyromegaly present.       Left IJ TLC   Cardiovascular: Normal rate, regular rhythm and normal heart sounds.    Pulses:       Radial pulses are 1+ on the right side, and 1+ on the left side.        Dorsalis pedis pulses are Detected w/ doppler on the right side, and Detected w/ doppler on the left side.    Pulmonary/Chest: He is intubated. No respiratory distress (on vent and sedated). He has decreased breath sounds in the right lower field and the left lower field. He has no wheezes. He has no rhonchi. He has no rales.   Abdominal: Soft. He exhibits no distension. Bowel sounds are decreased. There is no tenderness.   Genitourinary: Penis normal.   Genitourinary Comments: richey   Musculoskeletal: Normal range of motion. He exhibits no deformity.   +1 bilat pedal edema   Lymphadenopathy:     He has no cervical adenopathy.   Neurological: He appears lethargic.   sedated   Skin: Skin is warm. Capillary refill takes more than 3 seconds. Ecchymosis and petechiae noted. There is cyanosis and erythema.   Nursing note and vitals reviewed.      Vents:  Vent Mode: A/C (10/22/17 0721)  Ventilator Initiated: Yes (10/21/17 0637)  Set Rate: 22 bmp (10/22/17 0721)  Vt Set: 0 mL (10/22/17 0721)  Pressure Support: 0 cmH20 (10/22/17 0721)  PEEP/CPAP: 8 cmH20 (10/22/17 0721)  Oxygen Concentration (%): 45 (10/22/17 0721)  Peak Airway Pressure: 18 cmH2O (10/22/17 0721)  Plateau Pressure: 0 cmH20 (10/22/17 0721)  Total Ve: 10.8 mL (10/22/17 0721)  F/VT Ratio<105 (RSBI): (!) 27.03 (10/22/17 0322)    Lines/Drains/Airways     Central Venous Catheter Line                 Percutaneous Central Line Insertion/Assessment - triple lumen  10/21/17 0745 left internal jugular 1 day          Drain                 NG/OG Tube 10/21/17 0610 [place] sump [facility] mouth 1 day         Urethral Catheter 10/21/17 0630 1 day          Airway                 Airway - Non-Surgical 10/21/17 0610 Endotracheal Tube 1 day          Arterial Line                 Arterial Line 10/21/17 1300 Right Radial less than 1 day          Peripheral Intravenous Line                 Peripheral IV - Single Lumen 10/21/17 0625 Left Antecubital 1 day         Peripheral IV - Single Lumen 10/21/17 0700 Right Hand 1 day                Significant Labs:    CBC/Anemia Profile:    Recent  Labs  Lab 10/21/17  0433 10/21/17  0936 10/22/17  0612   WBC 3.86* 13.18* 5.63   HGB 10.8* 10.3* 10.9*   HCT 35.5* 34.9* 35.3*   PLT 33* 40* 17*   MCV 98 100* 98   RDW 21.2* 21.0* 20.7*        Chemistries:    Recent Labs  Lab 10/21/17  0936  10/21/17  1443 10/21/17  1751 10/22/17  0009 10/22/17  0612     --  139 136 136  --    K 5.4*  --  4.6 5.1 5.6*  --    *  --  120* 112* 110  --    CO2 13*  --  8* 11* 8*  --    BUN 60*  --  52* 63* 62*  --    CREATININE 1.9*  --  1.5* 1.9* 1.9*  --    CALCIUM 8.0*  --  6.3* 7.9* 8.1*  --    ALBUMIN 2.8*  --   --   --   --   --    PROT 6.2  --   --   --   --   --    BILITOT 2.5*  --   --   --   --   --    ALKPHOS 80  --   --   --   --   --    ALT 19  --   --   --   --   --    AST 26  --   --   --   --   --    MG  --   < > 1.9 2.2 2.5 2.2   < > = values in this interval not displayed.    ABGs:   Recent Labs  Lab 10/22/17  0439   PH 7.317*   PCO2 31.6*   HCO3 16.2*   POCSATURATED 98   BE -10     Coagulation:   Recent Labs  Lab 10/21/17  1443   INR 2.5*   APTT 50.6*     All pertinent labs within the past 24 hours have been reviewed.    Significant Imaging:  CXR: I have reviewed all pertinent results/findings within the past 24 hours and my personal findings are:  no acute process noted  Echo: I have reviewed all pertinent results/findings within the past 24 hours and my personal findings are:  Cardiac:  EF 30% with Mod AR and PAP 50  U/S: I have reviewed all pertinent results/findings within the past 24 hours and my personal findings are:  LE Doppler neg for DVT      Assessment/Plan:     Problem   Cardiogenic Shock   Acute On Chronic Respiratory Failure With Hypoxia   Lavon (Acute Kidney Injury)   Thrombocytopenia, with anemia   Metabolic Acidosis   Paf (Paroxysmal Atrial Fibrillation)   Pulmonary Hypertension   H/O Aortic Valve Replacement   Moderate Copd (Chronic Obstructive Pulmonary Disease)   Coronary Artery Disease Involving Native Coronary Artery of Native Heart  Without Angina Pectoris   Acute On Chronic Combined Systolic and Diastolic Congestive Heart Failure   Seizures   Aortic stenosis s/p TAVR       1. Neuro: ICU neuro monitoring.  Cont supportive care and Keppra    2. Pulmonary: Cont full vent support.  Vent settings reviewed and adjusted.  Cont Duonebs, Formeterol and Budesonide nebs.  Wean steroids    3. Cardiac: ICU Cardiac monitoring.  Cont Amiodarone infusion and Wean Levophed infusion as tolerated.  Cont Lasix, statin, Lopressor, ASA    4. Renal: Tapia in place, monitor I/Os and support BP    5. Infectious Disease: Follow fever curve.  Urine + E.Coli and sputum + GPC.  Blood cultures NGTD.  Cont Cipro, Zosyn and Vanc    6. Hematology/Oncology: monitor for bleeding and transfuse Plts if any bleeding or Plt count drops < 10    7. Endocrine:  Monitor glucose stable at 104    8. Fluids/Electrolytes/Nutrition/GI: IVFs with Bicarb and start TF    9. Musculoskeletal:  ROM    10. Pain Management: Fentanyl infusion    11. Discharge and Palliative Care: Consider comfort care given co-morbidities and MOSF    Preventive Measures and Monitoring:   Stress Ulcer: PPI  Nutrition: start TF  Glucose control: stable  Bowel prophylaxis: PRN Dulcolax  DVT prophylaxis: SCDs  Hx CAD on B-Blocker: Lopressor once shock resolved  Head of Bed/Reposition: Elevate HOB and turn Q1-2 hours    Early Mobility: OOB asap  SAT/SBT: daily  RASS goal: -2  Vent Day: #2  OG Day: #2  Central Line Left IJ Day: #2  Right Radial Arterial Line Day: #2  Tapia Day: #2  IVAB Day: #2  Code Status: DNR  PNA Vaccine: UTD  Flu Vaccine: UTD    Counseling/Consultation: I have discussed the care of this patient in detail with Multidisciplinary team during rounds, bedside nursing staff and Dr. Downing and Dr. Collins    Critical Care Time: 55 minutes  Critical secondary to Patient has a condition that poses threat to life and bodily function: Severe Respiratory Distress  Patient is currently on drug therapy  requiring intensive monitoring for toxicity: Amiodarone and Levophed infusions  Patient is currently receiving parenteral controlled substances: Precedex and Fentanyl      Critical care was time spent personally by me on the following activities: development of treatment plan with patient or surrogate and bedside caregivers, discussions with consultants, evaluation of patient's response to treatment, examination of patient, ordering and performing treatments and interventions, ordering and review of laboratory studies, ordering and review of radiographic studies, pulse oximetry, re-evaluation of patient's condition. This critical care time did not overlap with that of any other provider or involve time for any procedures.     Jersey Ceballos, NP  Critical Care Medicine  [facility]

## 2017-10-22 NOTE — PLAN OF CARE
Problem: Patient Care Overview  Goal: Plan of Care Review  Outcome: Ongoing (interventions implemented as appropriate)  Patient remains on ventilator at this time. ETT 8.0 remains secure at 24 cm at the lips. No redness or breakdown noted around ETT or commercial tube benitez. Patient tolerated treatment well. Respiratory to follow.

## 2017-10-22 NOTE — PLAN OF CARE
Problem: Patient Care Overview  Goal: Plan of Care Review  Outcome: Ongoing (interventions implemented as appropriate)  Pt remains intubated on vent. Difficulty obtaining accurate O2 sat readings w/pulse ox d/t chronic peripheral cyanosis; MD aware. Sedated w/Precedex and Fentanyl gtts. RASS at goal -2. POC discussed w/spouse, sons, family, verbalized understanding. Decision to make pt DNR today by family. Afib on monitor, rate controlled. Amiodarone gtt continued. Tapia patent and in place, UOP minimal.  Bicarb gtt continues.  Serial labs continue. Q2 turns w/wedge, heels elevated.

## 2017-10-22 NOTE — SUBJECTIVE & OBJECTIVE
Interval History:  Remained hemodynamically stable overnight spiking fever.  Seizure-like activity aborted with Ativan.    Review of Systems   Unable to perform ROS: Intubated     Objective:     Vital Signs (Most Recent):  Temp: 97.3 °F (36.3 °C) (10/22/17 0705)  Pulse: 72 (10/22/17 1024)  Resp: (!) 21 (10/22/17 1024)  BP: (!) 105/59 (10/22/17 0705)  SpO2: 99 % (10/22/17 1024) Vital Signs (24h Range):  Temp:  [97.3 °F (36.3 °C)-104.5 °F (40.3 °C)] 97.3 °F (36.3 °C)  Pulse:  [] 72  Resp:  [18-40] 21  SpO2:  [83 %-100 %] 99 %  BP: ()/(27-85) 105/59     Weight: 91 kg (200 lb 9.9 oz)  Body mass index is 27.98 kg/m².    Intake/Output Summary (Last 24 hours) at 10/22/17 1032  Last data filed at 10/22/17 1005   Gross per 24 hour   Intake          6248.92 ml   Output              960 ml   Net          5288.92 ml      Physical Exam   Constitutional: He appears well-developed and well-nourished. No distress.   HENT:   Head: Normocephalic and atraumatic.   Mouth/Throat: Oropharynx is clear and moist.   Eyes: Conjunctivae and EOM are normal. Pupils are equal, round, and reactive to light.   Neck: No JVD present. No thyromegaly present.   Cardiovascular: Normal rate, regular rhythm and normal heart sounds.  Exam reveals no gallop and no friction rub.    No murmur heard.  Digital and acral cyanosis.   Pulmonary/Chest: Effort normal and breath sounds normal. No respiratory distress. He has no wheezes. He has no rales.   Abdominal: Soft. Bowel sounds are normal. He exhibits no distension. There is no tenderness. There is no rebound and no guarding.   Musculoskeletal: Normal range of motion. He exhibits edema. He exhibits no tenderness.   2+ pitting pre-tibial edema bilaterally.   Lymphadenopathy:     He has no cervical adenopathy.   Neurological: He has normal reflexes. He displays normal reflexes. No cranial nerve deficit.   Intubated and sedated.  Spontaneously moving all four extremities but not following commands.    Skin: Skin is warm and dry. No rash noted. He is not diaphoretic. No erythema.   Scattered varicosities in the lower extremities.  Streak-like erythema on the dorsal surfaces of both feet consistent with micro skin tears from sudden swelling as the patient described.       Significant Labs:   ABGs:     Recent Labs  Lab 10/22/17  0439   PH 7.317*   PCO2 31.6*   HCO3 16.2*   POCSATURATED 98   BE -10     CBC:     Recent Labs  Lab 10/21/17  0433 10/21/17  0936 10/22/17  0612   WBC 3.86* 13.18* 5.63   HGB 10.8* 10.3* 10.9*   HCT 35.5* 34.9* 35.3*   PLT 33* 40* 17*     CMP:     Recent Labs  Lab 10/21/17  0936 10/21/17  1443 10/21/17  1751 10/22/17  0009    139 136 136   K 5.4* 4.6 5.1 5.6*   * 120* 112* 110   CO2 13* 8* 11* 8*   * 176* 218* 164*   BUN 60* 52* 63* 62*   CREATININE 1.9* 1.5* 1.9* 1.9*   CALCIUM 8.0* 6.3* 7.9* 8.1*   PROT 6.2  --   --   --    ALBUMIN 2.8*  --   --   --    BILITOT 2.5*  --   --   --    ALKPHOS 80  --   --   --    AST 26  --   --   --    ALT 19  --   --   --    ANIONGAP 13 11 13 18*   EGFRNONAA 33* 44* 33* 33*       Significant Imaging: I have reviewed all pertinent imaging results/findings within the past 24 hours.

## 2017-10-22 NOTE — PLAN OF CARE
Problem: Patient Care Overview  Goal: Plan of Care Review  Outcome: Ongoing (interventions implemented as appropriate)  Pt remains intubated on vent. Precedex and Fentanyl gtts turned off at 1500. No change in arousal level.  POC discussed w/spouse, sons, family, questions encouraged and answered, verbalized understanding.   Pt hypothermic 95.3F and placed on Ignacio Hugger @1205; removed once pt temp 98.6F. Pt now at tmax 100.8F despite PRN Tylenol given.  Pt w/ 3 episodes of seizure-like activity.  Afib on monitor, rate controlled. Amiodarone gtt continued.   Tapia patent and in place, UOP average 60mL/hr. Bicarb gtt cont.    Q2 turns w/wedge, heels elevated.

## 2017-10-22 NOTE — PROGRESS NOTES
"[facility] Hospital Medicine  Progress Note    Patient Name: Orion Rinaldi  MRN: [id number]  Patient Class: IP- Inpatient   Admission Date: 10/20/2017  Length of Stay: 2 days  Attending Physician: Eliot Collins MD  Primary Care Provider: Daisy Oconnor MD        Subjective:     Principal Problem:Acute on chronic respiratory failure with hypoxia    HPI:  The pt is a 80 yo male with Seizure disorder, S/P TAVR, CAD s/p CABG, HLD, COPD, Systolic Heart Failure CHF NYHA class 4 EF 45%,  and Chronic Resp Failure on 5 liters home oxygen and Trilogy who presented to ED from Cardiology office with Respiratory distress. The pt and his spouse reported the pt walked from  of [facility] and through several hallways. When he arrived to Cardiology clinic, O2 sat was in the 70s. Pt was sent to ED. In the ED, pt was found to be in respiratory distress. Bipap applied. CXR showed mild interstitial edema,tiny left pleural effusion.   The pt reports he felt at baseline this am. He did have large amount of leg edema 3 days ago and took extra Torsemide with improvement. The pt's spouse reports pt does not usually exert himself and walk as far as he walked today. Denies fever/chills, +SOB, +cough -chronic-denies change in cough. +leg edema-improved. Spouse denies weight gain. Weight on discharge 9/23/17 was 186lb, today's weight 189lb. Spouse alsoe reports pt's BP "always runs low".     Hospital Course:  Admitted initially to ICU on continuous BiPAP but weaned to nasal cannula in the ER.  He was then downgraded to telemetry and admitted to the floor.  CT chest had some small atelectatic changes in the left lung base but no evidence of consolidation or edema.  Urinalysis showed only small proteinuria.  He was stable most of the night but became tachycardic around 4 am and tachypneic with increased work of breathing.  The night physician evaluated him and called an intubation code and transferred him " to the ICU.  He was stabilized in the ICU.  Starting Vancomycin and Zosyn empirically due to acute decompensation and shock.  No evidence of pneumonia or UTI by diagnostic studies.  No abdominal symptoms by history.  Decompensation probably due to heart failure.  Family changed his code status to DNR.    Interval History:  Remained hemodynamically stable overnight spiking fever.  Seizure-like activity aborted with Ativan.    Review of Systems   Unable to perform ROS: Intubated     Objective:     Vital Signs (Most Recent):  Temp: 97.3 °F (36.3 °C) (10/22/17 0705)  Pulse: 72 (10/22/17 1024)  Resp: (!) 21 (10/22/17 1024)  BP: (!) 105/59 (10/22/17 0705)  SpO2: 99 % (10/22/17 1024) Vital Signs (24h Range):  Temp:  [97.3 °F (36.3 °C)-104.5 °F (40.3 °C)] 97.3 °F (36.3 °C)  Pulse:  [] 72  Resp:  [18-40] 21  SpO2:  [83 %-100 %] 99 %  BP: ()/(27-85) 105/59     Weight: 91 kg (200 lb 9.9 oz)  Body mass index is 27.98 kg/m².    Intake/Output Summary (Last 24 hours) at 10/22/17 1032  Last data filed at 10/22/17 1005   Gross per 24 hour   Intake          6248.92 ml   Output              960 ml   Net          5288.92 ml      Physical Exam   Constitutional: He appears well-developed and well-nourished. No distress.   HENT:   Head: Normocephalic and atraumatic.   Mouth/Throat: Oropharynx is clear and moist.   Eyes: Conjunctivae and EOM are normal. Pupils are equal, round, and reactive to light.   Neck: No JVD present. No thyromegaly present.   Cardiovascular: Normal rate, regular rhythm and normal heart sounds.  Exam reveals no gallop and no friction rub.    No murmur heard.  Digital and acral cyanosis.   Pulmonary/Chest: Effort normal and breath sounds normal. No respiratory distress. He has no wheezes. He has no rales.   Abdominal: Soft. Bowel sounds are normal. He exhibits no distension. There is no tenderness. There is no rebound and no guarding.   Musculoskeletal: Normal range of motion. He exhibits edema. He  exhibits no tenderness.   2+ pitting pre-tibial edema bilaterally.   Lymphadenopathy:     He has no cervical adenopathy.   Neurological: He has normal reflexes. He displays normal reflexes. No cranial nerve deficit.   Intubated and sedated.  Spontaneously moving all four extremities but not following commands.   Skin: Skin is warm and dry. No rash noted. He is not diaphoretic. No erythema.   Scattered varicosities in the lower extremities.  Streak-like erythema on the dorsal surfaces of both feet consistent with micro skin tears from sudden swelling as the patient described.       Significant Labs:   ABGs:     Recent Labs  Lab 10/22/17  0439   PH 7.317*   PCO2 31.6*   HCO3 16.2*   POCSATURATED 98   BE -10     CBC:     Recent Labs  Lab 10/21/17  0433 10/21/17  0936 10/22/17  0612   WBC 3.86* 13.18* 5.63   HGB 10.8* 10.3* 10.9*   HCT 35.5* 34.9* 35.3*   PLT 33* 40* 17*     CMP:     Recent Labs  Lab 10/21/17  0936 10/21/17  1443 10/21/17  1751 10/22/17  0009    139 136 136   K 5.4* 4.6 5.1 5.6*   * 120* 112* 110   CO2 13* 8* 11* 8*   * 176* 218* 164*   BUN 60* 52* 63* 62*   CREATININE 1.9* 1.5* 1.9* 1.9*   CALCIUM 8.0* 6.3* 7.9* 8.1*   PROT 6.2  --   --   --    ALBUMIN 2.8*  --   --   --    BILITOT 2.5*  --   --   --    ALKPHOS 80  --   --   --    AST 26  --   --   --    ALT 19  --   --   --    ANIONGAP 13 11 13 18*   EGFRNONAA 33* 44* 33* 33*       Significant Imaging: I have reviewed all pertinent imaging results/findings within the past 24 hours.    Assessment/Plan:      * Acute on chronic respiratory failure with hypoxia    Likely secondary to decompensated CHF and COPD exacerbation.  IV lasix, IV solumedrol, Neb txs, I/O,  Daily weights.  Intubated for ventilatory support.  Starting Vancomycin and Zosyn empirically due to acute decompensation and shock.  No evidence of pneumonia or UTI by diagnostic studies.  No abdominal symptoms by history.  Decompensation probably due to heart failure.         Lactic acid acidosis    No S/S infection-No Sepsis   Will obtain CT chest to rule out PNA  Lactic acidosis likely secondary to Acute/chronic cyanosis/hypoxemia   Monitor closely         PAF (paroxysmal atrial fibrillation)    BB  No Anticoagulation due to thrombocytopenia           Coronary artery disease involving native coronary artery of native heart without angina pectoris              COPD exacerbation    IV Steroids  Neb txs           Acute on chronic combined systolic and diastolic congestive heart failure    IV Lasix  I/O   Daily weights         Thrombocytopenia, with anemia    Likely secondary to MDS  Pt/spouse declined BM bx         Seizures    Cont Levetiracetam.  Lorazepam as needed for seizure.        Aortic stenosis s/p TAVR                VTE Risk Mitigation         Ordered     Place sequential compression device  Until discontinued      10/21/17 1351     Place sequential compression device  Until discontinued      10/20/17 1354     Medium Risk of VTE  Once      10/20/17 1320          Critical care time spent on the evaluation and treatment of severe organ dysfunction, review of pertinent labs and imaging studies, discussions with consulting providers and discussions with patient/family: 30 minutes.    Eliot Collins MD  Department of Hospital Medicine   [facility]

## 2017-10-22 NOTE — EICU
Notified of critical labs and persistent fevers    82 y/o M CAD s/p cabg, s/p TAVR in 2013, CHFpEF, 30-35%, mild COPD persistently febrile with a non-improving lactate level of 6 and and worsening acidosis and bicarbonate of 8 on bicarbonate drip. Persistently febrile depite oral tylenol and ice packets. No increase in pressor requirement.  Already on Vancomycin, Pip-Tazo and Cipro.    2D echo with evidence of elevated central venous pressure  Multiple valvular abnormalities but no evidence of vegetation    CXR no signs of congestion    · Continue with cautious hydration  · Ordered acetaminophen IV

## 2017-10-22 NOTE — PLAN OF CARE
Problem: Patient Care Overview  Goal: Plan of Care Review  Outcome: Ongoing (interventions implemented as appropriate)  Pt remains on pressor gtt, sedation gtts, bicarb, and amiodarone gtt. BP stable on low dose pressor. Cooling blanket in place. Temp gradually decreasing. POC reviewed.

## 2017-10-22 NOTE — PLAN OF CARE
Problem: Patient Care Overview  Goal: Plan of Care Review  Outcome: Ongoing (interventions implemented as appropriate)  Patient remains on the ventilator . No changes at this time

## 2017-10-23 PROBLEM — K72.00 ACUTE LIVER FAILURE WITHOUT HEPATIC COMA: Status: ACTIVE | Noted: 2017-01-01

## 2017-10-23 NOTE — PROGRESS NOTES
While attempting to turn patient, seizure-like activity noted. Pt eyes rolled back, shaking head side to side intermittently, labored breathing, BL/UE mild tremors. Pt's wife brought to bedside. Per wife resembles pt's seizures in the past. Seizure-like activity occurred twice in an approx 15min period, each lasting about 3min each. MD aware. Will continue to monitor.

## 2017-10-23 NOTE — PHYSICIAN QUERY
PT Name: Orion Rinaldi  MR #: [id number]    Physician Query Form -Systemic Infectious Process Clarification     CDS/: Susanne Juarez RN               Contact information: [phone]  This form is a permanent document in the medical record.     Query Date: October 23, 2017     By submitting this query, we are merely seeking further clarification of documentation. Please utilize your independent clinical judgment when addressing the question(s) below.    The Medical record contains the following:     Indicators   Supporting Clinical Findings   Location in Medical Record   x HR RR BP Temp Temp= 101, 101.3, 101.8, 102.2, 101.3, 100.5   RR= RR= 29, 25, 25, 24, 29, 25, 33, 27, 24  BP= 78/44, 83/56, 83/46, 79/52  HR= 109, 106, 105, 100, 99   10/22 Flowsheet       x Lactic Acid             Procalcitonin Lactic acid= 6.2 10/21 Lab   x WBC                Bands                     CRP WBC= 3.86  WBC= 13.18 10/21 Lab  10/22 Lab     x Culture(s) Gram Stain (Respiratory)  <10 epithelial cells per low power field.    Gram Stain (Respiratory)  Rare WBC's    Gram Stain (Respiratory)  Many Gram positive cocci    Gram Stain (Respiratory)  Rare Gram negative rods      ESCHERICHIA COLI ESBL 50,000 - 99,999 cfu/ml  10/21 Resp culture                  10/20 Urine culture    AMS, Confusion, LOC, etc.     x Organ Dysfunction / Failure Acute on chronic respiratory failure with hypoxia  Acute on chronic combined systolic and diastolic chf  JESSENIA   Acute  Liver failure without coma   10/23 Pulm-NP note    Bacteremia or Sepsis / Septic     x Known or Suspected Source of Infection documented  Urine + E.Coli MDR and sputum and +GPC    E.Coli urinary tract infection   10/23 Pulm- NP  note    (Failed) Outpatient Treatment     x Medications   Norepinephrine gtt  Meropenem IVPB  Vancomycin IVPB  Cipro IVPB  Pipercillin IVPB   10/23 MAR    10/22- 10/23 MAR         x Treatment Mechanical ventiliation  Pulmonary consult  Lactic acid  Urine  culture  Respiratory culture  Blood cultures  CXR  Cardiac monitoring  Pulse oximetry   10/22 NSG orders    Other       Provider, please specify diagnosis or diagnoses associated with above clinical findings.    [  ] Sepsis    [  ] Severe Sepsis with Acute Organ Dysfunction/Failure (please specify organ dysfunction/failure): ___________________    [  ] Sepsis with Septic Shock    [  ] Other Infectious Disease (please specify): _________________________________    [ x ] Other: ___hypoxia 2/2 end-stage COPD, UTI incidental finding _______________________________    [  ] Clinically Undetermined    Please document in your progress notes daily for the duration of treatment until resolved and include in your discharge summary.

## 2017-10-23 NOTE — ASSESSMENT & PLAN NOTE
Likely secondary to decompensated CHF and COPD exacerbation.  IV lasix, IV solumedrol, Neb txs, I/O,  Daily weights.  Intubated for ventilatory support.  Starting Vancomycin and Zosyn empirically due to acute decompensation and shock.  No evidence of pneumonia or UTI by diagnostic studies.  No abdominal symptoms by history.  Decompensation probably due to heart failure.    10/23 - leading cause of hypoxic episode remains COPD exacerbation and advanced cardiomyopathy (systolic-Hf).  No s/s of Pna.  Family wishes to extubate.

## 2017-10-23 NOTE — PROGRESS NOTES
Cardiology Progress Note        SUBJECTIVE:     History of Present Illness:  Patient is a 81 y.o. male PMH CAD s/p cabg, s/p TAVR in 2013, CHFpEF, 45% baseline, COPD, PAF, thrombocytopenia, HLD.  Was admitted to [facility] ICU from Dr. ROONEY's office.   Now intubated, on pressor and amiodarone gtt. Elevated INR and Cr with decreased urine output.  ECHO showed decreased EF to 25 to 30%, moderate to severe AI, severely decreased RV systolic function.82 yo, male, PMH CAD s/p cabg, s/p TAVR in 2013, CHFpEF, 45% baseline, COPD, PAF, thrombocytopenia, HLD.  Was admitted to [facility] ICU from Dr. ROONEY's office.   Now intubated, on pressor and amiodarone gtt. Elevated INR and Cr with decreased urine output.  ECHO showed decreased EF to 25 to 30%, moderate to severe AI, severely decreased RV systolic function.    Today pt remains intubated, sedated with multiorgan failure. Family in discussion regarding end of life issues and withdrawal of care.    ROS: unable to do secondary to intubated/sedated pt      OBJECTIVE:     Vital Signs (Most Recent)  Temp: 99 °F (37.2 °C) (10/23/17 0600)  Pulse: (!) 114 (10/23/17 0728)  Resp: (!) 33 (10/23/17 0728)  BP: (!) 87/60 (10/23/17 0600)  SpO2: 100 % (10/23/17 0728)    Vital Signs Range (Last 24H):  Temp:  [95.3 °F (35.2 °C)-102.2 °F (39 °C)]   Pulse:  []   Resp:  [14-41]   BP: ()/(43-61)   SpO2:  [82 %-100 %]     Intake/Output last 3 shifts:  I/O last 3 completed shifts:  In: 8006.8 [I.V.:6086.8; NG/GT:420; IV Piggyback:1500]  Out: 1930 [Urine:1930]    Intake/Output this shift:  No intake/output data recorded.    Review of patient's allergies indicates:   Allergen Reactions    Doxycycline Nausea Only and Other (See Comments)     Dizziness     Pneumococcal vaccine        Current Facility-Administered Medications   Medication    acetaminophen tablet 650 mg    albuterol-ipratropium 2.5mg-0.5mg/3mL nebulizer solution 3 mL    albuterol-ipratropium 2.5mg-0.5mg/3mL nebulizer solution 3 mL     amiodarone 360 mg/200 mL (1.8 mg/mL) infusion    arformoterol nebulizer solution 15 mcg    aspirin chewable tablet 81 mg    bisacodyl suppository 10 mg    budesonide nebulizer solution 0.5 mg    chlorhexidine 0.12 % solution 15 mL    dexmedetomidine (PRECEDEX) 400mcg/100mL 0.9% NaCL infusion    dextrose 50% injection 12.5 g    fentaNYL 2500 mcg in D5W 250 mL infusion premix (titrating) (conc: 10 mcg/mL)    furosemide injection 40 mg    glucagon (human recombinant) injection 1 mg    insulin aspart pen 1-10 Units    levetiracetam (KEPPRA) 750 mg in dextrose 5 % 100 mL IVPB    lorazepam (ATIVAN) injection 2 mg    lorazepam (ATIVAN) injection 2 mg    meropenem-0.9% sodium chloride 500 mg/50 mL IVPB    methylPREDNISolone sodium succinate injection 40 mg    metoprolol tartrate (LOPRESSOR) split tablet 12.5 mg    norepinephrine 64 mg in dextrose 5 % 250 mL infusion    ondansetron injection 4 mg    pantoprazole injection 40 mg    promethazine (PHENERGAN) 6.25 mg in dextrose 5 % 50 mL IVPB     No current facility-administered medications on file prior to encounter.      Current Outpatient Prescriptions on File Prior to Encounter   Medication Sig    albuterol-ipratropium 2.5mg-0.5mg/3mL (DUO-NEB) 0.5 mg-3 mg(2.5 mg base)/3 mL nebulizer solution Take 3 mLs by nebulization every 8 (eight) hours as needed for Wheezing.    aspirin 81 MG Chew Take 81 mg by mouth once daily.    budesonide-formoterol 80-4.5 mcg (SYMBICORT) 80-4.5 mcg/actuation [facility] Inhale 2 puffs into the lungs 2 (two) times daily. Pharmacy to adjust to cheaper tier options    levetiracetam (KEPPRA) 750 MG Tab Take 1 tablet (750 mg total) by mouth 2 (two) times daily.    levoFLOXacin (LEVAQUIN) 250 MG tablet Take 1 tablet (250 mg total) by mouth once daily.    losartan (COZAAR) 25 MG tablet Take 0.5 tablets (12.5 mg total) by mouth nightly.    metoprolol tartrate (LOPRESSOR) 25 MG tablet Take 0.5 tablets (12.5 mg total) by mouth 2 (two)  times daily.    OXYGEN-AIR DELIVERY SYSTEMS MISC by [facility].(Non-Drug; Combo Route) route.    pantoprazole (PROTONIX) 40 MG tablet Take 1 tablet (40 mg total) by mouth once daily.    roflumilast 500 mcg Tab Take 1 tablet (500 mcg total) by mouth once daily.    spironolactone (ALDACTONE) 25 MG tablet Take 1 tablet (25 mg total) by mouth every evening.    tiotropium (SPIRIVA WITH HANDIHALER) 18 mcg inhalation capsule Inhale 1 capsule (18 mcg total) into the lungs once daily. Pharmacy to adjust to cheaper tier options    torsemide (DEMADEX) 20 MG Tab 20 mg once daily.     [DISCONTINUED] simvastatin (ZOCOR) 40 MG tablet TAKE 1 TABLET EVERY EVENING.       Physical Exam:  General appearance: intubated, sedated  Head: Normocephalic, without obvious abnormality, atraumatic  Eyes:  conjunctivae/corneas clear. PERRL, EOM's intact. Fundi benign.  Nose: no discharge  Throat: normal findings: tongue midline and normal  Neck: no adenopathy, no carotid bruit, no JVD, supple, symmetrical, trachea midline and thyroid not enlarged, symmetric, no tenderness/mass/nodules  Back:  no skin lesions, erythema, or scars  Lungs:  Coarse b/s b/l  Chest wall: no tenderness  Heart: tachycardic, difficult to palpate pulses  Abdomen: hypoactive bowl sounds      Laboratory:  Chemistry:   Lab Results   Component Value Date     10/23/2017    K 3.5 10/23/2017     10/23/2017    CO2 20 (L) 10/23/2017    BUN 73 (H) 10/23/2017    CREATININE 2.6 (H) 10/23/2017    GLUCOSE 103 01/07/2010    CALCIUM 7.1 (L) 10/23/2017     Cardiac Markers:   Lab Results   Component Value Date    CKMB 2.4 01/08/2013    TROPONINI 2.790 (H) 10/21/2017    TROPONINI 0.07 01/08/2013     Cardiac Markers (Last 3):   Lab Results   Component Value Date    CKMB 2.4 01/08/2013    CKMB 2.6 01/08/2013    TROPONINI 2.790 (H) 10/21/2017    TROPONINI 0.044 (H) 10/21/2017    TROPONINI 0.033 (H) 10/20/2017    TROPONINI 0.07 01/08/2013    TROPONINI 0.09 (H) 01/08/2013     CBC:    Lab Results   Component Value Date    WBC 7.90 10/23/2017    HGB 11.9 (L) 10/23/2017    HCT 37.6 (L) 10/23/2017    HCT 30 (L) 06/20/2013    MCV 95 10/23/2017    PLT 28 (LL) 10/23/2017     Lipids:   Lab Results   Component Value Date    CHOL 123 03/25/2013    TRIG 83 03/25/2013    HDL 40 03/25/2013     Coagulation:   Lab Results   Component Value Date    INR 2.5 (H) 10/21/2017    APTT 50.6 (H) 10/21/2017       Diagnostic Results:  ECG: Reviewed  X-Ray: Reviewed  US: Reviewed  CT: Reviewed  Echo: Reviewed      ASSESSMENT/PLAN:     Patient Active Problem List   Diagnosis    Aortic stenosis s/p TAVR    Moderate COPD (chronic obstructive pulmonary disease)    Lens replaced by other means - Both Eyes    Dry eye - Both Eyes    Posterior vitreous detachment, both eyes - Both Eyes    Brow ptosis - Both Eyes    Seizures    Dizziness    Pulmonary heart disease    Cardiomyopathy, ischemic    H/O aortic valve replacement    DJD (degenerative joint disease) of lumbar spine    Impaired gait    Thrombocytopenia, with anemia    Transient synovitis of right knee    Effusion of right knee    Arthritis of right knee    Pulmonary emphysema    Respiratory failure with hypoxia    Mitral stenosis-moderate    Acute on chronic combined systolic and diastolic congestive heart failure    Pseudophakia    Refractive error    Nonexudative senile macular degeneration of retina    Atherosclerosis of arteries of extremities    Hypercholesterolemia    Acute renal failure    Pulmonary hypertension    Elevation of cardiac enzymes    Hyperglycemia, unspecified    Respiratory failure, acute and chronic    Nocturnal hypoxemia due to emphysema    COPD exacerbation    Acute on chronic respiratory failure with hypoxia    Acute pulmonary edema    Coronary artery disease involving native coronary artery of native heart without angina pectoris    E. coli urinary tract infection    Acute respiratory failure with hypoxia  and hypercarbia    PAF (paroxysmal atrial fibrillation)    Chronic kidney disease, stage III (moderate)    Lactic acid acidosis    A-fib    Metabolic acidosis    Cardiogenic shock    JESSENIA (acute kidney injury)    MDRO (multiple drug resistant organisms) resistance    Acute liver failure without hepatic coma      Assessment/Plan:     Severely BIV systolic dysfunction.  Cardiogenic shock  S/p TAVR with moderate to severe AI  Afib with RVR  Elevated troponin NSTEMI vs demand  coagulopathy  ARF  Reps. Failure    Pt with poor prognosis as discussed previously. Currently on IV pressors and IV amiodarone.   Discuss with family regarding end of life again, continue current therapy as per wishes of wife/family but would DC amiodarone given acute liver injury along with hypotension requiring IV pressors.     Please call with questions.     Michael Hunter MD  Cardiovascular Disease  [facility], [place]  [phone] (P)

## 2017-10-23 NOTE — PROGRESS NOTES
Family conference held with SAI Walton.  They are in agreement with withdrawal of care.  Will withdraw care when all family has arrived to see patient.  Will continue to monitor patient.

## 2017-10-23 NOTE — PROGRESS NOTES
Pt again with seizure-like activity as before; see prev note. One time dose of 2mg Ativan given, with good result. Will continue to monitor.

## 2017-10-23 NOTE — PROGRESS NOTES
Met with spouse and sons X 2 at bedside.  Discussed worsening renal and liver function and failing SBT/SAT.  Family understands he will likely need NHP/Trach/PEG which is against patient wishes.  Family desires palliative vent withdrawal and will extubate to comfort care.  Will sign off.  Have discussed plan with Dr. Montoya.     MATT Ceballos [facility]

## 2017-10-23 NOTE — PROGRESS NOTES
"[facility] -   Critical Care Medicine  Progress Note    Patient Name: Orion Rinaldi  MRN: [id number]  Admission Date: 10/20/2017  Hospital Length of Stay: 3 days  Code Status: DNR  Attending Provider: Bowen Montoya MD  Primary Care Provider: Daisy Oconnor MD   Principal Problem: Acute on chronic respiratory failure with hypoxia    Subjective:     HPI:  Mr Gentry Sears  is a 80 yo male with Seizure disorder, S/P TAVR, CAD s/p CABG, HLD, COPD, Systolic Heart Failure CHF NYHA class 4 EF 45%,  and Chronic Resp Failure on 5 liters home oxygen and Trilogy who presented to ED from Cardiology office with Respiratory distress. The pt and his spouse reported the pt walked from  of [facility] and through several hallways. When he arrived to Cardiology clinic, O2 sat was in the 70s. Pt was sent to ED. In the ED, pt was found to be in respiratory distress. Bipap applied. CXR showed mild interstitial edema,tiny left pleural effusion.   The pt reports he felt at baseline this am. He did have large amount of leg edema 3 days ago and took extra Torsemide with improvement. The pt's spouse reports pt does not usually exert himself and walk as far as he walked today. Denies fever/chills, +SOB, +cough -chronic-denies change in cough. +leg edema-improved. Spouse denies weight gain. Weight on discharge 9/23/17 was 186lb, today's weight 189lb. Spouse alsoe reports pt's BP "always runs low".        Last seen July 2017  Orion Rinaldi is a 81 y.o. male with a PMH of Seizure disorder,  SP TAVR, CABG, CAD, Pulm HTN secondary to Chronic lung disease and Valvular disease, HLD, COPD, Systolic Heart Failure CHF NYHA class 4 and Chronic Resp Failure home O2 dependent.  HAS TRILOGY     Seen in clinic: home O2 dependent at 4 lpm on 24/7, Symbicort and Spiriva.    Frequent hospitalizations for  with Acute on Chronic Resp Failure, Acute on Chronic heart failure and COPD Exacerbation    Hospital/ICU " Course:  Developed A fib with RVR  Respiratory distress and failure required intubation  In MICU: Central line placed, BP marginal, Breathing over vent  PE considered: creatinine 1.7, platelet 33, Troponin 0.04, lactate  6.2  Too unstable to get CTA chest and elevated creatinine and low platelets  emperic abx added  Central line placed  Unable to thread A line    10/22 - Still on low dose Levophed infusion and full mech vent support.  104.5 temp early this AM    10/23 - Worsening renal failure and now Liver failure.  Severe resp distress with SAT/SBT    Review of Systems   Unable to perform ROS: Intubated       Objective:     Vital Signs (Most Recent):  Temp: 99 °F (37.2 °C) (10/23/17 0600)  Pulse: (!) 114 (10/23/17 0728)  Resp: (!) 33 (10/23/17 0728)  BP: (!) 87/60 (10/23/17 0600)  SpO2: 100 % (10/23/17 0728) Vital Signs (24h Range):  Temp:  [95.3 °F (35.2 °C)-102.2 °F (39 °C)] 99 °F (37.2 °C)  Pulse:  [] 114  Resp:  [14-41] 33  SpO2:  [82 %-100 %] 100 %  BP: ()/(43-64) 87/60     Weight: 92.6 kg (204 lb 2.3 oz)  Body mass index is 28.47 kg/m².      Intake/Output Summary (Last 24 hours) at 10/23/17 0735  Last data filed at 10/23/17 0605   Gross per 24 hour   Intake          4405.78 ml   Output             1385 ml   Net          3020.78 ml       Physical Exam   Constitutional: He appears well-developed. He appears lethargic. He appears toxic. He has a sickly appearance. He appears ill. He appears distressed. He is sedated, intubated and restrained.   HENT:   Head: Normocephalic and atraumatic.   Nose: Nose normal.   Mouth/Throat: Oropharynx is clear and moist. No oropharyngeal exudate.   Eyes: Pupils are equal, round, and reactive to light.   Neck: Neck supple. No tracheal deviation present. No thyromegaly present.       Left IJ TLC   Cardiovascular: Regular rhythm and normal heart sounds.  Tachycardia present.    Pulses:       Radial pulses are 1+ on the right side, and 1+ on the left side.         Dorsalis pedis pulses are Detected w/ doppler on the right side, and Detected w/ doppler on the left side.   Pulmonary/Chest: Tachypnea noted. He is intubated. He is in respiratory distress (on vent and sedated). He has decreased breath sounds in the right lower field and the left lower field. He has no wheezes. He has no rhonchi. He has no rales.   Abdominal: Soft. He exhibits no distension. Bowel sounds are decreased. There is no tenderness.   Genitourinary: Penis normal.   Genitourinary Comments: richey   Musculoskeletal: Normal range of motion. He exhibits no deformity.   +1 bilat pedal edema   Lymphadenopathy:     He has no cervical adenopathy.   Neurological: He appears lethargic.   Very restless off sedation   Skin: Skin is warm and dry. Capillary refill takes more than 3 seconds. Ecchymosis and petechiae noted. There is cyanosis (LEs) and erythema.   Nursing note and vitals reviewed.      Vents:  Vent Mode: A/C (10/23/17 0520)  Ventilator Initiated: Yes (10/21/17 0637)  Set Rate: 22 bmp (10/23/17 0520)  Vt Set: 0 mL (10/23/17 0520)  Pressure Support: 0 cmH20 (10/23/17 0520)  PEEP/CPAP: 8 cmH20 (10/23/17 0520)  Oxygen Concentration (%): 40 (10/23/17 0723)  Peak Airway Pressure: 19 cmH2O (10/23/17 0520)  Plateau Pressure: 0 cmH20 (10/23/17 0520)  Total Ve: 17.1 mL (10/23/17 0520)  F/VT Ratio<105 (RSBI): (!) 55.45 (10/23/17 0520)    Lines/Drains/Airways     Central Venous Catheter Line                 Percutaneous Central Line Insertion/Assessment - triple lumen  10/21/17 0745 left internal jugular 1 day          Drain                 NG/OG Tube 10/21/17 0610 [place] [facility] mouth 2 days         Urethral Catheter 10/21/17 0630 2 days          Airway                 Airway - Non-Surgical 10/21/17 0610 Endotracheal Tube 2 days          Arterial Line                 Arterial Line 10/21/17 1300 Right Radial 1 day          Peripheral Intravenous Line                 Peripheral IV - Single Lumen 10/21/17 0625 Left  Antecubital 2 days         Peripheral IV - Single Lumen 10/21/17 0700 Right Hand 2 days                Significant Labs:    CBC/Anemia Profile:    Recent Labs  Lab 10/21/17  0936 10/22/17  0612 10/23/17  0401   WBC 13.18* 5.63 7.90   HGB 10.3* 10.9* 11.9*   HCT 34.9* 35.3* 37.6*   PLT 40* 17* 28*   * 98 95   RDW 21.0* 20.7* 20.9*        Chemistries:    Recent Labs  Lab 10/21/17  0936  10/22/17  0009 10/22/17  0612 10/22/17  0938 10/23/17  0401     < > 136  --  136 136   K 5.4*  < > 5.6*  --  4.0 3.5   *  < > 110  --  105 101   CO2 13*  < > 8*  --  14* 20*   BUN 60*  < > 62*  --  69* 73*   CREATININE 1.9*  < > 1.9*  --  2.3* 2.6*   CALCIUM 8.0*  < > 8.1*  --  7.3* 7.1*   ALBUMIN 2.8*  --   --   --   --  2.4*   PROT 6.2  --   --   --   --  5.3*   BILITOT 2.5*  --   --   --   --  5.8*   ALKPHOS 80  --   --   --   --  80   ALT 19  --   --   --   --  3,401*   AST 26  --   --   --   --  1,603*   MG  --   < > 2.5 2.2  --  2.0   < > = values in this interval not displayed.    ABGs:   Recent Labs  Lab 10/23/17  0506   PH 7.474*   PCO2 29.6*   HCO3 21.7*   POCSATURATED 97   BE -2     Urine Culture: No results for input(s): LABURIN in the last 48 hours.    Significant Imaging:  CXR: I have reviewed all pertinent results/findings within the past 24 hours and my personal findings are:  no significant change      Assessment/Plan:     Problem   Cardiogenic Shock   Acute On Chronic Respiratory Failure With Hypoxia   Lavon (Acute Kidney Injury)   Thrombocytopenia, with anemia   Mdro (Multiple Drug Resistant Organisms) Resistance   Acute Liver Failure Without Hepatic Coma   E. Coli Urinary Tract Infection   Metabolic Acidosis   Paf (Paroxysmal Atrial Fibrillation)   Acute On Chronic Combined Systolic and Diastolic Congestive Heart Failure   H/O Aortic Valve Replacement   Moderate Copd (Chronic Obstructive Pulmonary Disease)   Coronary Artery Disease Involving Native Coronary Artery of Native Heart Without Angina  Pectoris   Seizures       1. Neuro: ICU neuro monitoring.  Cont supportive care and Keppra.  Daily sedation vacations     2. Pulmonary: Cont full vent support.  Vent settings reviewed and adjusted.  Cont Duonebs, Formeterol and Budesonide nebs.  Wean steroids.  Failed SBT/SAT with severe SOB and distress     3. Cardiac: ICU Cardiac monitoring.  Cont Amiodarone infusion and Wean Levophed infusion as tolerated.  Cont Lasix, Lopressor, and ASA     4. Renal: Tapia in place, monitor I/Os and support BP     5. Infectious Disease: Follow fever curve.  Urine + E.Coli MDR and sputum + GPC.  Blood cultures NGTD.  Change IVAB to Merrem     6. Hematology/Oncology: monitor for bleeding and transfuse Plts if any bleeding or Plt count drops < 10     7. Endocrine:  Monitor glucose stable      8. Fluids/Electrolytes/Nutrition/GI: IVFs with Bicarb and cont TF as tolerated     9. Musculoskeletal:  ROM     10. Pain Management: Fentanyl infusion     11. Discharge and Palliative Care: Strongly Consider comfort care given co-morbidities and MOSF now with worsening JESSENIA and Liver Failure.  Failed SBT and given severity of lung disease will likely require Trach and PEG and NHP.  Will discuss aggressive vs conservative care again with family today.      Preventive Measures and Monitoring:   Stress Ulcer: PPI  Nutrition: start TF  Glucose control: stable  Bowel prophylaxis: PRN Dulcolax  DVT prophylaxis: SCDs  Hx CAD on B-Blocker: Lopressor once shock resolved  Head of Bed/Reposition: Elevate HOB and turn Q1-2 hours    Early Mobility: OOB asap  SAT/SBT: daily  RASS goal: -2  Vent Day: #3  OG Day: #3  Central Line Left IJ Day: #3  Right Radial Arterial Line Day: #3  Tapia Day: #3  IVAB Day: #3  Code Status: DNR  PNA Vaccine: UTD  Flu Vaccine: UTD     Counseling/Consultation: I have discussed the care of this patient in detail with Multidisciplinary team during rounds, bedside nursing staff and Dr. Mario and Dr. Montoya    Critical Care Time: 55  minutes  Critical secondary to Patient has a condition that poses threat to life and bodily function: Resp Failure and distress  Patient is currently on drug therapy requiring intensive monitoring for toxicity: Levophed infusion      Critical care was time spent personally by me on the following activities: development of treatment plan with patient or surrogate and bedside caregivers, discussions with consultants, evaluation of patient's response to treatment, examination of patient, ordering and performing treatments and interventions, ordering and review of laboratory studies, ordering and review of radiographic studies, pulse oximetry, re-evaluation of patient's condition. This critical care time did not overlap with that of any other provider or involve time for any procedures.     Jersey Ceballos NP  Critical Care Medicine  [facility] [place]

## 2017-10-23 NOTE — PLAN OF CARE
Problem: Patient Care Overview  Goal: Plan of Care Review  Recommendations     Recommendation/Intervention:   1. Recommend comfort care

## 2017-10-23 NOTE — PROGRESS NOTES
"    [facility] -   Adult Nutrition  Consult Note    SUMMARY     Recommendations    Recommendation/Intervention:   1. Recommend comfort care       Reason for Assessment    Reason for Assessment: RD to follow up   Diagnosis:  (Respiratory Failure)  Relevent Medical History: COPD, CHF, CAD   Interdisciplinary Rounds: attend     General Information Comments: Pt extubated at this time to comfort measures.     Nutrition Prescription Ordered    Current Diet Order: NPO      Evaluation of Received Nutrients/Fluid Intake     % Intake of Estimated Energy Needs: 0- 25 %   % Meal Intake: NPO      Nutrition Risk Screen     Nutrition Risk Screen: no indicators present    Nutrition/Diet History     Food Preferences: ROSE MARIE   Factors Affecting Nutritional Intake: NPO    Labs/Tests/Procedures/Meds     Pertinent Labs Reviewed: reviewed  Pertinent Labs Comments:BUN 73, Cr 2.6, GFR 22, Glu 353, Ca 7.1, Alb 2.4, Total bilirubin 5.8, AST 1603, ALT 3401   Pertinent Medications Reviewed: reviewed      Physical Findings    Overall Physical Appearance:  Overweight   Oral/Mouth Cavity: tooth/teeth missing  Skin:  (skin tear x 2)    Anthropometrics    Temp: 99 °F (37.2 °C)     Height: 5' 11" (180.3 cm)  Weight Method: Bed Scale  Weight: 85.7 kg (188 lb 15 oz)  Ideal Body Weight (IBW), Male: 172 lb     % Ideal Body Weight, Male (lb): 109.85 lb     BMI (Calculated): 26.4  BMI Grade: 25 - 29.9 - overweight     Estimated/Assessed Needs    Weight Used For Calorie Calculations: 85.7 kg (188 lb 15 oz)      Energy Calorie Requirements (kcal): 1992      RMR (Eagle-St. Jeor Equation): 1594.12 x 1.25 = 1992     Weight Used For Protein Calculations: 85.7 kg (188 lb 15 oz)  Protein Requirements: 102.84   g      Fluid Need Method: 1ml/clint or per MD      CHO Requirement: n/a     Assessment and Plan    No nutrition diagnosis at this time       Nutrition Follow-Up    RD Follow-up?: No         "

## 2017-10-23 NOTE — PLAN OF CARE
Problem: Patient Care Overview  Goal: Plan of Care Review  Outcome: Ongoing (interventions implemented as appropriate)  Pt extubated this AM @ 1154. Will initiate comfort measures. Morphine PRN for comfort. Will continue to monitor.

## 2017-10-23 NOTE — SUBJECTIVE & OBJECTIVE
Review of Systems   Unable to perform ROS: Intubated       Objective:     Vital Signs (Most Recent):  Temp: 99 °F (37.2 °C) (10/23/17 0600)  Pulse: (!) 114 (10/23/17 0728)  Resp: (!) 33 (10/23/17 0728)  BP: (!) 87/60 (10/23/17 0600)  SpO2: 100 % (10/23/17 0728) Vital Signs (24h Range):  Temp:  [95.3 °F (35.2 °C)-102.2 °F (39 °C)] 99 °F (37.2 °C)  Pulse:  [] 114  Resp:  [14-41] 33  SpO2:  [82 %-100 %] 100 %  BP: ()/(43-64) 87/60     Weight: 92.6 kg (204 lb 2.3 oz)  Body mass index is 28.47 kg/m².      Intake/Output Summary (Last 24 hours) at 10/23/17 0735  Last data filed at 10/23/17 0605   Gross per 24 hour   Intake          4405.78 ml   Output             1385 ml   Net          3020.78 ml       Physical Exam   Constitutional: He appears well-developed. He appears lethargic. He appears toxic. He has a sickly appearance. He appears ill. He appears distressed. He is sedated, intubated and restrained.   HENT:   Head: Normocephalic and atraumatic.   Nose: Nose normal.   Mouth/Throat: Oropharynx is clear and moist. No oropharyngeal exudate.   Eyes: Pupils are equal, round, and reactive to light.   Neck: Neck supple. No tracheal deviation present. No thyromegaly present.       Left IJ TLC   Cardiovascular: Regular rhythm and normal heart sounds.  Tachycardia present.    Pulses:       Radial pulses are 1+ on the right side, and 1+ on the left side.        Dorsalis pedis pulses are Detected w/ doppler on the right side, and Detected w/ doppler on the left side.   Pulmonary/Chest: Tachypnea noted. He is intubated. He is in respiratory distress (on vent and sedated). He has decreased breath sounds in the right lower field and the left lower field. He has no wheezes. He has no rhonchi. He has no rales.   Abdominal: Soft. He exhibits no distension. Bowel sounds are decreased. There is no tenderness.   Genitourinary: Penis normal.   Genitourinary Comments: richey   Musculoskeletal: Normal range of motion. He exhibits  no deformity.   +1 bilat pedal edema   Lymphadenopathy:     He has no cervical adenopathy.   Neurological: He appears lethargic.   Very restless off sedation   Skin: Skin is warm and dry. Capillary refill takes more than 3 seconds. Ecchymosis and petechiae noted. There is cyanosis (LEs) and erythema.   Nursing note and vitals reviewed.      Vents:  Vent Mode: A/C (10/23/17 0520)  Ventilator Initiated: Yes (10/21/17 0637)  Set Rate: 22 bmp (10/23/17 0520)  Vt Set: 0 mL (10/23/17 0520)  Pressure Support: 0 cmH20 (10/23/17 0520)  PEEP/CPAP: 8 cmH20 (10/23/17 0520)  Oxygen Concentration (%): 40 (10/23/17 0723)  Peak Airway Pressure: 19 cmH2O (10/23/17 0520)  Plateau Pressure: 0 cmH20 (10/23/17 0520)  Total Ve: 17.1 mL (10/23/17 0520)  F/VT Ratio<105 (RSBI): (!) 55.45 (10/23/17 0520)    Lines/Drains/Airways     Central Venous Catheter Line                 Percutaneous Central Line Insertion/Assessment - triple lumen  10/21/17 0745 left internal jugular 1 day          Drain                 NG/OG Tube 10/21/17 0610 [place] sump [facility] mouth 2 days         Urethral Catheter 10/21/17 0630 2 days          Airway                 Airway - Non-Surgical 10/21/17 0610 Endotracheal Tube 2 days          Arterial Line                 Arterial Line 10/21/17 1300 Right Radial 1 day          Peripheral Intravenous Line                 Peripheral IV - Single Lumen 10/21/17 0625 Left Antecubital 2 days         Peripheral IV - Single Lumen 10/21/17 0700 Right Hand 2 days                Significant Labs:    CBC/Anemia Profile:    Recent Labs  Lab 10/21/17  0936 10/22/17  0612 10/23/17  0401   WBC 13.18* 5.63 7.90   HGB 10.3* 10.9* 11.9*   HCT 34.9* 35.3* 37.6*   PLT 40* 17* 28*   * 98 95   RDW 21.0* 20.7* 20.9*        Chemistries:    Recent Labs  Lab 10/21/17  0936  10/22/17  0009 10/22/17  0612 10/22/17  0938 10/23/17  0401     < > 136  --  136 136   K 5.4*  < > 5.6*  --  4.0 3.5   *  < > 110  --  105 101   CO2 13*  < > 8*   --  14* 20*   BUN 60*  < > 62*  --  69* 73*   CREATININE 1.9*  < > 1.9*  --  2.3* 2.6*   CALCIUM 8.0*  < > 8.1*  --  7.3* 7.1*   ALBUMIN 2.8*  --   --   --   --  2.4*   PROT 6.2  --   --   --   --  5.3*   BILITOT 2.5*  --   --   --   --  5.8*   ALKPHOS 80  --   --   --   --  80   ALT 19  --   --   --   --  3,401*   AST 26  --   --   --   --  1,603*   MG  --   < > 2.5 2.2  --  2.0   < > = values in this interval not displayed.    ABGs:   Recent Labs  Lab 10/23/17  0506   PH 7.474*   PCO2 29.6*   HCO3 21.7*   POCSATURATED 97   BE -2     Urine Culture: No results for input(s): LABURIN in the last 48 hours.    Significant Imaging:  CXR: I have reviewed all pertinent results/findings within the past 24 hours and my personal findings are:  no significant change

## 2017-10-23 NOTE — PLAN OF CARE
Problem: Patient Care Overview  Goal: Plan of Care Review  Outcome: Ongoing (interventions implemented as appropriate)  Pt back on sedative gtt. Temp stable at 97.9, tolerating tube feeds. Pressor gtt infusing. VSS. Family at bedside. POC reviewed.

## 2017-10-23 NOTE — PROGRESS NOTES
"[facility] Hospital Medicine  Progress Note    Patient Name: Orion Rinaldi  MRN: [id number]  Patient Class: IP- Inpatient   Admission Date: 10/20/2017  Length of Stay: 3 days  Attending Physician: Bowen Montoya MD  Primary Care Provider: Daisy Oconnor MD        Subjective:     Principal Problem:Acute on chronic respiratory failure with hypoxia    HPI:  The pt is a 80 yo male with Seizure disorder, S/P TAVR, CAD s/p CABG, HLD, COPD, Systolic Heart Failure CHF NYHA class 4 EF 45%,  and Chronic Resp Failure on 5 liters home oxygen and Trilogy who presented to ED from Cardiology office with Respiratory distress. The pt and his spouse reported the pt walked from  of [facility] and through several hallways. When he arrived to Cardiology clinic, O2 sat was in the 70s. Pt was sent to ED. In the ED, pt was found to be in respiratory distress. Bipap applied. CXR showed mild interstitial edema,tiny left pleural effusion.   The pt reports he felt at baseline this am. He did have large amount of leg edema 3 days ago and took extra Torsemide with improvement. The pt's spouse reports pt does not usually exert himself and walk as far as he walked today. Denies fever/chills, +SOB, +cough -chronic-denies change in cough. +leg edema-improved. Spouse denies weight gain. Weight on discharge 9/23/17 was 186lb, today's weight 189lb. Spouse alsoe reports pt's BP "always runs low".     Hospital Course:  Admitted initially to ICU on continuous BiPAP but weaned to nasal cannula in the ER.  He was then downgraded to telemetry and admitted to the floor.  CT chest had some small atelectatic changes in the left lung base but no evidence of consolidation or edema.  Urinalysis showed only small proteinuria.  He was stable most of the night but became tachycardic around 4 am and tachypneic with increased work of breathing.  The night physician evaluated him and called an intubation code and transferred him to " the ICU.  He was stabilized in the ICU.  Starting Vancomycin and Zosyn empirically due to acute decompensation and shock.  No evidence of pneumonia or UTI by diagnostic studies.  No abdominal symptoms by history.  Decompensation probably due to heart failure.  Family changed his code status to DNR.10/23 - + fever to 100, ESBL in urine, now on meropenem.  meeting with family, son said mom is not returning and he is surrogate decision maker.  Three siblings present and concur, comfort measures and extubation are consistent with patients wishes.  Will consult neuro to determine if pt is having seizures complicating neuro status.        Review of Systems   Unable to perform ROS: Intubated     Objective:     Vital Signs (Most Recent):  Temp: (!) 100.9 °F (38.3 °C) (10/23/17 1045)  Pulse: (!) 116 (10/23/17 1155)  Resp: (!) 29 (10/23/17 1155)  BP: (!) 100/51 (10/23/17 1045)  SpO2: 97 % (10/23/17 1155) Vital Signs (24h Range):  Temp:  [96.4 °F (35.8 °C)-102.2 °F (39 °C)] 100.9 °F (38.3 °C)  Pulse:  [] 116  Resp:  [16-41] 29  SpO2:  [82 %-100 %] 97 %  BP: ()/(43-88) 100/51     Weight: 92.6 kg (204 lb 2.3 oz)  Body mass index is 28.47 kg/m².    Intake/Output Summary (Last 24 hours) at 10/23/17 1246  Last data filed at 10/23/17 0605   Gross per 24 hour   Intake          3391.23 ml   Output             1125 ml   Net          2266.23 ml      Physical Exam   Constitutional: He appears well-developed and well-nourished. No distress.   HENT:   Head: Normocephalic and atraumatic.   Mouth/Throat: Oropharynx is clear and moist.   Eyes: Conjunctivae and EOM are normal. Pupils are equal, round, and reactive to light.   Neck: No JVD present. No thyromegaly present.   Cardiovascular: Normal rate, regular rhythm and normal heart sounds.  Exam reveals no gallop and no friction rub.    No murmur heard.  Digital and acral cyanosis.   Pulmonary/Chest: Effort normal and breath sounds normal. No respiratory distress. He has no  wheezes. He has no rales.   Abdominal: Soft. Bowel sounds are normal. He exhibits no distension. There is no tenderness. There is no rebound and no guarding.   Musculoskeletal: Normal range of motion. He exhibits edema. He exhibits no tenderness.   2+ pitting pre-tibial edema bilaterally.   Lymphadenopathy:     He has no cervical adenopathy.   Neurological: He has normal reflexes. He displays normal reflexes. No cranial nerve deficit.   Intubated and sedated.  Spontaneously moving all four extremities but not following commands.   Skin: Skin is warm and dry. No rash noted. He is not diaphoretic. No erythema.   Scattered varicosities in the lower extremities.  Streak-like erythema on the dorsal surfaces of both feet consistent with micro skin tears from sudden swelling as the patient described.       Significant Labs: All pertinent labs within the past 24 hours have been reviewed.    Significant Imaging: I have reviewed and interpreted all pertinent imaging results/findings within the past 24 hours.    Assessment/Plan:      * Acute on chronic respiratory failure with hypoxia    Likely secondary to decompensated CHF and COPD exacerbation.  IV lasix, IV solumedrol, Neb txs, I/O,  Daily weights.  Intubated for ventilatory support.  Starting Vancomycin and Zosyn empirically due to acute decompensation and shock.  No evidence of pneumonia or UTI by diagnostic studies.  No abdominal symptoms by history.  Decompensation probably due to heart failure.    10/23 - leading cause of hypoxic episode remains COPD exacerbation and advanced cardiomyopathy (systolic-Hf).  No s/s of Pna.  Family wishes to extubate.          COPD exacerbation    IV Steroids  Neb txs     10/23 - as above          Seizures    Cont Levetiracetam.  Lorazepam as needed for seizure.    10/23 as above, neurology consulted.          Acute on chronic combined systolic and diastolic congestive heart failure    IV Lasix  I/O   Daily weights         E. coli  urinary tract infection    On meropenem,           Lactic acid acidosis    No S/S infection-No Sepsis   Will obtain CT chest to rule out PNA  Lactic acidosis likely secondary to Acute/chronic cyanosis/hypoxemia   Monitor closely         PAF (paroxysmal atrial fibrillation)    BB  No Anticoagulation due to thrombocytopenia           Coronary artery disease involving native coronary artery of native heart without angina pectoris              Thrombocytopenia, with anemia    Likely secondary to MDS  Pt/spouse declined BM bx         Aortic stenosis s/p TAVR                VTE Risk Mitigation     None          Critical care time spent on the evaluation and treatment of severe organ dysfunction, review of pertinent labs and imaging studies, discussions with consulting providers and discussions with patient/family: 51 minutes.    Bowen Montoya MD  Department of Hospital Medicine   [facility]

## 2017-10-23 NOTE — SUBJECTIVE & OBJECTIVE
Review of Systems   Unable to perform ROS: Intubated     Objective:     Vital Signs (Most Recent):  Temp: (!) 100.9 °F (38.3 °C) (10/23/17 1045)  Pulse: (!) 116 (10/23/17 1155)  Resp: (!) 29 (10/23/17 1155)  BP: (!) 100/51 (10/23/17 1045)  SpO2: 97 % (10/23/17 1155) Vital Signs (24h Range):  Temp:  [96.4 °F (35.8 °C)-102.2 °F (39 °C)] 100.9 °F (38.3 °C)  Pulse:  [] 116  Resp:  [16-41] 29  SpO2:  [82 %-100 %] 97 %  BP: ()/(43-88) 100/51     Weight: 92.6 kg (204 lb 2.3 oz)  Body mass index is 28.47 kg/m².    Intake/Output Summary (Last 24 hours) at 10/23/17 1246  Last data filed at 10/23/17 0605   Gross per 24 hour   Intake          3391.23 ml   Output             1125 ml   Net          2266.23 ml      Physical Exam   Constitutional: He appears well-developed and well-nourished. No distress.   HENT:   Head: Normocephalic and atraumatic.   Mouth/Throat: Oropharynx is clear and moist.   Eyes: Conjunctivae and EOM are normal. Pupils are equal, round, and reactive to light.   Neck: No JVD present. No thyromegaly present.   Cardiovascular: Normal rate, regular rhythm and normal heart sounds.  Exam reveals no gallop and no friction rub.    No murmur heard.  Digital and acral cyanosis.   Pulmonary/Chest: Effort normal and breath sounds normal. No respiratory distress. He has no wheezes. He has no rales.   Abdominal: Soft. Bowel sounds are normal. He exhibits no distension. There is no tenderness. There is no rebound and no guarding.   Musculoskeletal: Normal range of motion. He exhibits edema. He exhibits no tenderness.   2+ pitting pre-tibial edema bilaterally.   Lymphadenopathy:     He has no cervical adenopathy.   Neurological: He has normal reflexes. He displays normal reflexes. No cranial nerve deficit.   Intubated and sedated.  Spontaneously moving all four extremities but not following commands.   Skin: Skin is warm and dry. No rash noted. He is not diaphoretic. No erythema.   Scattered varicosities in  the lower extremities.  Streak-like erythema on the dorsal surfaces of both feet consistent with micro skin tears from sudden swelling as the patient described.       Significant Labs: All pertinent labs within the past 24 hours have been reviewed.    Significant Imaging: I have reviewed and interpreted all pertinent imaging results/findings within the past 24 hours.

## 2017-10-24 PROBLEM — J96.21 ACUTE ON CHRONIC RESPIRATORY FAILURE WITH HYPOXIA: Status: RESOLVED | Noted: 2017-01-01 | Resolved: 2017-10-24

## 2017-10-24 PROBLEM — R57.0 CARDIOGENIC SHOCK: Status: RESOLVED | Noted: 2017-01-01 | Resolved: 2017-10-24

## 2017-10-24 NOTE — SIGNIFICANT EVENT
DEATH NOTE    Called by nursing staff to pronounce patient death. Upon entering room, patient is without any visible respiratory movements, no corneal reflex, no heard sounds heard, no carotid or radial pulses palpable, pupils dilated and fixed. Monitor shows asystole. The patient is declared dead at 1908 (7:08 PM) on October 23, 2017. Offered condolences to the family at the bedside.    - Jason Meier MD

## 2017-10-24 NOTE — NURSING
Report received from VILLA Baez/Sonya RN , Pt  at time of report. TOD -Dr. Meier. Post mortem care performed.  home notified. Supportive care provided to family.

## 2017-10-24 NOTE — PROGRESS NOTES
Patient koko'd to asystole on the monitor.  Family at the bedside.  Dr. Meier notified to pronounce patient.  Time of death 19:08.

## 2017-10-24 NOTE — DISCHARGE SUMMARY
"[facility] Hospital Medicine  Discharge Summary      Patient Name: Orion Rinaldi  MRN: [id number]  Admission Date: 10/20/2017  Hospital Length of Stay: 3 days  Discharge Date and Time:  10/23/2017 7:10PM  Attending Physician: No att. providers found   Discharging Provider: Bowen Montoya MD  Primary Care Provider: Daisy Oconnor MD      HPI:   The pt is a 80 yo male with Seizure disorder, S/P TAVR, CAD s/p CABG, HLD, COPD, Systolic Heart Failure CHF NYHA class 4 EF 45%,  and Chronic Resp Failure on 5 liters home oxygen and Trilogy who presented to ED from Cardiology office with Respiratory distress. The pt and his spouse reported the pt walked from  of [facility] and through several hallways. When he arrived to Cardiology clinic, O2 sat was in the 70s. Pt was sent to ED. In the ED, pt was found to be in respiratory distress. Bipap applied. CXR showed mild interstitial edema,tiny left pleural effusion.   The pt reports he felt at baseline this am. He did have large amount of leg edema 3 days ago and took extra Torsemide with improvement. The pt's spouse reports pt does not usually exert himself and walk as far as he walked today. Denies fever/chills, +SOB, +cough -chronic-denies change in cough. +leg edema-improved. Spouse denies weight gain. Weight on discharge 9/23/17 was 186lb, today's weight 189lb. Spouse alsoe reports pt's BP "always runs low".     * No surgery found *      Indwelling Lines/Drains at time of discharge:   Lines/Drains/Airways     Central Venous Catheter Line                 Percutaneous Central Line Insertion/Assessment - triple lumen  10/21/17 0745 left internal jugular 2 days          Drain                 Urethral Catheter 10/21/17 0630 3 days              Hospital Course:   Admitted initially to ICU on continuous BiPAP but weaned to nasal cannula in the ER.  He was then downgraded to telemetry and admitted to the floor.  CT chest had some small atelectatic " changes in the left lung base but no evidence of consolidation or edema.  Urinalysis showed only small proteinuria.  He was stable most of the night but became tachycardic around 4 am and tachypneic with increased work of breathing.  The night physician evaluated him and called an intubation code and transferred him to the ICU.  He was stabilized in the ICU.  Starting Vancomycin and Zosyn empirically due to acute decompensation and shock.  No evidence of pneumonia or UTI by diagnostic studies.  No abdominal symptoms by history.  Decompensation probably due to heart failure.  Family changed his code status to DNR.10/23 - + fever to 100, ESBL in urine, now on meropenem.  meeting with family, son said mom is not returning and he is surrogate decision maker.  Three siblings present and concur, comfort measures and extubation are consistent with patients wishes.  Case discussed with neuro, pt had been on antiepileptic and responsive to family, thought not having seizures complicating neuro status.  Pt  at 19:10     Consults:   Consults         Status Ordering Provider     Inpatient consult to Cardiology  Once     Provider:  Priyank Saab MD    Completed ROMINA BOURNE     Inpatient consult to Pulmonology  Once     Provider:  (Not yet assigned)    Completed KIP HYATT consult to dietary  Once     Provider:  (Not yet assigned)    Completed ROMINA BOURNE              Pending Diagnostic Studies:     None        Final Active Diagnoses:    Diagnosis Date Noted POA    COPD exacerbation [J44.1] 2017 Yes    Seizures [R56.9]  Yes     Chronic    Acute on chronic combined systolic and diastolic congestive heart failure [I50.43] 2016 Yes    E. coli urinary tract infection [N39.0, B96.20] 2017 Yes    MDRO (multiple drug resistant organisms) resistance [Z16.35] 10/23/2017 Yes    Acute liver failure without hepatic coma [K72.00] 10/23/2017 No    A-fib [I48.91] 10/21/2017 Yes     Metabolic acidosis [E87.2] 10/21/2017 Yes    JESSENIA (acute kidney injury) [N17.9] 10/21/2017 Yes    Lactic acid acidosis [E87.2] 10/20/2017 Yes    PAF (paroxysmal atrial fibrillation) [I48.0] 2017 Yes    Coronary artery disease involving native coronary artery of native heart without angina pectoris [I25.10] 2017 Yes     Chronic    Thrombocytopenia, with anemia [D69.6] 08/10/2015 Yes     Chronic    Aortic stenosis s/p TAVR [I35.0] 2013 Yes      Problems Resolved During this Admission:    Diagnosis Date Noted Date Resolved POA    PRINCIPAL PROBLEM:  Acute on chronic respiratory failure with hypoxia [J96.21] 2017 10/24/2017 Yes    Cardiogenic shock [R57.0] 10/21/2017 10/24/2017 Yes    Hyperosmolar syndrome [E87.0] 10/21/2017 10/21/2017 Yes    Acute respiratory failure with hypoxia [J96.01] 10/20/2017 10/21/2017 Yes      No new Assessment & Plan notes have been filed under this hospital service since the last note was generated.  Service: Hospital Medicine      Discharged Condition:     Disposition:     Follow Up:    Patient Instructions:   No discharge procedures on file.  Medications:  Reconciled Home Medications:   Discharge Medication List as of 10/23/2017 10:37 PM      CONTINUE these medications which have NOT CHANGED    Details   albuterol-ipratropium 2.5mg-0.5mg/3mL (DUO-NEB) 0.5 mg-3 mg(2.5 mg base)/3 mL nebulizer solution Take 3 mLs by nebulization every 8 (eight) hours as needed for Wheezing., Starting 11/10/2016, Until Fri 11/10/17, Normal      aspirin 81 MG Chew Take 81 mg by mouth once daily., Until Discontinued, Historical Med      budesonide-formoterol 80-4.5 mcg (SYMBICORT) 80-4.5 mcg/actuation HFAA Inhale 2 puffs into the lungs 2 (two) times daily. Pharmacy to adjust to cheaper tier options, Starting 2016, Until 17, Print      levetiracetam (KEPPRA) 750 MG Tab Take 1 tablet (750 mg total) by mouth 2 (two) times daily., Starting 2017, Until  Discontinued, Normal      levoFLOXacin (LEVAQUIN) 250 MG tablet Take 1 tablet (250 mg total) by mouth once daily., Starting Sat 9/23/2017, Print      losartan (COZAAR) 25 MG tablet Take 0.5 tablets (12.5 mg total) by mouth nightly., Starting Wed 7/12/2017, Until Thu 7/12/2018, Normal      metoprolol tartrate (LOPRESSOR) 25 MG tablet Take 0.5 tablets (12.5 mg total) by mouth 2 (two) times daily., Starting 1/5/2017, Until Fri 1/5/18, Normal      OXYGEN-AIR DELIVERY SYSTEMS MISC by Misc.(Non-Drug; Combo Route) route., Until Discontinued, Historical Med      pantoprazole (PROTONIX) 40 MG tablet Take 1 tablet (40 mg total) by mouth once daily., Starting Sun 9/24/2017, Until Mon 9/24/2018, Print      roflumilast 500 mcg Tab Take 1 tablet (500 mcg total) by mouth once daily., Starting Thu 7/6/2017, Normal      spironolactone (ALDACTONE) 25 MG tablet Take 1 tablet (25 mg total) by mouth every evening., Starting Mon 7/10/2017, Until Tue 7/10/2018, Normal      tiotropium (SPIRIVA WITH HANDIHALER) 18 mcg inhalation capsule Inhale 1 capsule (18 mcg total) into the lungs once daily. Pharmacy to adjust to cheaper tier options, Starting 11/1/2016, Until Wed 11/1/17, Print      torsemide (DEMADEX) 20 MG Tab 20 mg once daily. , Starting Fri 7/7/2017, Historical Med         STOP taking these medications       simvastatin (ZOCOR) 40 MG tablet Comments:   Reason for Stopping:             Time spent on the discharge of patient: 55 minutes      Bowen Montoya MD  Department of Hospital Medicine  [facility]

## 2017-10-25 LAB
BACTERIA BLD CULT: NORMAL
BACTERIA BLD CULT: NORMAL

## 2017-10-26 ENCOUNTER — TELEPHONE (OUTPATIENT)
Dept: PULMONOLOGY | Facility: CLINIC | Age: 82
End: 2017-10-26

## 2017-10-26 LAB
BACTERIA BLD CULT: NORMAL
BACTERIA BLD CULT: NORMAL

## 2018-01-22 PROBLEM — J81.0 ACUTE PULMONARY EDEMA: Status: RESOLVED | Noted: 2017-01-01 | Resolved: 2018-01-22

## 2019-04-11 NOTE — TELEPHONE ENCOUNTER
Dr. Solano returned call and spoke  with spouse;    Patient spouse was instructed to alternate  Lasix 20mg and Lasix 40mg     Aldactone to once daily.  BMP Friday.   Hpi Title: Evaluation of Skin Lesions How Severe Are Your Spot(S)?: moderate Have Your Spot(S) Been Treated In The Past?: has not been treated Family Member: Father 4 = completely dependent

## 2021-01-20 NOTE — NURSING TRANSFER
Nursing Transfer Note      1/4/2017     Transfer from ICU 3 to Telemetry 247.    Transfer via wheelchair.    Transfer with portable cardiac monitor.    Transported by SHARRON Miller RN    Medicines sent: NA    Chart send with patient: yes    Notified: Patient's spouse at bedside     Bedside report given to Lucy DRIVER   all other ROS negative except as per HPI
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
## 2022-09-24 NOTE — ASSESSMENT & PLAN NOTE
Problem: Pain  Goal: #Acceptable pain level achieved/maintained at rest using NRS/Faces  Description: This goal is used for patients who can self-report.  Acceptable means the level is at or below the identified comfort/function goal.  Outcome: Outcome Met, Continue evaluating goal progress toward completion  Goal: # Acceptable pain level achieved/maintained at rest using NRS/Faces without oversedation (opioid naive or PCA/Epidural infusion)  Description: This goal is used if Opioid-naïve or on PCA/Epidural Infusion.  Outcome: Outcome Met, Continue evaluating goal progress toward completion  Goal: # Acceptable pain level achieved/maintained with activity using NRS/Faces  Description: This goal is used for patients who can self-report and are not achieving acceptable pain control during activity.  Outcome: Outcome Met, Continue evaluating goal progress toward completion  Goal: Acceptable pain/comfort level is achieved/maintained at rest (based on Pain Behaviors Scale)  Description: This goal is used for patients who are not able to self-report pain and are assessed for pain using the Pain Behaviors Scale  Outcome: Outcome Met, Continue evaluating goal progress toward completion  Goal: Acceptable pain/comfort level is achieved/maintained at rest based on PAINAID scale (Dementia)  Description: This goal is used for patients who are not able to self-report pain, have dementia, and assessed using the PAINAD scale.  Outcome: Outcome Met, Continue evaluating goal progress toward completion  Goal: Acceptable pain/comfort level is achieved/maintained at rest (based on pediatric behavior tool: NIPS, NPASS, or FLACC)  Description: This goal is used for pediatric patients who are not able to self report pain.  Outcome: Outcome Met, Continue evaluating goal progress toward completion  Goal: # Verbalizes understanding of pain management  Description: Documented in Patient Education Activity  Outcome: Outcome Met, Continue  Spirolactone on hold  Lasix gtt reduced to 5 mg /hr     evaluating goal progress toward completion  Goal: Verbalizes understanding and effective use of Patient Controlled Analgesia (PCA)  Description: Documented in Patient Education Activity  This goal is used for patients with PCA  Outcome: Outcome Met, Continue evaluating goal progress toward completion  Goal: Maximum comfort achieved/maintained at end of life (Hospice)  Outcome: Outcome Met, Continue evaluating goal progress toward completion     Problem: Risk for Infection re: Immunocompromise  Goal: # Patient free of symptoms of infection  Description: e.g. absence of fever/chills, Vital Signs are maintained within parameters  Outcome: Outcome Met, Continue evaluating goal progress toward completion  Goal: # Patient free of signs of infection  Description: Elevated WBC, low absolute neutrophil count, and positive cultures indicate infectious process  Outcome: Outcome Met, Continue evaluating goal progress toward completion  Goal: # Bowel function maintained/improved  Description: To reduce portal of entry and recognize if increased risk  Outcome: Outcome Met, Continue evaluating goal progress toward completion  Goal: Verbalizes understanding of s/s and care strategies to prevent infection  Description: Document in Patient Education Activity  Outcome: Outcome Met, Continue evaluating goal progress toward completion     Problem: At Risk for Falls  Goal: # Patient does not fall  Outcome: Outcome Met, Continue evaluating goal progress toward completion  Goal: # Takes action to control fall-related risks  Outcome: Outcome Met, Continue evaluating goal progress toward completion  Goal: # Verbalizes understanding of fall risk/precautions  Description: Document education using the patient education activity  Outcome: Outcome Met, Continue evaluating goal progress toward completion     Problem: At Risk for Injury Due to Fall  Goal: # Patient does not fall  Outcome: Outcome Met, Continue evaluating goal progress toward  completion  Goal: # Takes action to control condition specific risks  Outcome: Outcome Met, Continue evaluating goal progress toward completion  Goal: # Verbalizes understanding of fall-related injury personal risks  Description: Document education using the patient education activity  Outcome: Outcome Met, Continue evaluating goal progress toward completion

## 2023-05-30 NOTE — PROGRESS NOTES
Trough = 12.1 @ 1529  Scr = 1.5 trended down from 1.8 yesterday   Blood cxs pending  Dx: pneumonia/sepsis (goal trough = 15-20)  Est crcl = 42.5 ml/min  Wbc = 2.3   tmax = 98.8 F  1st dose was entered as a 1x time and given at 2100 and  Maintenance doses were followed with 1st initial dose to be given 12 hrs later?? Nurse did not give this morning -adjusted dose 24hrs since last dose given  Continue current dosing and if scr improves again may need to increase freq to 18hrs.  Ordering a random for 18hr bassam @ 1500 on 9/21  /Emmy Ling [facility] 9/20/2017 5:11 PM       Minoxidil Counseling: Minoxidil is a topical medication which can increase blood flow where it is applied. It is uncertain how this medication increases hair growth. Side effects are uncommon and include stinging and allergic reactions.
